# Patient Record
Sex: FEMALE | Race: WHITE | NOT HISPANIC OR LATINO | Employment: OTHER | URBAN - METROPOLITAN AREA
[De-identification: names, ages, dates, MRNs, and addresses within clinical notes are randomized per-mention and may not be internally consistent; named-entity substitution may affect disease eponyms.]

---

## 2019-09-18 ENCOUNTER — APPOINTMENT (OUTPATIENT)
Dept: ONCOLOGY | Facility: CLINIC | Age: 69
End: 2019-09-18

## 2019-10-25 ENCOUNTER — TRANSCRIBE ORDERS (OUTPATIENT)
Dept: ADMINISTRATIVE | Facility: HOSPITAL | Age: 69
End: 2019-10-25

## 2019-10-25 DIAGNOSIS — Z85.3 PERSONAL HISTORY OF MALIGNANT NEOPLASM OF BREAST: Primary | ICD-10-CM

## 2019-10-30 ENCOUNTER — HOSPITAL ENCOUNTER (OUTPATIENT)
Dept: MRI IMAGING | Facility: HOSPITAL | Age: 69
Discharge: HOME | End: 2019-10-30
Admitting: NURSE PRACTITIONER

## 2019-10-30 DIAGNOSIS — Z85.3 PERSONAL HISTORY OF MALIGNANT NEOPLASM OF BREAST: ICD-10-CM

## 2019-10-30 LAB — CREAT BLDA-MCNC: 1.2 MG/DL (ref 0.6–1.3)

## 2019-10-30 PROCEDURE — A9576 INJ PROHANCE MULTIPACK: HCPCS | Performed by: NURSE PRACTITIONER

## 2019-10-30 PROCEDURE — 82565 ASSAY OF CREATININE: CPT

## 2019-10-30 PROCEDURE — C8937 CAD BREAST MRI: HCPCS

## 2019-10-30 PROCEDURE — C8908 MRI W/O FOL W/CONT, BREAST,: HCPCS

## 2019-10-30 PROCEDURE — 25010000002 GADOTERIDOL PER 1 ML: Performed by: NURSE PRACTITIONER

## 2019-10-30 RX ADMIN — GADOTERIDOL 20 ML: 279.3 INJECTION, SOLUTION INTRAVENOUS at 15:45

## 2019-11-18 ENCOUNTER — TRANSCRIBE ORDERS (OUTPATIENT)
Dept: ADMINISTRATIVE | Facility: HOSPITAL | Age: 69
End: 2019-11-18

## 2019-11-18 DIAGNOSIS — K22.89 ESOPHAGEAL THICKENING: Primary | ICD-10-CM

## 2019-11-21 ENCOUNTER — APPOINTMENT (OUTPATIENT)
Dept: CT IMAGING | Facility: HOSPITAL | Age: 69
End: 2019-11-21

## 2019-11-26 ENCOUNTER — APPOINTMENT (OUTPATIENT)
Dept: MRI IMAGING | Facility: HOSPITAL | Age: 69
End: 2019-11-26

## 2019-11-26 ENCOUNTER — APPOINTMENT (OUTPATIENT)
Dept: CT IMAGING | Facility: HOSPITAL | Age: 69
End: 2019-11-26

## 2019-11-26 ENCOUNTER — TRANSCRIBE ORDERS (OUTPATIENT)
Dept: ADMINISTRATIVE | Facility: HOSPITAL | Age: 69
End: 2019-11-26

## 2019-11-26 ENCOUNTER — HOSPITAL ENCOUNTER (OUTPATIENT)
Dept: MRI IMAGING | Facility: HOSPITAL | Age: 69
End: 2019-11-26

## 2019-11-26 ENCOUNTER — HOSPITAL ENCOUNTER (OUTPATIENT)
Dept: CT IMAGING | Facility: HOSPITAL | Age: 69
Discharge: HOME | End: 2019-11-26
Admitting: NURSE PRACTITIONER

## 2019-11-26 DIAGNOSIS — K22.89 ESOPHAGEAL THICKENING: ICD-10-CM

## 2019-11-26 DIAGNOSIS — K22.89 ESOPHAGEAL THICKENING: Primary | ICD-10-CM

## 2019-11-26 LAB — CREAT BLDA-MCNC: 1 MG/DL (ref 0.6–1.3)

## 2019-11-26 PROCEDURE — 82565 ASSAY OF CREATININE: CPT

## 2019-11-26 PROCEDURE — 74160 CT ABDOMEN W/CONTRAST: CPT

## 2019-11-26 PROCEDURE — 71260 CT THORAX DX C+: CPT

## 2019-11-26 PROCEDURE — 0 IOPAMIDOL PER 1 ML: Performed by: NURSE PRACTITIONER

## 2019-11-26 RX ADMIN — IOPAMIDOL 100 ML: 755 INJECTION, SOLUTION INTRAVENOUS at 16:30

## 2023-11-15 ENCOUNTER — HOSPITAL ENCOUNTER (OUTPATIENT)
Dept: CARDIOLOGY | Facility: HOSPITAL | Age: 73
Discharge: HOME | End: 2023-11-15
Payer: MEDICARE

## 2023-11-15 ENCOUNTER — HOSPITAL ENCOUNTER (OUTPATIENT)
Dept: GENERAL RADIOLOGY | Facility: HOSPITAL | Age: 73
Discharge: HOME | End: 2023-11-15
Payer: MEDICARE

## 2023-11-15 ENCOUNTER — LAB (OUTPATIENT)
Dept: LAB | Facility: HOSPITAL | Age: 73
End: 2023-11-15
Payer: MEDICARE

## 2023-11-15 ENCOUNTER — PRE-ADMISSION TESTING (OUTPATIENT)
Dept: PREADMISSION TESTING | Facility: HOSPITAL | Age: 73
End: 2023-11-15
Payer: MEDICARE

## 2023-11-15 VITALS
DIASTOLIC BLOOD PRESSURE: 61 MMHG | SYSTOLIC BLOOD PRESSURE: 195 MMHG | HEIGHT: 60 IN | OXYGEN SATURATION: 99 % | RESPIRATION RATE: 18 BRPM | BODY MASS INDEX: 29.96 KG/M2 | HEART RATE: 73 BPM | WEIGHT: 152.6 LBS | TEMPERATURE: 98 F

## 2023-11-15 DIAGNOSIS — Z01.818 PRE-OP TESTING: Primary | ICD-10-CM

## 2023-11-15 LAB
ABO GROUP BLD: NORMAL
ALBUMIN SERPL-MCNC: 3.9 G/DL (ref 3.5–5.2)
ALBUMIN/GLOB SERPL: 1.3 G/DL
ALP SERPL-CCNC: 90 U/L (ref 39–117)
ALT SERPL W P-5'-P-CCNC: 8 U/L (ref 1–33)
ANION GAP SERPL CALCULATED.3IONS-SCNC: 10.3 MMOL/L (ref 5–15)
AST SERPL-CCNC: 15 U/L (ref 1–32)
BASOPHILS # BLD AUTO: 0.03 10*3/MM3 (ref 0–0.2)
BASOPHILS NFR BLD AUTO: 0.5 % (ref 0–1.5)
BILIRUB SERPL-MCNC: <0.2 MG/DL (ref 0–1.2)
BLD GP AB SCN SERPL QL: NEGATIVE
BUN SERPL-MCNC: 12 MG/DL (ref 8–23)
BUN/CREAT SERPL: 8.5 (ref 7–25)
CALCIUM SPEC-SCNC: 9.4 MG/DL (ref 8.6–10.5)
CHLORIDE SERPL-SCNC: 101 MMOL/L (ref 98–107)
CO2 SERPL-SCNC: 24.7 MMOL/L (ref 22–29)
CREAT SERPL-MCNC: 1.42 MG/DL (ref 0.57–1)
DEPRECATED RDW RBC AUTO: 42.8 FL (ref 37–54)
EGFRCR SERPLBLD CKD-EPI 2021: 39.6 ML/MIN/1.73
EOSINOPHIL # BLD AUTO: 0.26 10*3/MM3 (ref 0–0.4)
EOSINOPHIL NFR BLD AUTO: 4.5 % (ref 0.3–6.2)
ERYTHROCYTE [DISTWIDTH] IN BLOOD BY AUTOMATED COUNT: 13.9 % (ref 12.3–15.4)
GLOBULIN UR ELPH-MCNC: 3 GM/DL
GLUCOSE SERPL-MCNC: 68 MG/DL (ref 65–99)
HBA1C MFR BLD: 5.6 % (ref 4.8–5.6)
HCT VFR BLD AUTO: 31.8 % (ref 34–46.6)
HGB BLD-MCNC: 10.3 G/DL (ref 12–15.9)
IMM GRANULOCYTES # BLD AUTO: 0.01 10*3/MM3 (ref 0–0.05)
IMM GRANULOCYTES NFR BLD AUTO: 0.2 % (ref 0–0.5)
INR PPP: 1.02 (ref 0.93–1.1)
LYMPHOCYTES # BLD AUTO: 1.43 10*3/MM3 (ref 0.7–3.1)
LYMPHOCYTES NFR BLD AUTO: 24.5 % (ref 19.6–45.3)
MCH RBC QN AUTO: 27.5 PG (ref 26.6–33)
MCHC RBC AUTO-ENTMCNC: 32.4 G/DL (ref 31.5–35.7)
MCV RBC AUTO: 84.8 FL (ref 79–97)
MONOCYTES # BLD AUTO: 0.53 10*3/MM3 (ref 0.1–0.9)
MONOCYTES NFR BLD AUTO: 9.1 % (ref 5–12)
MRSA DNA SPEC QL NAA+PROBE: NORMAL
NEUTROPHILS NFR BLD AUTO: 3.57 10*3/MM3 (ref 1.7–7)
NEUTROPHILS NFR BLD AUTO: 61.2 % (ref 42.7–76)
NRBC BLD AUTO-RTO: 0 /100 WBC (ref 0–0.2)
PLATELET # BLD AUTO: 233 10*3/MM3 (ref 140–450)
PMV BLD AUTO: 10.4 FL (ref 6–12)
POTASSIUM SERPL-SCNC: 3.8 MMOL/L (ref 3.5–5.2)
PROT SERPL-MCNC: 6.9 G/DL (ref 6–8.5)
PROTHROMBIN TIME: 11.1 SECONDS (ref 9.6–11.7)
RBC # BLD AUTO: 3.75 10*6/MM3 (ref 3.77–5.28)
RH BLD: POSITIVE
SODIUM SERPL-SCNC: 136 MMOL/L (ref 136–145)
T&S EXPIRATION DATE: NORMAL
WBC NRBC COR # BLD: 5.83 10*3/MM3 (ref 3.4–10.8)

## 2023-11-15 PROCEDURE — 86900 BLOOD TYPING SEROLOGIC ABO: CPT

## 2023-11-15 PROCEDURE — 85610 PROTHROMBIN TIME: CPT

## 2023-11-15 PROCEDURE — 86901 BLOOD TYPING SEROLOGIC RH(D): CPT

## 2023-11-15 PROCEDURE — 80053 COMPREHEN METABOLIC PANEL: CPT

## 2023-11-15 PROCEDURE — 85025 COMPLETE CBC W/AUTO DIFF WBC: CPT | Performed by: ORTHOPAEDIC SURGERY

## 2023-11-15 PROCEDURE — 86850 RBC ANTIBODY SCREEN: CPT

## 2023-11-15 PROCEDURE — 71046 X-RAY EXAM CHEST 2 VIEWS: CPT

## 2023-11-15 PROCEDURE — 87641 MR-STAPH DNA AMP PROBE: CPT

## 2023-11-15 PROCEDURE — 93005 ELECTROCARDIOGRAM TRACING: CPT | Performed by: ORTHOPAEDIC SURGERY

## 2023-11-15 PROCEDURE — 97161 PT EVAL LOW COMPLEX 20 MIN: CPT

## 2023-11-15 PROCEDURE — 36415 COLL VENOUS BLD VENIPUNCTURE: CPT | Performed by: ORTHOPAEDIC SURGERY

## 2023-11-15 PROCEDURE — 83036 HEMOGLOBIN GLYCOSYLATED A1C: CPT

## 2023-11-15 RX ORDER — OMEPRAZOLE 40 MG/1
1 CAPSULE, DELAYED RELEASE ORAL DAILY
Status: ON HOLD | COMMUNITY
Start: 2023-08-17

## 2023-11-15 RX ORDER — ZOLPIDEM TARTRATE 10 MG/1
1 TABLET ORAL NIGHTLY PRN
Status: ON HOLD | COMMUNITY

## 2023-11-15 RX ORDER — ATORVASTATIN CALCIUM 10 MG/1
1 TABLET, FILM COATED ORAL DAILY
Status: ON HOLD | COMMUNITY
Start: 2023-08-09

## 2023-11-15 RX ORDER — CETIRIZINE HYDROCHLORIDE 10 MG/1
1 TABLET ORAL DAILY
Status: ON HOLD | COMMUNITY
Start: 2023-09-06

## 2023-11-15 RX ORDER — CALCIUM CARBONATE/VITAMIN D3 500MG-5MCG
1 TABLET ORAL 2 TIMES DAILY
Status: ON HOLD | COMMUNITY

## 2023-11-15 RX ORDER — OXYBUTYNIN CHLORIDE 15 MG/1
1 TABLET, EXTENDED RELEASE ORAL DAILY
Status: ON HOLD | COMMUNITY
Start: 2023-07-18

## 2023-11-15 RX ORDER — FLUTICASONE PROPIONATE 50 MCG
2 SPRAY, SUSPENSION (ML) NASAL DAILY
Status: ON HOLD | COMMUNITY
Start: 2023-08-04

## 2023-11-15 RX ORDER — ALENDRONATE SODIUM 70 MG/1
1 TABLET ORAL WEEKLY
Status: ON HOLD | COMMUNITY
Start: 2023-07-24

## 2023-11-15 NOTE — THERAPY EVALUATION
Patient Name: Sandra Treadwell  : 1950    MRN: [id number]                              Today's Date: 11/15/2023       Admit Date: (Not on file)    Visit Dx: No diagnosis found.  There is no problem list on file for this patient.    Past Medical History:   Diagnosis Date    Bladder leak     Breast cancer     chemo and radiation    Depression     GERD (gastroesophageal reflux disease)     Hyperlipidemia     Osteoporosis      Past Surgical History:   Procedure Laterality Date    BREAST SURGERY      mastectomy    CHOLECYSTECTOMY      HYSTERECTOMY        General Information       Row Name 11/15/23 1434          Physical Therapy Time and Intention    Document Type evaluation  in PAT  -BR     Mode of Treatment physical therapy  -BR       Row Name 11/15/23 1434          General Information    Patient Profile Reviewed yes  -BR     Prior Level of Function independent:;all household mobility;community mobility;gait;transfer  -BR     Existing Precautions/Restrictions no known precautions/restrictions  -BR     Barriers to Rehab none identified  -BR       Row Name 11/15/23 1434          Living Environment    People in Home spouse  -BR       Row Name 11/15/23 143          Home Main Entrance    Number of Stairs, Main Entrance one  -BR       Row Name 11/15/23 1434          Stairs Within Home, Primary    Stairs, Within Home, Primary Pt  usually enters home from walk out basement and has several steps but pt can enter home from back of house.  -BR       Row Name 11/15/23 1434          Cognition    Orientation Status (Cognition) oriented x 4  -BR       Row Name 11/15/23 143          Safety Issues, Functional Mobility    Impairments Affecting Function (Mobility) balance;endurance/activity tolerance;pain;range of motion (ROM);strength  -BR               User Key  (r) = Recorded By, (t) = Taken By, (c) = Cosigned By      Initials Name Provider Type    Sharri Gonzalez PT Physical Therapist                   Mobility       Row  Name 11/15/23 1443          Sit-Stand Transfer    Sit-Stand [place] (Transfers) modified independence  -BR       Row Name 11/15/23 1443          Gait/Stairs (Locomotion)    [place] Level (Gait) standby assist  -BR     Distance in Feet (Gait) >150  -BR     Deviations/Abnormal Patterns (Gait) raymundo decreased;base of support, wide;antalgic;stride length decreased  -BR     Bilateral Gait Deviations heel strike decreased  -BR               User Key  (r) = Recorded By, (t) = Taken By, (c) = Cosigned By      Initials Name Provider Type    BR Sharri Yanes PT Physical Therapist                   Obj/Interventions       Row Name 11/15/23 1444          Range of Motion Comprehensive    Comment, General Range of Motion AROM right knee 0 to 120 degrees  -BR       Row Name 11/15/23 1444          Strength Comprehensive (MMT)    Comment, General Manual Muscle Testing (MMT) Assessment BLE strength grossly 3+/5  -BR       Row Name 11/15/23 1444          Motor Skills    Therapeutic Exercise knee  PT reviewed TKA protocol exercises with pt and spouse  -BR       Row Name 11/15/23 1444          Balance    Balance Assessment sitting dynamic balance;standing static balance  -BR     Dynamic Sitting Balance modified independence  -BR     Position, Sitting Balance sitting in chair  -BR     Static Standing Balance standby assist  -BR     Position/Device Used, Standing Balance unsupported  -BR       Row Name 11/15/23 1444          Sensory Assessment (Somatosensory)    Sensory Assessment (Somatosensory) sensation intact  -BR               User Key  (r) = Recorded By, (t) = Taken By, (c) = Cosigned By      Initials Name Provider Type    Sharri Gonzalez PT Physical Therapist                   Goals/Plan       Row Name 11/15/23 1450          Bed Mobility Goal 1 (PT)    Activity/Assistive Device (Bed Mobility Goal 1, PT) bed mobility activities, all  -BR     [place] Level/Cues Needed (Bed Mobility Goal 1, PT) modified  independence  -BR     Time Frame (Bed Mobility Goal 1, PT) long term goal (LTG);2 weeks  -BR       Row Name 11/15/23 1450          Transfer Goal 1 (PT)    Activity/Assistive Device (Transfer Goal 1, PT) transfers, all  -BR     [place] Level/Cues Needed (Transfer Goal 1, PT) standby assist  -BR     Time Frame (Transfer Goal 1, PT) long term goal (LTG);2 weeks  -BR       Row Name 11/15/23 1450          Gait Training Goal 1 (PT)    Activity/Assistive Device (Gait Training Goal 1, PT) gait (walking locomotion);assistive device use  -BR     [place] Level (Gait Training Goal 1, PT) supervision required  -BR     Distance (Gait Training Goal 1, PT) 100  -BR     Time Frame (Gait Training Goal 1, PT) long term goal (LTG);2 weeks  -BR       Row Name 11/15/23 1450          ROM Goal 1 (PT)    ROM Goal 1 (PT) AROM right knee 0 to 90 degrees  -BR       Row Name 11/15/23 1450          Stairs Goal 1 (PT)    Activity/Assistive Device (Stairs Goal 1, PT) stairs, all skills  -BR     [place] Level/Cues Needed (Stairs Goal 1, PT) contact guard required  -BR     Number of Stairs (Stairs Goal 1, PT) 1  -BR     Time Frame (Stairs Goal 1, PT) long term goal (LTG);2 weeks  -BR       Row Name 11/15/23 0800          Patient Education Goal (PT)    Activity (Patient Education Goal, PT) Pt independent with HEP.  -BR     Time Frame (Patient Education Goal, PT) long term goal (LTG);2 weeks  -BR       Row Name 11/15/23 7580          Therapy Assessment/Plan (PT)    Planned Therapy Interventions (PT) balance training;bed mobility training;gait training;home exercise program;patient/family education;transfer training;ROM (range of motion);stair training;strengthening;stretching  -BR               User Key  (r) = Recorded By, (t) = Taken By, (c) = Cosigned By      Initials Name Provider Type    Sharri Gonzalez, PT Physical Therapist                   Clinical Impression       Row Name 11/15/23 6233          Pain    Pretreatment Pain  Rating 0/10 - no pain  -BR     Posttreatment Pain Rating 0/10 - no pain  -BR       Row Name 11/15/23 1445          Plan of Care Review    Plan of Care Reviewed With patient;spouse  -BR     Outcome Evaluation Pt presents to PAT as  a 72 y/o F for planned R TKA per Dr. Chen on 11/24/23. PT A and O x 4. AROM right knee o to 120 degrees. AT baseline, pt lives with spouse in home with walk out basement with 1 NATALIA and uses no AD for community mobility. Pt up ad ramona in hospital for PAT. PT reviewed TKA protocol with pt including exercises, use of AD, cryotherapy, edema management and schedule. PT answered all pt questions. Pt has supportive spouse who will provide 24/7 care. PT will re-evaluate pt post-operatively. PT recommendation is Home with Assist and Home Health Physical Therapy. Pt will need a rolling walker for home.  -BR       Row Name 11/15/23 1445          Therapy Assessment/Plan (PT)    Rehab Potential (PT) good, to achieve stated therapy goals  -BR     Criteria for Skilled Interventions Met (PT) yes;meets criteria;skilled treatment is necessary  -BR     Therapy Frequency (PT) 2 times/day  -BR     Predicted Duration of Therapy Intervention (PT) until D/C  -BR       Row Name 11/15/23 1442          Vital Signs    O2 Delivery Pre Treatment room air  -BR               User Key  (r) = Recorded By, (t) = Taken By, (c) = Cosigned By      Initials Name Provider Type    Sharri Gonzalez, PT Physical Therapist                   Outcome Measures       Row Name 11/15/23 0833          How much help from another person do you currently need...    Turning from your back to your side while in flat bed without using bedrails? 4  -BR     Moving from lying on back to sitting on the side of a flat bed without bedrails? 4  -BR     Moving to and from a bed to a chair (including a wheelchair)? 4  -BR     Standing up from a chair using your arms (e.g., wheelchair, bedside chair)? 4  -BR     Climbing 3-5 steps with a railing? 4   -BR     To walk in hospital room? 4  -BR     AM-PAC 6 Clicks Score (PT) 24  -BR     Highest Level of Mobility Goal 8 --> Walked 250 feet or more  -BR       Row Name 11/15/23 1451          Functional Assessment    Outcome Measure Options AM-PAC 6 Clicks Basic Mobility (PT)  -BR               User Key  (r) = Recorded By, (t) = Taken By, (c) = Cosigned By      Initials Name Provider Type    BR Sharri Yanes PT Physical Therapist                                 Physical Therapy Education       Title: PT OT SLP Therapies (Done)       Topic: Physical Therapy (Done)       Point: Mobility training (Done)       Learning Progress Summary             Patient Acceptance, E,D,H, VU,DU by BR at 11/15/2023 1451   Family Acceptance, E,D,H, VU,DU by BR at 11/15/2023 1451                         Point: Home exercise program (Done)       Learning Progress Summary             Patient Acceptance, E,D,H, VU,DU by BR at 11/15/2023 1451   Family Acceptance, E,D,H, VU,DU by BR at 11/15/2023 1451                         Point: Body mechanics (Done)       Learning Progress Summary             Patient Acceptance, E,D,H, VU,DU by BR at 11/15/2023 1451   Family Acceptance, E,D,H, VU,DU by BR at 11/15/2023 1451                         Point: Precautions (Done)       Learning Progress Summary             Patient Acceptance, E,D,H, VU,DU by BR at 11/15/2023 1451   Family Acceptance, E,D,H, VU,DU by BR at 11/15/2023 1451                                         User Key       Initials Effective Dates Name Provider Type Discipline     02/01/22 -  Sharri Yanes, LEATHA Physical Therapist PT                  PT Recommendation and Plan  Planned Therapy Interventions (PT): balance training, bed mobility training, gait training, home exercise program, patient/family education, transfer training, ROM (range of motion), stair training, strengthening, stretching  Plan of Care Reviewed With: patient, spouse  Outcome Evaluation: Pt presents to PAT as  a  74 y/o F for planned R TKA per Dr. Chen on 11/24/23. PT A and O x 4. AROM right knee o to 120 degrees. AT baseline, pt lives with spouse in home with walk out basement with 1 NATALIA and uses no AD for community mobility. Pt up ad ramona in hospital for PAT. PT reviewed TKA protocol with pt including exercises, use of AD, cryotherapy, edema management and schedule. PT answered all pt questions. Pt has supportive spouse who will provide 24/7 care. PT will re-evaluate pt post-operatively. PT recommendation is Home with Assist and Home Health Physical Therapy. Pt will need a rolling walker for home.     Time Calculation:         PT Charges       Row Name 11/15/23 1452             Time Calculation    Start Time 1130  -BR      Stop Time 1152  -BR      Time Calculation (min) 22 min  -BR      PT Received On 11/15/23  -BR      PT - Next Appointment 11/24/23  -BR      PT Goal Re-Cert Due Date 11/29/23  -BR         Time Calculation- PT    Total Timed Code Minutes- PT 0 minute(s)  -BR                User Key  (r) = Recorded By, (t) = Taken By, (c) = Cosigned By      Initials Name Provider Type    BR Sharri Yanes, PT Physical Therapist                  Therapy Charges for Today       Code Description Service Date Service Provider Modifiers Qty    85335538325 HC PT EVAL LOW COMPLEXITY 4 11/15/2023 Sharri Yanes, PT GP 1            PT G-Codes  Outcome Measure Options: AM-PAC 6 Clicks Basic Mobility (PT)  AM-PAC 6 Clicks Score (PT): 24  PT Discharge Summary  Anticipated Discharge Disposition (PT): home with assist, home with home health    Sharri Yanes, LEATHA  11/15/2023

## 2023-11-15 NOTE — PLAN OF CARE
Goal Outcome Evaluation:  Plan of Care Reviewed With: patient, spouse   Pt presents to PAT as  a 74 y/o F for planned R TKA per Dr. Chen on 11/24/23. PT A and O x 4. AROM right knee o to 120 degrees. AT baseline, pt lives with spouse in home with walk out basement with 1 NATALIA and uses no AD for community mobility. Pt up ad ramona in hospital for PAT. PT reviewed TKA protocol with pt including exercises, use of AD, cryotherapy, edema management and schedule. PT answered all pt questions. Pt has supportive spouse who will provide 24/7 care. PT will re-evaluate pt post-operatively. PT recommendation is Home with Assist and Home Health Physical Therapy. Pt will need a rolling walker for home.                Anticipated Discharge Disposition (PT): home with assist, home with home health

## 2023-11-15 NOTE — PAT
Pt has abn EKG and [facility] states it is always abn.  Call to [facility] to obtain.  They are faxing EKG.

## 2023-11-16 LAB
QT INTERVAL: 457 MS
QTC INTERVAL: 499 MS

## 2023-11-17 ENCOUNTER — LAB (OUTPATIENT)
Dept: LAB | Facility: HOSPITAL | Age: 73
End: 2023-11-17
Payer: MEDICARE

## 2023-11-17 LAB
BACTERIA UR QL AUTO: ABNORMAL /HPF
BILIRUB UR QL STRIP: NEGATIVE
CLARITY UR: CLEAR
COLOR UR: YELLOW
GLUCOSE UR STRIP-MCNC: NEGATIVE MG/DL
HGB UR QL STRIP.AUTO: NEGATIVE
HYALINE CASTS UR QL AUTO: ABNORMAL /LPF
KETONES UR QL STRIP: NEGATIVE
LEUKOCYTE ESTERASE UR QL STRIP.AUTO: ABNORMAL
NITRITE UR QL STRIP: NEGATIVE
PH UR STRIP.AUTO: 5.5 [PH] (ref 5–8)
PROT UR QL STRIP: NEGATIVE
RBC # UR STRIP: ABNORMAL /HPF
REF LAB TEST METHOD: ABNORMAL
SP GR UR STRIP: 1.01 (ref 1–1.03)
SQUAMOUS #/AREA URNS HPF: ABNORMAL /HPF
UROBILINOGEN UR QL STRIP: ABNORMAL
WBC # UR STRIP: ABNORMAL /HPF

## 2023-11-17 PROCEDURE — 87086 URINE CULTURE/COLONY COUNT: CPT

## 2023-11-17 PROCEDURE — 81001 URINALYSIS AUTO W/SCOPE: CPT

## 2023-11-18 LAB — BACTERIA SPEC AEROBE CULT: NORMAL

## 2023-11-24 ENCOUNTER — ANESTHESIA EVENT (OUTPATIENT)
Dept: PERIOP | Facility: HOSPITAL | Age: 73
End: 2023-11-24
Payer: MEDICARE

## 2023-11-24 ENCOUNTER — APPOINTMENT (OUTPATIENT)
Dept: GENERAL RADIOLOGY | Facility: HOSPITAL | Age: 73
DRG: 469 | End: 2023-11-24
Payer: MEDICARE

## 2023-11-24 ENCOUNTER — ANESTHESIA (OUTPATIENT)
Dept: PERIOP | Facility: HOSPITAL | Age: 73
End: 2023-11-24
Payer: MEDICARE

## 2023-11-24 ENCOUNTER — HOSPITAL ENCOUNTER (INPATIENT)
Facility: HOSPITAL | Age: 73
LOS: 8 days | Discharge: NURSING, REHAB OR LONG-TERM CARE | DRG: 469 | End: 2023-12-04
Attending: ORTHOPAEDIC SURGERY | Admitting: ORTHOPAEDIC SURGERY
Payer: MEDICARE

## 2023-11-24 DIAGNOSIS — M17.11 UNILATERAL PRIMARY OSTEOARTHRITIS, RIGHT KNEE: Primary | ICD-10-CM

## 2023-11-24 PROBLEM — Z96.659 STATUS POST KNEE REPLACEMENT: Status: ACTIVE | Noted: 2023-11-24

## 2023-11-24 PROCEDURE — 97164 PT RE-EVAL EST PLAN CARE: CPT

## 2023-11-24 PROCEDURE — 25010000002 MIDAZOLAM PER 1 MG: Performed by: ANESTHESIOLOGY

## 2023-11-24 PROCEDURE — 0SRC069 REPLACEMENT OF RIGHT KNEE JOINT WITH OXIDIZED ZIRCONIUM ON POLYETHYLENE SYNTHETIC SUBSTITUTE, CEMENTED, OPEN APPROACH: ICD-10-PCS | Performed by: ORTHOPAEDIC SURGERY

## 2023-11-24 PROCEDURE — 25810000003 LACTATED RINGERS PER 1000 ML: Performed by: ORTHOPAEDIC SURGERY

## 2023-11-24 PROCEDURE — 25010000002 CEFAZOLIN PER 500 MG: Performed by: ORTHOPAEDIC SURGERY

## 2023-11-24 PROCEDURE — 25010000002 FENTANYL CITRATE (PF) 100 MCG/2ML SOLUTION: Performed by: ANESTHESIOLOGIST ASSISTANT

## 2023-11-24 PROCEDURE — C1713 ANCHOR/SCREW BN/BN,TIS/BN: HCPCS | Performed by: ORTHOPAEDIC SURGERY

## 2023-11-24 PROCEDURE — 25010000002 PROPOFOL 200 MG/20ML EMULSION: Performed by: ANESTHESIOLOGIST ASSISTANT

## 2023-11-24 PROCEDURE — A9270 NON-COVERED ITEM OR SERVICE: HCPCS | Performed by: ORTHOPAEDIC SURGERY

## 2023-11-24 PROCEDURE — 8E0Y0CZ ROBOTIC ASSISTED PROCEDURE OF LOWER EXTREMITY, OPEN APPROACH: ICD-10-PCS | Performed by: ORTHOPAEDIC SURGERY

## 2023-11-24 PROCEDURE — 25810000003 SODIUM CHLORIDE 0.9 % SOLUTION: Performed by: ORTHOPAEDIC SURGERY

## 2023-11-24 PROCEDURE — 63710000001 SERTRALINE 50 MG TABLET: Performed by: ORTHOPAEDIC SURGERY

## 2023-11-24 PROCEDURE — C1776 JOINT DEVICE (IMPLANTABLE): HCPCS | Performed by: ORTHOPAEDIC SURGERY

## 2023-11-24 PROCEDURE — 25010000002 DEXAMETHASONE PER 1 MG: Performed by: ANESTHESIOLOGIST ASSISTANT

## 2023-11-24 PROCEDURE — 25010000002 VANCOMYCIN 1 G RECONSTITUTED SOLUTION: Performed by: ORTHOPAEDIC SURGERY

## 2023-11-24 PROCEDURE — 25010000002 HYDROMORPHONE 1 MG/ML SOLUTION: Performed by: ANESTHESIOLOGIST ASSISTANT

## 2023-11-24 PROCEDURE — 63710000001 MELOXICAM 15 MG TABLET: Performed by: ORTHOPAEDIC SURGERY

## 2023-11-24 PROCEDURE — 73560 X-RAY EXAM OF KNEE 1 OR 2: CPT

## 2023-11-24 PROCEDURE — 25010000002 KETOROLAC TROMETHAMINE PER 15 MG: Performed by: ORTHOPAEDIC SURGERY

## 2023-11-24 PROCEDURE — 25010000002 EPINEPHRINE 1 MG/ML SOLUTION: Performed by: ORTHOPAEDIC SURGERY

## 2023-11-24 PROCEDURE — 25010000002 CLONIDINE PER 1 MG: Performed by: ORTHOPAEDIC SURGERY

## 2023-11-24 PROCEDURE — 25810000003 LACTATED RINGERS SOLUTION: Performed by: ANESTHESIOLOGY

## 2023-11-24 PROCEDURE — 25010000002 ONDANSETRON PER 1 MG: Performed by: ANESTHESIOLOGIST ASSISTANT

## 2023-11-24 PROCEDURE — 25010000002 ROPIVACAINE PER 1 MG: Performed by: ANESTHESIOLOGY

## 2023-11-24 PROCEDURE — 25010000002 ROPIVACAINE PER 1 MG: Performed by: ORTHOPAEDIC SURGERY

## 2023-11-24 PROCEDURE — 63710000001 OXYCODONE 5 MG TABLET: Performed by: ORTHOPAEDIC SURGERY

## 2023-11-24 DEVICE — DEV WND/CLS CONTRL TISS STRATAFIX SYMM PDS PLS CTX 60CM VIL: Type: IMPLANTABLE DEVICE | Site: KNEE | Status: FUNCTIONAL

## 2023-11-24 DEVICE — IMPLANTABLE DEVICE: Type: IMPLANTABLE DEVICE | Site: KNEE | Status: FUNCTIONAL

## 2023-11-24 DEVICE — JOURNEY II BCS FEMORAL OXINIUM                                    RIGHT SIZE 4
Type: IMPLANTABLE DEVICE | Site: KNEE | Status: FUNCTIONAL
Brand: JOURNEY

## 2023-11-24 DEVICE — DEV CONTRL TISS STRATAFIX SPIRAL MNCRYL UD 3/0 PLS 30CM: Type: IMPLANTABLE DEVICE | Site: KNEE | Status: FUNCTIONAL

## 2023-11-24 DEVICE — JOURNEY II BCS XLPE ARTICULAR                                    INSERT SIZE 1-2 RIGHT 11MM
Type: IMPLANTABLE DEVICE | Site: KNEE | Status: FUNCTIONAL
Brand: JOURNEY

## 2023-11-24 DEVICE — JOURNEY 7.5 ROUND RESURF PAT 32MM STANDARD
Type: IMPLANTABLE DEVICE | Site: KNEE | Status: FUNCTIONAL
Brand: JOURNEY

## 2023-11-24 DEVICE — CMT BONE PALACOS R HI/VISC 1X40: Type: IMPLANTABLE DEVICE | Site: KNEE | Status: FUNCTIONAL

## 2023-11-24 DEVICE — JOURNEY TIBIAL BASEPLATE NONPOROUS                                    RT SZ 2
Type: IMPLANTABLE DEVICE | Site: KNEE | Status: FUNCTIONAL
Brand: JOURNEY

## 2023-11-24 RX ORDER — DIPHENHYDRAMINE HCL 25 MG
25 CAPSULE ORAL
Status: DISCONTINUED | OUTPATIENT
Start: 2023-11-24 | End: 2023-11-24 | Stop reason: HOSPADM

## 2023-11-24 RX ORDER — ACETAMINOPHEN 650 MG/1
650 SUPPOSITORY RECTAL EVERY 4 HOURS PRN
Status: DISCONTINUED | OUTPATIENT
Start: 2023-11-24 | End: 2023-11-24 | Stop reason: HOSPADM

## 2023-11-24 RX ORDER — FENTANYL CITRATE 50 UG/ML
100 INJECTION, SOLUTION INTRAMUSCULAR; INTRAVENOUS
Status: DISCONTINUED | OUTPATIENT
Start: 2023-11-24 | End: 2023-11-24 | Stop reason: HOSPADM

## 2023-11-24 RX ORDER — ONDANSETRON 2 MG/ML
4 INJECTION INTRAMUSCULAR; INTRAVENOUS EVERY 6 HOURS PRN
Status: DISCONTINUED | OUTPATIENT
Start: 2023-11-24 | End: 2023-12-04 | Stop reason: HOSPADM

## 2023-11-24 RX ORDER — MELOXICAM 15 MG/1
15 TABLET ORAL DAILY
Status: DISCONTINUED | OUTPATIENT
Start: 2023-11-24 | End: 2023-11-25

## 2023-11-24 RX ORDER — DEXAMETHASONE SODIUM PHOSPHATE 4 MG/ML
INJECTION, SOLUTION INTRA-ARTICULAR; INTRALESIONAL; INTRAMUSCULAR; INTRAVENOUS; SOFT TISSUE AS NEEDED
Status: DISCONTINUED | OUTPATIENT
Start: 2023-11-24 | End: 2023-11-24 | Stop reason: SURG

## 2023-11-24 RX ORDER — VANCOMYCIN HYDROCHLORIDE 1 G/20ML
INJECTION, POWDER, LYOPHILIZED, FOR SOLUTION INTRAVENOUS AS NEEDED
Status: DISCONTINUED | OUTPATIENT
Start: 2023-11-24 | End: 2023-11-24 | Stop reason: HOSPADM

## 2023-11-24 RX ORDER — ONDANSETRON 2 MG/ML
4 INJECTION INTRAMUSCULAR; INTRAVENOUS ONCE AS NEEDED
Status: DISCONTINUED | OUTPATIENT
Start: 2023-11-24 | End: 2023-11-24 | Stop reason: HOSPADM

## 2023-11-24 RX ORDER — MIDAZOLAM HYDROCHLORIDE 1 MG/ML
2 INJECTION INTRAMUSCULAR; INTRAVENOUS
Status: DISCONTINUED | OUTPATIENT
Start: 2023-11-24 | End: 2023-11-24 | Stop reason: HOSPADM

## 2023-11-24 RX ORDER — ONDANSETRON 4 MG/1
4 TABLET, FILM COATED ORAL EVERY 6 HOURS PRN
Status: DISCONTINUED | OUTPATIENT
Start: 2023-11-24 | End: 2023-12-04 | Stop reason: HOSPADM

## 2023-11-24 RX ORDER — ACETAMINOPHEN 325 MG/1
650 TABLET ORAL ONCE AS NEEDED
Status: DISCONTINUED | OUTPATIENT
Start: 2023-11-24 | End: 2023-11-24 | Stop reason: HOSPADM

## 2023-11-24 RX ORDER — SODIUM CHLORIDE 9 MG/ML
100 INJECTION, SOLUTION INTRAVENOUS CONTINUOUS
Status: DISCONTINUED | OUTPATIENT
Start: 2023-11-24 | End: 2023-11-26

## 2023-11-24 RX ORDER — FENTANYL CITRATE 50 UG/ML
50 INJECTION, SOLUTION INTRAMUSCULAR; INTRAVENOUS
Status: DISCONTINUED | OUTPATIENT
Start: 2023-11-24 | End: 2023-11-24 | Stop reason: HOSPADM

## 2023-11-24 RX ORDER — OXYCODONE HYDROCHLORIDE 5 MG/1
10 TABLET ORAL EVERY 4 HOURS PRN
Status: DISCONTINUED | OUTPATIENT
Start: 2023-11-24 | End: 2023-11-24

## 2023-11-24 RX ORDER — ZOLPIDEM TARTRATE 5 MG/1
10 TABLET ORAL NIGHTLY PRN
Status: DISCONTINUED | OUTPATIENT
Start: 2023-11-24 | End: 2023-12-04 | Stop reason: HOSPADM

## 2023-11-24 RX ORDER — LIDOCAINE HYDROCHLORIDE 10 MG/ML
0.5 INJECTION, SOLUTION INFILTRATION; PERINEURAL ONCE AS NEEDED
Status: DISCONTINUED | OUTPATIENT
Start: 2023-11-24 | End: 2023-11-24 | Stop reason: HOSPADM

## 2023-11-24 RX ORDER — MIDAZOLAM HYDROCHLORIDE 1 MG/ML
INJECTION INTRAMUSCULAR; INTRAVENOUS
Status: COMPLETED | OUTPATIENT
Start: 2023-11-24 | End: 2023-11-24

## 2023-11-24 RX ORDER — DOCUSATE SODIUM 100 MG/1
100 CAPSULE, LIQUID FILLED ORAL 2 TIMES DAILY PRN
Status: DISCONTINUED | OUTPATIENT
Start: 2023-11-24 | End: 2023-12-04 | Stop reason: HOSPADM

## 2023-11-24 RX ORDER — PROPOFOL 10 MG/ML
INJECTION, EMULSION INTRAVENOUS AS NEEDED
Status: DISCONTINUED | OUTPATIENT
Start: 2023-11-24 | End: 2023-11-24 | Stop reason: SURG

## 2023-11-24 RX ORDER — ROPIVACAINE HYDROCHLORIDE 5 MG/ML
INJECTION, SOLUTION EPIDURAL; INFILTRATION; PERINEURAL
Status: COMPLETED | OUTPATIENT
Start: 2023-11-24 | End: 2023-11-24

## 2023-11-24 RX ORDER — TRANEXAMIC ACID 10 MG/ML
1000 INJECTION, SOLUTION INTRAVENOUS ONCE
Status: DISCONTINUED | OUTPATIENT
Start: 2023-11-24 | End: 2023-11-24 | Stop reason: HOSPADM

## 2023-11-24 RX ORDER — FENTANYL CITRATE 50 UG/ML
INJECTION, SOLUTION INTRAMUSCULAR; INTRAVENOUS AS NEEDED
Status: DISCONTINUED | OUTPATIENT
Start: 2023-11-24 | End: 2023-11-24 | Stop reason: SURG

## 2023-11-24 RX ORDER — EPHEDRINE SULFATE 5 MG/ML
5 INJECTION INTRAVENOUS ONCE AS NEEDED
Status: DISCONTINUED | OUTPATIENT
Start: 2023-11-24 | End: 2023-11-24 | Stop reason: HOSPADM

## 2023-11-24 RX ORDER — LIDOCAINE HYDROCHLORIDE 10 MG/ML
INJECTION, SOLUTION EPIDURAL; INFILTRATION; INTRACAUDAL; PERINEURAL AS NEEDED
Status: DISCONTINUED | OUTPATIENT
Start: 2023-11-24 | End: 2023-11-24 | Stop reason: SURG

## 2023-11-24 RX ORDER — LORAZEPAM 2 MG/ML
1 INJECTION INTRAMUSCULAR
Status: DISCONTINUED | OUTPATIENT
Start: 2023-11-24 | End: 2023-11-24 | Stop reason: HOSPADM

## 2023-11-24 RX ORDER — SODIUM CHLORIDE, SODIUM LACTATE, POTASSIUM CHLORIDE, CALCIUM CHLORIDE 600; 310; 30; 20 MG/100ML; MG/100ML; MG/100ML; MG/100ML
1000 INJECTION, SOLUTION INTRAVENOUS CONTINUOUS
Status: DISCONTINUED | OUTPATIENT
Start: 2023-11-24 | End: 2023-11-26

## 2023-11-24 RX ORDER — PHENYLEPHRINE HCL IN 0.9% NACL 1 MG/10 ML
SYRINGE (ML) INTRAVENOUS AS NEEDED
Status: DISCONTINUED | OUTPATIENT
Start: 2023-11-24 | End: 2023-11-24 | Stop reason: SURG

## 2023-11-24 RX ORDER — DIPHENHYDRAMINE HYDROCHLORIDE 50 MG/ML
12.5 INJECTION INTRAMUSCULAR; INTRAVENOUS
Status: DISCONTINUED | OUTPATIENT
Start: 2023-11-24 | End: 2023-11-24 | Stop reason: HOSPADM

## 2023-11-24 RX ORDER — IPRATROPIUM BROMIDE AND ALBUTEROL SULFATE 2.5; .5 MG/3ML; MG/3ML
3 SOLUTION RESPIRATORY (INHALATION) ONCE AS NEEDED
Status: DISCONTINUED | OUTPATIENT
Start: 2023-11-24 | End: 2023-11-24 | Stop reason: HOSPADM

## 2023-11-24 RX ORDER — HYDRALAZINE HYDROCHLORIDE 20 MG/ML
5 INJECTION INTRAMUSCULAR; INTRAVENOUS
Status: DISCONTINUED | OUTPATIENT
Start: 2023-11-24 | End: 2023-11-24 | Stop reason: HOSPADM

## 2023-11-24 RX ORDER — FLUMAZENIL 0.1 MG/ML
0.5 INJECTION INTRAVENOUS AS NEEDED
Status: DISCONTINUED | OUTPATIENT
Start: 2023-11-24 | End: 2023-11-24 | Stop reason: HOSPADM

## 2023-11-24 RX ORDER — OXYCODONE HYDROCHLORIDE 5 MG/1
10 TABLET ORAL EVERY 4 HOURS PRN
Status: DISCONTINUED | OUTPATIENT
Start: 2023-11-24 | End: 2023-11-24 | Stop reason: HOSPADM

## 2023-11-24 RX ORDER — SODIUM CHLORIDE, SODIUM LACTATE, POTASSIUM CHLORIDE, CALCIUM CHLORIDE 600; 310; 30; 20 MG/100ML; MG/100ML; MG/100ML; MG/100ML
1000 INJECTION, SOLUTION INTRAVENOUS CONTINUOUS
Status: DISPENSED | OUTPATIENT
Start: 2023-11-24 | End: 2023-11-26

## 2023-11-24 RX ORDER — SODIUM CHLORIDE 0.9 % (FLUSH) 0.9 %
10 SYRINGE (ML) INJECTION AS NEEDED
Status: DISCONTINUED | OUTPATIENT
Start: 2023-11-24 | End: 2023-11-24 | Stop reason: HOSPADM

## 2023-11-24 RX ORDER — OXYBUTYNIN CHLORIDE 5 MG/1
15 TABLET, EXTENDED RELEASE ORAL DAILY
Status: DISCONTINUED | OUTPATIENT
Start: 2023-11-24 | End: 2023-12-04 | Stop reason: HOSPADM

## 2023-11-24 RX ORDER — LABETALOL HYDROCHLORIDE 5 MG/ML
5 INJECTION, SOLUTION INTRAVENOUS
Status: DISCONTINUED | OUTPATIENT
Start: 2023-11-24 | End: 2023-11-24 | Stop reason: HOSPADM

## 2023-11-24 RX ORDER — NALOXONE HCL 0.4 MG/ML
0.4 VIAL (ML) INJECTION AS NEEDED
Status: DISCONTINUED | OUTPATIENT
Start: 2023-11-24 | End: 2023-11-24 | Stop reason: HOSPADM

## 2023-11-24 RX ORDER — PROMETHAZINE HYDROCHLORIDE 25 MG/1
25 SUPPOSITORY RECTAL ONCE AS NEEDED
Status: DISCONTINUED | OUTPATIENT
Start: 2023-11-24 | End: 2023-11-24 | Stop reason: HOSPADM

## 2023-11-24 RX ORDER — OXYCODONE HYDROCHLORIDE 5 MG/1
5 TABLET ORAL EVERY 4 HOURS PRN
Status: DISCONTINUED | OUTPATIENT
Start: 2023-11-24 | End: 2023-11-24

## 2023-11-24 RX ORDER — ASPIRIN 81 MG/1
81 TABLET ORAL EVERY 12 HOURS SCHEDULED
Status: DISCONTINUED | OUTPATIENT
Start: 2023-11-25 | End: 2023-12-04 | Stop reason: HOSPADM

## 2023-11-24 RX ORDER — HYDROCODONE BITARTRATE AND ACETAMINOPHEN 7.5; 325 MG/1; MG/1
2 TABLET ORAL EVERY 4 HOURS PRN
Status: DISCONTINUED | OUTPATIENT
Start: 2023-11-24 | End: 2023-11-26

## 2023-11-24 RX ORDER — TRANEXAMIC ACID 10 MG/ML
1000 INJECTION, SOLUTION INTRAVENOUS ONCE
Status: COMPLETED | OUTPATIENT
Start: 2023-11-24 | End: 2023-11-24

## 2023-11-24 RX ORDER — HYDROCODONE BITARTRATE AND ACETAMINOPHEN 7.5; 325 MG/1; MG/1
1 TABLET ORAL EVERY 4 HOURS PRN
Status: DISCONTINUED | OUTPATIENT
Start: 2023-11-24 | End: 2023-11-26

## 2023-11-24 RX ORDER — OXYCODONE HYDROCHLORIDE 5 MG/1
7.5 TABLET ORAL ONCE AS NEEDED
Status: DISCONTINUED | OUTPATIENT
Start: 2023-11-24 | End: 2023-11-24 | Stop reason: HOSPADM

## 2023-11-24 RX ORDER — PROMETHAZINE HYDROCHLORIDE 25 MG/1
25 TABLET ORAL ONCE AS NEEDED
Status: DISCONTINUED | OUTPATIENT
Start: 2023-11-24 | End: 2023-11-24 | Stop reason: HOSPADM

## 2023-11-24 RX ORDER — DIPHENHYDRAMINE HYDROCHLORIDE 50 MG/ML
12.5 INJECTION INTRAMUSCULAR; INTRAVENOUS ONCE AS NEEDED
Status: DISCONTINUED | OUTPATIENT
Start: 2023-11-24 | End: 2023-11-24 | Stop reason: HOSPADM

## 2023-11-24 RX ORDER — ONDANSETRON 2 MG/ML
INJECTION INTRAMUSCULAR; INTRAVENOUS AS NEEDED
Status: DISCONTINUED | OUTPATIENT
Start: 2023-11-24 | End: 2023-11-24 | Stop reason: SURG

## 2023-11-24 RX ORDER — NALOXONE HCL 0.4 MG/ML
0.1 VIAL (ML) INJECTION
Status: DISCONTINUED | OUTPATIENT
Start: 2023-11-24 | End: 2023-12-04 | Stop reason: HOSPADM

## 2023-11-24 RX ADMIN — SODIUM CHLORIDE, SODIUM LACTATE, POTASSIUM CHLORIDE, AND CALCIUM CHLORIDE 500 ML: .6; .31; .03; .02 INJECTION, SOLUTION INTRAVENOUS at 13:56

## 2023-11-24 RX ADMIN — PROPOFOL 50 MG: 10 INJECTION, EMULSION INTRAVENOUS at 09:56

## 2023-11-24 RX ADMIN — Medication 150 MCG: at 10:31

## 2023-11-24 RX ADMIN — MIDAZOLAM 2 MG: 1 INJECTION INTRAMUSCULAR; INTRAVENOUS at 08:41

## 2023-11-24 RX ADMIN — Medication 150 MCG: at 11:05

## 2023-11-24 RX ADMIN — SERTRALINE HYDROCHLORIDE 50 MG: 50 TABLET ORAL at 20:14

## 2023-11-24 RX ADMIN — OXYCODONE 10 MG: 5 TABLET ORAL at 12:34

## 2023-11-24 RX ADMIN — LIDOCAINE HYDROCHLORIDE 30 MG: 10 INJECTION, SOLUTION EPIDURAL; INFILTRATION; INTRACAUDAL; PERINEURAL at 09:47

## 2023-11-24 RX ADMIN — OXYCODONE 10 MG: 5 TABLET ORAL at 15:58

## 2023-11-24 RX ADMIN — FENTANYL CITRATE 50 MCG: 50 INJECTION, SOLUTION INTRAMUSCULAR; INTRAVENOUS at 10:40

## 2023-11-24 RX ADMIN — TRANEXAMIC ACID 1000 MG: 10 INJECTION, SOLUTION INTRAVENOUS at 10:27

## 2023-11-24 RX ADMIN — MELOXICAM 15 MG: 15 TABLET ORAL at 08:23

## 2023-11-24 RX ADMIN — ROPIVACAINE HYDROCHLORIDE 30 ML: 5 INJECTION EPIDURAL; INFILTRATION; PERINEURAL at 08:41

## 2023-11-24 RX ADMIN — CEFAZOLIN 2000 MG: 2 INJECTION, POWDER, FOR SOLUTION INTRAMUSCULAR; INTRAVENOUS at 09:43

## 2023-11-24 RX ADMIN — SODIUM CHLORIDE 100 ML/HR: 9 INJECTION, SOLUTION INTRAVENOUS at 15:00

## 2023-11-24 RX ADMIN — SODIUM CHLORIDE, POTASSIUM CHLORIDE, SODIUM LACTATE AND CALCIUM CHLORIDE 1000 ML: 600; 310; 30; 20 INJECTION, SOLUTION INTRAVENOUS at 08:02

## 2023-11-24 RX ADMIN — SODIUM CHLORIDE, POTASSIUM CHLORIDE, SODIUM LACTATE AND CALCIUM CHLORIDE: 600; 310; 30; 20 INJECTION, SOLUTION INTRAVENOUS at 10:43

## 2023-11-24 RX ADMIN — TRANEXAMIC ACID 1000 MG: 10 INJECTION, SOLUTION INTRAVENOUS at 09:43

## 2023-11-24 RX ADMIN — Medication 200 MCG: at 10:48

## 2023-11-24 RX ADMIN — PROPOFOL 150 MG: 10 INJECTION, EMULSION INTRAVENOUS at 09:47

## 2023-11-24 RX ADMIN — PROPOFOL 120 MCG/KG/MIN: 10 INJECTION, EMULSION INTRAVENOUS at 09:51

## 2023-11-24 RX ADMIN — ONDANSETRON 4 MG: 2 INJECTION INTRAMUSCULAR; INTRAVENOUS at 10:41

## 2023-11-24 RX ADMIN — DEXAMETHASONE SODIUM PHOSPHATE 8 MG: 4 INJECTION, SOLUTION INTRAMUSCULAR; INTRAVENOUS at 09:56

## 2023-11-24 RX ADMIN — CEFAZOLIN 2000 MG: 2 INJECTION, POWDER, FOR SOLUTION INTRAMUSCULAR; INTRAVENOUS at 16:16

## 2023-11-24 RX ADMIN — PROPOFOL 50 MG: 10 INJECTION, EMULSION INTRAVENOUS at 09:58

## 2023-11-24 RX ADMIN — PROPOFOL 30 MG: 10 INJECTION, EMULSION INTRAVENOUS at 10:58

## 2023-11-24 RX ADMIN — FENTANYL CITRATE 50 MCG: 50 INJECTION, SOLUTION INTRAMUSCULAR; INTRAVENOUS at 10:55

## 2023-11-24 RX ADMIN — HYDROMORPHONE HYDROCHLORIDE 0.5 MG: 1 INJECTION, SOLUTION INTRAMUSCULAR; INTRAVENOUS; SUBCUTANEOUS at 12:34

## 2023-11-24 NOTE — ANESTHESIA PROCEDURE NOTES
Peripheral Block    Pre-sedation assessment completed: 11/24/2023 8:40 AM    Patient reassessed immediately prior to procedure    Patient location during procedure: pre-op  Start time: 11/24/2023 8:41 AM  Stop time: 11/24/2023 8:52 AM  Reason for block: at surgeon's request and post-op pain management  Performed by  Anesthesiologist: Ankit Storm MD  Preanesthetic Checklist  Completed: patient identified, IV checked, site marked, risks and benefits discussed, surgical consent, monitors and equipment checked, pre-op evaluation and timeout performed  Prep:  Pt Position: supine  Sterile barriers:cap, gloves and sterile barriers  Prep: ChloraPrep  Patient monitoring: blood pressure monitoring, continuous pulse oximetry and EKG  Procedure    Sedation: yes  Performed under: local infiltration  Guidance:ultrasound guided    ULTRASOUND INTERPRETATION.  Using ultrasound guidance a gauge needle was placed in close proximity to the femoral and sciatic nerve, at which point, under ultrasound guidance anesthetic was injected in the area of the nerve and spread of the anesthesia was seen on ultrasound in close proximity thereto.  There were no abnormalities seen on ultrasound; a digital image was taken; and the patient tolerated the procedure with no complications. Images:still images obtained, printed/placed on chart    Laterality:right  Block Type:iPack and adductor canal block  Injection Technique:single-shot  Needle Type:echogenic  Needle Gauge:20 G  Resistance on Injection: none  Sedation medications used: midazolam (VERSED) injection - Intravenous   2 mg - 11/24/2023 8:41:00 AM  Medications Used: ropivacaine (NAROPIN) 0.5 % injection - Perineural   30 mL - 11/24/2023 8:41:00 AM      Post Assessment  Injection Assessment: negative aspiration for heme, no paresthesia on injection and incremental injection  Patient Tolerance:comfortable throughout block  Complications:no             · Continue CPAP HS

## 2023-11-24 NOTE — OP NOTE
ROBOTIC Total Knee Replacement Operative Note  Dr. USHA Chen II  [phone]    PATIENT NAME: Sandra Treadwell  MRN: [id number]  : 1950 AGE: 73 y.o. GENDER: female  DATE OF OPERATION: 2023  PREOPERATIVE DIAGNOSIS: End Stage Arthritis  POSTOPERATIVE DIAGNOSIS: Same  OPERATION PERFORMED: Right Robotic Assisted Total Knee Arthroplasty  SURGEON: Ulises Chen MD  Circulator: Melyssa Abdul RN; Balaji Haro RN; Winston Patino RN  Scrub Person: Sheridan Ludwig; Sandrine Montilla  Vendor Representative: Zuleyma White  Assistant: Montana Lincoln CSA  ANESTHESIA: General with Block  ASSISTANT:  Montana Lincoln. This case would not have been possible without another set of skilled surgical hands for retraction, bone reduction, use of instrumentation, assistance with implants, and closure.  This assistance was vital to the success of the case.   ESTIMATED BLOOD LOSS: 100cc  SPONGE AND NEEDLE COUNT: Correct  INDICATIONS:   A discussion of operative versus nonoperative treatment was had with the patient and they failed conservative management. They elected to undergo total knee arthroplasty. The risks of surgery were discussed and included the risk of anesthesia, infection, damage to neurovascular structures, implant loosening/failure, fracture, hardware prominence, continued pain, early failure, the need for further procedures, medical complications, and others. No guarantees were made. The patient wished to proceed with surgery and a surgical consent was signed.    COMPONENTS:   Journey II BCS Oxinium Femoral Component: Size 4  Journey II Tibial Baseplate: 2   Posterior Stabilized Insert: 11  Patella: 32mm    PERTINENT FINDINGS: Degenerative Arthritis    DETAILS OF PROCEDURE:  The patient was met in the preoperative area. The site was marked. The consent and H&P were reviewed. The patient was then wheeled back to the operative suite and transferred to the operative table. The patient underwent  anesthesia. A tourniquet was placed on the upper thigh. Surgical alcohol was used to thoroughly clean the entire operative extremity.     The leg was then prepped in the normal sterile fashion and surgical space suits were used for the entire operative team. New outer gloves were used by all sterile surgical team members after final draping. After a surgical timeout, the tourniquet was inflated.     I began by inserting 2 pins into the tibial and femoral shafts and attaching the tibial and femoral robotic .    In flexion, a midline knee incision was utilized centered on the patella and ending medial to the tibial tubercle. Dissection was carried down to the knee capsule. A medial parapatellar ararthrotomy was completed. The patellar fat pad was excised. The MCL was minimally elevated to gain adequate exposure to the knee.  The suprapatellar fat pad was also excised.  The patella was subluxed laterally. The patella was held vertical using 2 clamps, and was then cut using a saw. The patella was then sized, and the lug holes were drilled. Excess patellar bone was removed using a saw. The patella was then protected during this case using the metal patella shield.    The ACL remnant, anterior horn of the lateral meniscus, and PCL were then resected.  Next I proceeded with a standard mapping of the knee including all relevant anatomic positions, range of motion, and stressing of ligaments.  I then planned out the knee including implant sizes, rotation, and bony resection to appropriately balance the knee as needed.      After the mapping and planning was complete, I turned my attention to the distal femur.  Using the bur, I was able to remove the distal femoral bone and create the initial lug holes.  I then used the punch to create the pinholes for the 5-in-1 cutting block.  The 5-in-1 cutting block was then snapped into place and pinned.  I used a saw to resect the anterior posterior and chamfer cuts.  These were  all removed using a rongeur.    I then turned my attention to the tibia.  Retractors were placed appropriately.  Using the robot for guidance, a cutting jig was attached to the tibia using 2 pins.  This was done just above the level of measured resection.  I then used a saw to cut the tibia and remove this bone.  At that point, I was able to use the bur to resect down to the actual intended target tibial resection line.  This was verified to be accurate afterwards on the robot screen.    At that point, the remaining meniscus and osteophytes were removed.  I then attached the femoral component which was well-seated. The femoral BCS box was then prepared for.  I then trialed the knee.  Any additional bony resection and/or soft tissue releases were then noted and performed at that time to fully balance the knee.    We next turned our attention back to the tibia to finish the tibial preparation. The tibia was measured and sized. The tibial baseplate was aligned with the rotation from the trialing process and verified to be positioned near the medial third of the tibial tubercle. The tibial surface was then prepared for the keel.     The knee was thoroughly irrigated with sterile saline using a pulse-lavage system while the final tibial baseplate, femoral component and patellar component were opened. Cement was prepared and mixed using standard techniques. Outer gloves were changed before implant handling to ensure no soft tissue or oily material was exposed to the surfaces of the final implants. The bony knee surfaces were dried and the implants were cemented in place, starting with the tibia, then the femur and finally the patella. Excess cement was removed at each step. A trial poly was utilized during cementation for compression. The tourniquet was taken down and adequate hemostasis was achieved. The knee was thoroughly irrigated once again.     The soft tissues about the knee were then injected with an anesthetic  "cocktail. Care was taken to avoid the peroneal nerve and the neurovascular bundle posteriorly. The cement was allowed to harden.  While the cement was hardening, I did remove all 4 pins those sites were closed.    After the cement was fully set, the knee was ranged with various thickness of polyethylene trials to ensure that the balance was appropriate after robotic resection. The knee was inspected for excess cement, which was removed. The real poly, of corresponding thickness was then opened and inserted into the knee. One final range of motion and stability test showed the knee to be in good condition with a well tracking patellar component.    The knee capsule was then closed after applying 1 g vancomycin.  The knee was then closed in layers.  A sterile dressing was applied.    The patient was awoken from anesthesia, moved to the [place] and taken to the recovery room in stable condition. Sponge and needle count were correct. There were no complications. Patient tolerated the procedure well.    R \"Jose\" April ORTEGA MD  Orthopaedic Surgery  [facility]  [phone]                "

## 2023-11-24 NOTE — PLAN OF CARE
Goal Outcome Evaluation:  Plan of Care Reviewed With: patient        Progress: no change   Pt is a 72 y/o F admitted to [facility] on 11/24/23 for elective R TKA performed by Dr. Chen. Pt had previously failed conservative treatment of R Knee OA and has now elected for surgical intervention. She is currently living with spouse in [facility] with one step step entry. At baseline, she is IND with households mobility, community ambulation, and ADLs with no AD. This date, she is AAOx4 and lying supine in bed. She completes bed mobility min A this date, but is orthostatic in sitting and returned back to supine min A. BP supine originally was 102/47, dropped to 72/33 sitting EOB, returned back to 95/48 in trendelenburg position in bed. Pt is not safe for transfers/ambulation this date to due orthostatic hypotension and complaints of dizziness with positional changes. She was educated on HEP in bed, ambulation distance/intensity, icing protocol, safe household/stair navigation, and HHPT at d/c. PT will re-evaluate tomorrow, 11/25/23 assuming BP is more stable than present in PACU. Pt will require RW at d/c and has one step to enter. PT will follow.    Anticipated Discharge Disposition (PT): home with home health, home with assist

## 2023-11-24 NOTE — THERAPY RE-EVALUATION
Patient Name: Sandra Treadwell  : 1950    MRN: [id number]                              Today's Date: 2023       Admit Date: 2023    Visit Dx: No diagnosis found.  Patient Active Problem List   Diagnosis    Status post knee replacement    Unilateral primary osteoarthritis, right knee     Past Medical History:   Diagnosis Date    Bladder leak     Breast cancer     chemo and radiation    Depression     GERD (gastroesophageal reflux disease)     Hyperlipidemia     Osteoporosis      Past Surgical History:   Procedure Laterality Date    BREAST SURGERY      lumpectomy    CHOLECYSTECTOMY      HYSTERECTOMY        General Information       Row Name 23 1614          Physical Therapy Time and Intention    Document Type re-evaluation  -MB     Mode of Treatment physical therapy  -MB       Row Name 23 1614          General Information    Patient Profile Reviewed yes  -MB     Prior Level of Function --  See initial eval for PLOF and home environment  -MB     Existing Precautions/Restrictions orthostatic hypotension  -MB       Row Name 23 1614          Cognition    Orientation Status (Cognition) oriented x 4  -MB       Row Name 23 1614          Safety Issues, Functional Mobility    Impairments Affecting Function (Mobility) balance;endurance/activity tolerance;strength;pain  -MB               User Key  (r) = Recorded By, (t) = Taken By, (c) = Cosigned By      Initials Name Provider Type    MB Ken Cai, PT Physical Therapist                   Mobility       Row Name 23 1616          Bed Mobility    Bed Mobility bed mobility (all) activities  -MB     All Activities, [place] (Bed Mobility) minimum assist (75% patient effort)  -MB       Row Name 23 1616          Transfers    Comment, (Transfers) transfers and gait not attempted this date due to orthostatic hypotension  -MB               User Key  (r) = Recorded By, (t) = Taken By, (c) = Cosigned By      Initials Name Provider  Type    Ken Cedeño, PT Physical Therapist                   Obj/Interventions       Row Name 11/24/23 1623          Range of Motion Comprehensive    Comment, General Range of Motion AROM R Knee: 0-100  -MB       Row Name 11/24/23 1623          Strength Comprehensive (MMT)    Comment, General Manual Muscle Testing (MMT) Assessment BLE Strength 3+/5 grossly  -MB       Row Name 11/24/23 1623          Balance    Balance Assessment sitting static balance;sitting dynamic balance  -MB     Static Sitting Balance independent  -MB     Dynamic Sitting Balance independent  -MB     Position, Sitting Balance unsupported;sitting edge of bed  -MB       Row Name 11/24/23 1623          Sensory Assessment (Somatosensory)    Sensory Assessment (Somatosensory) sensation intact  -MB               User Key  (r) = Recorded By, (t) = Taken By, (c) = Cosigned By      Initials Name Provider Type    Ken Cedeño, PT Physical Therapist                   Goals/Plan       Row Name 11/24/23 1627          Bed Mobility Goal 1 (PT)    Activity/Assistive Device (Bed Mobility Goal 1, PT) bed mobility activities, all  -MB     [place] Level/Cues Needed (Bed Mobility Goal 1, PT) independent  -MB     Time Frame (Bed Mobility Goal 1, PT) long term goal (LTG);2 weeks  -MB       Row Name 11/24/23 1627          Transfer Goal 1 (PT)    Activity/Assistive Device (Transfer Goal 1, PT) transfers, all;walker, rolling  -MB     [place] Level/Cues Needed (Transfer Goal 1, PT) standby assist  -MB     Time Frame (Transfer Goal 1, PT) long term goal (LTG);2 weeks  -MB       Row Name 11/24/23 1627          Gait Training Goal 1 (PT)    Activity/Assistive Device (Gait Training Goal 1, PT) gait (walking locomotion);walker, rolling  -MB     [place] Level (Gait Training Goal 1, PT) standby assist  -MB     Distance (Gait Training Goal 1, PT) 100  -MB     Time Frame (Gait Training Goal 1, PT) long term goal (LTG);2 weeks  -MB       Row Name 11/24/23 1627           Stairs Goal 1 (PT)    Activity/Assistive Device (Stairs Goal 1, PT) stairs, all skills  -MB     [place] Level/Cues Needed (Stairs Goal 1, PT) standby assist  -MB     Number of Stairs (Stairs Goal 1, PT) 1  -MB     Time Frame (Stairs Goal 1, PT) long term goal (LTG);2 weeks  -MB       Row Name 11/24/23 1627          Therapy Assessment/Plan (PT)    Planned Therapy Interventions (PT) balance training;bed mobility training;home exercise program;gait training;neuromuscular re-education;patient/family education;stair training;strengthening;transfer training  -MB               User Key  (r) = Recorded By, (t) = Taken By, (c) = Cosigned By      Initials Name Provider Type    Ken Cedeño, PT Physical Therapist                   Clinical Impression       Row Name 11/24/23 1626          Pain    Additional Documentation Pain Scale: FACES Pre/Post-Treatment (Group)  -MB       Row Name 11/24/23 1626          Pain Scale: FACES Pre/Post-Treatment    Pain: FACES Scale, Pretreatment 4-->hurts little more  -MB     Posttreatment Pain Rating 4-->hurts little more  -MB     Pain Location - Side/Orientation Right  -MB     Pain Location - knee  -MB       Row Name 11/24/23 1634 11/24/23 1626       Plan of Care Review    Plan of Care Reviewed With -- patient  -MB    Progress -- no change  -MB    Outcome Evaluation Pt is a 74 y/o F admitted to [facility] on 11/24/23 for elective R TKA performed by Dr. Chen. Pt had previously failed conservative treatment of R Knee OA and has now elected for surgical intervention. She is currently living with spouse in [facility] with one step step entry. At baseline, she is IND with households mobility, community ambulation, and ADLs with no AD. This date, she is AAOx4 and lying supine in bed. She completes bed mobility min A this date, but is orthostatic in sitting and returned back to supine min A. BP supine originally was 102/47, dropped to 72/33 sitting EOB, returned back to 95/48 in trendelenburg  position in bed. Pt is not safe for transfers/ambulation this date to due orthostatic hypotension and complaints of dizziness with positional changes. She was educated on HEP in bed, ambulation distance/intensity, icing protocol, safe household/stair navigation, and HHPT at d/c. PT will re-evaluate tomorrow, 11/25/23 assuming BP is more stable than present in PACU. Pt will require RW at d/c and has one step to enter. PT will follow.  -MB --      Row Name 11/24/23 1626          Therapy Assessment/Plan (PT)    Rehab Potential (PT) good, to achieve stated therapy goals  -MB     Criteria for Skilled Interventions Met (PT) yes;meets criteria  -MB     Therapy Frequency (PT) 2 times/day  -MB     Predicted Duration of Therapy Intervention (PT) until d/c  -MB       Row Name 11/24/23 1626          Vital Signs    Pre Systolic BP Rehab 102  -MB     Pre Treatment Diastolic BP 47  -MB     Intra Systolic BP Rehab 72  -MB     Intra Treatment Diastolic BP 33  -MB     Post Systolic BP Rehab 95  -MB     Post Treatment Diastolic BP 48  -MB     Pretreatment Heart Rate (beats/min) 78  -MB     Posttreatment Heart Rate (beats/min) 76  -MB     Pre SpO2 (%) 95  -MB     O2 Delivery Pre Treatment room air  -MB     O2 Delivery Intra Treatment room air  -MB     Post SpO2 (%) 97  -MB     O2 Delivery Post Treatment room air  -MB     Pre Patient Position Supine  -MB     Intra Patient Position Sitting  -MB     Post Patient Position Supine  -MB       Row Name 11/24/23 1626          Positioning and Restraints    Pre-Treatment Position in bed  -MB     Post Treatment Position bed  -MB     In Bed notified nsg;supine;encouraged to call for assist;patient within staff view  -MB               User Key  (r) = Recorded By, (t) = Taken By, (c) = Cosigned By      Initials Name Provider Type    Ken Cedeño, PT Physical Therapist                   Outcome Measures       Row Name 11/24/23 1629 11/24/23 7240       How much help from another person do you  currently need...    Turning from your back to your side while in flat bed without using bedrails? 3  -MB 4  -CS    Moving from lying on back to sitting on the side of a flat bed without bedrails? 3  -MB 3  -CS    Moving to and from a bed to a chair (including a wheelchair)? 3  -MB 3  -CS    Standing up from a chair using your arms (e.g., wheelchair, bedside chair)? 3  -MB 3  -CS    Climbing 3-5 steps with a railing? 3  -MB 3  -CS    To walk in hospital room? 3  -MB 3  -CS    AM-PAC 6 Clicks Score (PT) 18  -MB 19  -CS    Highest Level of Mobility Goal 6 --> Walk 10 steps or more  -MB 6 --> Walk 10 steps or more  -CS              User Key  (r) = Recorded By, (t) = Taken By, (c) = Cosigned By      Initials Name Provider Type    CS Meera Ziegler, RN Registered Nurse    Ken Cedeño, PT Physical Therapist                                 Physical Therapy Education       Title: PT OT SLP Therapies (Done)       Topic: Physical Therapy (Done)       Point: Mobility training (Done)       Learning Progress Summary             Patient Acceptance, E,D,H, VU,DU by BR at 11/15/2023 1451   Family Acceptance, E,D,H, VU,DU by BR at 11/15/2023 1451                         Point: Home exercise program (Done)       Learning Progress Summary             Patient Acceptance, E,D,H, VU,DU by BR at 11/15/2023 1451   Family Acceptance, E,D,H, VU,DU by BR at 11/15/2023 1451                         Point: Body mechanics (Done)       Learning Progress Summary             Patient Acceptance, E,D,H, VU,DU by BR at 11/15/2023 1451   Family Acceptance, E,D,H, VU,DU by BR at 11/15/2023 1451                         Point: Precautions (Done)       Learning Progress Summary             Patient Acceptance, E,D,H, VU,DU by BR at 11/15/2023 1451   Family Acceptance, E,D,H, VU,DU by BR at 11/15/2023 1451                                         User Key       Initials Effective Dates Name Provider Type Discipline    BR 02/01/22 -  Sharri Yanes, PT  Physical Therapist PT                  PT Recommendation and Plan  Planned Therapy Interventions (PT): balance training, bed mobility training, home exercise program, gait training, neuromuscular re-education, patient/family education, stair training, strengthening, transfer training  Plan of Care Reviewed With: patient  Progress: no change  Outcome Evaluation: Pt is a 72 y/o F admitted to [facility] on 11/24/23 for elective R TKA performed by Dr. Chen. Pt had previously failed conservative treatment of R Knee OA and has now elected for surgical intervention. She is currently living with spouse in [facility] with one step step entry. At baseline, she is IND with households mobility, community ambulation, and ADLs with no AD. This date, she is AAOx4 and lying supine in bed. She completes bed mobility min A this date, but is orthostatic in sitting and returned back to supine min A. BP supine originally was 102/47, dropped to 72/33 sitting EOB, returned back to 95/48 in trendelenburg position in bed. Pt is not safe for transfers/ambulation this date to due orthostatic hypotension and complaints of dizziness with positional changes. She was educated on HEP in bed, ambulation distance/intensity, icing protocol, safe household/stair navigation, and HHPT at d/c. PT will re-evaluate tomorrow, 11/25/23 assuming BP is more stable than present in PACU. Pt will require RW at d/c and has one step to enter. PT will follow.     Time Calculation:   PT Evaluation Complexity  History, PT Evaluation Complexity: 1-2 personal factors and/or comorbidities  Examination of Body Systems (PT Eval Complexity): total of 3 or more elements  Clinical Presentation (PT Evaluation Complexity): evolving  Clinical Decision Making (PT Evaluation Complexity): moderate complexity  Overall Complexity (PT Evaluation Complexity): moderate complexity     PT Charges       Row Name 11/24/23 9454             Time Calculation    Start Time 1332  -MB      Stop Time 1351  -MB       Time Calculation (min) 19 min  -MB      PT Received On 11/24/23  -MB      PT - Next Appointment 11/25/23  -MB      PT Goal Re-Cert Due Date 12/08/23  -MB         Time Calculation- PT    Total Timed Code Minutes- PT 0 minute(s)  -MB                User Key  (r) = Recorded By, (t) = Taken By, (c) = Cosigned By      Initials Name Provider Type    Ken Cedeño, PT Physical Therapist                  Therapy Charges for Today       Code Description Service Date Service Provider Modifiers Qty    71727323025 HC PT RE-EVAL ESTABLISHED PLAN 2 11/24/2023 Ken Cai, PT GP 1            PT G-Codes  Outcome Measure Options: AM-PAC 6 Clicks Basic Mobility (PT)  AM-PAC 6 Clicks Score (PT): 18  PT Discharge Summary  Anticipated Discharge Disposition (PT): home with home health, home with assist    Ken Cai, LEATHA  11/24/2023

## 2023-11-24 NOTE — H&P
Orthopaedic Surgery  History & Physical For Elective Total Knee  Dr. USHA Chen II  [phone]    HPI:  Patient is a 73 y.o. Not  or  female who presents with End-stage arthritis of the right knee. They failed conservative treatment of their knee pain and a thorough discussion of the risks and benefits of surgery was had. The patient wishes to continue with elective total knee replacement, they were scheduled and are here for surgery. They did get medical clearance as well as a thorough preoperative workup.    MEDICAL HISTORY  Past Medical History:   Diagnosis Date    Bladder leak     Breast cancer     chemo and radiation    Depression     GERD (gastroesophageal reflux disease)     Hyperlipidemia     Osteoporosis      Past Surgical History:   Procedure Laterality Date    BREAST SURGERY      mastectomy    CHOLECYSTECTOMY      HYSTERECTOMY       Prior to Admission medications    Medication Sig Start Date End Date Taking? Authorizing Provider   alendronate (FOSAMAX) 70 MG tablet Take 1 tablet by mouth 1 (One) Time Per Week. Sun 7/24/23   Ramy Swann MD   atorvastatin (LIPITOR) 10 MG tablet Take 1 tablet by mouth Daily. 8/9/23   Ramy Swann MD   cetirizine (zyrTEC) 10 MG tablet Take 1 tablet by mouth Daily. 9/6/23   Ramy Swann MD   fluticasone (FLONASE) 50 MCG/ACT nasal spray 2 sprays by Each Nare route Daily. 8/4/23   Ramy Swann MD   omeprazole (priLOSEC) 40 MG capsule Take 1 capsule by mouth Daily. 8/17/23   Ramy Swann MD   oxybutynin XL (DITROPAN XL) 15 MG 24 hr tablet Take 1 tablet by mouth Daily. 7/18/23   Ramy Swann MD   OYSCO 500 + D 500-5 MG-MCG tablet per tablet Take 1 tablet by mouth 2 (Two) Times a Day.    Ramy Swann MD   sertraline (ZOLOFT) 50 MG tablet Take 1 tablet by mouth Every Night.    Ramy Swann MD   zolpidem (AMBIEN) 10 MG tablet Take 1 tablet by mouth At Night As Needed.    Hue  Historical, MD     Allergies   Allergen Reactions    Codeine Nausea And Vomiting and Dizziness     Most Recent Immunizations   Administered Date(s) Administered    COVID-19 (MODERNA) 1st,2nd,3rd Dose Monovalent 03/22/2021    COVID-19 (MODERNA) BIVALENT 12+YRS 10/12/2022    COVID-19 (MODERNA) Monovalent Original Booster 11/22/2021     Social History     Tobacco Use    Smoking status: Never    Smokeless tobacco: Never   Substance Use Topics    Alcohol use: Not on file      Social History     Substance and Sexual Activity   Drug Use Never       REVIEW OF SYSTEMS:  Head: negative for headache  Respiratory: negative for shortness of breath.   Cardiovascular: negative for chest pain.   Gastrointestinal: negative abdominal pain.   Neurological: negative for LOC  Psychiatric/Behavioral: negative for memory loss.   All other systems reviewed and are negative    VITALS: There were no vitals taken for this visit. There is no height or weight on file to calculate BMI.    PHYSICAL EXAM:   CONSTITUTIONAL: A&Ox3, No acute distress  LUNGS: Equal chest rise, no shortness of air  CARDIOVASCULAR: palpable peripheral pulses  SKIN: no skin lesions in the area examined  LYMPH: no lymphadenopathy in the area examined  EXTREMITY: Knee  Pulses:  Brisk Capillary Refill  Sensation: Intact to Saphenous, Sural, Deep Peroneal, Superficial Peroneal, and Tibial Nerves and grossly throughout extremity  Motor: 5/5 EHL/FHL/TA/GS motor complexes    RADIOLOGY REVIEW:   No radiology results for the last 7 days    LABS:   Results for the past 24 hours: No results found for this or any previous visit (from the past 24 hour(s)).    IMPRESSION:  Patient is a 73 y.o. Not  or  female with end-stage arthritis of the right knee    PLAN:   Surgery: Elective total knee arthroplasty  Consent: The risks and benefits of operative versus nonoperative treatment were discussed. The patient elected to undergo operative treatment of their knee arthritis.  The risks discussed included but were not limited to blood clots, MI, stroke, other medical complications, infection, damage to neurovascular structures, continued pain, hardware prominence, loss of range of motion, need for further procedures, and and risk of anesthesia..  No guarantees were made   Disposition: Elective right Total Knee Arthroplasty today.    Ulises Chen II, MD  Orthopaedic Surgery  [facility]

## 2023-11-24 NOTE — ANESTHESIA POSTPROCEDURE EVALUATION
Patient: Sandra Treadwell    Procedure Summary       Date: 11/24/23 Room / Location: [facility] OR 06 / [facility] MAIN OR    Anesthesia Start: 0941 Anesthesia Stop: 1109    Procedure: TOTAL KNEE ARTHROPLASTY WITH CORI ROBOT (Right: Knee) Diagnosis:       Unilateral primary osteoarthritis, right knee      (Unilateral primary osteoarthritis, right knee [M17.11])    Surgeons: Ulises Chen II, MD Provider: Ankit Storm MD    Anesthesia Type: general with block ASA Status: 2            Anesthesia Type: general with block    Vitals  Vitals Value Taken Time   BP 95/48 11/24/23 1349   Temp 97.4 °F (36.3 °C) 11/24/23 1110   Pulse 77 11/24/23 1349   Resp 14 11/24/23 1250   SpO2 97 % 11/24/23 1349   Vitals shown include unfiled device data.        Post Anesthesia Care and Evaluation    Patient location during evaluation: PACU  Patient participation: complete - patient participated  Level of consciousness: awake  Pain score: 2 (See nurse's notes for pain score)  Pain management: adequate    Airway patency: patent  Anesthetic complications: No anesthetic complications  PONV Status: none  Cardiovascular status: acceptable  Respiratory status: acceptable and spontaneous ventilation  Hydration status: acceptable    Comments: Patient seen and examined postoperatively; vital signs stable; SpO2 greater than or equal to 90%; cardiopulmonary status stable; nausea/vomiting adequately controlled; pain adequately controlled; no apparent anesthesia complications; patient discharged from anesthesia care when discharge criteria were met

## 2023-11-24 NOTE — ANESTHESIA PROCEDURE NOTES
Airway  Urgency: elective    Date/Time: 11/24/2023 9:49 AM  End Time:11/24/2023 9:49 AM  Airway not difficult    General Information and Staff    Patient location during procedure: OR  Anesthesiologist: Ankit Storm MD  CRNA/CAA: Mesha Noyola CAA    Indications and Patient Condition  Indications for airway management: airway protection    Preoxygenated: yes  Mask difficulty assessment: 0 - not attempted    Final Airway Details  Final airway type: supraglottic airway      Successful airway: LMA and I-gel  Size 4     Number of attempts at approach: 1  Assessment: lips, teeth, and gum same as pre-op and atraumatic intubation

## 2023-11-25 ENCOUNTER — APPOINTMENT (OUTPATIENT)
Dept: CT IMAGING | Facility: HOSPITAL | Age: 73
DRG: 469 | End: 2023-11-25
Payer: MEDICARE

## 2023-11-25 LAB
ACANTHOCYTES BLD QL SMEAR: NORMAL
ALBUMIN SERPL-MCNC: 3.3 G/DL (ref 3.5–5.2)
ALBUMIN/GLOB SERPL: 1.4 G/DL
ALP SERPL-CCNC: 67 U/L (ref 39–117)
ALT SERPL W P-5'-P-CCNC: <5 U/L (ref 1–33)
ANION GAP SERPL CALCULATED.3IONS-SCNC: 7 MMOL/L (ref 5–15)
AST SERPL-CCNC: 25 U/L (ref 1–32)
BILIRUB SERPL-MCNC: 0.3 MG/DL (ref 0–1.2)
BILIRUB UR QL STRIP: NEGATIVE
BUN SERPL-MCNC: 13 MG/DL (ref 8–23)
BUN/CREAT SERPL: 10.3 (ref 7–25)
CA-I SERPL ISE-MCNC: 1.23 MMOL/L (ref 1.2–1.3)
CALCIUM SPEC-SCNC: 8.7 MG/DL (ref 8.6–10.5)
CHLORIDE SERPL-SCNC: 100 MMOL/L (ref 98–107)
CLARITY UR: CLEAR
CO2 SERPL-SCNC: 24 MMOL/L (ref 22–29)
COLOR UR: YELLOW
CREAT SERPL-MCNC: 1.26 MG/DL (ref 0.57–1)
D DIMER PPP FEU-MCNC: 0.48 MG/L (FEU) (ref 0–0.71)
D-LACTATE SERPL-SCNC: 0.8 MMOL/L (ref 0.5–2)
DEPRECATED RDW RBC AUTO: 48.1 FL (ref 37–54)
EGFRCR SERPLBLD CKD-EPI 2021: 45.7 ML/MIN/1.73
ELLIPTOCYTES BLD QL SMEAR: NORMAL
ERYTHROCYTE [DISTWIDTH] IN BLOOD BY AUTOMATED COUNT: 15.6 % (ref 12.3–15.4)
GEN 5 2HR TROPONIN T REFLEX: 31 NG/L
GLOBULIN UR ELPH-MCNC: 2.4 GM/DL
GLUCOSE SERPL-MCNC: 85 MG/DL (ref 65–99)
GLUCOSE UR STRIP-MCNC: NEGATIVE MG/DL
HCT VFR BLD AUTO: 24.6 % (ref 34–46.6)
HGB BLD-MCNC: 8 G/DL (ref 12–15.9)
HGB UR QL STRIP.AUTO: NEGATIVE
IRON 24H UR-MRATE: 24 MCG/DL (ref 37–145)
IRON SATN MFR SERPL: 6 % (ref 20–50)
KETONES UR QL STRIP: NEGATIVE
LEUKOCYTE ESTERASE UR QL STRIP.AUTO: NEGATIVE
LYMPHOCYTES # BLD MANUAL: 1.28 10*3/MM3 (ref 0.7–3.1)
LYMPHOCYTES NFR BLD MANUAL: 8 % (ref 5–12)
MAGNESIUM SERPL-MCNC: 1.5 MG/DL (ref 1.6–2.4)
MCH RBC QN AUTO: 27.1 PG (ref 26.6–33)
MCHC RBC AUTO-ENTMCNC: 32.6 G/DL (ref 31.5–35.7)
MCV RBC AUTO: 83.2 FL (ref 79–97)
MONOCYTES # BLD: 0.51 10*3/MM3 (ref 0.1–0.9)
NEUTROPHILS # BLD AUTO: 4.61 10*3/MM3 (ref 1.7–7)
NEUTROPHILS NFR BLD MANUAL: 72 % (ref 42.7–76)
NEUTS VAC BLD QL SMEAR: NORMAL
NITRITE UR QL STRIP: NEGATIVE
NT-PROBNP SERPL-MCNC: 1332 PG/ML (ref 0–900)
PH UR STRIP.AUTO: 5.5 [PH] (ref 5–8)
PLAT MORPH BLD: NORMAL
PLATELET # BLD AUTO: 192 10*3/MM3 (ref 140–450)
PMV BLD AUTO: 8.1 FL (ref 6–12)
POIKILOCYTOSIS BLD QL SMEAR: NORMAL
POTASSIUM SERPL-SCNC: 4.2 MMOL/L (ref 3.5–5.2)
PROT SERPL-MCNC: 5.7 G/DL (ref 6–8.5)
PROT UR QL STRIP: NEGATIVE
RBC # BLD AUTO: 2.96 10*6/MM3 (ref 3.77–5.28)
SCAN SLIDE: NORMAL
SODIUM SERPL-SCNC: 131 MMOL/L (ref 136–145)
SP GR UR STRIP: 1.01 (ref 1–1.03)
TIBC SERPL-MCNC: 374 MCG/DL (ref 298–536)
TRANSFERRIN SERPL-MCNC: 251 MG/DL (ref 200–360)
TROPONIN T DELTA: 8 NG/L
TROPONIN T SERPL HS-MCNC: 23 NG/L
TROPONIN T SERPL HS-MCNC: 49 NG/L
TSH SERPL DL<=0.05 MIU/L-ACNC: 3.46 UIU/ML (ref 0.27–4.2)
UROBILINOGEN UR QL STRIP: NORMAL
VARIANT LYMPHS NFR BLD MANUAL: 20 % (ref 19.6–45.3)
WBC NRBC COR # BLD AUTO: 6.4 10*3/MM3 (ref 3.4–10.8)
WHOLE BLOOD HOLD COAG: NORMAL

## 2023-11-25 PROCEDURE — 93005 ELECTROCARDIOGRAM TRACING: CPT | Performed by: INTERNAL MEDICINE

## 2023-11-25 PROCEDURE — 82746 ASSAY OF FOLIC ACID SERUM: CPT | Performed by: INTERNAL MEDICINE

## 2023-11-25 PROCEDURE — A9270 NON-COVERED ITEM OR SERVICE: HCPCS | Performed by: ORTHOPAEDIC SURGERY

## 2023-11-25 PROCEDURE — 85025 COMPLETE CBC W/AUTO DIFF WBC: CPT | Performed by: INTERNAL MEDICINE

## 2023-11-25 PROCEDURE — 84443 ASSAY THYROID STIM HORMONE: CPT | Performed by: INTERNAL MEDICINE

## 2023-11-25 PROCEDURE — 63710000001 ASPIRIN 81 MG TABLET DELAYED-RELEASE: Performed by: ORTHOPAEDIC SURGERY

## 2023-11-25 PROCEDURE — 80053 COMPREHEN METABOLIC PANEL: CPT | Performed by: INTERNAL MEDICINE

## 2023-11-25 PROCEDURE — 84466 ASSAY OF TRANSFERRIN: CPT | Performed by: INTERNAL MEDICINE

## 2023-11-25 PROCEDURE — 25010000002 MAGNESIUM SULFATE 2 GM/50ML SOLUTION: Performed by: INTERNAL MEDICINE

## 2023-11-25 PROCEDURE — 83605 ASSAY OF LACTIC ACID: CPT | Performed by: INTERNAL MEDICINE

## 2023-11-25 PROCEDURE — 82330 ASSAY OF CALCIUM: CPT | Performed by: INTERNAL MEDICINE

## 2023-11-25 PROCEDURE — 85379 FIBRIN DEGRADATION QUANT: CPT | Performed by: INTERNAL MEDICINE

## 2023-11-25 PROCEDURE — 25010000002 HYDROMORPHONE 1 MG/ML SOLUTION: Performed by: ORTHOPAEDIC SURGERY

## 2023-11-25 PROCEDURE — 85007 BL SMEAR W/DIFF WBC COUNT: CPT | Performed by: INTERNAL MEDICINE

## 2023-11-25 PROCEDURE — 81003 URINALYSIS AUTO W/O SCOPE: CPT | Performed by: INTERNAL MEDICINE

## 2023-11-25 PROCEDURE — P9612 CATHETERIZE FOR URINE SPEC: HCPCS

## 2023-11-25 PROCEDURE — 83540 ASSAY OF IRON: CPT | Performed by: INTERNAL MEDICINE

## 2023-11-25 PROCEDURE — 25010000002 LORAZEPAM PER 2 MG: Performed by: ORTHOPAEDIC SURGERY

## 2023-11-25 PROCEDURE — 63710000001 HYDROCODONE-ACETAMINOPHEN 7.5-325 MG TABLET: Performed by: ORTHOPAEDIC SURGERY

## 2023-11-25 PROCEDURE — 83880 ASSAY OF NATRIURETIC PEPTIDE: CPT | Performed by: INTERNAL MEDICINE

## 2023-11-25 PROCEDURE — 97530 THERAPEUTIC ACTIVITIES: CPT

## 2023-11-25 PROCEDURE — 83735 ASSAY OF MAGNESIUM: CPT | Performed by: INTERNAL MEDICINE

## 2023-11-25 PROCEDURE — 97110 THERAPEUTIC EXERCISES: CPT

## 2023-11-25 PROCEDURE — 82607 VITAMIN B-12: CPT | Performed by: INTERNAL MEDICINE

## 2023-11-25 PROCEDURE — 93010 ELECTROCARDIOGRAM REPORT: CPT | Performed by: INTERNAL MEDICINE

## 2023-11-25 PROCEDURE — 84484 ASSAY OF TROPONIN QUANT: CPT | Performed by: INTERNAL MEDICINE

## 2023-11-25 PROCEDURE — 63710000001 SERTRALINE 50 MG TABLET: Performed by: ORTHOPAEDIC SURGERY

## 2023-11-25 PROCEDURE — 63710000001 MELOXICAM 15 MG TABLET: Performed by: ORTHOPAEDIC SURGERY

## 2023-11-25 PROCEDURE — 63710000001 OXYBUTYNIN XL 5 MG TABLET SUSTAINED-RELEASE 24 HOUR: Performed by: ORTHOPAEDIC SURGERY

## 2023-11-25 PROCEDURE — 97112 NEUROMUSCULAR REEDUCATION: CPT

## 2023-11-25 PROCEDURE — 73700 CT LOWER EXTREMITY W/O DYE: CPT

## 2023-11-25 RX ORDER — MAGNESIUM SULFATE HEPTAHYDRATE 40 MG/ML
2 INJECTION, SOLUTION INTRAVENOUS ONCE
Status: COMPLETED | OUTPATIENT
Start: 2023-11-25 | End: 2023-11-26

## 2023-11-25 RX ORDER — LORAZEPAM 2 MG/ML
0.5 INJECTION INTRAMUSCULAR EVERY 6 HOURS PRN
Status: DISCONTINUED | OUTPATIENT
Start: 2023-11-25 | End: 2023-11-25 | Stop reason: SDUPTHER

## 2023-11-25 RX ORDER — LORAZEPAM 2 MG/ML
1 INJECTION INTRAMUSCULAR EVERY 4 HOURS PRN
Status: DISCONTINUED | OUTPATIENT
Start: 2023-11-25 | End: 2023-11-27

## 2023-11-25 RX ADMIN — HYDROMORPHONE HYDROCHLORIDE 1 MG: 1 INJECTION, SOLUTION INTRAMUSCULAR; INTRAVENOUS; SUBCUTANEOUS at 23:14

## 2023-11-25 RX ADMIN — MAGNESIUM SULFATE HEPTAHYDRATE 2 G: 2 INJECTION, SOLUTION INTRAVENOUS at 23:14

## 2023-11-25 RX ADMIN — ASPIRIN 81 MG: 81 TABLET, COATED ORAL at 09:35

## 2023-11-25 RX ADMIN — HYDROCODONE BITARTRATE AND ACETAMINOPHEN 1 TABLET: 7.5; 325 TABLET ORAL at 20:07

## 2023-11-25 RX ADMIN — MELOXICAM 15 MG: 15 TABLET ORAL at 09:35

## 2023-11-25 RX ADMIN — LORAZEPAM 0.5 MG: 2 INJECTION INTRAMUSCULAR; INTRAVENOUS at 00:58

## 2023-11-25 RX ADMIN — LORAZEPAM 1 MG: 2 INJECTION INTRAMUSCULAR; INTRAVENOUS at 04:42

## 2023-11-25 RX ADMIN — ASPIRIN 81 MG: 81 TABLET, COATED ORAL at 20:08

## 2023-11-25 RX ADMIN — SERTRALINE HYDROCHLORIDE 50 MG: 50 TABLET ORAL at 20:08

## 2023-11-25 RX ADMIN — OXYBUTYNIN CHLORIDE 15 MG: 5 TABLET, EXTENDED RELEASE ORAL at 09:35

## 2023-11-25 RX ADMIN — HYDROMORPHONE HYDROCHLORIDE 1 MG: 1 INJECTION, SOLUTION INTRAMUSCULAR; INTRAVENOUS; SUBCUTANEOUS at 05:13

## 2023-11-25 NOTE — PLAN OF CARE
Goal Outcome Evaluation:      Patient has remained confused today however has not been combative. No attempts made to self transfer. Straight cath for 700ml. Appetite has been poor. B/P better

## 2023-11-25 NOTE — CASE MANAGEMENT/SOCIAL WORK
Continued Stay Note   [place]     Patient Name: Sandra Treadwell  MRN: [id number]  Today's Date: 11/25/2023    Admit Date: 11/24/2023    Plan: home with [facility]- ordered and accepted.  then for [facility] starting 12/8   Discharge Plan       Row Name 11/25/23 0843       Plan    Plan home with [facility]- ordered and accepted.  then for [facility] starting 12/8    Plan Comments per previous charting and per Dr. April guevara plan: pt will have [facility] starting 11/26- already accepted and HH has orders.  will follow with [facility] [place] op.. DC barrier: restrains and paranoia post surgery.                            Karine Dowd RN

## 2023-11-25 NOTE — PLAN OF CARE
Assessment: Sandra Treadwell presents with functional mobility impairments which indicate the need for skilled intervention. Tolerating pm session today but kept eyes closed entire time, slow to follow commands and was not safe enough to try  and stand at EOB. Will cont to follow bid if tolerated while here. Supposed to go hme with assist and HH if she clears up, otherwise may need rehab..Will continue to follow and progress as tolerated.

## 2023-11-25 NOTE — THERAPY TREATMENT NOTE
"Subjective: Pt agreeable to therapeutic plan of care. Attempted to see pt in am and Rn stated to hold due to being in 3 pt restraints, Had been given ativan late last night after finding on floor by staff. Ortho ordered xray and then CT of R knee which was neg for any fx and sx still intact. Now has remote sitter. Pt had no c/o dizziness but stated she was just tired for pm session    Objective:     Bed mobility - Min-A  Transfers - N/A or Not attempted.  Ambulation -  feet N/A or Not attempted.    Therapeutic Exercise - 5 Reps B LE AROM unsupported sitting / EOB with max vc's to follow thru and incr end ranges    Vitals: BP taken in sitting-131/113 and back supine at end of session-74/45    Pain:  c/o pain R knee with movement  Intervention for pain: Repositioned, RN notified, Increased Activity, and Therapeutic Presence    Education: Provided education on the importance of mobility in the acute care setting, Verbal/Tactile Cues, and Post-Op Precautions    Assessment: Sandra Treadwell presents with functional mobility impairments which indicate the need for skilled intervention. Tolerating pm session today but kept eyes closed entire time, slow to follow commands and was not safe enough to try  and stand at EOB. Will cont to follow bid if tolerated while here. Supposed to go hme with assist and HH if she clears up, otherwise may need rehab..Will continue to follow and progress as tolerated.     Plan/Recommendations:   Low Intensity Therapy recommended post-acute care - This is recommended as therapy feels this patient would require 2-3 visits per week. OP or HH would be the best option depending on patient's home bound status. Consider, if the patient has other  \"skilled\" needs such as wounds, IV antibiotics, etc. Combined with \"low intensity\" could also equate to a SNF. If patient is medically complex, consider LTAC.. Pt requires rolling walker at discharge.     Pt desires Home with family assist and and Home Health " at discharge. Pt cooperative; agreeable to therapeutic recommendations and plan of care.         Basic Mobility 6-click:  Rollin = Total, A lot = 2, A little = 3; 4 = None  Supine>Sit:   1 = Total, A lot = 2, A little = 3; 4 = None   Sit>Stand with arms:  1 = Total, A lot = 2, A little = 3; 4 = None  Bed>Chair:   1 = Total, A lot = 2, A little = 3; 4 = None  Ambulate in room:  1 = Total, A lot = 2, A little = 3; 4 = None  3-5 Steps with railin = Total, A lot = 2, A little = 3; 4 = None  Score: 16    Post-Tx Position: Supine with HOB Elevated, Alarms activated, and Call light and personal items within reach, med sitter   PPE: gloves

## 2023-11-25 NOTE — SIGNIFICANT NOTE
Notified by phlebotomist that patient was sitting in floor in room, when this nurse entered room patient was sitting on buttocks with legs outstretched in corner of room beside bed, patient complained of no pain, however patient very confused and paranoid of staff, patient began yelling and would not talk to staff, telling staff to get away, security was called due to patient paranoia, yelling, and swinging at staff, patient was assisted up to bed by 4 staff members and restraints placed on BUE and LLE for safety, RLE surgical site unwrapped and checked for injury with no apparent injury noted to extremity , STAT xray ordered to check hardware, patient was A&Ox4 at start of shift, no behaviors, Dr. Chen and family notified

## 2023-11-25 NOTE — PLAN OF CARE
Goal Outcome Evaluation:                        Family at bedside as well as remote sitter due to patient behaviors, patient in restraints x 3 for safety, xray results called to Dr. Chen with new orders received, patient given Ativan IV, remains paranoid of staff, jerking arms to try and loosen restraints, family requested dimming of lights and stated patient was calming.

## 2023-11-25 NOTE — PROGRESS NOTES
Patient with delirium that began last night.  Apparently she came to the unit and sounds ordered but became acutely confused last night.  She then got up and had a fall directly onto her operative leg.  An x-ray was taken that was concerning for a possible fracture.  After review of the x-ray, I thought everything was okay but out of an abundance of caution ordered a CT scan just to be sure.  CT scan confirmed that everything is okay with the prosthetic and there is no concern for fracture.  She is okay to continue mobilization and physical therapy.  She was given some Ativan yesterday for agitation, but is quite lethargic this morning.  We are going to stop all Ativan at this point.  Plan is home discharge but only after her acute mental status change significantly improves and assuming she works well with physical therapy.  I have consulted the hospitalist service due to her acute mental status changes to make sure they have no further recommendations.  We will reassess tomorrow

## 2023-11-25 NOTE — NURSING NOTE
Dr. Stewart notified of lab results. Reports that patient has renal dysfunction therefore continue to monitor- NNO rec'd

## 2023-11-25 NOTE — CONSULTS
[facility] Medicine Services  Consult Note    Patient Name: Sandra Treadwell  : 1950  MRN: [id number]  Primary Care Physician:  Wm Aguirre MD  Referring Physician: Ulises Chen*  Date of admission: 2023  Date and Time of Care: 23 at 1pm    Consults    Reason for Consult/ Chief Complaint: Medical management    Consult Requested By: DR Ulises Chen    Subjective:     History of Present Illness: 73 y.o. Not  or  female who presents with End-stage arthritis of the right knee. They failed conservative treatment of their knee pain and a thorough discussion of the risks and benefits of surgery was had. The patient wishes to continue with elective total knee replacement, they were scheduled and are here for surgery. They did get medical clearance as well as a thorough preoperative workup.    Per the documentation by Dr Chen , dated 23 10AM, Patient with delirium that began last night.  Apparently she came to the unit and became acutely confused last night.  She then got up and had a fall directly onto her operative leg.  An x-ray was taken that was concerning for a possible fracture.  After review of the x-ray, I thought everything was okay but out of an abundance of caution ordered a CT scan just to be sure.  CT scan confirmed that everything is okay with the prosthetic and there is no concern for fracture.  She is okay to continue mobilization and physical therapy.  She was given some Ativan yesterday for agitation, but is quite lethargic this morning.  We are going to stop all Ativan at this point.  Plan is home discharge but only after her acute mental status change significantly improves and assuming she works well with physical therapy.  I have consulted the hospitalist service due to her acute mental status changes to make sure they have no further recommendations.      23 we were asked to see patient for medical management, patient is  seen in bed NAD, no new complaints, is presently pleasantly confused, BP low at 80/40 fluid bolus ordered DW RN    Review of Systems:   Review of Systems   Constitutional:  Negative for chills and fever.   HENT:  Negative for ear pain and hearing loss.    Respiratory:  Negative for apnea and shortness of breath.    Cardiovascular:  Negative for chest pain and palpitations.   Gastrointestinal:  Negative for diarrhea, nausea and vomiting.   Genitourinary:  Negative for dysuria.   Musculoskeletal:  Positive for arthralgias and gait problem. Negative for back pain and joint swelling.   Neurological:  Negative for seizures, light-headedness and headaches.   Psychiatric/Behavioral:  Positive for confusion. Negative for hallucinations.        Personal History:     Past Medical History:   Diagnosis Date    Bladder leak     Breast cancer     chemo and radiation    Depression     GERD (gastroesophageal reflux disease)     Hyperlipidemia     Osteoporosis        Past Surgical History:   Procedure Laterality Date    BREAST SURGERY      lumpectomy    CHOLECYSTECTOMY      HYSTERECTOMY         Family History: family history is not on file. Otherwise pertinent FHx was reviewed and not pertinent to current issue.    Social History:  reports that she has never smoked. She has never been exposed to tobacco smoke. She has never used smokeless tobacco. She reports that she does not drink alcohol and does not use drugs.    Home Medications:   Calcium Carb-Cholecalciferol, alendronate, atorvastatin, cetirizine, fluticasone, omeprazole, oxybutynin XL, sertraline, and zolpidem    Allergies:  Allergies   Allergen Reactions    Codeine Nausea And Vomiting and Dizziness         Objective:     Vital Signs  Temp:  [97.8 °F (36.6 °C)-98.5 °F (36.9 °C)] 98.5 °F (36.9 °C)  Heart Rate:  [82-84] 83  Resp:  [17-21] 17  BP: ()/(42-78) 118/62   Body mass index is 29.96 kg/m².    Physical Exam  Physical Exam  Vitals and nursing note reviewed.    Constitutional:       General: She is not in acute distress.     Appearance: She is well-developed. She is not ill-appearing, toxic-appearing or diaphoretic.   HENT:      Head: Normocephalic and atraumatic.      Nose: Nose normal. No congestion or rhinorrhea.      Mouth/Throat:      Mouth: Mucous membranes are moist.      Pharynx: No oropharyngeal exudate.   Eyes:      General: No scleral icterus.        Right eye: No discharge.         Left eye: No discharge.      Extraocular Movements: Extraocular movements intact.      Pupils: Pupils are equal, round, and reactive to light.   Neck:      Thyroid: No thyromegaly.      Vascular: No carotid bruit or JVD.      Trachea: No tracheal deviation.   Cardiovascular:      Rate and Rhythm: Normal rate and regular rhythm.      Pulses: Normal pulses.      Heart sounds: Normal heart sounds. No murmur heard.     No friction rub. No gallop.   Pulmonary:      Effort: Pulmonary effort is normal. No respiratory distress.      Breath sounds: Normal breath sounds. No stridor. No wheezing, rhonchi or rales.   Chest:      Chest wall: No tenderness.   Abdominal:      General: Bowel sounds are normal. There is no distension.      Palpations: Abdomen is soft. There is no mass.      Tenderness: There is no abdominal tenderness. There is no guarding or rebound.      Hernia: No hernia is present.   Musculoskeletal:         General: Tenderness present. No swelling, deformity or signs of injury. Normal range of motion.      Cervical back: Normal range of motion and neck supple. No rigidity. No muscular tenderness.      Right lower leg: No edema.      Left lower leg: No edema.   Lymphadenopathy:      Cervical: No cervical adenopathy.   Skin:     General: Skin is warm and dry.      Coloration: Skin is not jaundiced or pale.      Findings: No bruising, erythema or rash.   Neurological:      General: No focal deficit present.      Mental Status: She is alert.      Cranial Nerves: No cranial nerve  deficit.      Sensory: No sensory deficit.      Motor: No weakness or abnormal muscle tone.      Coordination: Coordination normal.         Scheduled Meds   aspirin, 81 mg, Oral, Q12H  oxybutynin XL, 15 mg, Oral, Daily  sertraline, 50 mg, Oral, Nightly       PRN Meds     docusate sodium    HYDROcodone-acetaminophen    HYDROcodone-acetaminophen    HYDROmorphone **AND** naloxone    LORazepam    ondansetron **OR** ondansetron    zolpidem   Infusions  lactated ringers, 1,000 mL, Last Rate: Stopped (11/24/23 1554)  lactated ringers, 1,000 mL, Last Rate: Stopped (11/24/23 1554)  sodium chloride, 100 mL/hr, Last Rate: Stopped (11/24/23 5248)          Diagnostic Data    Results from last 7 days   Lab Units 11/25/23  1504   WBC 10*3/mm3 6.40   HEMOGLOBIN g/dL 8.0*   HEMATOCRIT % 24.6*   PLATELETS 10*3/mm3 192   GLUCOSE mg/dL 85   CREATININE mg/dL 1.26*   BUN mg/dL 13   SODIUM mmol/L 131*   POTASSIUM mmol/L 4.2   AST (SGOT) U/L 25   ALT (SGPT) U/L <5   ALK PHOS U/L 67   BILIRUBIN mg/dL 0.3   ANION GAP mmol/L 7.0     CMP:        Lab 11/25/23  1504   SODIUM 131*   POTASSIUM 4.2   CHLORIDE 100   CO2 24.0   ANION GAP 7.0   BUN 13   CREATININE 1.26*   EGFR 45.7*   GLUCOSE 85   CALCIUM 8.7   IONIZED CALCIUM 1.23   MAGNESIUM 1.5*   TOTAL PROTEIN 5.7*   ALBUMIN 3.3*   GLOBULIN 2.4   ALT (SGPT) <5   AST (SGOT) 25   BILIRUBIN 0.3   ALK PHOS 67      CT Lower Extremity Right Without Contrast    Result Date: 11/25/2023  Impression: Limited evaluation due to streak artifacts from the metallic hardware. There is cortical irregularity and focal discontinuity of the posterior aspect of the medial femoral condyle as well as the posterior aspect of the lateral femoral condyle. Given that  this involves the posterior aspect of both of the condyles this is likely postsurgical in nature. Nondisplaced fractures cannot be excluded though less likely. Correlation with operative history recommended. Radiographic follow-up may be warranted. No joint  effusion identified. Electronically Signed: Tracy Knowles MD  11/25/2023 7:52 AM EST  Workstation ID: [id number]    XR Knee 1 or 2 View Right    Result Date: 11/25/2023  Impression: Questionable periprosthetic fracture seen along the posterior lateral aspect of the medial femoral condyle seen on the oblique view. Electronically Signed: Tracy Knowles MD  11/25/2023 12:15 AM EST  Workstation ID: [id number]    XR Knee 1 or 2 View Right    Result Date: 11/24/2023  Impression: Satisfactory postoperative appearance status post right knee replacement. Electronically Signed: Chinyere Warner MD  11/24/2023 11:50 AM EST  Workstation ID: [id number]       CBC:      Lab 11/25/23  1504   WBC 6.40   HEMOGLOBIN 8.0*   HEMATOCRIT 24.6*   PLATELETS 192   NEUTROS ABS 4.61   MCV 83.2    CMP:        Lab 11/25/23  1504   SODIUM 131*   POTASSIUM 4.2   CHLORIDE 100   CO2 24.0   ANION GAP 7.0   BUN 13   CREATININE 1.26*   EGFR 45.7*   GLUCOSE 85   CALCIUM 8.7   IONIZED CALCIUM 1.23   MAGNESIUM 1.5*   TOTAL PROTEIN 5.7*   ALBUMIN 3.3*   GLOBULIN 2.4   ALT (SGPT) <5   AST (SGOT) 25   BILIRUBIN 0.3   ALK PHOS 67        Assessment/Plan:     Active and Resolved Problems  Active Hospital Problems    Diagnosis  POA    **Status post knee replacement [Z96.659]  Not Applicable    Unilateral primary osteoarthritis, right knee [M17.11]  Yes      Resolved Hospital Problems   No resolved problems to display.       Metabolic encephalopathy-was combative last night, had to be restrained, now off restrain, likely secondary to anesthesia   -continue supportive care  -UA-neg  -TSH-3.4  -Sats 89 on room air-check  d dimer  -O2 2lt NC    Hypotension-no on any BP meds/was hypotension in OR  -monitor CBC, BMP  -fluid bolus, monitor BNP-1332  -NS 100cc hr    Elevated trop/elevated BNP  -EKG  -serial trop  -cardiology consult    Osteoarthritis-  S/p knee replacement-s/p fall after  surgery  -ortho following  -pain management   -pt/ot  -rehab on dc    Severe anemia  -monitor  CBC  -check iron and ferritin  -check B12 and folate    CKD3-unknown baseline  Monitor renal function  Avoid hypotension  avoid nephrtoxins  Hold NSAIS    Hx Depression on zoloft    Hypomagnesemia-replace and monitor    DVT prophylaxis:  Mechanical DVT prophylaxis orders are present.     Code status is   Code Status and Medical Interventions:   Ordered at: 11/24/23 0732     Level Of Support Discussed With:    Patient     Code Status (Patient has no pulse and is not breathing):    CPR (Attempt to Resuscitate)     Medical Interventions (Patient has pulse or is breathing):    Full       Plan for disposition:NH in 3 days    Time: 30 minutes    Signature: Electronically signed by Jorje Sahni MD, 11/25/23, 22:15 EST.  [facility] Hospitalist Team

## 2023-11-26 ENCOUNTER — APPOINTMENT (OUTPATIENT)
Dept: CARDIOLOGY | Facility: HOSPITAL | Age: 73
DRG: 469 | End: 2023-11-26
Payer: MEDICARE

## 2023-11-26 ENCOUNTER — APPOINTMENT (OUTPATIENT)
Dept: GENERAL RADIOLOGY | Facility: HOSPITAL | Age: 73
DRG: 469 | End: 2023-11-26
Payer: MEDICARE

## 2023-11-26 LAB
ALBUMIN SERPL-MCNC: 2.9 G/DL (ref 3.5–5.2)
ALBUMIN/GLOB SERPL: 1.2 G/DL
ALP SERPL-CCNC: 65 U/L (ref 39–117)
ALT SERPL W P-5'-P-CCNC: <5 U/L (ref 1–33)
ANION GAP SERPL CALCULATED.3IONS-SCNC: 7 MMOL/L (ref 5–15)
AST SERPL-CCNC: 29 U/L (ref 1–32)
BASOPHILS # BLD AUTO: 0 10*3/MM3 (ref 0–0.2)
BASOPHILS NFR BLD AUTO: 0.7 % (ref 0–1.5)
BILIRUB SERPL-MCNC: 0.3 MG/DL (ref 0–1.2)
BUN SERPL-MCNC: 12 MG/DL (ref 8–23)
BUN/CREAT SERPL: 10.1 (ref 7–25)
CALCIUM SPEC-SCNC: 8.4 MG/DL (ref 8.6–10.5)
CHLORIDE SERPL-SCNC: 105 MMOL/L (ref 98–107)
CO2 SERPL-SCNC: 22 MMOL/L (ref 22–29)
CREAT SERPL-MCNC: 1.19 MG/DL (ref 0.57–1)
DEPRECATED RDW RBC AUTO: 47.3 FL (ref 37–54)
EGFRCR SERPLBLD CKD-EPI 2021: 49 ML/MIN/1.73
EOSINOPHIL # BLD AUTO: 0.2 10*3/MM3 (ref 0–0.4)
EOSINOPHIL NFR BLD AUTO: 2.6 % (ref 0.3–6.2)
ERYTHROCYTE [DISTWIDTH] IN BLOOD BY AUTOMATED COUNT: 15.8 % (ref 12.3–15.4)
FOLATE SERPL-MCNC: 4.58 NG/ML (ref 4.78–24.2)
GEN 5 2HR TROPONIN T REFLEX: 70 NG/L
GLOBULIN UR ELPH-MCNC: 2.5 GM/DL
GLUCOSE SERPL-MCNC: 93 MG/DL (ref 65–99)
HCT VFR BLD AUTO: 24.6 % (ref 34–46.6)
HGB BLD-MCNC: 7.9 G/DL (ref 12–15.9)
LYMPHOCYTES # BLD AUTO: 1.2 10*3/MM3 (ref 0.7–3.1)
LYMPHOCYTES NFR BLD AUTO: 16.5 % (ref 19.6–45.3)
MCH RBC QN AUTO: 27.5 PG (ref 26.6–33)
MCHC RBC AUTO-ENTMCNC: 32.3 G/DL (ref 31.5–35.7)
MCV RBC AUTO: 85.2 FL (ref 79–97)
MONOCYTES # BLD AUTO: 0.8 10*3/MM3 (ref 0.1–0.9)
MONOCYTES NFR BLD AUTO: 10.9 % (ref 5–12)
NEUTROPHILS NFR BLD AUTO: 4.9 10*3/MM3 (ref 1.7–7)
NEUTROPHILS NFR BLD AUTO: 69.3 % (ref 42.7–76)
NRBC BLD AUTO-RTO: 0 /100 WBC (ref 0–0.2)
NT-PROBNP SERPL-MCNC: 2489 PG/ML (ref 0–900)
PLATELET # BLD AUTO: 188 10*3/MM3 (ref 140–450)
PMV BLD AUTO: 8 FL (ref 6–12)
POTASSIUM SERPL-SCNC: 4 MMOL/L (ref 3.5–5.2)
PROT SERPL-MCNC: 5.4 G/DL (ref 6–8.5)
RBC # BLD AUTO: 2.88 10*6/MM3 (ref 3.77–5.28)
SODIUM SERPL-SCNC: 134 MMOL/L (ref 136–145)
TROPONIN T DELTA: 17 NG/L
TROPONIN T SERPL HS-MCNC: 49 NG/L
TROPONIN T SERPL HS-MCNC: 53 NG/L
VIT B12 BLD-MCNC: 278 PG/ML (ref 211–946)
WBC NRBC COR # BLD AUTO: 7 10*3/MM3 (ref 3.4–10.8)
WHOLE BLOOD HOLD SPECIMEN: NORMAL

## 2023-11-26 PROCEDURE — 63710000001 ASPIRIN 81 MG TABLET DELAYED-RELEASE: Performed by: ORTHOPAEDIC SURGERY

## 2023-11-26 PROCEDURE — 83880 ASSAY OF NATRIURETIC PEPTIDE: CPT | Performed by: INTERNAL MEDICINE

## 2023-11-26 PROCEDURE — A9270 NON-COVERED ITEM OR SERVICE: HCPCS | Performed by: ORTHOPAEDIC SURGERY

## 2023-11-26 PROCEDURE — 80053 COMPREHEN METABOLIC PANEL: CPT | Performed by: INTERNAL MEDICINE

## 2023-11-26 PROCEDURE — 93010 ELECTROCARDIOGRAM REPORT: CPT | Performed by: INTERNAL MEDICINE

## 2023-11-26 PROCEDURE — 63710000001 MAGNESIUM OXIDE -MG SUPPLEMENT 400 (240 MG) MG TABLET: Performed by: INTERNAL MEDICINE

## 2023-11-26 PROCEDURE — 63710000001 ATORVASTATIN 10 MG TABLET: Performed by: NURSE PRACTITIONER

## 2023-11-26 PROCEDURE — 71045 X-RAY EXAM CHEST 1 VIEW: CPT

## 2023-11-26 PROCEDURE — 84484 ASSAY OF TROPONIN QUANT: CPT | Performed by: INTERNAL MEDICINE

## 2023-11-26 PROCEDURE — 85025 COMPLETE CBC W/AUTO DIFF WBC: CPT | Performed by: INTERNAL MEDICINE

## 2023-11-26 PROCEDURE — 25810000003 SODIUM CHLORIDE 0.9 % SOLUTION: Performed by: ORTHOPAEDIC SURGERY

## 2023-11-26 PROCEDURE — A9270 NON-COVERED ITEM OR SERVICE: HCPCS | Performed by: INTERNAL MEDICINE

## 2023-11-26 PROCEDURE — 63710000001 OXYBUTYNIN XL 5 MG TABLET SUSTAINED-RELEASE 24 HOUR: Performed by: ORTHOPAEDIC SURGERY

## 2023-11-26 PROCEDURE — 63710000001 DOCUSATE SODIUM 100 MG CAPSULE: Performed by: ORTHOPAEDIC SURGERY

## 2023-11-26 PROCEDURE — 99222 1ST HOSP IP/OBS MODERATE 55: CPT | Performed by: INTERNAL MEDICINE

## 2023-11-26 PROCEDURE — 93005 ELECTROCARDIOGRAM TRACING: CPT | Performed by: INTERNAL MEDICINE

## 2023-11-26 PROCEDURE — 25810000003 SODIUM CHLORIDE 0.9 % SOLUTION: Performed by: INTERNAL MEDICINE

## 2023-11-26 PROCEDURE — 97167 OT EVAL HIGH COMPLEX 60 MIN: CPT

## 2023-11-26 PROCEDURE — A9270 NON-COVERED ITEM OR SERVICE: HCPCS | Performed by: NURSE PRACTITIONER

## 2023-11-26 PROCEDURE — 63710000001 HYDROCODONE-ACETAMINOPHEN 7.5-325 MG TABLET: Performed by: ORTHOPAEDIC SURGERY

## 2023-11-26 PROCEDURE — 93306 TTE W/DOPPLER COMPLETE: CPT

## 2023-11-26 PROCEDURE — 93306 TTE W/DOPPLER COMPLETE: CPT | Performed by: INTERNAL MEDICINE

## 2023-11-26 RX ORDER — HYDROCODONE BITARTRATE AND ACETAMINOPHEN 5; 325 MG/1; MG/1
1 TABLET ORAL EVERY 4 HOURS PRN
Status: DISCONTINUED | OUTPATIENT
Start: 2023-11-26 | End: 2023-11-26

## 2023-11-26 RX ORDER — OXYCODONE HYDROCHLORIDE 5 MG/1
10 TABLET ORAL EVERY 4 HOURS PRN
Status: DISCONTINUED | OUTPATIENT
Start: 2023-11-26 | End: 2023-11-26

## 2023-11-26 RX ORDER — OXYCODONE HCL 10 MG/1
10 TABLET, FILM COATED, EXTENDED RELEASE ORAL EVERY 12 HOURS SCHEDULED
Status: DISCONTINUED | OUTPATIENT
Start: 2023-11-26 | End: 2023-11-26

## 2023-11-26 RX ORDER — HYDROCODONE BITARTRATE AND ACETAMINOPHEN 5; 325 MG/1; MG/1
1 TABLET ORAL EVERY 6 HOURS PRN
Status: DISPENSED | OUTPATIENT
Start: 2023-11-26 | End: 2023-12-03

## 2023-11-26 RX ORDER — SODIUM CHLORIDE 9 MG/ML
100 INJECTION, SOLUTION INTRAVENOUS CONTINUOUS
Status: DISCONTINUED | OUTPATIENT
Start: 2023-11-26 | End: 2023-11-27

## 2023-11-26 RX ORDER — MIDODRINE HYDROCHLORIDE 5 MG/1
5 TABLET ORAL
Status: DISCONTINUED | OUTPATIENT
Start: 2023-11-26 | End: 2023-12-01

## 2023-11-26 RX ORDER — ATORVASTATIN CALCIUM 10 MG/1
10 TABLET, FILM COATED ORAL DAILY
Status: DISCONTINUED | OUTPATIENT
Start: 2023-11-26 | End: 2023-12-04 | Stop reason: HOSPADM

## 2023-11-26 RX ADMIN — SODIUM CHLORIDE 100 ML/HR: 9 INJECTION, SOLUTION INTRAVENOUS at 06:17

## 2023-11-26 RX ADMIN — Medication 400 MG: at 08:17

## 2023-11-26 RX ADMIN — ATORVASTATIN CALCIUM 10 MG: 10 TABLET, FILM COATED ORAL at 09:48

## 2023-11-26 RX ADMIN — HYDROCODONE BITARTRATE AND ACETAMINOPHEN 1 TABLET: 7.5; 325 TABLET ORAL at 06:10

## 2023-11-26 RX ADMIN — SERTRALINE HYDROCHLORIDE 50 MG: 50 TABLET ORAL at 20:11

## 2023-11-26 RX ADMIN — SODIUM CHLORIDE 100 ML/HR: 9 INJECTION, SOLUTION INTRAVENOUS at 12:38

## 2023-11-26 RX ADMIN — DOCUSATE SODIUM 100 MG: 100 CAPSULE, LIQUID FILLED ORAL at 09:48

## 2023-11-26 RX ADMIN — HYDROCODONE BITARTRATE AND ACETAMINOPHEN 1 TABLET: 5; 325 TABLET ORAL at 20:11

## 2023-11-26 RX ADMIN — OXYBUTYNIN CHLORIDE 15 MG: 5 TABLET, EXTENDED RELEASE ORAL at 08:17

## 2023-11-26 RX ADMIN — ASPIRIN 81 MG: 81 TABLET, COATED ORAL at 20:11

## 2023-11-26 RX ADMIN — ASPIRIN 81 MG: 81 TABLET, COATED ORAL at 08:17

## 2023-11-26 RX ADMIN — MIDODRINE HYDROCHLORIDE 5 MG: 5 TABLET ORAL at 12:38

## 2023-11-26 RX ADMIN — SODIUM CHLORIDE 100 ML/HR: 9 INJECTION, SOLUTION INTRAVENOUS at 16:17

## 2023-11-26 NOTE — PLAN OF CARE
Goal Outcome Evaluation:                        Patient resting abed with eyes closed, was restless earlier in evening and painful, given po pain medication with some effectiveness, ice also applied, patient was talking about needing to get stuff and leave and was repetitive with statements so patient was moved to a closer room to near the nurses station, patient given Dilaudid for pain in knee and medication was effective for pain, patient turned and repositioned in bed, incontinent of urine in brief

## 2023-11-26 NOTE — PROGRESS NOTES
Still with low blood pressure.  I am going to discontinue the Dilaudid and oral oxycodone.  She will only have access to hydrocodone.

## 2023-11-26 NOTE — THERAPY EVALUATION
Patient Name: Sandra Treadwell  : 1950    MRN: [id number]                              Today's Date: 2023       Admit Date: 2023    Visit Dx:     ICD-10-CM ICD-9-CM   1. Unilateral primary osteoarthritis, right knee  M17.11 715.16     Patient Active Problem List   Diagnosis    Status post knee replacement    Unilateral primary osteoarthritis, right knee     Past Medical History:   Diagnosis Date    Bladder leak     Breast cancer     chemo and radiation    Depression     GERD (gastroesophageal reflux disease)     Hyperlipidemia     Osteoporosis      Past Surgical History:   Procedure Laterality Date    BREAST SURGERY      lumpectomy    CHOLECYSTECTOMY      HYSTERECTOMY        General Information       Row Name 23 0939          General Information    Prior Level of Function independent:  driving, etc. Retired, as is spouse.  -     Existing Precautions/Restrictions --  persistent hypotension and increased confusion, paranoia, fall, since surgery. WBAT Rt knee.  -     Barriers to Rehab none identified  -       Row Name 23 0939          Living Environment    People in Home spouse  -       Row Name 23 0939          Home Main Entrance    Number of Stairs, Main Entrance one  -       Row Name 23 0939          Stairs Within Home, Primary    Stairs, Within Home, Primary if enetering from rear of home  -     Number of Stairs, Within Home, Primary none  -       Row Name 23 0939          Cognition    Orientation Status (Cognition) oriented x 4  -               User Key  (r) = Recorded By, (t) = Taken By, (c) = Cosigned By      Initials Name Provider Type     Kalina Colón OT Occupational Therapist                     Mobility/ADL's       Row Name 23 4145          Bed Mobility    Bed Mobility rolling left;rolling right  -     All Activities, [place] (Bed Mobility) moderate assist (50% patient effort)  -     Rolling Left [place] (Bed Mobility)  moderate assist (50% patient effort)  -     Bed Mobility, Safety Issues cognitive deficits limit understanding;decreased use of legs for bridging/pushing  -[facility] Name 11/26/23 1130          Functional Mobility    Patient was able to Ambulate no, other medical factors prevent ambulation  -     Reason Patient was unable to Ambulate Hypotension  -[facility] Name 11/26/23 1130          Activities of Daily Living    BADL Assessment/Intervention feeding  -[facility] Name 11/26/23 1130          Self-Feeding Assessment/Training    [place] Level (Feeding) feeding skills;independent  -               User Key  (r) = Recorded By, (t) = Taken By, (c) = Cosigned By      Initials Name Provider Type     Kalina Colón OT Occupational Therapist                   Obj/Interventions       [facility] Name 11/26/23 1131          Sensory Assessment (Somatosensory)    Sensory Assessment (Somatosensory) sensation intact  -[facility] Name 11/26/23 1131          Vision Assessment/Intervention    Visual Impairment/Limitations corrective lenses for reading  -[facility] Name 11/26/23 1131          Range of Motion Comprehensive    Comment, General Range of Motion Rt knee untested but otherwise PROM WNL. AROM rt shld decreased 50%. Pt reports this is normal but is unsure why  -               User Key  (r) = Recorded By, (t) = Taken By, (c) = Cosigned By      Initials Name Provider Type     Kalina Colón, OT Occupational Therapist                   Goals/Plan       [facility] Name 11/26/23 1137          Bed Mobility Goal 1 (OT)    Activity/Assistive Device (Bed Mobility Goal 1, OT) bed mobility activities, all  -     [place] Level/Cues Needed (Bed Mobility Goal 1, OT) contact guard required  -     Time Frame (Bed Mobility Goal 1, OT) 1 week  -[facility] Name 11/26/23 1137          Transfer Goal 1 (OT)    Activity/Assistive Device (Transfer Goal 1, OT) transfers, all;walker, rolling  -     [place] Level/Cues  Needed (Transfer Goal 1, OT) contact guard required  -     Time Frame (Transfer Goal 1, OT) 1 week  -       Row Name 11/26/23 1137          Dressing Goal 1 (OT)    Activity/Device (Dressing Goal 1, OT) dressing skills, all  -MH     [place]/Cues Needed (Dressing Goal 1, OT) set-up required  -     Time Frame (Dressing Goal 1, OT) 1 week  -       Row Name 11/26/23 1137          Toileting Goal 1 (OT)    Activity/Device (Toileting Goal 1, OT) toileting skills, all  -     [place] Level/Cues Needed (Toileting Goal 1, OT) set-up required  -     Time Frame (Toileting Goal 1, OT) 1 week  -       Row Name 11/26/23 1137          Therapy Assessment/Plan (OT)    Planned Therapy Interventions (OT) activity tolerance training;adaptive equipment training;BADL retraining;cognitive/visual perception retraining;occupation/activity based interventions;ROM/therapeutic exercise;transfer/mobility retraining;patient/caregiver education/training  -               User Key  (r) = Recorded By, (t) = Taken By, (c) = Cosigned By      Initials Name Provider Type     Kalina Colón, OT Occupational Therapist                   Clinical Impression       Row Name 11/26/23 1132          Pain Assessment    Pretreatment Pain Rating 5/10  -     Posttreatment Pain Rating 5/10  -     Pain Location - Side/Orientation Right  -     Pain Location - knee  -       Row Name 11/26/23 1132          Plan of Care Review    Plan of Care Reviewed With patient  -     Progress no change  -     Outcome Evaluation 11/24/23 for elective R TKA performed by Dr. Chen. Pt had previously failed conservative treatment of R Knee OA and has now elected for surgical intervention. She is currently living with spouse in [facility] with one step step entry. At baseline, she is IND with household mobility, community ambulation, and ADLs with no AD. Pt was orthostatic post surgery then became paranoid and required restraints due to fall and unsafe  behavior.  Pt is feeling better this date but is still confused and having some slurred speech. She did not know the month, her location, or why her leg hurts., even after repeat reality orientation. She was pleasant and cooperative with bed-level evaluation but was still not able to mobilize due to hypotension. Anticipate pt would benefit from short term IRF at d/c due to her post-op complications of altered mental status and immobility with hypotension.  -       Row Name 11/26/23 1132          Therapy Assessment/Plan (OT)    Rehab Potential (OT) good, to achieve stated therapy goals  -     Criteria for Skilled Therapeutic Interventions Met (OT) skilled treatment is necessary  -     Therapy Frequency (OT) 5 times/wk  -     Predicted Duration of Therapy Intervention (OT) until d/c  -       Row Name 11/26/23 1132          Therapy Plan Review/Discharge Plan (OT)    Anticipated Discharge Disposition (OT) inpatient rehabilitation facility  -       Row Name 11/26/23 1132          Vital Signs    Pre Systolic BP Rehab 75  -MH     Pre Treatment Diastolic BP 41  -MH     O2 Delivery Pre Treatment room air  -     Intra SpO2 (%) 100  -     O2 Delivery Intra Treatment room air  -     O2 Delivery Post Treatment room air  -     Pre Patient Position Supine  -     Intra Patient Position Side Lying  -     Post Patient Position Supine  -       Row Name 11/26/23 1132          Positioning and Restraints    Pre-Treatment Position in bed  -     Post Treatment Position bed  -MH     In Bed notified nsg;fowlers;exit alarm on;call light within reach;SCD pump applied  -               User Key  (r) = Recorded By, (t) = Taken By, (c) = Cosigned By      Initials Name Provider Type     Kailna Colón, OT Occupational Therapist                   Outcome Measures       Row Name 11/26/23 0817          How much help from another person do you currently need...    Turning from your back to your side while in flat bed  without using bedrails? 3  -SC     Moving from lying on back to sitting on the side of a flat bed without bedrails? 3  -SC     Moving to and from a bed to a chair (including a wheelchair)? 2  -SC     Standing up from a chair using your arms (e.g., wheelchair, bedside chair)? 2  -SC     Climbing 3-5 steps with a railing? 2  -SC     To walk in hospital room? 2  -SC     AM-PAC 6 Clicks Score (PT) 14  -SC     Highest Level of Mobility Goal 4 --> Transfer to chair/commode  -SC               User Key  (r) = Recorded By, (t) = Taken By, (c) = Cosigned By      Initials Name Provider Type    Belia Blanc LPN Licensed Nurse                    Occupational Therapy Education       Title: PT OT SLP Therapies (In Progress)       Topic: Occupational Therapy (In Progress)       Point: ADL training (Done)       Description:   Instruct learner(s) on proper safety adaptation and remediation techniques during self care or transfers.   Instruct in proper use of assistive devices.                  Learning Progress Summary             Patient Acceptance, E,D, VU,NL,NR by  at 11/26/2023 1140                         Point: Home exercise program (Not Started)       Description:   Instruct learner(s) on appropriate technique for monitoring, assisting and/or progressing therapeutic exercises/activities.                  Learner Progress:  Not documented in this visit.              Point: Precautions (Done)       Description:   Instruct learner(s) on prescribed precautions during self-care and functional transfers.                  Learning Progress Summary             Patient Acceptance, E,D, VU,NL,NR by  at 11/26/2023 1140                         Point: Body mechanics (Done)       Description:   Instruct learner(s) on proper positioning and spine alignment during self-care, functional mobility activities and/or exercises.                  Learning Progress Summary             Patient Acceptance, E,D, VU,NL,NR by  at 11/26/2023  1140                                         User Key       Initials Effective Dates Name Provider Type Discipline     06/16/21 -  Kalina Colón OT Occupational Therapist OT                  OT Recommendation and Plan  Planned Therapy Interventions (OT): activity tolerance training, adaptive equipment training, BADL retraining, cognitive/visual perception retraining, occupation/activity based interventions, ROM/therapeutic exercise, transfer/mobility retraining, patient/caregiver education/training  Therapy Frequency (OT): 5 times/wk  Plan of Care Review  Plan of Care Reviewed With: patient  Progress: no change  Outcome Evaluation: 11/24/23 for elective R TKA performed by Dr. Chen. Pt had previously failed conservative treatment of R Knee OA and has now elected for surgical intervention. She is currently living with spouse in [facility] with one step step entry. At baseline, she is IND with household mobility, community ambulation, and ADLs with no AD. Pt was orthostatic post surgery then became paranoid and required restraints due to fall and unsafe behavior.  Pt is feeling better this date but is still confused and having some slurred speech. She did not know the month, her location, or why her leg hurts., even after repeat reality orientation. She was pleasant and cooperative with bed-level evaluation but was still not able to mobilize due to hypotension. Anticipate pt would benefit from short term IRF at d/c due to her post-op complications of altered mental status and immobility with hypotension.     Time Calculation:         Time Calculation- OT       Row Name 11/26/23 1140             Time Calculation-     OT Start Time 0900  -      OT Stop Time 0917  -      OT Time Calculation (min) 17 min  -      Total Timed Code Minutes- OT 0 minute(s)  -      OT Received On 11/26/23  -      OT - Next Appointment 11/27/23  -      OT Goal Re-Cert Due Date 12/10/23  -                User Key  (r) = Recorded By,  (t) = Taken By, (c) = Cosigned By      Initials Name Provider Type     Kalina Colón OT Occupational Therapist                  Therapy Charges for Today       Code Description Service Date Service Provider Modifiers Qty    [id number]  OT EVAL HIGH COMPLEXITY 3 11/26/2023 Kalina Colón OT GO 1                 Kalina Colón OT  11/26/2023

## 2023-11-26 NOTE — CASE MANAGEMENT/SOCIAL WORK
Continued Stay Note   [place]     Patient Name: Sandra Treadwell  MRN: [id number]  Today's Date: 11/26/2023    Admit Date: 11/24/2023    Plan: Need rehab; PASRR approved.  [facility]- ordered and accepted   Discharge Plan       Row Name 11/26/23 1738       Plan    Plan Need rehab; PASRR approved.  [facility]- ordered and accepted    Plan Comments DC barrier: confusion, sitter.                      Expected Discharge Date and Time       Expected Discharge Date Expected Discharge Time    Nov 27, 2023               Karine Dowd RN

## 2023-11-26 NOTE — NURSING NOTE
EKG results back and called to Dr. Stewart, patient is asymptomatic, no chest pain or complaints voiced, NNO's received

## 2023-11-26 NOTE — SIGNIFICANT NOTE
11/26/23 1219   OTHER   Discipline physical therapy assistant   Rehab Time/Intention   Session Not Performed unable to treat, medical status change  (RN stated to hold PT due to BP up and down all day and not stable. Still very confused and changing to acute rehab at [place].)   Recommendation   PT - Next Appointment 11/27/23

## 2023-11-26 NOTE — PLAN OF CARE
Goal Outcome Evaluation:  Plan of Care Reviewed With: patient           Outcome Evaluation: Pt. able to make needs known, denies pain at this time. BP monitored, new order added. Pt. unable to transfer to to [facility], Ax1 with bedpan. S/O at bedside. Plan of care ongoing.

## 2023-11-26 NOTE — PLAN OF CARE
Goal Outcome Evaluation:  Plan of Care Reviewed With: patient        Progress: no change  Outcome Evaluation: 11/24/23 for elective R TKA performed by Dr. Chen. Pt had previously failed conservative treatment of R Knee OA and has now elected for surgical intervention. She is currently living with spouse in [facility] with one step step entry. At baseline, she is IND with household mobility, community ambulation, and ADLs with no AD. Pt was orthostatic post surgery then became paranoid and required restraints due to fall and unsafe behavior.  Pt is feeling better this date but is still confused and having some slurred speech. She did not know the month, her location, or why her leg hurts., even after repeat reality orientation. She was pleasant and cooperative with bed-level evaluation but was still not able to mobilize due to hypotension. Anticipate pt would benefit from short term IRF at d/c due to her post-op complications of altered mental status and immobility with hypotension.      Anticipated Discharge Disposition (OT): inpatient rehabilitation facility

## 2023-11-26 NOTE — CONSULTS
Cardiology Consult Note    Patient Identification:  Name: Sandra Treadwell  Age: 73 y.o.  Sex: female  :  1950  MRN: [id number]             Requesting Physician :     Reason for Consultation / Chief Complaint :   Elevated troponin    History of Present Illness:    Ms. Sandra Treadwell has PMH of    Right knee trauma  Cholecystectomy, hysterectomy  Allergies/intolerance to codeine  Positive family history of heart disease in brother and father    Here for right total knee replacement surgery.  Patient underwent surgery 2023 had low blood pressure, abnormal EKG with left bundle branch block, therefore troponins were ordered which were abnormal.  Patient denies any chest pain or shortness of breath.    Data:  2023 and 2023: HS troponin is 23--> 31--> 49.  proBNP 1332.  CMP with a creatinine of 1.36 EGFR 45.  CBC with a hemoglobin of 8.  Chest x-ray shows no acute ST-T changes and hiatal hernia.  EKG done 2023 reviewed/interpreted by me reveals sinus rhythm with left bundle branch block at the rate of 93 bpm.  Left bundle is not new compared EKG from 11/15/2023.      Assessment:  :    Status post knee surgery  Low blood pressure  Elevated troponin  Elevated proBNP  Left bundle branch block  Anemia  CKD  Dyslipidemia        Recommendations / Plan:        Reviewed EKG and lab results with patient.  Will check serial troponins.  Patient's troponin elevation is probably due to demand ischemia from anemia.  Will continue to monitor.  Hydration and recheck blood pressure.  Check echocardiogram.  Statins for dyslipidemia  Will follow and consider further evaluation treatment.           Diagnosis Plan   1. Unilateral primary osteoarthritis, right knee  Ambulatory Referral to Home Health    Walker                 Past Medical History:  Past Medical History:   Diagnosis Date    Bladder leak     Breast cancer     chemo and radiation    Depression     GERD (gastroesophageal reflux disease)      Hyperlipidemia     Osteoporosis      Past Surgical History:  Past Surgical History:   Procedure Laterality Date    BREAST SURGERY      lumpectomy    CHOLECYSTECTOMY      HYSTERECTOMY        Allergies:  Allergies   Allergen Reactions    Codeine Nausea And Vomiting and Dizziness     Home Meds:  Medications Prior to Admission   Medication Sig Dispense Refill Last Dose    atorvastatin (LIPITOR) 10 MG tablet Take 1 tablet by mouth Daily.   11/24/2023    cetirizine (zyrTEC) 10 MG tablet Take 1 tablet by mouth Daily.   11/23/2023    fluticasone (FLONASE) 50 MCG/ACT nasal spray 2 sprays by Each Nare route Daily.   11/23/2023    omeprazole (priLOSEC) 40 MG capsule Take 1 capsule by mouth Daily.   11/24/2023    oxybutynin XL (DITROPAN XL) 15 MG 24 hr tablet Take 1 tablet by mouth Daily.   11/24/2023    OYSCO 500 + D 500-5 MG-MCG tablet per tablet Take 1 tablet by mouth 2 (Two) Times a Day.   11/23/2023    sertraline (ZOLOFT) 50 MG tablet Take 1 tablet by mouth Every Night.   11/23/2023    zolpidem (AMBIEN) 10 MG tablet Take 1 tablet by mouth At Night As Needed.   11/23/2023    alendronate (FOSAMAX) 70 MG tablet Take 1 tablet by mouth 1 (One) Time Per Week. Sun   11/19/2023     Current Meds:     Current Facility-Administered Medications:     aspirin EC tablet 81 mg, 81 mg, Oral, Q12H, Ulises Chen II, MD, 81 mg at 11/26/23 0817    atorvastatin (LIPITOR) tablet 10 mg, 10 mg, Oral, Daily, Jessica Sanchez, APRN, 10 mg at 11/26/23 0948    docusate sodium (COLACE) capsule 100 mg, 100 mg, Oral, BID PRN, Ulises Chen II, MD, 100 mg at 11/26/23 0948    HYDROcodone-acetaminophen (NORCO) 5-325 MG per tablet 1 tablet, 1 tablet, Oral, Q6H PRN, Jorje Sahni MD    LORazepam (ATIVAN) injection 1 mg, 1 mg, Intravenous, Q4H PRN, Ulises Chen II, MD, 1 mg at 11/25/23 0442    magnesium oxide (MAG-OX) tablet 400 mg, 400 mg, Oral, Daily, Jorje Sahni MD, 400 mg at 11/26/23 0817    midodrine  "(PROAMATINE) tablet 5 mg, 5 mg, Oral, TID AC, Jorje Sahni MD, 5 mg at 11/26/23 1238    [DISCONTINUED] HYDROmorphone (DILAUDID) injection 1 mg, 1 mg, Intramuscular, Q4H PRN, 1 mg at 11/25/23 2314 **AND** naloxone (NARCAN) injection 0.1 mg, 0.1 mg, Intravenous, Q5 Min PRN, Ulises Chen II, MD    ondansetron (ZOFRAN) tablet 4 mg, 4 mg, Oral, Q6H PRN **OR** ondansetron (ZOFRAN) injection 4 mg, 4 mg, Intravenous, Q6H PRN, Ulises Chen II, MD    oxybutynin XL (DITROPAN-XL) 24 hr tablet 15 mg, 15 mg, Oral, Daily, Ulises Chen II, MD, 15 mg at 11/26/23 0817    sertraline (ZOLOFT) tablet 50 mg, 50 mg, Oral, Nightly, Ulises Chen II, MD, 50 mg at 11/25/23 2008    sodium chloride 0.9 % infusion, 100 mL/hr, Intravenous, Continuous, Jorje Sahni MD, Last Rate: 100 mL/hr at 11/26/23 1238, 100 mL/hr at 11/26/23 1238    zolpidem (AMBIEN) tablet 10 mg, 10 mg, Oral, Nightly PRN, Ulises Chen II, MD  Social History:   Social History     Tobacco Use    Smoking status: Never     Passive exposure: Never    Smokeless tobacco: Never   Substance Use Topics    Alcohol use: Never      Family History:  History reviewed. No pertinent family history.     Review of Systems : Review of Systems   Musculoskeletal:  Positive for joint pain.   All other systems reviewed and are negative.                 Constitutional:  Temp:  [98.3 °F (36.8 °C)-100.1 °F (37.8 °C)] 98.3 °F (36.8 °C)  Heart Rate:  [] 85  Resp:  [12-20] 20  BP: ()/(48-89) 110/48    Physical Exam   /48   Pulse 85   Temp 98.3 °F (36.8 °C) (Oral)   Resp 20   Ht 152.4 cm (60\")   Wt 69.4 kg (153 lb)   SpO2 91%   BMI 29.88 kg/m²   Physical Exam  General:  Appears in no acute distress  Eyes: Sclerae are anicteric,  conjunctivae are clear   HEENT:  No JVD. Thyroid not visibly enlarged. No mucosal pallor or cyanosis  Respiratory: Respirations regular and unlabored at rest.  Bilaterally good " breath sounds with good air entry in all fields. No crackles, rubs or wheezes auscultated  Cardiovascular: S1,S2 Regular rate and rhythm. No murmur, rub or gallop auscultated. No pretibial pitting edema  Gastrointestinal: Abdomen soft, flat, nontender. Bowel sounds present.   Musculoskeletal:  No abnormal movements  Extremities: No digital clubbing or cyanosis  Skin: Color pink. Skin warm and dry to touch. No rashes  No xanthoma  Neuro: Alert and awake, no lateralizing deficits appreciated    Cardiographics  ECG: EKG tracing was  personally reviewed/interpreted by me  ECG 12 Lead Other; elevated troponin   Preliminary Result   HEART RATE= 93  bpm   RR Interval= 640  ms   MN Interval= 177  ms   P Horizontal Axis= 9  deg   P Front Axis= 50  deg   QRSD Interval= 120  ms   QT Interval= 379  ms   QTcB= 474  ms   QRS Axis= -3  deg   T Wave Axis= 138  deg   - ABNORMAL ECG -   Sinus rhythm   Ventricular premature complex   Left bundle branch block   Probable anteroseptal infarct, acute   When compared with ECG of 15-Nov-2023 11:02:55,   Significant rate increase   New or worsened ischemia or infarction   Electronically Signed By:    Date and Time of Study: 2023-11-25 19:45:40      SCANNED EKG   Final Result      SCANNED - TELEMETRY     Final Result          Telemetry: Sinus    Echocardiogram:       Imaging  Chest X-ray:   Imaging Results (Last 24 Hours)       Procedure Component Value Units Date/Time    XR Chest 1 View [[id number]] Resulted: 11/26/23 0834     Updated: 11/26/23 0835            Lab Review: I have reviewed the labs  Results from last 7 days   Lab Units 11/26/23  1245 11/25/23  2306 11/25/23  1751   HSTROP T ng/L 49* 49* 31*     Results from last 7 days   Lab Units 11/25/23  1504   MAGNESIUM mg/dL 1.5*     Results from last 7 days   Lab Units 11/26/23  1245   SODIUM mmol/L 134*   POTASSIUM mmol/L 4.0   BUN mg/dL 12   CREATININE mg/dL 1.19*   CALCIUM mg/dL 8.4*         Results from last 7 days   Lab Units  11/26/23  1245   PROBNP pg/mL 2,489.0*     Results from last 7 days   Lab Units 11/25/23  1504   WBC 10*3/mm3 6.40   HEMOGLOBIN g/dL 8.0*   HEMATOCRIT % 24.6*   PLATELETS 10*3/mm3 192                 Wili Stewart MD  11/26/2023, 15:14 EST      ZIYAD Patel/Transcription:   Dictated utilizing Dragon dictation

## 2023-11-26 NOTE — PROGRESS NOTES
[facility] MEDICINE SERVICE  DAILY PROGRESS NOTE    NAME: Sandra Treadwell  : 1950  MRN: [id number]      LOS: 0 days     PROVIDER OF SERVICE: Jorje Sahni MD    Chief Complaint: Status post knee replacement    Subjective:     Interval History:  History taken from: patient  Patient Complaints:        73 y.o. Not  or  female who presents with End-stage arthritis of the right knee. They failed conservative treatment of their knee pain and a thorough discussion of the risks and benefits of surgery was had. The patient wishes to continue with elective total knee replacement, they were scheduled and are here for surgery. They did get medical clearance as well as a thorough preoperative workup.     Per the documentation by Dr Chen , dated 23 10AM, Patient with delirium that began last night.  Apparently she came to the unit and became acutely confused last night.  She then got up and had a fall directly onto her operative leg.  An x-ray was taken that was concerning for a possible fracture.  After review of the x-ray, I thought everything was okay but out of an abundance of caution ordered a CT scan just to be sure.  CT scan confirmed that everything is okay with the prosthetic and there is no concern for fracture.  She is okay to continue mobilization and physical therapy.  She was given some Ativan yesterday for agitation, but is quite lethargic this morning.  We are going to stop all Ativan at this point.  Plan is home discharge but only after her acute mental status change significantly improves and assuming she works well with physical therapy.  I have consulted the hospitalist service due to her acute mental status changes to make sure they have no further recommendations.       23 we were asked to see patient for medical management, patient is seen in bed NAD, no new complaints, is presently pleasantly confused, BP low at 80/40 fluid bolus ordered DW RN  2023  patient seen and examined in bed no acute distress, feeling better this morning.  Patient is awake alert oriented x 3.  Discussed with RN.  Echocardiogram being done now.Blood pressure 110/48.    Review of SystemsReview of Systems   Constitutional:  Negative for chills and fever.   HENT:  Negative for ear pain and hearing loss.    Respiratory:  Negative for apnea and shortness of breath.    Cardiovascular:  Negative for chest pain and palpitations.   Gastrointestinal:  Negative for diarrhea, nausea and vomiting.   Genitourinary:  Negative for dysuria.   Musculoskeletal:  Positive for arthralgias and gait problem. Negative for back pain and joint swelling.   Neurological:  Negative for seizures, light-headedness and headaches.   Psychiatric/Behavioral:  Positive for confusion. Negative for hallucinati    Objective:     Vital Signs  Temp:  [98.3 °F (36.8 °C)-100.1 °F (37.8 °C)] 98.3 °F (36.8 °C)  Heart Rate:  [] 85  Resp:  [12-20] 20  BP: ()/(46-89) 110/48  Flow (L/min):  [2] 2   Body mass index is 29.88 kg/m².      Physical ExamConstitutional:       General: She is not in acute distress.     Appearance: She is well-developed. She is not ill-appearing, toxic-appearing or diaphoretic.   HENT:      Head: Normocephalic and atraumatic.      Nose: Nose normal. No congestion or rhinorrhea.      Mouth/Throat:      Mouth: Mucous membranes are moist.      Pharynx: No oropharyngeal exudate.   Eyes:      General: No scleral icterus.        Right eye: No discharge.         Left eye: No discharge.      Extraocular Movements: Extraocular movements intact.      Pupils: Pupils are equal, round, and reactive to light.   Neck:      Thyroid: No thyromegaly.      Vascular: No carotid bruit or JVD.      Trachea: No tracheal deviation.   Cardiovascular:      Rate and Rhythm: Normal rate and regular rhythm.      Pulses: Normal pulses.      Heart sounds: Normal heart sounds. No murmur heard.     No friction rub. No gallop.    Pulmonary:      Effort: Pulmonary effort is normal. No respiratory distress.      Breath sounds: Normal breath sounds. No stridor. No wheezing, rhonchi or rales.   Chest:      Chest wall: No tenderness.   Abdominal:      General: Bowel sounds are normal. There is no distension.      Palpations: Abdomen is soft. There is no mass.      Tenderness: There is no abdominal tenderness. There is no guarding or rebound.      Hernia: No hernia is present.   Musculoskeletal:         General: Tenderness present. No swelling, deformity or signs of injury. Normal range of motion.      Cervical back: Normal range of motion and neck supple. No rigidity. No muscular tenderness.      Right lower leg: No edema.      Left lower leg: No edema.   Lymphadenopathy:      Cervical: No cervical adenopathy.   Skin:     General: Skin is warm and dry.      Coloration: Skin is not jaundiced or pale.      Findings: No bruising, erythema or rash.   Neurological:      General: No focal deficit present.      Mental Status: She is alert.      Cranial Nerves: No cranial nerve deficit.      Sensory: No sensory deficit.      Motor: No weakness or abnormal muscle tone.      Coordination: Coordination normal.              Scheduled Meds   aspirin, 81 mg, Oral, Q12H  atorvastatin, 10 mg, Oral, Daily  magnesium oxide, 400 mg, Oral, Daily  midodrine, 5 mg, Oral, TID AC  oxybutynin XL, 15 mg, Oral, Daily  sertraline, 50 mg, Oral, Nightly       PRN Meds     docusate sodium    HYDROcodone-acetaminophen    LORazepam    [DISCONTINUED] HYDROmorphone **AND** naloxone    ondansetron **OR** ondansetron    zolpidem   Infusions  sodium chloride, 100 mL/hr, Last Rate: 100 mL/hr (11/26/23 1238)          Diagnostic Data    Results from last 7 days   Lab Units 11/25/23  1504   WBC 10*3/mm3 6.40   HEMOGLOBIN g/dL 8.0*   HEMATOCRIT % 24.6*   PLATELETS 10*3/mm3 192   GLUCOSE mg/dL 85   CREATININE mg/dL 1.26*   BUN mg/dL 13   SODIUM mmol/L 131*   POTASSIUM mmol/L 4.2   AST  (SGOT) U/L 25   ALT (SGPT) U/L <5   ALK PHOS U/L 67   BILIRUBIN mg/dL 0.3   ANION GAP mmol/L 7.0       CT Lower Extremity Right Without Contrast    Result Date: 11/25/2023  Impression: Limited evaluation due to streak artifacts from the metallic hardware. There is cortical irregularity and focal discontinuity of the posterior aspect of the medial femoral condyle as well as the posterior aspect of the lateral femoral condyle. Given that  this involves the posterior aspect of both of the condyles this is likely postsurgical in nature. Nondisplaced fractures cannot be excluded though less likely. Correlation with operative history recommended. Radiographic follow-up may be warranted. No joint effusion identified. Electronically Signed: Tracy Knowles MD  11/25/2023 7:52 AM EST  Workstation ID: [id number]    XR Knee 1 or 2 View Right    Result Date: 11/25/2023  Impression: Questionable periprosthetic fracture seen along the posterior lateral aspect of the medial femoral condyle seen on the oblique view. Electronically Signed: Tracy Knowles MD  11/25/2023 12:15 AM EST  Workstation ID: [id number]       I reviewed the patient's new clinical results.    Assessment/Plan:     Active and Resolved Problems  Active Hospital Problems    Diagnosis  POA    **Status post knee replacement [Z96.659]  Not Applicable    Unilateral primary osteoarthritis, right knee [M17.11]  Yes      Resolved Hospital Problems   No resolved problems to display.     Metabolic encephalopathy-was combative 11/24/23, had to be restrained, now off restrain, likely secondary to anesthesia. improving  -continue supportive care  -UA-neg  -TSH-3.4  -Sats 89 on room air-check  d dimer  -O2 2lt NC     Hypotension-not on any BP meds/was hypotension in OR  -monitor CBC, BMP  -ortho discontinued Dilaudid and oral oxycodone   -fluid bolus,   -monitor BNP-1332  -NS 100cc hr     Elevated trop/elevated BNP  -EKG  -serial dgzj--31-49-49  -cardiology consulted  -echo p      Osteoarthritis-  S/p knee replacement-s/p fall after  surgery  -ortho following  -pain management   -pt/ot  -rehab on dc     Severe anemia  -monitor CBC  -iron-24 and ferritin  -check B12 and folate     CKD3-unknown baseline  Monitor renal function  Avoid hypotension  avoid nephrtoxins  Hold NSAIDS     Hx Depression on zoloft     Hypomagnesemia-replace and monitor    DVT prophylaxis:  Mechanical DVT prophylaxis orders are present.     Code status is   Code Status and Medical Interventions:   Ordered at: 11/24/23 0732     Level Of Support Discussed With:    Patient     Code Status (Patient has no pulse and is not breathing):    CPR (Attempt to Resuscitate)     Medical Interventions (Patient has pulse or is breathing):    Full       Plan for disposition:nh in 2 days    Time: 30 minutes    Signature: Electronically signed by Jorje Sahni MD, 11/26/23, 14:14 EST.  [facility] Hospitalist Team

## 2023-11-27 LAB
ANION GAP SERPL CALCULATED.3IONS-SCNC: 15 MMOL/L (ref 5–15)
BASOPHILS # BLD AUTO: 0 10*3/MM3 (ref 0–0.2)
BASOPHILS NFR BLD AUTO: 0.3 % (ref 0–1.5)
BH CV ECHO MEAS - ACS: 1.6 CM
BH CV ECHO MEAS - AO MAX PG: 42.8 MMHG
BH CV ECHO MEAS - AO MEAN PG: 25 MMHG
BH CV ECHO MEAS - AO ROOT DIAM: 2.7 CM
BH CV ECHO MEAS - AO V2 MAX: 327 CM/SEC
BH CV ECHO MEAS - AO V2 VTI: 64.2 CM
BH CV ECHO MEAS - AVA(I,D): 1.07 CM2
BH CV ECHO MEAS - EDV(CUBED): 50.7 ML
BH CV ECHO MEAS - EDV(MOD-SP4): 61.3 ML
BH CV ECHO MEAS - EF(MOD-BP): 53 %
BH CV ECHO MEAS - EF(MOD-SP4): 52.5 %
BH CV ECHO MEAS - ESV(MOD-SP4): 29.1 ML
BH CV ECHO MEAS - FS: 24.3 %
BH CV ECHO MEAS - IVS/LVPW: 1.13 CM
BH CV ECHO MEAS - IVSD: 0.9 CM
BH CV ECHO MEAS - LA DIMENSION: 3.4 CM
BH CV ECHO MEAS - LAT PEAK E' VEL: 8.2 CM/SEC
BH CV ECHO MEAS - LV DIASTOLIC VOL/BSA (35-75): 36.8 CM2
BH CV ECHO MEAS - LV MASS(C)D: 89.5 GRAMS
BH CV ECHO MEAS - LV MAX PG: 4.5 MMHG
BH CV ECHO MEAS - LV MEAN PG: 3 MMHG
BH CV ECHO MEAS - LV SYSTOLIC VOL/BSA (12-30): 17.5 CM2
BH CV ECHO MEAS - LV V1 MAX: 106 CM/SEC
BH CV ECHO MEAS - LV V1 VTI: 21.8 CM
BH CV ECHO MEAS - LVIDD: 3.7 CM
BH CV ECHO MEAS - LVIDS: 2.8 CM
BH CV ECHO MEAS - LVOT AREA: 3.1 CM2
BH CV ECHO MEAS - LVOT DIAM: 2 CM
BH CV ECHO MEAS - LVPWD: 0.8 CM
BH CV ECHO MEAS - MED PEAK E' VEL: 8.5 CM/SEC
BH CV ECHO MEAS - MR MAX PG: 102 MMHG
BH CV ECHO MEAS - MR MAX VEL: 505 CM/SEC
BH CV ECHO MEAS - MV A DUR: 0.13 SEC
BH CV ECHO MEAS - MV A MAX VEL: 127 CM/SEC
BH CV ECHO MEAS - MV DEC SLOPE: 855 CM/SEC2
BH CV ECHO MEAS - MV DEC TIME: 0.13 SEC
BH CV ECHO MEAS - MV E MAX VEL: 88.2 CM/SEC
BH CV ECHO MEAS - MV E/A: 0.69
BH CV ECHO MEAS - MV MAX PG: 7.8 MMHG
BH CV ECHO MEAS - MV MEAN PG: 4 MMHG
BH CV ECHO MEAS - MV P1/2T: 41.8 MSEC
BH CV ECHO MEAS - MV V2 VTI: 29.7 CM
BH CV ECHO MEAS - MVA(P1/2T): 5.3 CM2
BH CV ECHO MEAS - MVA(VTI): 2.31 CM2
BH CV ECHO MEAS - PA V2 MAX: 139 CM/SEC
BH CV ECHO MEAS - RAP SYSTOLE: 3 MMHG
BH CV ECHO MEAS - RV MAX PG: 1.32 MMHG
BH CV ECHO MEAS - RV V1 MAX: 57.5 CM/SEC
BH CV ECHO MEAS - RV V1 VTI: 9.2 CM
BH CV ECHO MEAS - RVSP: 53 MMHG
BH CV ECHO MEAS - SI(MOD-SP4): 19.3 ML/M2
BH CV ECHO MEAS - SV(LVOT): 68.5 ML
BH CV ECHO MEAS - SV(MOD-SP4): 32.2 ML
BH CV ECHO MEAS - TR MAX PG: 49.6 MMHG
BH CV ECHO MEAS - TR MAX VEL: 352 CM/SEC
BH CV ECHO MEASUREMENTS AVERAGE E/E' RATIO: 10.56
BH CV XLRA - RV BASE: 3 CM
BH CV XLRA - RV LENGTH: 6.9 CM
BH CV XLRA - RV MID: 2.6 CM
BH CV XLRA - TDI S': 9.3 CM/SEC
BUN SERPL-MCNC: 11 MG/DL (ref 8–23)
BUN/CREAT SERPL: 11.6 (ref 7–25)
CALCIUM SPEC-SCNC: 8.4 MG/DL (ref 8.6–10.5)
CHLORIDE SERPL-SCNC: 105 MMOL/L (ref 98–107)
CO2 SERPL-SCNC: 17 MMOL/L (ref 22–29)
CREAT SERPL-MCNC: 0.95 MG/DL (ref 0.57–1)
DEPRECATED RDW RBC AUTO: 48.1 FL (ref 37–54)
EGFRCR SERPLBLD CKD-EPI 2021: 64.2 ML/MIN/1.73
EOSINOPHIL # BLD AUTO: 0.1 10*3/MM3 (ref 0–0.4)
EOSINOPHIL NFR BLD AUTO: 1.4 % (ref 0.3–6.2)
ERYTHROCYTE [DISTWIDTH] IN BLOOD BY AUTOMATED COUNT: 15.6 % (ref 12.3–15.4)
GLUCOSE SERPL-MCNC: 89 MG/DL (ref 65–99)
HCT VFR BLD AUTO: 23.4 % (ref 34–46.6)
HGB BLD-MCNC: 7.7 G/DL (ref 12–15.9)
LEFT ATRIUM VOLUME INDEX: 36.6 ML/M2
LYMPHOCYTES # BLD AUTO: 1.1 10*3/MM3 (ref 0.7–3.1)
LYMPHOCYTES NFR BLD AUTO: 13.9 % (ref 19.6–45.3)
MCH RBC QN AUTO: 27.6 PG (ref 26.6–33)
MCHC RBC AUTO-ENTMCNC: 32.9 G/DL (ref 31.5–35.7)
MCV RBC AUTO: 83.9 FL (ref 79–97)
MONOCYTES # BLD AUTO: 0.8 10*3/MM3 (ref 0.1–0.9)
MONOCYTES NFR BLD AUTO: 9.9 % (ref 5–12)
NEUTROPHILS NFR BLD AUTO: 6 10*3/MM3 (ref 1.7–7)
NEUTROPHILS NFR BLD AUTO: 74.5 % (ref 42.7–76)
NRBC BLD AUTO-RTO: 0.2 /100 WBC (ref 0–0.2)
NT-PROBNP SERPL-MCNC: 6219 PG/ML (ref 0–900)
PLATELET # BLD AUTO: 185 10*3/MM3 (ref 140–450)
PMV BLD AUTO: 8.4 FL (ref 6–12)
POTASSIUM SERPL-SCNC: 4 MMOL/L (ref 3.5–5.2)
QT INTERVAL: 431 MS
QTC INTERVAL: 544 MS
RBC # BLD AUTO: 2.79 10*6/MM3 (ref 3.77–5.28)
SODIUM SERPL-SCNC: 137 MMOL/L (ref 136–145)
TROPONIN T SERPL HS-MCNC: 118 NG/L
WBC NRBC COR # BLD AUTO: 8.1 10*3/MM3 (ref 3.4–10.8)
WHOLE BLOOD HOLD SPECIMEN: NORMAL

## 2023-11-27 PROCEDURE — 97112 NEUROMUSCULAR REEDUCATION: CPT

## 2023-11-27 PROCEDURE — 25010000002 CYANOCOBALAMIN PER 1000 MCG: Performed by: INTERNAL MEDICINE

## 2023-11-27 PROCEDURE — 93010 ELECTROCARDIOGRAM REPORT: CPT | Performed by: INTERNAL MEDICINE

## 2023-11-27 PROCEDURE — 85025 COMPLETE CBC W/AUTO DIFF WBC: CPT | Performed by: INTERNAL MEDICINE

## 2023-11-27 PROCEDURE — 97110 THERAPEUTIC EXERCISES: CPT

## 2023-11-27 PROCEDURE — 99232 SBSQ HOSP IP/OBS MODERATE 35: CPT | Performed by: INTERNAL MEDICINE

## 2023-11-27 PROCEDURE — 80048 BASIC METABOLIC PNL TOTAL CA: CPT | Performed by: INTERNAL MEDICINE

## 2023-11-27 PROCEDURE — 84484 ASSAY OF TROPONIN QUANT: CPT | Performed by: INTERNAL MEDICINE

## 2023-11-27 PROCEDURE — 83880 ASSAY OF NATRIURETIC PEPTIDE: CPT | Performed by: INTERNAL MEDICINE

## 2023-11-27 PROCEDURE — 25010000002 LORAZEPAM PER 2 MG: Performed by: INTERNAL MEDICINE

## 2023-11-27 PROCEDURE — 97116 GAIT TRAINING THERAPY: CPT

## 2023-11-27 PROCEDURE — 93005 ELECTROCARDIOGRAM TRACING: CPT | Performed by: INTERNAL MEDICINE

## 2023-11-27 RX ORDER — FUROSEMIDE 20 MG/1
20 TABLET ORAL DAILY
Qty: 3 TABLET | Refills: 0 | Status: DISCONTINUED | OUTPATIENT
Start: 2023-11-28 | End: 2023-11-30

## 2023-11-27 RX ORDER — FUROSEMIDE 10 MG/ML
20 INJECTION INTRAMUSCULAR; INTRAVENOUS ONCE
Status: DISCONTINUED | OUTPATIENT
Start: 2023-11-27 | End: 2023-11-30

## 2023-11-27 RX ORDER — FOLIC ACID 1 MG/1
1 TABLET ORAL DAILY
Status: DISCONTINUED | OUTPATIENT
Start: 2023-11-27 | End: 2023-12-04 | Stop reason: HOSPADM

## 2023-11-27 RX ORDER — CYANOCOBALAMIN 1000 UG/ML
1000 INJECTION, SOLUTION INTRAMUSCULAR; SUBCUTANEOUS
Status: DISCONTINUED | OUTPATIENT
Start: 2023-11-27 | End: 2023-12-04 | Stop reason: HOSPADM

## 2023-11-27 RX ORDER — LORAZEPAM 2 MG/ML
1 INJECTION INTRAMUSCULAR EVERY 4 HOURS PRN
Status: DISCONTINUED | OUTPATIENT
Start: 2023-11-27 | End: 2023-11-27

## 2023-11-27 RX ORDER — LORAZEPAM 2 MG/ML
1 INJECTION INTRAMUSCULAR EVERY 4 HOURS PRN
Status: DISCONTINUED | OUTPATIENT
Start: 2023-11-27 | End: 2023-11-30

## 2023-11-27 RX ORDER — LORAZEPAM 1 MG/1
1 TABLET ORAL ONCE
Status: DISCONTINUED | OUTPATIENT
Start: 2023-11-27 | End: 2023-12-03

## 2023-11-27 RX ADMIN — LORAZEPAM 1 MG: 2 INJECTION INTRAMUSCULAR; INTRAVENOUS at 19:14

## 2023-11-27 RX ADMIN — Medication 400 MG: at 09:18

## 2023-11-27 RX ADMIN — SERTRALINE HYDROCHLORIDE 50 MG: 50 TABLET ORAL at 21:02

## 2023-11-27 RX ADMIN — ATORVASTATIN CALCIUM 10 MG: 10 TABLET, FILM COATED ORAL at 09:18

## 2023-11-27 RX ADMIN — OXYBUTYNIN CHLORIDE 15 MG: 5 TABLET, EXTENDED RELEASE ORAL at 09:18

## 2023-11-27 RX ADMIN — ASPIRIN 81 MG: 81 TABLET, COATED ORAL at 21:02

## 2023-11-27 RX ADMIN — MIDODRINE HYDROCHLORIDE 5 MG: 5 TABLET ORAL at 09:18

## 2023-11-27 RX ADMIN — ASPIRIN 81 MG: 81 TABLET, COATED ORAL at 09:18

## 2023-11-27 RX ADMIN — LORAZEPAM 1 MG: 2 INJECTION INTRAMUSCULAR; INTRAVENOUS at 14:22

## 2023-11-27 NOTE — CASE MANAGEMENT/SOCIAL WORK
Discharge Planning Assessment   [facility]     Patient Name: Sandra Treadwell  MRN: [id number]  Today's Date: 11/27/2023    Admit Date: 11/24/2023    Plan: Referral to [facility] pending acceptance .  No precert needed.PASRR  approved.   Discharge Needs Assessment       Row Name 11/27/23 1612       Living Environment    People in Home spouse    Name(s) of People in Home richard Crowder    Current Living Arrangements home    Potentially Unsafe Housing Conditions none    Primary Care Provided by self    Provides Primary Care For no one    Family Caregiver if Needed spouse    Family Caregiver Names richard Crowder    Quality of Family Relationships supportive;involved;helpful    Able to Return to Prior Arrangements yes       Resource/Environmental Concerns    Transportation Concerns none       Transition Planning    Patient/Family Anticipates Transition to home with help/services    Patient/Family Anticipated Services at Transition skilled nursing    Transportation Anticipated family or friend will provide       Discharge Needs Assessment    Readmission Within the Last 30 Days no previous admission in last 30 days    Equipment Currently Used at Home none    Concerns to be Addressed care coordination/care conferences;discharge planning    Anticipated Changes Related to Illness none    Equipment Needed After Discharge walker, rolling    Outpatient/Agency/Support Group Needs homecare agency;skilled nursing facility    Discharge Facility/Level of Care Needs nursing facility, skilled;home with home health    Provided Post Acute Provider List? Yes    Post Acute Provider List Nursing Home    Delivered To Support Person    Method of Delivery In person    Patient's Choice of Community Agency(s) [facility]                   Discharge Plan       Row Name 11/27/23 1614       Plan    Plan Referral to [facility] pending acceptance .  No precert needed.PASRR  approved.    Patient/Family in Agreement with Plan yes    Plan  Comments Met with patient at bedside.  Confirmed pc and pharmacy.  Lives at home with spouse,  Family able to transport at discharge.  [facility]  Now PT recommending snf.  Spoke with patient and  about snf choices and they selected [facility],  Referral sent to [facility] pending acceptance. Dc barriers pod #3 Left TKR.  confusion, sitter at bedside.                  Continued Care and Services - Admitted Since 11/24/2023       Destination       Service Provider Request Status Selected Services Address Phone Fax Patient Preferred    [facility] Pending - Request Sent N/A [phone] [place] [id number] [phone] --              Home Medical Care Coordination complete.      Service Provider Request Status Selected Services Address Phone Fax Patient Preferred    [facility]  Selected Home Rehabilitation [address], [address] [address] [phone] --                  Expected Discharge Date and Time       Expected Discharge Date Expected Discharge Time    Nov 27, 2023            Demographic Summary       Row Name 11/27/23 1612       General Information    Admission Type inpatient    Arrived From home    Referral Source admission list    Reason for Consult discharge planning    Preferred Language English       Contact Information    Permission Granted to Share Info With                    Functional Status       Row Name 11/27/23 1612       Functional Status    Usual Activity Tolerance moderate    Current Activity Tolerance moderate       Functional Status, IADL    Medications independent    Meal Preparation independent    Housekeeping independent    Laundry independent    Shopping independent       Mental Status    General Appearance WDL WDL       Mental Status Summary    Recent Changes in Mental Status/Cognitive Functioning no changes                     Sendy Pires RN

## 2023-11-27 NOTE — PROGRESS NOTES
[facility] MEDICINE SERVICE  DAILY PROGRESS NOTE    NAME: Sandra Treadwell  : 1950  MRN: [id number]      LOS: 1 day     PROVIDER OF SERVICE: Jorje Sahni MD    Chief Complaint: Status post knee replacement    Subjective:     Interval History:  History taken from: patient  Patient Complaints:        73 y.o. Not  or  female who presents with End-stage arthritis of the right knee. They failed conservative treatment of their knee pain and a thorough discussion of the risks and benefits of surgery was had. The patient wishes to continue with elective total knee replacement, they were scheduled and are here for surgery. They did get medical clearance as well as a thorough preoperative workup.     Per the documentation by Dr Chen , dated 23 10AM, Patient with delirium that began last night.  Apparently she came to the unit and became acutely confused last night.  She then got up and had a fall directly onto her operative leg.  An x-ray was taken that was concerning for a possible fracture.  After review of the x-ray, I thought everything was okay but out of an abundance of caution ordered a CT scan just to be sure.  CT scan confirmed that everything is okay with the prosthetic and there is no concern for fracture.  She is okay to continue mobilization and physical therapy.  She was given some Ativan yesterday for agitation, but is quite lethargic this morning.  We are going to stop all Ativan at this point.  Plan is home discharge but only after her acute mental status change significantly improves and assuming she works well with physical therapy.  I have consulted the hospitalist service due to her acute mental status changes to make sure they have no further recommendations.       23 we were asked to see patient for medical management, patient is seen in bed NAD, no new complaints, is presently pleasantly confused, BP low at 80/40 fluid bolus ordered DW RN  2023  patient seen and examined in bed no acute distress, feeling better this morning.  Patient is awake alert oriented x 3.  Discussed with RN.  Echocardiogram being done now.Blood pressure 110/48.  11/27/2023 patient seen and examined in bed no acute distress, patient still confused.  Vital signs stable, troponin 118 from 70 yesterday, discussed with RN.  BNP 6219 will DC fluids.    Review of SystemsReview of Systems   Constitutional:  Negative for chills and fever.   HENT:  Negative for ear pain and hearing loss.    Respiratory:  Negative for apnea and shortness of breath.    Cardiovascular:  Negative for chest pain and palpitations.   Gastrointestinal:  Negative for diarrhea, nausea and vomiting.   Genitourinary:  Negative for dysuria.   Musculoskeletal:  Positive for arthralgias and gait problem. Negative for back pain and joint swelling.   Neurological:  Negative for seizures, light-headedness and headaches.   Psychiatric/Behavioral:  Positive for confusion. Negative for hallucinati    Objective:     Vital Signs  Temp:  [98 °F (36.7 °C)-99.3 °F (37.4 °C)] 98 °F (36.7 °C)  Heart Rate:  [85-93] 93  Resp:  [19-20] 20  BP: (109-146)/(48-77) 146/77   Body mass index is 29.88 kg/m².    Physical ExamConstitutional:       General: She is not in acute distress.     Appearance: She is well-developed. She is not ill-appearing, toxic-appearing or diaphoretic.   HENT:      Head: Normocephalic and atraumatic.      Nose: Nose normal. No congestion or rhinorrhea.      Mouth/Throat:      Mouth: Mucous membranes are moist.      Pharynx: No oropharyngeal exudate.   Eyes:      General: No scleral icterus.        Right eye: No discharge.         Left eye: No discharge.      Extraocular Movements: Extraocular movements intact.      Pupils: Pupils are equal, round, and reactive to light.   Neck:      Thyroid: No thyromegaly.      Vascular: No carotid bruit or JVD.      Trachea: No tracheal deviation.   Cardiovascular:      Rate and  Rhythm: Normal rate and regular rhythm.      Pulses: Normal pulses.      Heart sounds: Normal heart sounds. No murmur heard.     No friction rub. No gallop.   Pulmonary:      Effort: Pulmonary effort is normal. No respiratory distress.      Breath sounds: Normal breath sounds. No stridor. No wheezing, rhonchi or rales.   Chest:      Chest wall: No tenderness.   Abdominal:      General: Bowel sounds are normal. There is no distension.      Palpations: Abdomen is soft. There is no mass.      Tenderness: There is no abdominal tenderness. There is no guarding or rebound.      Hernia: No hernia is present.   Musculoskeletal:         General: Tenderness present. No swelling, deformity or signs of injury. Normal range of motion.      Cervical back: Normal range of motion and neck supple. No rigidity. No muscular tenderness.      Right lower leg: No edema.      Left lower leg: No edema.   Lymphadenopathy:      Cervical: No cervical adenopathy.   Skin:     General: Skin is warm and dry.      Coloration: Skin is not jaundiced or pale.      Findings: No bruising, erythema or rash.   Neurological:      General: No focal deficit present.      Mental Status: She is alert.      Cranial Nerves: No cranial nerve deficit.      Sensory: No sensory deficit.      Motor: No weakness or abnormal muscle tone.      Coordination: Coordination normal.      Scheduled Meds   aspirin, 81 mg, Oral, Q12H  atorvastatin, 10 mg, Oral, Daily  LORazepam, 1 mg, Oral, Once  magnesium oxide, 400 mg, Oral, Daily  midodrine, 5 mg, Oral, TID AC  oxybutynin XL, 15 mg, Oral, Daily  sertraline, 50 mg, Oral, Nightly       PRN Meds     docusate sodium    HYDROcodone-acetaminophen    LORazepam    [DISCONTINUED] HYDROmorphone **AND** naloxone    ondansetron **OR** ondansetron    zolpidem   Infusions  sodium chloride, 100 mL/hr, Last Rate: 100 mL/hr (11/26/23 1617)          Diagnostic Data    Results from last 7 days   Lab Units 11/27/23  0642 11/26/23  0792  11/26/23  1245   WBC 10*3/mm3 8.10   < >  --    HEMOGLOBIN g/dL 7.7*   < >  --    HEMATOCRIT % 23.4*   < >  --    PLATELETS 10*3/mm3 185   < >  --    GLUCOSE mg/dL 89  --  93   CREATININE mg/dL 0.95  --  1.19*   BUN mg/dL 11  --  12   SODIUM mmol/L 137  --  134*   POTASSIUM mmol/L 4.0  --  4.0   AST (SGOT) U/L  --   --  29   ALT (SGPT) U/L  --   --  <5   ALK PHOS U/L  --   --  65   BILIRUBIN mg/dL  --   --  0.3   ANION GAP mmol/L 15.0  --  7.0    < > = values in this interval not displayed.       XR Chest 1 View    Result Date: 11/27/2023  Impression: 1. Large hiatal hernia with increased distention compared with 11/15/2023. If further concern consider CT follow-up 2. Lungs without consolidation. Electronically Signed: Willie Spencer MD  11/27/2023 8:06 AM EST  Workstation ID: [id number]       I reviewed the patient's new clinical results.    Assessment/Plan:     Active and Resolved Problems  Active Hospital Problems    Diagnosis  POA    **Status post knee replacement [Z96.659]  Not Applicable    Unilateral primary osteoarthritis, right knee [M17.11]  Yes      Resolved Hospital Problems   No resolved problems to display.     Metabolic encephalopathy-was combative 11/24/23, had to be restrained, now off restrain, likely secondary to anesthesia. improving  -continue supportive care  -UA-neg  -TSH-3.4  -Sats 89 on room air-check  d dimer     Hypotension-not on any BP meds/was hypotension in OR  -monitor CBC, BMP  -ortho discontinued Dilaudid and oral oxycodone   -fluid bolus,   -monitor BNP-1332>6219  -DC NS      Elevated trop/elevated BNP  -EKG  -serial [id number]-  -cardiology consulted  -echo Normal LV size and lower limits of normal contractility EF of 50%   Aortic valve leaflets are mildly thickened, have adequate cusp separation  Mitral valve, tricuspid valve appears structurally normal, mild mitral and trace tricuspid regurgitation seen.  Calculated RV systolic pressure 50 mmHg consistent with  moderate pulm hypertension sent to.  -start lasix 20mg qd     Osteoarthritis-  S/p knee replacement-s/p fall after  surgery  -ortho following  -pain management   -pt/ot  -rehab on dc     Severe anemia-iron deficiency B12 deficient folate deficient  -monitor CBC-hemoglobin 7.7  -iron-24 > IV iron ordered  -check B12: 272 will give vitamin B12  -folate-4.5 will replace     JULIA-back to baseline  Monitor renal function  Avoid hypotension  avoid nephrtoxins  Hold NSAIDS     Hx Depression on zoloft     Hypomagnesemia-replace and monitor    DVT prophylaxis:  Mechanical DVT prophylaxis orders are present.     Code status is   Code Status and Medical Interventions:   Ordered at: 11/24/23 0732     Level Of Support Discussed With:    Patient     Code Status (Patient has no pulse and is not breathing):    CPR (Attempt to Resuscitate)     Medical Interventions (Patient has pulse or is breathing):    Full       Plan for disposition:nh in 2 days    Time: 30 minutes    Signature: Electronically signed by Jorje Sahni MD, 11/27/23, 12:06 EST.  [facility] Hospitalist Team

## 2023-11-27 NOTE — PLAN OF CARE
"Goal Outcome Evaluation:  Plan of Care Reviewed With: patient, spouse        Assessment: Sandra Treadwell presents with functional mobility impairments which indicate the need for skilled intervention. Pt confused and not wanting to use the walker initially. Pt eventually used the walker once in standing. Pt not grasping the walker at hand  but grabs the cross bar and leans far forward over the walker. Pt unable to perform turning steps safely with the rolling walker and the CNA /sitter brought recliner up behind pt so she could sit. Patient is a high risk of injurious falls and unsafe to return to prior living environment at this time.  Pt's present cognitive state is far from her baseline. Pt flexing right knee to 90 degrees spontaneously with pt hanging her right leg off the side of the recliner foot rest. Pt requiring assist of 2 for all functional mobility. PT changing disposition recommendation to Skilled Nursing Facility and CM is aware. Tolerating session today without incident. Will continue to follow and progress as tolerated.     Plan/Recommendations:   Moderate Intensity Therapy recommended post-acute care. This is recommended as therapy feels the patient would require 3-4 days per week and wouldn't tolerate \"3 hour daily\" rehab intensity. SNF would be the preferred choice. If the patient does not agree to SNF, arrange HH or OP depending on home bound status. If patient is medically complex, consider LTACH.. Pt requires no DME at discharge.     Pt desires Skilled Rehab placement at discharge. Pt cooperative; agreeable to therapeutic recommendations and plan of care.         Anticipated Discharge Disposition (PT): skilled nursing facility  "

## 2023-11-27 NOTE — THERAPY TREATMENT NOTE
Subjective: Pt agreeable to therapeutic plan of care. Pt with nonsensical conversation.    Objective:   Pt disoriented to place, situation and time.     Bed mobility - N/A or Not attempted. Pt was up in recliner   Transfers - Mod-A, Assist x 2, and with rolling walker  Ambulation - 12 feet Mod-A, Assist x 2, and with rolling walker    WB'ing status: R Lower Extremity Weight Bearing As Tolerated    ROM: R knee -5 to 90 degrees    Therapeutic Exercise: 10 Reps R Lower Extremity AAROM Total Knee: Ankle Pumps, Quad Sets, Heel slides, Hip Abduction, LAQ    Precautions: High falls risk     Vitals: Hypotensive  BP pre-tx 146/77  BP post-tx 120/50    Pain: pt grimaced and gasped with standing and rubbed right knee  Intervention for pain: Repositioned, Increased Activity, and Therapeutic Presence    Education: Provided education on the importance of mobility in the acute care setting, Verbal/Tactile Cues, Transfer Training, Gait Training, and Post-Op Precautions    Assessment: Sandra Treadwell presents with functional mobility impairments which indicate the need for skilled intervention. Pt confused and not wanting to use the walker initially. Pt eventually used the walker once in standing. Pt not grasping the walker at hand  but grabs the cross bar and leans far forward over the walker. Pt unable to perform turning steps safely with the rolling walker and the CNA /sitter brought recliner up behind pt so she could sit. Patient is a high risk of injurious falls and unsafe to return to prior living environment at this time.  Pt's present cognitive state is far from her baseline. Pt flexing right knee to 90 degrees spontaneously with pt hanging her right leg off the side of the recliner foot rest. Pt requiring assist of 2 for all functional mobility. PT changing disposition recommendation to Skilled Nursing Facility and CM is aware. Tolerating session today without incident. Will continue to follow and progress as tolerated.  "    Plan/Recommendations:   Moderate Intensity Therapy recommended post-acute care. This is recommended as therapy feels the patient would require 3-4 days per week and wouldn't tolerate \"3 hour daily\" rehab intensity. SNF would be the preferred choice. If the patient does not agree to SNF, arrange HH or OP depending on home bound status. If patient is medically complex, consider LTACH.. Pt requires no DME at discharge.     Pt desires Skilled Rehab placement at discharge. Pt cooperative; agreeable to therapeutic recommendations and plan of care.         Basic Mobility 6-click:  Rollin = Total, A lot = 2, A little = 3; 4 = None  Supine>Sit:   1 = Total, A lot = 2, A little = 3; 4 = None   Sit>Stand with arms:  1 = Total, A lot = 2, A little = 3; 4 = None  Bed>Chair:   1 = Total, A lot = 2, A little = 3; 4 = None  Ambulate in room:  1 = Total, A lot = 2, A little = 3; 4 = None  3-5 Steps with railin = Total, A lot = 2, A little = 3; 4 = None  Score: 11    Modified Jing: N/A = No pre-op stroke/TIA    Post-Tx Position: Up in Chair, Staff Present, Alarms activated, and Call light and personal items within reach  PPE: gloves   "

## 2023-11-27 NOTE — DISCHARGE PLACEMENT REQUEST
"Hamzah Quintana (73 y.o. Female)       Date of Birth   1950    Social Security Number       Address   [address] [place] [address] [place] [facility]    Home Phone   [phone]    MRN   [id number]       Christian   None    Marital Status                               Admission Date   11/24/23    Admission Type   Elective    Admitting Provider   Ulises Chen II, MD    Attending Provider   Ulises Chen II, MD    Department, Room/Bed   [facility] SURGICAL INPATIENT, 4103/1       Discharge Date       Discharge Disposition       Discharge Destination                                 Attending Provider: Ulises Chen II, MD    Allergies: Codeine    Isolation: None   Infection: None   Code Status: CPR    Ht: 152.4 cm (60\")   Wt: 69.4 kg (153 lb)    Admission Cmt: None   Principal Problem: Status post knee replacement [Z96.659]                   Active Insurance as of 11/24/2023       Primary Coverage       Payor Plan Insurance Group Employer/Plan Group    MEDICARE MEDICARE A & B        Payor Plan Address Payor Plan Phone Number Payor Plan Fax Number Effective Dates    [address] [phone]  4/1/2017 - None Entered    [facility] [id number]         Subscriber Name Subscriber Birth Date Member ID       HAMZAH QUINTANA 1950 [id number]               Secondary Coverage       Payor Plan Insurance Group Employer/Plan Group    [employer]ET[employer] COMMERCIAL [employer] HE[employer] AND LIFE  SUP               Payor Plan Address Payor Plan Phone Number Payor Plan Fax Number Effective Dates    [address] [address]   4/1/2017 - None Entered    [facility] [address]         Subscriber Name Subscriber Birth Date Member ID       HAMZAH QUINTANA 1950 [id number]                     Emergency Contacts        (Rel.) Home Phone Work Phone Mobile Phone    SKINNY QUINTANA (Spouse) -- -- [phone]                "

## 2023-11-27 NOTE — PLAN OF CARE
Goal Outcome Evaluation:   Patient remains very confused.  at bedside. Ct ordered however patient not able to sit still for test. Screams at staff.

## 2023-11-27 NOTE — NURSING NOTE
Patient hitting at staff , attempting to hit staff, trying to take dressing off leg, screaming at staff. Dr. Sahni notified . Can give ativan IM

## 2023-11-27 NOTE — PLAN OF CARE
Goal Outcome Evaluation:              Outcome Evaluation: Pt has had minimal c/o pain treated per MAR, pt pulled iv out, refused to have iv put back in, kept trying to get out of bed unassisted, medsitter back in place, vss, call light in reach, plan of care ongoing

## 2023-11-27 NOTE — PLAN OF CARE
Goal Outcome Evaluation:   Patient continues to be very confused and screaming at staff. Does become combative with care.  at bedside

## 2023-11-27 NOTE — NURSING NOTE
Patient remains very confused. Refuses to get in abed to transport for CT. Yelling and combative with staff. Dr. Sahni notified.

## 2023-11-27 NOTE — PROGRESS NOTES
"  Cardiology Progress Note    Patient Identification:  Name: Sandra Treadwell  Age: 73 y.o.  Sex: female  :  1950  MRN: [id number]                 Follow Up / Chief Complaint: Left Bundle branch block,     Interval History: Patient presented for elective right total knee replacement on 2023 patient's EKG showed LBBB with elevated troponin therefore cardiology was consulted.         NP NOTE:  Patient seen and examined this afternoon.   at bedside and sitter at bedside. Patient's  report she had has \"irregular heart beat and murmur for 30 years\" but does not follow a cardiologist.  He states she has been confused since Saturday and she got out of bed and fell.    Patient denies any chest pain.  Patient's  does state that patient gets out of breath quickly at home especially if carrying things up stairs. Per nursing staff, patient is combative and calling out.     Will check UA and CT chest   Discussed echocardiogram results with patient's      Electronically signed by BERNA Kelly, 23, 4:25 PM EST.    Cardiology attending addendum :    I have personally performed a face-to-face diagnostic evaluation, physical exam and reviewed data on this patient.  I have reviewed documentation done by me and nurse practitioner  and corrected as needed.  And agree with the different components of documentation.Greater than 50% of the time spent in the care of this patient was provided by attending consultant/me.       Subjective patient seen and examined.  Labs reviewed.  Discussed with RN taking care of patient.        Objective:     2023 and 2023: HS troponin is 23--> 31--> 49.  proBNP 1332.  CMP with a creatinine of 1.36 EGFR 45.  CBC with a hemoglobin of 8.  Chest x-ray shows no acute changes and hiatal hernia.  2023: HS troponin 70-- 118 pro bnp 6219  hgb 7.7       History of present illness:    Ms. Sandra Treadwell has PMH of     Right knee trauma  Cholecystectomy, " hysterectomy  Allergies/intolerance to codeine  Positive family history of heart disease in brother and father     Here for right total knee replacement surgery.  Patient underwent surgery 11/25/2023 had low blood pressure, abnormal EKG with left bundle branch block, therefore troponins were ordered which were abnormal.  Patient denies any chest pain or shortness of breath.     Data:  11/25/2023 and 11/26/2023: HS troponin is 23--> 31--> 49.  proBNP 1332.  CMP with a creatinine of 1.36 EGFR 45.  CBC with a hemoglobin of 8.  Chest x-ray shows no acute ST-T changes and hiatal hernia.  EKG done 11/25/2023 reviewed/interpreted by me reveals sinus rhythm with left bundle branch block at the rate of 93 bpm.  Left bundle is not new compared EKG from 11/15/2023.        Assessment:  :     Status post knee surgery  Low blood pressure  Elevated troponin  Elevated proBNP  Left bundle branch block  Anemia  CKD  Dyslipidemia           Recommendations / Plan:           Will check serial troponins.  Patient's troponin elevation is probably due to demand ischemia from anemia.  Will continue to monitor.  Statins for dyslipidemia  Telemetry is revealing sinus rhythm  Patient is currently confused, will defer evaluation and treatment of confusion to primary team.  Patient is not a candidate for any invasive cardiac workup at the current time.  Will follow and consider further evaluation and treatment.  Discussed with patient's  by bedside.  Discussed with RN taking care of patient to coordinate care         Copied text in this portion of the note has been reviewed and is accurate as of 11/27/2023    Past Medical History:  Past Medical History:   Diagnosis Date    Bladder leak     Breast cancer     chemo and radiation    Depression     GERD (gastroesophageal reflux disease)     Hyperlipidemia     Osteoporosis      Past Surgical History:  Past Surgical History:   Procedure Laterality Date    BREAST SURGERY      lumpectomy     "CHOLECYSTECTOMY      HYSTERECTOMY          Social History:   Social History     Tobacco Use    Smoking status: Never     Passive exposure: Never    Smokeless tobacco: Never   Substance Use Topics    Alcohol use: Never      Family History:  History reviewed. No pertinent family history.       Allergies:  Allergies   Allergen Reactions    Codeine Nausea And Vomiting and Dizziness     Scheduled Meds:  aspirin, 81 mg, Q12H  atorvastatin, 10 mg, Daily  cyanocobalamin, 1,000 mcg, Q28 Days  ferric gluconate, 250 mg, Once  folic acid, 1 mg, Daily  furosemide, 20 mg, Once  [START ON 11/28/2023] furosemide, 20 mg, Daily  LORazepam, 1 mg, Once  magnesium oxide, 400 mg, Daily  midodrine, 5 mg, TID AC  oxybutynin XL, 15 mg, Daily  sertraline, 50 mg, Nightly          Review of Systems:   Review of Systems   Unable to perform ROS: Mental status change (confused)         Constitutional:  Temp:  [98 °F (36.7 °C)-99.3 °F (37.4 °C)] 98.6 °F (37 °C)  Heart Rate:  [75-93] 75  Resp:  [18-20] 18  BP: (137-148)/(59-77) 148/70    Physical Exam   /70 (BP Location: Right arm, Patient Position: Sitting)   Pulse 75   Temp 98.6 °F (37 °C) (Oral)   Resp 18   Ht 152.4 cm (60\")   Wt 69.4 kg (153 lb)   SpO2 95%   BMI 29.88 kg/m²   General:  Appears in no acute distress; confused   Eyes: Sclera is anicteric,  conjunctiva is clear   HEENT:  No JVD. Thyroid not visibly enlarged. No mucosal pallor or cyanosis  Respiratory: Respirations regular and unlabored at rest.  Bilaterally good breath sounds, with good air entry in all fields. No crackles, rubs or wheezes auscultated  Cardiovascular: S1,S2 Regular rate and rhythm. +2/6 murmur, no rub or gallop auscultated.  . No pretibial pitting edema  Gastrointestinal: Abdomen nondistended, soft  Musculoskeletal:  No abnormal movements  Extremities: No digital clubbing or cyanosis  Skin: Color pink. Skin warm and dry to touch. No rashes  No xanthoma  Neuro: Alert and awake, no lateralizing deficits " appreciated  confused     INTAKE AND OUTPUT:    Intake/Output Summary (Last 24 hours) at 11/27/2023 1825  Last data filed at 11/27/2023 0731  Gross per 24 hour   Intake --   Output 700 ml   Net -700 ml       Cardiographics  Telemetry: non monitored.     ECG:   ECG 12 Lead Other; elevated troponin   Preliminary Result   HEART RATE= 95  bpm   RR Interval= 628  ms   OK Interval= 163  ms   P Horizontal Axis= 9  deg   P Front Axis= 51  deg   QRSD Interval= 131  ms   QT Interval= 431  ms   QTcB= 544  ms   QRS Axis= 37  deg   T Wave Axis= 52  deg   - ABNORMAL ECG -   Sinus rhythm   Left bundle branch block   ST elevation secondary to IVCD   When compared with ECG of 26-Nov-2023 21:32:31,   No significant change   Electronically Signed By:    Date and Time of Study: 2023-11-27 08:38:04      ECG 12 Lead Rhythm Change   Preliminary Result   HEART RATE= 98  bpm   RR Interval= 612  ms   OK Interval= 170  ms   P Horizontal Axis= -4  deg   P Front Axis= 51  deg   QRSD Interval= 120  ms   QT Interval= 377  ms   QTcB= 482  ms   QRS Axis= 3  deg   T Wave Axis= 82  deg   - ABNORMAL ECG -   Sinus rhythm   Left bundle branch block   LVH with secondary repolarization abnormality   Anterior infarct, old   When compared with ECG of 25-Nov-2023 19:45:40,   No significant change   Electronically Signed By:    Date and Time of Study: 2023-11-26 21:32:31      ECG 12 Lead Other; elevated troponin   Preliminary Result   HEART RATE= 93  bpm   RR Interval= 640  ms   OK Interval= 177  ms   P Horizontal Axis= 9  deg   P Front Axis= 50  deg   QRSD Interval= 120  ms   QT Interval= 379  ms   QTcB= 474  ms   QRS Axis= -3  deg   T Wave Axis= 138  deg   - ABNORMAL ECG -   Sinus rhythm   Ventricular premature complex   Left bundle branch block   Probable anteroseptal infarct, acute   When compared with ECG of 15-Nov-2023 11:02:55,   Significant rate increase   New or worsened ischemia or infarction   Electronically Signed By:    Date and Time of Study:  2023-11-25 19:45:40      SCANNED EKG   Final Result      SCANNED - TELEMETRY     Final Result      SCANNED - TELEMETRY     Final Result      SCANNED - TELEMETRY     Final Result      SCANNED - TELEMETRY     Final Result      SCANNED - TELEMETRY     Final Result      SCANNED - TELEMETRY     Final Result        I have personally reviewed EKG    Echocardiogram: Results for orders placed during the hospital encounter of 11/24/23    Adult Transthoracic Echo Complete W/ Cont if Necessary Per Protocol    Interpretation Summary    Estimated right ventricular systolic pressure from tricuspid regurgitation is moderately elevated (45-55 mmHg).    Conclusion      Normal LV size and lower limits of normal contractility EF of 50%  Normal RV size  Normal atrial size  Pulmonic valve is not well visualized.  Aortic valve leaflets are mildly thickened, have adequate cusp separation  Mitral valve, tricuspid valve appears structurally normal, mild mitral and trace tricuspid regurgitation seen.  Calculated RV systolic pressure 50 mmHg consistent with moderate pulm hypertension sent to.  No pericardial effusion seen.  Proximal aorta appears normal in size.      Lab Review   I have reviewed the labs  Results from last 7 days   Lab Units 11/27/23  0642 11/26/23 2032 11/26/23 1847   HSTROP T ng/L 118* 70* 53*     Results from last 7 days   Lab Units 11/25/23  1504   MAGNESIUM mg/dL 1.5*     Results from last 7 days   Lab Units 11/27/23  0642   SODIUM mmol/L 137   POTASSIUM mmol/L 4.0   BUN mg/dL 11   CREATININE mg/dL 0.95   CALCIUM mg/dL 8.4*         Results from last 7 days   Lab Units 11/27/23  0642 11/26/23  1847 11/25/23  1504   WBC 10*3/mm3 8.10 7.00 6.40   HEMOGLOBIN g/dL 7.7* 7.9* 8.0*   HEMATOCRIT % 23.4* 24.6* 24.6*   PLATELETS 10*3/mm3 185 188 192           RADIOLOGY:  Imaging Results (Last 24 Hours)       Procedure Component Value Units Date/Time    XR Chest 1 View [[id number]] Collected: 11/27/23 0803     Updated: 11/27/23 0808  "   Narrative:      XR CHEST 1 VW    Date of Exam: 11/26/2023 8:34 AM EST    Indication: hypoxia    Comparison: 11/15/2023    Findings:  Heart mildly enlarged, exaggerated by technique. There is a large hiatal hernia with increased distention when compared prior study. No pneumothorax. No focal consolidation or significant effusion. Surgical clips overlie the left lateral chest.      Impression:      Impression:  1. Large hiatal hernia with increased distention compared with 11/15/2023. If further concern consider CT follow-up  2. Lungs without consolidation.      Electronically Signed: Willie Spencer MD    11/27/2023 8:06 AM EST    Workstation ID: [id number]                  )11/27/2023  Wili Stewart MD      EMR Dragon/Transcription:   \"Dictated utilizing Dragon dictation\".   "

## 2023-11-28 ENCOUNTER — APPOINTMENT (OUTPATIENT)
Dept: CT IMAGING | Facility: HOSPITAL | Age: 73
DRG: 469 | End: 2023-11-28
Payer: MEDICARE

## 2023-11-28 LAB
ABO GROUP BLD: NORMAL
ANION GAP SERPL CALCULATED.3IONS-SCNC: 14 MMOL/L (ref 5–15)
BACTERIA UR QL AUTO: ABNORMAL /HPF
BASOPHILS # BLD AUTO: 0 10*3/MM3 (ref 0–0.2)
BASOPHILS NFR BLD AUTO: 0.3 % (ref 0–1.5)
BILIRUB UR QL STRIP: NEGATIVE
BLD GP AB SCN SERPL QL: NEGATIVE
BUN SERPL-MCNC: 13 MG/DL (ref 8–23)
BUN/CREAT SERPL: 15.7 (ref 7–25)
CALCIUM SPEC-SCNC: 8.4 MG/DL (ref 8.6–10.5)
CHLORIDE SERPL-SCNC: 107 MMOL/L (ref 98–107)
CLARITY UR: CLEAR
CO2 SERPL-SCNC: 17 MMOL/L (ref 22–29)
COLOR UR: YELLOW
CREAT SERPL-MCNC: 0.83 MG/DL (ref 0.57–1)
DEPRECATED RDW RBC AUTO: 51.6 FL (ref 37–54)
EGFRCR SERPLBLD CKD-EPI 2021: 75.5 ML/MIN/1.73
EOSINOPHIL # BLD AUTO: 0.2 10*3/MM3 (ref 0–0.4)
EOSINOPHIL NFR BLD AUTO: 2.2 % (ref 0.3–6.2)
ERYTHROCYTE [DISTWIDTH] IN BLOOD BY AUTOMATED COUNT: 16.6 % (ref 12.3–15.4)
GLUCOSE SERPL-MCNC: 80 MG/DL (ref 65–99)
GLUCOSE UR STRIP-MCNC: NEGATIVE MG/DL
HCT VFR BLD AUTO: 23.3 % (ref 34–46.6)
HGB BLD-MCNC: 7.3 G/DL (ref 12–15.9)
HGB UR QL STRIP.AUTO: ABNORMAL
HYALINE CASTS UR QL AUTO: ABNORMAL /LPF
KETONES UR QL STRIP: ABNORMAL
LEUKOCYTE ESTERASE UR QL STRIP.AUTO: NEGATIVE
LYMPHOCYTES # BLD AUTO: 1.2 10*3/MM3 (ref 0.7–3.1)
LYMPHOCYTES NFR BLD AUTO: 14.6 % (ref 19.6–45.3)
MCH RBC QN AUTO: 27.9 PG (ref 26.6–33)
MCHC RBC AUTO-ENTMCNC: 31.5 G/DL (ref 31.5–35.7)
MCV RBC AUTO: 88.6 FL (ref 79–97)
MONOCYTES # BLD AUTO: 0.9 10*3/MM3 (ref 0.1–0.9)
MONOCYTES NFR BLD AUTO: 10.3 % (ref 5–12)
NEUTROPHILS NFR BLD AUTO: 6.1 10*3/MM3 (ref 1.7–7)
NEUTROPHILS NFR BLD AUTO: 72.6 % (ref 42.7–76)
NITRITE UR QL STRIP: NEGATIVE
NRBC BLD AUTO-RTO: 0 /100 WBC (ref 0–0.2)
PH UR STRIP.AUTO: 5.5 [PH] (ref 5–8)
PLATELET # BLD AUTO: 220 10*3/MM3 (ref 140–450)
PMV BLD AUTO: 8 FL (ref 6–12)
POTASSIUM SERPL-SCNC: 3.9 MMOL/L (ref 3.5–5.2)
PROT UR QL STRIP: ABNORMAL
RBC # BLD AUTO: 2.63 10*6/MM3 (ref 3.77–5.28)
RBC # UR STRIP: ABNORMAL /HPF
REF LAB TEST METHOD: ABNORMAL
RH BLD: POSITIVE
SODIUM SERPL-SCNC: 138 MMOL/L (ref 136–145)
SP GR UR STRIP: 1.02 (ref 1–1.03)
SQUAMOUS #/AREA URNS HPF: ABNORMAL /HPF
T&S EXPIRATION DATE: NORMAL
UROBILINOGEN UR QL STRIP: ABNORMAL
WBC # UR STRIP: ABNORMAL /HPF
WBC NRBC COR # BLD AUTO: 8.4 10*3/MM3 (ref 3.4–10.8)

## 2023-11-28 PROCEDURE — 80048 BASIC METABOLIC PNL TOTAL CA: CPT | Performed by: INTERNAL MEDICINE

## 2023-11-28 PROCEDURE — 86850 RBC ANTIBODY SCREEN: CPT | Performed by: INTERNAL MEDICINE

## 2023-11-28 PROCEDURE — 81001 URINALYSIS AUTO W/SCOPE: CPT | Performed by: NURSE PRACTITIONER

## 2023-11-28 PROCEDURE — 25010000002 LORAZEPAM PER 2 MG: Performed by: INTERNAL MEDICINE

## 2023-11-28 PROCEDURE — 71250 CT THORAX DX C-: CPT

## 2023-11-28 PROCEDURE — 86923 COMPATIBILITY TEST ELECTRIC: CPT

## 2023-11-28 PROCEDURE — 86900 BLOOD TYPING SEROLOGIC ABO: CPT

## 2023-11-28 PROCEDURE — 25010000002 LORAZEPAM PER 2 MG: Performed by: NURSE PRACTITIONER

## 2023-11-28 PROCEDURE — 86900 BLOOD TYPING SEROLOGIC ABO: CPT | Performed by: INTERNAL MEDICINE

## 2023-11-28 PROCEDURE — 36430 TRANSFUSION BLD/BLD COMPNT: CPT

## 2023-11-28 PROCEDURE — 99222 1ST HOSP IP/OBS MODERATE 55: CPT

## 2023-11-28 PROCEDURE — 99221 1ST HOSP IP/OBS SF/LOW 40: CPT | Performed by: PSYCHIATRY & NEUROLOGY

## 2023-11-28 PROCEDURE — 70450 CT HEAD/BRAIN W/O DYE: CPT

## 2023-11-28 PROCEDURE — 86901 BLOOD TYPING SEROLOGIC RH(D): CPT | Performed by: INTERNAL MEDICINE

## 2023-11-28 PROCEDURE — 99232 SBSQ HOSP IP/OBS MODERATE 35: CPT | Performed by: INTERNAL MEDICINE

## 2023-11-28 PROCEDURE — P9016 RBC LEUKOCYTES REDUCED: HCPCS

## 2023-11-28 PROCEDURE — 85025 COMPLETE CBC W/AUTO DIFF WBC: CPT | Performed by: INTERNAL MEDICINE

## 2023-11-28 RX ORDER — LORAZEPAM 2 MG/ML
0.5 INJECTION INTRAMUSCULAR ONCE
Status: COMPLETED | OUTPATIENT
Start: 2023-11-28 | End: 2023-11-28

## 2023-11-28 RX ADMIN — LORAZEPAM 1 MG: 2 INJECTION INTRAMUSCULAR; INTRAVENOUS at 02:35

## 2023-11-28 RX ADMIN — ASPIRIN 81 MG: 81 TABLET, COATED ORAL at 20:57

## 2023-11-28 RX ADMIN — ZOLPIDEM TARTRATE 10 MG: 5 TABLET ORAL at 20:56

## 2023-11-28 RX ADMIN — SERTRALINE HYDROCHLORIDE 50 MG: 50 TABLET ORAL at 20:57

## 2023-11-28 RX ADMIN — LORAZEPAM 0.5 MG: 2 INJECTION, SOLUTION INTRAMUSCULAR; INTRAVENOUS at 05:34

## 2023-11-28 NOTE — SIGNIFICANT NOTE
Post Fall Note    Patient: Sandra Treadwell   Location: 4103/1    Time of fall: 2345    Date of fall: 11/24/23    Location of the fall: 4115    Describe the Fall:   Notified by phlebotomist that patient was sitting in floor in room, when this nurse entered room patient was sitting on buttocks with legs outstretched in corner of room beside bed, patient complained of no pain, however patient very confused and paranoid of staff, patient began yelling and would not talk to staff, telling staff to get away, security was called due to patient paranoia, yelling, and swinging at staff, patient was assisted up to bed by 4 staff members and restraints placed on BUE and LLE for safety, RLE surgical site unwrapped and checked for injury with no apparent injury noted to extremity , STAT xray ordered to check hardware, patient was A&Ox4 at start of shift, no behaviors          Interventions in place prior to fall: Call Light Within Reach, Encourage Use of Call Light, Bed/Chair in Lowest Position, Bed/Chair Locked, Non-Skid Footwear, Items within Reach, Room Clutter Free, and Adequate Lighting Provided    Was the fall witnessed? No    Where was the patient found?: 4115 in room beside bed    Patient position when found?: sitting up right on buttocks near corner of room beside bed    If witnessed, what part of the body made contact with the floor or other object? unknown    Injury: Needs Evaluatiuon    Is the patient having any pain?: No    Level of Consciousness: awake, alert, confused, and agitated     Post fall assessment:     Physician notified: Yes    Name of physician: Dr. Chen    Family notified: Yes    Name of family member notified: Vel Eben    Post fall precautions in place: Encourage Use of Call Light, Bed Side Rails x1, x2, x3, Bed/Chair in Lowest Position, Bed/Chair Locked, Bed Alarm Set, Increased Alarm Volume, Falls Wrist Band Applied, Falls Gown Applied, Fall Risk Care Plan Active, Non-Skid Footwear, Room Clutter  Free, Items within Reach, Adequate Lighting Provided, Falls Risk Signage Present, and Restraints    Teaching provided: Patient educated on risk for added injury due to status post knee replacement, increased pain, mobility, family also educated about anesthesia complications post surgery due to acute delirium and restraint usage due to safety concerns          Electronically signed by Mary Pena LPN, 11/27/23, 22:08 EST.

## 2023-11-28 NOTE — CONSULTS
"  Referring Provider: Jorje Sahni MD  Reason for Consultation: confusion      Chief complaint confusion    Subjective .     History of present illness:  The patient is a 73 y.o. female who was admitted secondary to arthritis of the right knee and a right knee replacement.     PMH: Breast cancer, depression. GERD, hyperlipidemia.     Psych was consulted due to confusion and agitation.     The patient was seen this AM. She was sleeping and her  in the room asked me not to wake her. She was also accompanied by a patient .     The patient's  state the patient is generally alert and oriented and independent in her ADLs. She does not have any prior history of dementia or memory problems. He states she has been \"belligerent\" and combative with staff. He states this happened last night.     Per chart review of nursing notes, patient has been confused and only oriented to self, combative and telling at staff.       Review of Systems   All systems were reviewed and negative except for:  Neurological: positive for  confusion and agitation    The following portions of the patient's history were reviewed and updated as appropriate: allergies, current medications, past family history, past medical history, past social history, past surgical history and problem list.    History    Past psychiatric history: depression    Past Medical History:   Diagnosis Date    Bladder leak     Breast cancer     chemo and radiation    Depression     GERD (gastroesophageal reflux disease)     Hyperlipidemia     Osteoporosis         History reviewed. No pertinent family history.     Social History     Tobacco Use    Smoking status: Never     Passive exposure: Never    Smokeless tobacco: Never   Vaping Use    Vaping Use: Never used   Substance Use Topics    Alcohol use: Never    Drug use: Never          Medications Prior to Admission   Medication Sig Dispense Refill Last Dose    atorvastatin (LIPITOR) 10 MG tablet " "Take 1 tablet by mouth Daily.   11/24/2023    cetirizine (zyrTEC) 10 MG tablet Take 1 tablet by mouth Daily.   11/23/2023    fluticasone (FLONASE) 50 MCG/ACT nasal spray 2 sprays by Each Nare route Daily.   11/23/2023    omeprazole (priLOSEC) 40 MG capsule Take 1 capsule by mouth Daily.   11/24/2023    oxybutynin XL (DITROPAN XL) 15 MG 24 hr tablet Take 1 tablet by mouth Daily.   11/24/2023    OYSCO 500 + D 500-5 MG-MCG tablet per tablet Take 1 tablet by mouth 2 (Two) Times a Day.   11/23/2023    sertraline (ZOLOFT) 50 MG tablet Take 1 tablet by mouth Every Night.   11/23/2023    zolpidem (AMBIEN) 10 MG tablet Take 1 tablet by mouth At Night As Needed.   11/23/2023    alendronate (FOSAMAX) 70 MG tablet Take 1 tablet by mouth 1 (One) Time Per Week. Sun   11/19/2023        Scheduled Meds:  aspirin, 81 mg, Oral, Q12H  atorvastatin, 10 mg, Oral, Daily  cyanocobalamin, 1,000 mcg, Intramuscular, Q28 Days  ferric gluconate, 250 mg, Intravenous, Once  folic acid, 1 mg, Oral, Daily  furosemide, 20 mg, Intravenous, Once  furosemide, 20 mg, Oral, Daily  LORazepam, 1 mg, Oral, Once  magnesium oxide, 400 mg, Oral, Daily  midodrine, 5 mg, Oral, TID AC  oxybutynin XL, 15 mg, Oral, Daily  sertraline, 50 mg, Oral, Nightly         Continuous Infusions:       PRN Meds:    docusate sodium    HYDROcodone-acetaminophen    LORazepam    [DISCONTINUED] HYDROmorphone **AND** naloxone    ondansetron **OR** ondansetron    zolpidem      Allergies:  Codeine      Objective     Vital Signs   /68 (BP Location: Right arm, Patient Position: Lying)   Pulse 88   Temp 97.7 °F (36.5 °C) (Oral)   Resp 16   Ht 152.4 cm (60\")   Wt 69.4 kg (153 lb)   SpO2 91%   BMI 29.88 kg/m²     Physical Exam:    Musculoskeletal:   Muscle strength and tone: TEMITOPE  Abnormal Movements: None noted   Gait: TEMITOPE     General Appearance:    In bed, in NAD>    Head:    Normocephalic, without obvious abnormality, atraumatic   Eyes:            Lids and lashes normal, " conjunctivae and sclerae normal, no   icterus, no pallor, corneas clear, PERRLA   Skin:   No bleeding, bruising or rash          Neurologic:   Cranial nerves 2 - 12 grossly intact, sensation intact, DTR       present and equal bilaterally     Mental Status Exam:   Hygiene:   good  Cooperation:   TEMITOPE  Eye Contact:  Closed  Psychomotor Behavior:   Calm at time of assessment, but patient sleeping  Affect:   TEMITOPE  Mood:  TEMITOPE  Hopelessness: Denies  Speech:   TEMITOPE  Thought Process:   TEMITOPE  Thought Content:   TEMITOPE  Suicidal:   TEMITOPE  Homicidal:   TEMITOPE  Hallucinations:  Not demonstrated today  Delusion:  None  Memory:  Deficits  Orientation:  Person  Reliability:  poor  Insight:  Poor  Judgement:  Poor  Impulse Control:  Impaired  Physical/Medical Issues:  Yes          Medications and allergies reviewed.    Result Review:  I have personally reviewed the results from the time of this admission to 11/28/2023 07:50 EST and agree with these findings:  [x]  Laboratory  []  Microbiology  []  Radiology  [x]  EKG/Telemetry   []  Cardiology/Vascular   []  Pathology  []  Old records  []  Other:  Most notable findings include:     Assessment & Plan       Status post knee replacement    Unilateral primary osteoarthritis, right knee    Assessment: delirium   Treatment Plan: Patient presents with signs and symptoms of delirium. Her QTC is prolonged at 545, so not appropriate for antipsychotics for management at this time.     Continue lorazepam PRN agitation.     Continue supportive treatment.     Will continue to follow.   Treatment Plan discussed with: Patient and Family    I discussed the patients findings and my recommendations with patient and family    I have reviewed and approved the behavioral health treatment plans and problem list. Yes  Thank you for the consult   Referring MD has access to consult report and progress notes in EMR     BERNA Arredondo  11/28/23  07:50 EST

## 2023-11-28 NOTE — SIGNIFICANT NOTE
11/28/23 1516   Rehab Time/Intention   Session Not Performed   (Attempted x2.  Sleeping each attempt and RN requested to let her rest due to very stressful night.)   Recommendation   OT - Next Appointment 11/29/23

## 2023-11-28 NOTE — CONSULTS
"Primary Care Provider: Ulises Chen*     Consult requested by: Dr. Sahni    Reason for Consultation: Neurological evaluation /delirium    History taken from: chart RN    Chief complaint: Confusion and delirium and agitation       SUBJECTIVE:    History of present illness: Background per H&P: Sandra Treadwell is a 73 y.o. year old female who was actually evaluated briefly in CT suite at [facility]    She was admitted on the 24th of this month and had knee replacement    Since then she has been lethargic and at times agitated    When I saw her she would keep her eyes closed but was arousable and interactive with me but very angry and upset    I am not seeing anything on cranial nerves    She is moving all extremities    No ptosis or nystagmus or passing out or forceful eye deviation or anything focal except for limitation because of knee surgery    Psychiatry is already seen the patient and considering this as delirium    Nothing suggesting large stroke, seizure or CNS infection  Minimally low B12          As per psychiatry  History of present illness:  The patient is a 73 y.o. female who was admitted secondary to arthritis of the right knee and a right knee replacement.      PMH: Breast cancer, depression. GERD, hyperlipidemia.      Psych was consulted due to confusion and agitation.      The patient was seen this AM. She was sleeping and her  in the room asked me not to wake her. She was also accompanied by a patient .     The patient's  state the patient is generally alert and oriented and independent in her ADLs. She does not have any prior history of dementia or memory problems. He states she has been \"belligerent\" and combative with staff. He states this happened last night.      Per chart review of nursing notes, patient has been confused and only oriented to self, combative and telling at staff.         Review of Systems              All systems were reviewed " and negative except for:  Neurological: positive for  confusion and agitation     The following portions of the patient's history were reviewed and updated as appropriate: allergies, current medications, past family history, past medical history, past social history, past surgical history and problem list.        - Portions of the above HPI were copied from previous encounters and edited as appropriate. PMH as detailed below.     Review of Systems   Very not possible because of her present condition    PATIENT HISTORY:  Past Medical History:   Diagnosis Date    Bladder leak     Breast cancer     chemo and radiation    Depression     GERD (gastroesophageal reflux disease)     Hyperlipidemia     Osteoporosis    ,   Past Surgical History:   Procedure Laterality Date    BREAST SURGERY      lumpectomy    CHOLECYSTECTOMY      HYSTERECTOMY      TOTAL KNEE ARTHROPLASTY Right 11/24/2023    Procedure: TOTAL KNEE ARTHROPLASTY WITH CORI ROBOT;  Surgeon: Ulises Chen II, MD;  Location: [facility] OR;  Service: Robotics - Ortho;  Laterality: Right;   , History reviewed. No pertinent family history.,   Social History     Tobacco Use    Smoking status: Never     Passive exposure: Never    Smokeless tobacco: Never   Vaping Use    Vaping Use: Never used   Substance Use Topics    Alcohol use: Never    Drug use: Never   ,   Prior to Admission medications    Medication Sig Start Date End Date Taking? Authorizing Provider   atorvastatin (LIPITOR) 10 MG tablet Take 1 tablet by mouth Daily. 8/9/23  Yes ProviderRamy MD   cetirizine (zyrTEC) 10 MG tablet Take 1 tablet by mouth Daily. 9/6/23  Yes ProviderRamy MD   fluticasone (FLONASE) 50 MCG/ACT nasal spray 2 sprays by Each Nare route Daily. 8/4/23  Yes ProviderRamy MD   omeprazole (priLOSEC) 40 MG capsule Take 1 capsule by mouth Daily. 8/17/23  Yes ProviderRamy MD   oxybutynin XL (DITROPAN XL) 15 MG 24 hr tablet Take 1 tablet by mouth Daily.  7/18/23  Yes Ramy Swann MD   OYSCO 500 + D 500-5 MG-MCG tablet per tablet Take 1 tablet by mouth 2 (Two) Times a Day.   Yes Ramy Swann MD   sertraline (ZOLOFT) 50 MG tablet Take 1 tablet by mouth Every Night.   Yes Ramy Swann MD   zolpidem (AMBIEN) 10 MG tablet Take 1 tablet by mouth At Night As Needed.   Yes Ramy Swann MD   alendronate (FOSAMAX) 70 MG tablet Take 1 tablet by mouth 1 (One) Time Per Week. Sun 7/24/23   Ramy Swann MD    Allergies:  Codeine    Current Facility-Administered Medications   Medication Dose Route Frequency Provider Last Rate Last Admin    aspirin EC tablet 81 mg  81 mg Oral Q12H Ulises Chen II, MD   81 mg at 11/27/23 2102    atorvastatin (LIPITOR) tablet 10 mg  10 mg Oral Daily Jessica Sanchez APRN   10 mg at 11/27/23 0918    cyanocobalamin injection 1,000 mcg  1,000 mcg Intramuscular Q28 Days Jorje Sahni MD        docusate sodium (COLACE) capsule 100 mg  100 mg Oral BID PRN Ulises Chen II, MD   100 mg at 11/26/23 0948    ferric gluconate (FERRLECIT) 250 MG in sodium chloride 0.9% 250 mL IVPB  250 mg Intravenous Once Jorje Sahni MD        folic acid (FOLVITE) tablet 1 mg  1 mg Oral Daily Jorje Sahni MD        furosemide (LASIX) injection 20 mg  20 mg Intravenous Once Jorje Sahni MD        furosemide (LASIX) tablet 20 mg  20 mg Oral Daily Jorje Sahni MD        HYDROcodone-acetaminophen (NORCO) 5-325 MG per tablet 1 tablet  1 tablet Oral Q6H PRN Jorje Sahni MD   1 tablet at 11/26/23 2011    LORazepam (ATIVAN) injection 1 mg  1 mg Intramuscular Q4H PRN Jorje Sahni MD   1 mg at 11/28/23 0235    LORazepam (ATIVAN) tablet 1 mg  1 mg Oral Once Dian Bernal APRN        magnesium oxide (MAG-OX) tablet 400 mg  400 mg Oral Daily Jorje Sahni MD   400 mg at 11/27/23 0918    midodrine (PROAMATINE) tablet 5 mg  5 mg Oral TID Jorje Barnes  "MD JORGE   5 mg at 11/27/23 0918    naloxone (NARCAN) injection 0.1 mg  0.1 mg Intravenous Q5 Min PRN Ulises Chen II, MD        ondansetron (ZOFRAN) tablet 4 mg  4 mg Oral Q6H PRN Ulises Chen II, MD        Or    ondansetron (ZOFRAN) injection 4 mg  4 mg Intravenous Q6H PRN Ulises Chen II, MD        oxybutynin XL (DITROPAN-XL) 24 hr tablet 15 mg  15 mg Oral Daily Ulises Chen II, MD   15 mg at 11/27/23 0918    sertraline (ZOLOFT) tablet 50 mg  50 mg Oral Nightly Ulises Chen II, MD   50 mg at 11/27/23 2102    zolpidem (AMBIEN) tablet 10 mg  10 mg Oral Nightly PRN Ulises Chen II, MD            ________________________________________________________        OBJECTIVE:  Upon today's exam, patient does not look in any distress, like respiratory or circulatory distress.  She intentionally refuses to interact.  When stimulated she states that, \"I will punch you in your head \".     Neurologic Exam  Limited neurological examination  Arousable  Goal oriented talk like when I told her to squeeze my fingers she would not give me minimal effort but when I told her to break them she started really twisting my fingers    When I showed her my hand and ask her what it was, her response was, but big fat hand \".    Neck stiffness    Detailed examination to follow but I am not seeing anything focal  ________________________________________________________   RESULTS REVIEW:    VITAL SIGNS:   Temp:  [97.7 °F (36.5 °C)-99.8 °F (37.7 °C)] 98 °F (36.7 °C)  Heart Rate:  [84-95] 95  Resp:  [16-19] 19  BP: (116-144)/(68-78) 144/73     LABS:      Lab 11/27/23  0642 11/26/23  1847 11/25/23  1504   WBC 8.10 7.00 6.40   HEMOGLOBIN 7.7* 7.9* 8.0*   HEMATOCRIT 23.4* 24.6* 24.6*   PLATELETS 185 188 192   NEUTROS ABS 6.00 4.90 4.61   LYMPHS ABS 1.10 1.20  --    MONOS ABS 0.80 0.80  --    EOS ABS 0.10 0.20  --    MCV 83.9 85.2 83.2   LACTATE  --   --  0.8         Lab " 11/27/23  0642 11/26/23  1245 11/25/23  1504   SODIUM 137 134* 131*   POTASSIUM 4.0 4.0 4.2   CHLORIDE 105 105 100   CO2 17.0* 22.0 24.0   ANION GAP 15.0 7.0 7.0   BUN 11 12 13   CREATININE 0.95 1.19* 1.26*   EGFR 64.2 49.0* 45.7*   GLUCOSE 89 93 85   CALCIUM 8.4* 8.4* 8.7   IONIZED CALCIUM  --   --  1.23   MAGNESIUM  --   --  1.5*   TSH  --   --  3.460         Lab 11/26/23  1245 11/25/23  1504   TOTAL PROTEIN 5.4* 5.7*   ALBUMIN 2.9* 3.3*   GLOBULIN 2.5 2.4   ALT (SGPT) <5 <5   AST (SGOT) 29 25   BILIRUBIN 0.3 0.3   ALK PHOS 65 67         Lab 11/27/23  0642 11/26/23 2032 11/26/23  1847 11/26/23  1245 11/25/23  2306 11/25/23  1751 11/25/23  1504   PROBNP 6,219.0*  --   --  2,489.0*  --   --  1,332.0*   HSTROP T 118* 70* 53* 49* 49*   < > 23*    < > = values in this interval not displayed.             Lab 11/25/23  2306 11/25/23  1751   IRON  --  24*   IRON SATURATION (TSAT)  --  6*   TIBC  --  374   TRANSFERRIN  --  251   FOLATE 4.58*  --    VITAMIN B 12 278  --          UA          11/17/2023    10:28 11/25/2023    16:20   Urinalysis   Squamous Epithelial Cells, UA 0-2     Specific Gravity, UA 1.007  1.010    Ketones, UA Negative  Negative    Blood, UA Negative  Negative    Leukocytes, UA Small (1+)  Negative    Nitrite, UA Negative  Negative    RBC, UA 0-2     WBC, UA 6-10     Bacteria, UA None Seen         Lab Results   Component Value Date    TSH 3.460 11/25/2023    HGBA1C 5.60 11/15/2023    [id number] 278 11/25/2023       IMAGING STUDIES:  No radiology results for the last day    I reviewed the patient's new clinical results.    ________________________________________________________     PROBLEM LIST:    Status post knee replacement    Unilateral primary osteoarthritis, right knee            ASSESSMENT/PLAN:  Likely delirium  Nonspecific  Psych history    Nothing suggesting large stroke, CNS infection or seizure    CT looks okay but I will follow-up      I discussed the patient's findings and my  recommendations with nursing staff    Reginald Herr MD  11/28/23  14:13 EST

## 2023-11-28 NOTE — PLAN OF CARE
Goal Outcome Evaluation:         Patient has been sleeping most of shift. Was able to go to CT today. IV placed. Waiting on type and screen for transfusion. No evidence of pain

## 2023-11-28 NOTE — PROGRESS NOTES
[facility] MEDICINE SERVICE  DAILY PROGRESS NOTE    NAME: Sandra Treadwell  : 1950  MRN: [id number]      LOS: 2 days     PROVIDER OF SERVICE: Jorje Sahni MD    Chief Complaint: Status post knee replacement    Subjective:     Interval History:  History taken from: patient  Patient Complaints:        73 y.o. Not  or  female who presents with End-stage arthritis of the right knee. They failed conservative treatment of their knee pain and a thorough discussion of the risks and benefits of surgery was had. The patient wishes to continue with elective total knee replacement, they were scheduled and are here for surgery. They did get medical clearance as well as a thorough preoperative workup.     Per the documentation by Dr Chen , dated 23 10AM, Patient with delirium that began last night.  Apparently she came to the unit and became acutely confused last night.  She then got up and had a fall directly onto her operative leg.  An x-ray was taken that was concerning for a possible fracture.  After review of the x-ray, I thought everything was okay but out of an abundance of caution ordered a CT scan just to be sure.  CT scan confirmed that everything is okay with the prosthetic and there is no concern for fracture.  She is okay to continue mobilization and physical therapy.  She was given some Ativan yesterday for agitation, but is quite lethargic this morning.  We are going to stop all Ativan at this point.  Plan is home discharge but only after her acute mental status change significantly improves and assuming she works well with physical therapy.  I have consulted the hospitalist service due to her acute mental status changes to make sure they have no further recommendations.       23 we were asked to see patient for medical management, patient is seen in bed NAD, no new complaints, is presently pleasantly confused, BP low at 80/40 fluid bolus ordered DW RN  2023  patient seen and examined in bed no acute distress, feeling better this morning.  Patient is awake alert oriented x 3.  Discussed with RN.  Echocardiogram being done now.Blood pressure 110/48.  11/27/2023 patient seen and examined in bed no acute distress, patient still confused.  Vital signs stable, troponin 118 from 70 yesterday, discussed with RN.  BNP 6219 will DC fluids.  11/28/23 patient seen and examined in bed no acute distress, family at bedside, discussed with RN.  Patient still confused.  Will obtain CT head.  Consulted neurology.    Review of SystemsReview of Systems   Constitutional:  Negative for chills and fever.   HENT:  Negative for ear pain and hearing loss.    Respiratory:  Negative for apnea and shortness of breath.    Cardiovascular:  Negative for chest pain and palpitations.   Gastrointestinal:  Negative for diarrhea, nausea and vomiting.   Genitourinary:  Negative for dysuria.   Musculoskeletal:  Positive for arthralgias and gait problem. Negative for back pain and joint swelling.   Neurological:  Negative for seizures, light-headedness and headaches.   Psychiatric/Behavioral:  Positive for confusion. Negative for hallucinati    Objective:     Vital Signs  Temp:  [97.7 °F (36.5 °C)-99.8 °F (37.7 °C)] 98 °F (36.7 °C)  Heart Rate:  [84-95] 95  Resp:  [16-19] 19  BP: (116-144)/(68-78) 144/73   Body mass index is 29.88 kg/m².    Physical ExamConstitutional:       General: She is not in acute distress.     Appearance: She is well-developed. She is not ill-appearing, toxic-appearing or diaphoretic.   HENT:      Head: Normocephalic and atraumatic.      Nose: Nose normal. No congestion or rhinorrhea.      Mouth/Throat:      Mouth: Mucous membranes are moist.      Pharynx: No oropharyngeal exudate.   Eyes:      General: No scleral icterus.        Right eye: No discharge.         Left eye: No discharge.      Extraocular Movements: Extraocular movements intact.      Pupils: Pupils are equal, round, and  reactive to light.   Neck:      Thyroid: No thyromegaly.      Vascular: No carotid bruit or JVD.      Trachea: No tracheal deviation.   Cardiovascular:      Rate and Rhythm: Normal rate and regular rhythm.      Pulses: Normal pulses.      Heart sounds: Normal heart sounds. No murmur heard.     No friction rub. No gallop.   Pulmonary:      Effort: Pulmonary effort is normal. No respiratory distress.      Breath sounds: Normal breath sounds. No stridor. No wheezing, rhonchi or rales.   Chest:      Chest wall: No tenderness.   Abdominal:      General: Bowel sounds are normal. There is no distension.      Palpations: Abdomen is soft. There is no mass.      Tenderness: There is no abdominal tenderness. There is no guarding or rebound.      Hernia: No hernia is present.   Musculoskeletal:         General: Tenderness present. No swelling, deformity or signs of injury. Normal range of motion.      Cervical back: Normal range of motion and neck supple. No rigidity. No muscular tenderness.      Right lower leg: No edema.      Left lower leg: No edema.   Lymphadenopathy:      Cervical: No cervical adenopathy.   Skin:     General: Skin is warm and dry.      Coloration: Skin is not jaundiced or pale.      Findings: No bruising, erythema or rash.   Neurological:      General: No focal deficit present.      Mental Status: She is alert.      Cranial Nerves: No cranial nerve deficit.      Sensory: No sensory deficit.      Motor: No weakness or abnormal muscle tone.      Coordination: Coordination normal.      Scheduled Meds   aspirin, 81 mg, Oral, Q12H  atorvastatin, 10 mg, Oral, Daily  cyanocobalamin, 1,000 mcg, Intramuscular, Q28 Days  ferric gluconate, 250 mg, Intravenous, Once  folic acid, 1 mg, Oral, Daily  furosemide, 20 mg, Intravenous, Once  furosemide, 20 mg, Oral, Daily  LORazepam, 1 mg, Oral, Once  magnesium oxide, 400 mg, Oral, Daily  midodrine, 5 mg, Oral, TID AC  oxybutynin XL, 15 mg, Oral, Daily  sertraline, 50 mg,  Oral, Nightly       PRN Meds     docusate sodium    HYDROcodone-acetaminophen    LORazepam    [DISCONTINUED] HYDROmorphone **AND** naloxone    ondansetron **OR** ondansetron    zolpidem   Infusions           Diagnostic Data    Results from last 7 days   Lab Units 11/27/23  0642 11/26/23  1847 11/26/23  1245   WBC 10*3/mm3 8.10   < >  --    HEMOGLOBIN g/dL 7.7*   < >  --    HEMATOCRIT % 23.4*   < >  --    PLATELETS 10*3/mm3 185   < >  --    GLUCOSE mg/dL 89  --  93   CREATININE mg/dL 0.95  --  1.19*   BUN mg/dL 11  --  12   SODIUM mmol/L 137  --  134*   POTASSIUM mmol/L 4.0  --  4.0   AST (SGOT) U/L  --   --  29   ALT (SGPT) U/L  --   --  <5   ALK PHOS U/L  --   --  65   BILIRUBIN mg/dL  --   --  0.3   ANION GAP mmol/L 15.0  --  7.0    < > = values in this interval not displayed.       No radiology results for the last day      I reviewed the patient's new clinical results.    Assessment/Plan:     Active and Resolved Problems  Active Hospital Problems    Diagnosis  POA    **Status post knee replacement [Z96.659]  Not Applicable    Unilateral primary osteoarthritis, right knee [M17.11]  Yes      Resolved Hospital Problems   No resolved problems to display.     Metabolic encephalopathy-was combative 11/24/23, had to be restrained, now off restrain, likely secondary to anesthesia.  -continue supportive care  -UA-neg  -TSH-3.4  -Sats 89 on room air-check  d dimer  CT head, neurology consult     Hypotension-not on any BP meds/was hypotension in OR  -monitor CBC, BMP  -ortho discontinued Dilaudid and oral oxycodone   -fluid bolus given   -monitor BNP-1332>6219  -DC NS      Elevated trop/elevated BNP  -EKG  -serial [id number]-  -cardiology consulted  -echo Normal LV size and lower limits of normal contractility EF of 50%   Aortic valve leaflets are mildly thickened, have adequate cusp separation  Mitral valve, tricuspid valve appears structurally normal, mild mitral and trace tricuspid regurgitation  seen.  Calculated RV systolic pressure 50 mmHg consistent with moderate pulm hypertension sent to.  -start lasix 20mg qd     Osteoarthritis-  S/p knee replacement-s/p fall after  surgery  -ortho following  -pain management   -pt/ot  -rehab on dc     Severe anemia-iron deficiency B12 deficient folate deficient  -monitor CBC-hemoglobin 7.7  -iron-24 > IV iron ordered  -check B12: 272 will give vitamin B12  -folate-4.5 will replace     JULIA-back to baseline  Monitor renal function  Avoid hypotension  avoid nephrtoxins  Hold NSAIDS     Hx Depression on zoloft     Hypomagnesemia-replace and monitor    DVT prophylaxis:  Mechanical DVT prophylaxis orders are present.     Code status is   Code Status and Medical Interventions:   Ordered at: 11/24/23 0732     Level Of Support Discussed With:    Patient     Code Status (Patient has no pulse and is not breathing):    CPR (Attempt to Resuscitate)     Medical Interventions (Patient has pulse or is breathing):    Full       Plan for disposition:nh in 2 days    Time: 30 minutes    Signature: Electronically signed by Jorje Sahni MD, 11/28/23, 13:35 EST.  [facility] Hospitalist Team

## 2023-11-28 NOTE — PROGRESS NOTES
Cardiology Progress Note    Patient Identification:  Name: Sandra Treadwell  Age: 73 y.o.  Sex: female  :  1950  MRN: [id number]                 Follow Up / Chief Complaint: Left Bundle branch block,     Interval History: Patient presented for elective right total knee replacement on 2023 patient's EKG showed LBBB with elevated troponin therefore cardiology was consulted.         NP NOTE:  Patient seen, not examined.  She was sleeping comfortably in the chair.  Still confused but now sedated.  She will undergo CT chest and CT head.  She had refused last night.         Electronically signed by BERNA Kelly, 23, 2:06 PM EST.    Cardiology attending addendum :    I have personally performed a face-to-face diagnostic evaluation, physical exam and reviewed data on this patient.  I have reviewed documentation done by me and nurse practitioner  and corrected as needed.  And agree with the different components of documentation.Greater than 50% of the time spent in the care of this patient was provided by attending consultant/me.           Subjective patient seen and examined.  Labs reviewed.  Discussed with RN taking care of patient.  Patient reportedly has not been sleeping and family is by bedside and she finally fell asleep.  Is awaiting CT of the head and chest.  Has been having altered mental status with agitation    Objective:     2023 and 2023: HS troponin is 23--> 31--> 49.  proBNP 1332.  CMP with a creatinine of 1.36 EGFR 45.  CBC with a hemoglobin of 8.  Chest x-ray shows no acute changes and hiatal hernia.  2023: HS troponin 70-- 118 pro bnp 6219  hgb 7.7   2023: refused labs      History of present illness:    Ms. Sandra Treadwell has PMH of     Right knee trauma  Cholecystectomy, hysterectomy  Allergies/intolerance to codeine  Positive family history of heart disease in brother and father     Here for right total knee replacement surgery.  Patient underwent surgery  11/25/2023 had low blood pressure, abnormal EKG with left bundle branch block, therefore troponins were ordered which were abnormal.  Patient denies any chest pain or shortness of breath.     Data:  11/25/2023 and 11/26/2023: HS troponin is 23--> 31--> 49.  proBNP 1332.  CMP with a creatinine of 1.36 EGFR 45.  CBC with a hemoglobin of 8.  Chest x-ray shows no acute ST-T changes and hiatal hernia.  EKG done 11/25/2023 reviewed/interpreted by me reveals sinus rhythm with left bundle branch block at the rate of 93 bpm.  Left bundle is not new compared EKG from 11/15/2023.        Assessment:  :     Status post knee surgery  Low blood pressure  Elevated troponin  Elevated proBNP  Left bundle branch block  Anemia  CKD  Dyslipidemia           Recommendations / Plan:           Will check serial troponins.  Patient's troponin elevation is probably due to demand ischemia from anemia.  Will continue to monitor.  Statins for dyslipidemia  Telemetry is revealing sinus rhythm  Patient is currently confused, will defer evaluation and treatment of confusion to primary team.  Patient is not a candidate for any invasive cardiac workup at the current time.  Patient is awaiting CT of the head and chest.  Will follow and consider further evaluation and treatment.  Discussed with patient's  by bedside.  Discussed with RN taking care of patient to coordinate care         Copied text in this portion of the note has been reviewed and is accurate as of 11/28/2023    Past Medical History:  Past Medical History:   Diagnosis Date    Bladder leak     Breast cancer     chemo and radiation    Depression     GERD (gastroesophageal reflux disease)     Hyperlipidemia     Osteoporosis      Past Surgical History:  Past Surgical History:   Procedure Laterality Date    BREAST SURGERY      lumpectomy    CHOLECYSTECTOMY      HYSTERECTOMY      TOTAL KNEE ARTHROPLASTY Right 11/24/2023    Procedure: TOTAL KNEE ARTHROPLASTY WITH CORI ROBOT;  Surgeon:  "Ulises Chen II, MD;  Location: [facility] MAIN OR;  Service: Robotics - Ortho;  Laterality: Right;        Social History:   Social History     Tobacco Use    Smoking status: Never     Passive exposure: Never    Smokeless tobacco: Never   Substance Use Topics    Alcohol use: Never      Family History:  History reviewed. No pertinent family history.       Allergies:  Allergies   Allergen Reactions    Codeine Nausea And Vomiting and Dizziness     Scheduled Meds:  aspirin, 81 mg, Q12H  atorvastatin, 10 mg, Daily  cyanocobalamin, 1,000 mcg, Q28 Days  ferric gluconate, 250 mg, Once  folic acid, 1 mg, Daily  furosemide, 20 mg, Once  furosemide, 20 mg, Daily  LORazepam, 1 mg, Once  magnesium oxide, 400 mg, Daily  midodrine, 5 mg, TID AC  oxybutynin XL, 15 mg, Daily  sertraline, 50 mg, Nightly          Review of Systems:   Review of Systems   Unable to perform ROS: Mental status change (confused)         Constitutional:  Temp:  [97.7 °F (36.5 °C)-99.8 °F (37.7 °C)] 98 °F (36.7 °C)  Heart Rate:  [84-95] 95  Resp:  [16-19] 19  BP: (116-144)/(68-78) 144/73    Physical Exam   /73 (BP Location: Right arm, Patient Position: Lying)   Pulse 95   Temp 98 °F (36.7 °C) (Axillary)   Resp 19   Ht 152.4 cm (60\")   Wt 69.4 kg (153 lb)   SpO2 92%   BMI 29.88 kg/m²   General:  Appears in no acute distress; confused intermittently  Eyes: Sclera is anicteric,  conjunctiva is clear   HEENT:  No JVD. Thyroid not visibly enlarged. No mucosal pallor or cyanosis  Respiratory: Respirations regular and unlabored at rest.  Clear to auscultation  Cardiovascular: S1,S2 Regular rate and rhythm. +2/6 murmur, no rub or gallop auscultated.  . No pretibial pitting edema  Gastrointestinal: Abdomen nondistended, soft  Musculoskeletal:  No abnormal movements  Extremities: No digital clubbing or cyanosis  Skin: Color pink. Skin warm and dry to touch. No rashes  No xanthoma  Neuro: Sedated, sleeping    INTAKE AND OUTPUT:    Intake/Output " Summary (Last 24 hours) at 11/28/2023 1742  Last data filed at 11/28/2023 1643  Gross per 24 hour   Intake --   Output 1050 ml   Net -1050 ml       Cardiographics  Telemetry: non monitored.     ECG:   ECG 12 Lead Other; elevated troponin   Final Result   HEART RATE= 95  bpm   RR Interval= 628  ms   RI Interval= 163  ms   P Horizontal Axis= 9  deg   P Front Axis= 51  deg   QRSD Interval= 131  ms   QT Interval= 431  ms   QTcB= 544  ms   QRS Axis= 37  deg   T Wave Axis= 52  deg   - ABNORMAL ECG -   Sinus rhythm   Left bundle branch block   ST elevation secondary to IVCD   When compared with ECG of 26-Nov-2023 21:32:31,   No significant change   Electronically Signed By: Merary Jaffe ([facility]) 27-Nov-2023 20:51:26   Date and Time of Study: 2023-11-27 08:38:04      ECG 12 Lead Rhythm Change   Preliminary Result   HEART RATE= 98  bpm   RR Interval= 612  ms   RI Interval= 170  ms   P Horizontal Axis= -4  deg   P Front Axis= 51  deg   QRSD Interval= 120  ms   QT Interval= 377  ms   QTcB= 482  ms   QRS Axis= 3  deg   T Wave Axis= 82  deg   - ABNORMAL ECG -   Sinus rhythm   Left bundle branch block   LVH with secondary repolarization abnormality   Anterior infarct, old   When compared with ECG of 25-Nov-2023 19:45:40,   No significant change   Electronically Signed By:    Date and Time of Study: 2023-11-26 21:32:31      ECG 12 Lead Other; elevated troponin   Preliminary Result   HEART RATE= 93  bpm   RR Interval= 640  ms   RI Interval= 177  ms   P Horizontal Axis= 9  deg   P Front Axis= 50  deg   QRSD Interval= 120  ms   QT Interval= 379  ms   QTcB= 474  ms   QRS Axis= -3  deg   T Wave Axis= 138  deg   - ABNORMAL ECG -   Sinus rhythm   Ventricular premature complex   Left bundle branch block   Probable anteroseptal infarct, acute   When compared with ECG of 15-Nov-2023 11:02:55,   Significant rate increase   New or worsened ischemia or infarction   Electronically Signed By:    Date and Time of Study: 2023-11-25 19:45:40       SCANNED EKG   Final Result      SCANNED - TELEMETRY     Final Result      SCANNED - TELEMETRY     Final Result      SCANNED - TELEMETRY     Final Result      SCANNED - TELEMETRY     Final Result      SCANNED - TELEMETRY     Final Result      SCANNED - TELEMETRY     Final Result        I have personally reviewed EKG    Echocardiogram: Results for orders placed during the hospital encounter of 11/24/23    Adult Transthoracic Echo Complete W/ Cont if Necessary Per Protocol    Interpretation Summary    Estimated right ventricular systolic pressure from tricuspid regurgitation is moderately elevated (45-55 mmHg).    Conclusion      Normal LV size and lower limits of normal contractility EF of 50%  Normal RV size  Normal atrial size  Pulmonic valve is not well visualized.  Aortic valve leaflets are mildly thickened, have adequate cusp separation  Mitral valve, tricuspid valve appears structurally normal, mild mitral and trace tricuspid regurgitation seen.  Calculated RV systolic pressure 50 mmHg consistent with moderate pulm hypertension sent to.  No pericardial effusion seen.  Proximal aorta appears normal in size.      Lab Review   I have reviewed the labs  Results from last 7 days   Lab Units 11/27/23  0642 11/26/23  2032 11/26/23  1847   HSTROP T ng/L 118* 70* 53*     Results from last 7 days   Lab Units 11/25/23  1504   MAGNESIUM mg/dL 1.5*     Results from last 7 days   Lab Units 11/28/23  1459   SODIUM mmol/L 138   POTASSIUM mmol/L 3.9   BUN mg/dL 13   CREATININE mg/dL 0.83   CALCIUM mg/dL 8.4*         Results from last 7 days   Lab Units 11/28/23  1459 11/27/23  0642 11/26/23  1847   WBC 10*3/mm3 8.40 8.10 7.00   HEMOGLOBIN g/dL 7.3* 7.7* 7.9*   HEMATOCRIT % 23.3* 23.4* 24.6*   PLATELETS 10*3/mm3 220 185 188           RADIOLOGY:  Imaging Results (Last 24 Hours)       Procedure Component Value Units Date/Time    CT Chest Without Contrast Diagnostic [[id number]] Collected: 11/28/23 1513     Updated: 11/28/23 1527     Narrative:      CT CHEST WO CONTRAST DIAGNOSTIC    Date of Exam: 11/28/2023 2:14 PM EST    Indication: Dyspnea, chronic, unclear etiology  large hiatal hernia,abnormal cxr.    Comparison: Chest radiograph dated 11/26/2023, chest CT with contrast dated 11/26/2019    Technique: Axial CT images were obtained of the chest without contrast administration.  Sagittal and coronal reconstructions were performed.  Automated exposure control and iterative reconstruction methods were used.      Findings:  The entire stomach is in the thoracic cavity due to large hiatal hernia which also contains fat and decompressed loops of colon within the hernia. The stomach is decompressed and there is suggestion of gastric volvulus given its contour, but there are no   CT signs of obstruction. The visualized loop of colon distal to the hernia defect is air-filled and within upper range of normal for caliber. No abnormal bowel wall thickening. There are small bilateral pleural effusions and adjacent partial right lower   lobe atelectasis these findings are new since the 2019 CT. The tracheobronchial tree is widely patent. No bronchiectasis or abnormal bronchial wall thickening. No pneumothorax or pericardial effusions. Coronary artery calcifications are present. The   thoracic aorta is not aneurysmal. The unenhanced pulmonary arteries are normal in caliber. The thyroid gland is normal in size. There are bilateral saline breast implants in place. No acute bony abnormality or aggressive appearing focal osseous lesions.      Impression:      Impression:    1. Large hiatal hernia containing the entire stomach as well as abdominal fat and a loop of colon. No distended bowel loops are seen within the hernia to indicate obstruction. There is suggestion of a gastric volvulus. Adjacent to the hernia there is   extensive partial right lower lobe atelectasis. There are also small layering bilateral pleural effusions.    2. Other chronic incidental  "ancillary findings are detailed above    Electronically Signed: Sonu DO Arnaud    11/28/2023 3:25 PM EST    Workstation ID: [id number]    CT Head Without Contrast [[id number]] Collected: 11/28/23 1425     Updated: 11/28/23 1434    Narrative:      CT HEAD WO CONTRAST    Date of Exam: 11/28/2023 2:14 PM EST    Indication: Altered mental status, nontraumatic (Ped 0-17y).    Comparison: None available.    Technique: Axial CT images were obtained of the head without contrast administration.  Coronal reconstructions were performed.  Automated exposure control and iterative reconstruction methods were used.    FINDINGS: Gray-white differentiation is maintained and there is no evidence of intracranial hemorrhage, mass or mass effect. Mild age-related changes of the brain are present including volume loss and typical periventricular sequela of chronic small   vessel ischemia. There is otherwise no evidence of intracranial hemorrhage, mass or mass effect. The ventricles are normal in size and configuration accounting for surrounding volume loss. The orbits are normal and the paranasal sinuses are grossly   clear.      Impression:      Mild age-related changes of the brain as above, otherwise without evidence of acute intracranial abnormality.      Electronically Signed: Sarkis Chirinos MD    11/28/2023 2:32 PM EST    Workstation ID: [id number]                  )11/28/2023  Wili Stewart MD      [facility] "[employer]/Transcription:   \"Dictated utilizing Dragon dictation\".   "

## 2023-11-28 NOTE — CASE MANAGEMENT/SOCIAL WORK
Continued Stay Note   [facility]     Patient Name: Sandra Treadwell  MRN: [id number]  Today's Date: 11/28/2023    Admit Date: 11/24/2023    Plan: Referral to [facility] pending acceptance . No precert needed.PASRR approved.   Discharge Plan       Row Name 11/28/23 1504       Plan    Plan Referral to [facility] pending acceptance . No precert needed.PASRR approved.    Plan Comments DC barriers: pod #4 left TKR, patient is confused and anxious, pending CT of head  ,confusion                    Expected Discharge Date and Time       Expected Discharge Date Expected Discharge Time    Nov 29, 2023               Sendy Pires RN

## 2023-11-28 NOTE — PLAN OF CARE
Goal Outcome Evaluation:  Plan of Care Reviewed With: patient        Progress: no change  Outcome Evaluation: Patient is confused, only oriented to self. Combative and yelling at staff. Safety attendent at bedside. Plan of care ongoing.

## 2023-11-28 NOTE — SIGNIFICANT NOTE
11/28/23 1443   OTHER   Discipline physical therapist   Rehab Time/Intention   Session Not Performed unable to treat, medical status change;other (see comments)  (Pt had just gotten up to BSC with nursing and was resting soundly. Pt also received head CT today. Nursing wanting pt to rest this afternoon and thus no PT session today.)   Therapy Assessment/Plan (PT)   Criteria for Skilled Interventions Met (PT) yes;meets criteria   Recommendation   PT - Next Appointment 11/29/23

## 2023-11-29 LAB
ACANTHOCYTES BLD QL SMEAR: ABNORMAL
ALBUMIN SERPL-MCNC: 2.6 G/DL (ref 3.5–5.2)
ALBUMIN/GLOB SERPL: 0.9 G/DL
ALP SERPL-CCNC: 56 U/L (ref 39–117)
ALT SERPL W P-5'-P-CCNC: 6 U/L (ref 1–33)
ANION GAP SERPL CALCULATED.3IONS-SCNC: 11 MMOL/L (ref 5–15)
AST SERPL-CCNC: 27 U/L (ref 1–32)
BILIRUB SERPL-MCNC: 0.7 MG/DL (ref 0–1.2)
BUN SERPL-MCNC: 13 MG/DL (ref 8–23)
BUN/CREAT SERPL: 14.9 (ref 7–25)
CALCIUM SPEC-SCNC: 8.5 MG/DL (ref 8.6–10.5)
CHLORIDE SERPL-SCNC: 107 MMOL/L (ref 98–107)
CO2 SERPL-SCNC: 21 MMOL/L (ref 22–29)
CREAT SERPL-MCNC: 0.87 MG/DL (ref 0.57–1)
DEPRECATED RDW RBC AUTO: 45.5 FL (ref 37–54)
EGFRCR SERPLBLD CKD-EPI 2021: 71.3 ML/MIN/1.73
ELLIPTOCYTES BLD QL SMEAR: ABNORMAL
EOSINOPHIL # BLD MANUAL: 0.07 10*3/MM3 (ref 0–0.4)
EOSINOPHIL NFR BLD MANUAL: 1 % (ref 0.3–6.2)
ERYTHROCYTE [DISTWIDTH] IN BLOOD BY AUTOMATED COUNT: 15.2 % (ref 12.3–15.4)
GIANT PLATELETS: ABNORMAL
GLOBULIN UR ELPH-MCNC: 2.9 GM/DL
GLUCOSE SERPL-MCNC: 91 MG/DL (ref 65–99)
HCT VFR BLD AUTO: 27.3 % (ref 34–46.6)
HGB BLD-MCNC: 8.9 G/DL (ref 12–15.9)
LYMPHOCYTES # BLD MANUAL: 1.06 10*3/MM3 (ref 0.7–3.1)
LYMPHOCYTES NFR BLD MANUAL: 7 % (ref 5–12)
MCH RBC QN AUTO: 28 PG (ref 26.6–33)
MCHC RBC AUTO-ENTMCNC: 32.7 G/DL (ref 31.5–35.7)
MCV RBC AUTO: 85.9 FL (ref 79–97)
MONOCYTES # BLD: 0.46 10*3/MM3 (ref 0.1–0.9)
NEUTROPHILS # BLD AUTO: 5.02 10*3/MM3 (ref 1.7–7)
NEUTROPHILS NFR BLD MANUAL: 74 % (ref 42.7–76)
NEUTS BAND NFR BLD MANUAL: 2 % (ref 0–5)
NEUTS VAC BLD QL SMEAR: ABNORMAL
PLATELET # BLD AUTO: 228 10*3/MM3 (ref 140–450)
PMV BLD AUTO: 8 FL (ref 6–12)
POIKILOCYTOSIS BLD QL SMEAR: ABNORMAL
POLYCHROMASIA BLD QL SMEAR: ABNORMAL
POTASSIUM SERPL-SCNC: 3.9 MMOL/L (ref 3.5–5.2)
PROT SERPL-MCNC: 5.5 G/DL (ref 6–8.5)
RBC # BLD AUTO: 3.18 10*6/MM3 (ref 3.77–5.28)
SCAN SLIDE: NORMAL
SODIUM SERPL-SCNC: 139 MMOL/L (ref 136–145)
VARIANT LYMPHS NFR BLD MANUAL: 16 % (ref 19.6–45.3)
WBC NRBC COR # BLD AUTO: 6.6 10*3/MM3 (ref 3.4–10.8)

## 2023-11-29 PROCEDURE — 97110 THERAPEUTIC EXERCISES: CPT

## 2023-11-29 PROCEDURE — 85025 COMPLETE CBC W/AUTO DIFF WBC: CPT | Performed by: INTERNAL MEDICINE

## 2023-11-29 PROCEDURE — 99232 SBSQ HOSP IP/OBS MODERATE 35: CPT | Performed by: INTERNAL MEDICINE

## 2023-11-29 PROCEDURE — 97530 THERAPEUTIC ACTIVITIES: CPT

## 2023-11-29 PROCEDURE — 80053 COMPREHEN METABOLIC PANEL: CPT | Performed by: INTERNAL MEDICINE

## 2023-11-29 PROCEDURE — 85007 BL SMEAR W/DIFF WBC COUNT: CPT | Performed by: INTERNAL MEDICINE

## 2023-11-29 PROCEDURE — 25010000002 LORAZEPAM PER 2 MG: Performed by: INTERNAL MEDICINE

## 2023-11-29 RX ADMIN — LORAZEPAM 1 MG: 2 INJECTION INTRAMUSCULAR; INTRAVENOUS at 12:05

## 2023-11-29 RX ADMIN — LORAZEPAM 1 MG: 2 INJECTION INTRAMUSCULAR; INTRAVENOUS at 01:41

## 2023-11-29 RX ADMIN — LORAZEPAM 1 MG: 2 INJECTION INTRAMUSCULAR; INTRAVENOUS at 07:04

## 2023-11-29 NOTE — PROGRESS NOTES
Events noted    She was again agitated last night and had I believe several doses of Ativan    This morning she is arousable  Follows simple commands    Moving all extremities    Does not want to interact rather than so encephalopathic that she could not communicate    CT head okay    Likely delirium secondary to being in the hospital and medication and medical condition    Again nothing suggesting large stroke, CNS infection or seizure

## 2023-11-29 NOTE — PROGRESS NOTES
Unfortunately patient remains with significant sundowning and confusion.  I appreciate neurology and psychiatry assistance with the case.  I will defer management of her altered mental status to their care.  In terms of the knee, she is likely going to get behind in physical therapy due to this mental status change but there is not much we can do about that at current time.  She will eventually be discharged to a rehab facility likely once she pulls through this a little bit better but I doubt she will be a candidate for discharge until she is a better sound mind.  We will continue to follow

## 2023-11-29 NOTE — PLAN OF CARE
Problem: Adult Inpatient Plan of Care  Goal: Plan of Care Review  Outcome: Ongoing, Progressing  Flowsheets (Taken 11/29/2023 1448)  Progress: no change  Plan of Care Reviewed With:   patient   spouse  Outcome Evaluation: Patient still confused. only oriented to self. Patient still combative and yelling at staff. Sitter at bedside this shift. Patient recieving ativan IM to help control agitation.  visited this shift and sat with patient. Safety measures in place. Call light within reach. Patient unable to communication calmly with staff. Care of plan ongoing.  Goal: Patient-Specific Goal (Individualized)  Outcome: Ongoing, Progressing  Goal: Absence of Hospital-Acquired Illness or Injury  Outcome: Ongoing, Progressing  Intervention: Identify and Manage Fall Risk  Recent Flowsheet Documentation  Taken 11/29/2023 1415 by Yen Tinoco LPN  Safety Promotion/Fall Prevention:   activity supervised   assistive device/personal items within reach   clutter free environment maintained   nonskid shoes/slippers when out of bed   room organization consistent   safety round/check completed   fall prevention program maintained   gait belt  Taken 11/29/2023 1125 by Yen iTnoco LPN  Safety Promotion/Fall Prevention:   activity supervised   assistive device/personal items within reach   clutter free environment maintained   fall prevention program maintained   gait belt   nonskid shoes/slippers when out of bed   room organization consistent   safety round/check completed  Taken 11/29/2023 1040 by Yen Tinoco LPN  Safety Promotion/Fall Prevention:   activity supervised   assistive device/personal items within reach   clutter free environment maintained   nonskid shoes/slippers when out of bed   room organization consistent   safety round/check completed   fall prevention program maintained  Taken 11/29/2023 0854 by Yen Tinoco LPN  Safety Promotion/Fall Prevention:   activity supervised   assistive  device/personal items within reach   clutter free environment maintained   nonskid shoes/slippers when out of bed   room organization consistent   safety round/check completed   fall prevention program maintained   gait belt  Intervention: Prevent Skin Injury  Recent Flowsheet Documentation  Taken 11/29/2023 1125 by Yen Tinoco LPN  Body Position:   position changed independently   side-lying   right  Taken 11/29/2023 0854 by Yen Tinoco LPN  Body Position: position changed independently  Intervention: Prevent and Manage VTE (Venous Thromboembolism) Risk  Recent Flowsheet Documentation  Taken 11/29/2023 1125 by Yen Tinoco LPN  Activity Management: patient refuses activity  Taken 11/29/2023 0854 by Yen Tinoco LPN  Activity Management: (unable to follow direction at this time) unable to complete activity (see comments)  VTE Prevention/Management:   bilateral   sequential compression devices off   patient refused intervention  Intervention: Prevent Infection  Recent Flowsheet Documentation  Taken 11/29/2023 1125 by Yen Tinoco LPN  Infection Prevention:   hand hygiene promoted   personal protective equipment utilized   single patient room provided  Taken 11/29/2023 0854 by Yen Tinoco LPN  Infection Prevention:   hand hygiene promoted   personal protective equipment utilized   single patient room provided  Goal: Optimal Comfort and Wellbeing  Outcome: Ongoing, Progressing  Intervention: Provide Person-Centered Care  Recent Flowsheet Documentation  Taken 11/29/2023 1125 by Yen Tinoco LPN  Trust Relationship/Rapport:   care explained   questions encouraged   reassurance provided   thoughts/feelings acknowledged  Taken 11/29/2023 0854 by Yen Tinoco LPN  Trust Relationship/Rapport:   care explained   thoughts/feelings acknowledged  Goal: Readiness for Transition of Care  Outcome: Ongoing, Progressing     Problem: Adjustment to Surgery (Knee Arthroplasty)  Goal: Optimal Coping  Outcome:  Ongoing, Progressing     Problem: Bleeding (Knee Arthroplasty)  Goal: Absence of Bleeding  Outcome: Ongoing, Progressing  Intervention: Monitor and Manage Bleeding  Recent Flowsheet Documentation  Taken 11/29/2023 0854 by Yen Tinoco LPN  Bleeding Management: dressing monitored     Problem: Bowel Motility Impaired (Knee Arthroplasty)  Goal: Effective Bowel Elimination  Outcome: Ongoing, Progressing     Problem: Fluid and Electrolyte Imbalance (Knee Arthroplasty)  Goal: Fluid and Electrolyte Balance  Outcome: Ongoing, Progressing     Problem: Functional Ability Impaired (Knee Arthroplasty)  Goal: Optimal Functional Ability  Outcome: Ongoing, Progressing  Intervention: Promote Optimal Functional Status  Recent Flowsheet Documentation  Taken 11/29/2023 1125 by Yen Tinoco LPN  Activity Management: patient refuses activity  Taken 11/29/2023 0854 by Yen Tinoco LPN  Activity Management: (unable to follow direction at this time) unable to complete activity (see comments)     Problem: Infection (Knee Arthroplasty)  Goal: Absence of Infection Signs and Symptoms  Outcome: Ongoing, Progressing  Intervention: Prevent or Manage Infection  Recent Flowsheet Documentation  Taken 11/29/2023 1125 by Yen Tinoco LPN  Infection Prevention:   hand hygiene promoted   personal protective equipment utilized   single patient room provided  Taken 11/29/2023 0854 by Yen Tinoco LPN  Infection Prevention:   hand hygiene promoted   personal protective equipment utilized   single patient room provided     Problem: Neurovascular Compromise (Knee Arthroplasty)  Goal: Intact Neurovascular Status  Outcome: Ongoing, Progressing     Problem: Ongoing Anesthesia Effects (Knee Arthroplasty)  Goal: Anesthesia/Sedation Recovery  Outcome: Ongoing, Progressing  Intervention: Optimize Anesthesia Recovery  Recent Flowsheet Documentation  Taken 11/29/2023 1415 by Yen Tinoco LPN  Safety Promotion/Fall Prevention:   activity supervised    assistive device/personal items within reach   clutter free environment maintained   nonskid shoes/slippers when out of bed   room organization consistent   safety round/check completed   fall prevention program maintained   gait belt  Taken 11/29/2023 1400 by Yen Tinoco LPN  Administration (IS): unable to perform  Taken 11/29/2023 1200 by Yen Tinoco LPN  Administration (IS): unable to perform  Taken 11/29/2023 1125 by Yen Tinoco LPN  Safety Promotion/Fall Prevention:   activity supervised   assistive device/personal items within reach   clutter free environment maintained   fall prevention program maintained   gait belt   nonskid shoes/slippers when out of bed   room organization consistent   safety round/check completed  Taken 11/29/2023 1040 by Yen Tinoco LPN  Safety Promotion/Fall Prevention:   activity supervised   assistive device/personal items within reach   clutter free environment maintained   nonskid shoes/slippers when out of bed   room organization consistent   safety round/check completed   fall prevention program maintained  Taken 11/29/2023 1000 by Yen Tinoco LPN  Administration (IS):   refused   unable to perform  Taken 11/29/2023 0854 by Yen Tinoco LPN  Safety Promotion/Fall Prevention:   activity supervised   assistive device/personal items within reach   clutter free environment maintained   nonskid shoes/slippers when out of bed   room organization consistent   safety round/check completed   fall prevention program maintained   gait belt  Administration (IS):   refused   unable to perform  Taken 11/29/2023 0800 by Yen Tinoco LPN  Administration (IS):   refused   unable to perform     Problem: Pain (Knee Arthroplasty)  Goal: Acceptable Pain Control  Outcome: Ongoing, Progressing  Intervention: Prevent or Manage Pain  Recent Flowsheet Documentation  Taken 11/29/2023 1125 by Yen Tinoco LPN  Diversional Activities: television     Problem: Postoperative Nausea  and Vomiting (Knee Arthroplasty)  Goal: Nausea and Vomiting Relief  Outcome: Ongoing, Progressing     Problem: Postoperative Urinary Retention (Knee Arthroplasty)  Goal: Effective Urinary Elimination  Outcome: Ongoing, Progressing     Problem: Respiratory Compromise (Knee Arthroplasty)  Goal: Effective Oxygenation and Ventilation  Outcome: Ongoing, Progressing  Intervention: Optimize Oxygenation and Ventilation  Recent Flowsheet Documentation  Taken 11/29/2023 1400 by Yen Tinoco LPN  Administration (IS): unable to perform  Taken 11/29/2023 1200 by Yen Tinoco LPN  Administration (IS): unable to perform  Taken 11/29/2023 1125 by Yen Tinoco LPN  Head of Bed (HOB) Positioning: HOB lowered  Taken 11/29/2023 1000 by Yen Tinoco LPN  Administration (IS):   refused   unable to perform  Taken 11/29/2023 0854 by Yen Tinoco LPN  Administration (IS):   refused   unable to perform  Taken 11/29/2023 0800 by Yen Tinoco LPN  Administration (IS):   refused   unable to perform     Problem: Fall Injury Risk  Goal: Absence of Fall and Fall-Related Injury  Outcome: Ongoing, Progressing  Intervention: Identify and Manage Contributors  Recent Flowsheet Documentation  Taken 11/29/2023 1415 by Yen Tinoco LPN  Medication Review/Management: medications reviewed  Taken 11/29/2023 1125 by Yen Tinoco LPN  Medication Review/Management: medications reviewed  Taken 11/29/2023 1040 by Yen Tinoco LPN  Medication Review/Management: medications reviewed  Taken 11/29/2023 0854 by Yen Tinoco LPN  Medication Review/Management: medications reviewed  Intervention: Promote Injury-Free Environment  Recent Flowsheet Documentation  Taken 11/29/2023 1415 by Yen Tinoco LPN  Safety Promotion/Fall Prevention:   activity supervised   assistive device/personal items within reach   clutter free environment maintained   nonskid shoes/slippers when out of bed   room organization consistent   safety round/check  completed   fall prevention program maintained   gait belt  Taken 11/29/2023 1125 by Yen Tinoco LPN  Safety Promotion/Fall Prevention:   activity supervised   assistive device/personal items within reach   clutter free environment maintained   fall prevention program maintained   gait belt   nonskid shoes/slippers when out of bed   room organization consistent   safety round/check completed  Taken 11/29/2023 1040 by Yen Tinoco LPN  Safety Promotion/Fall Prevention:   activity supervised   assistive device/personal items within reach   clutter free environment maintained   nonskid shoes/slippers when out of bed   room organization consistent   safety round/check completed   fall prevention program maintained  Taken 11/29/2023 0854 by Yen Tinoco LPN  Safety Promotion/Fall Prevention:   activity supervised   assistive device/personal items within reach   clutter free environment maintained   nonskid shoes/slippers when out of bed   room organization consistent   safety round/check completed   fall prevention program maintained   gait belt     Problem: Restraint, Nonviolent  Goal: Absence of Harm or Injury  Outcome: Ongoing, Progressing  Intervention: Implement Least Restrictive Safety Strategies  Recent Flowsheet Documentation  Taken 11/29/2023 1125 by Yen Tinoco LPN  Diversional Activities: television  Intervention: Protect Dignity, Rights, and Personal Wellbeing  Recent Flowsheet Documentation  Taken 11/29/2023 1125 by Yen Tinoco LPN  Trust Relationship/Rapport:   care explained   questions encouraged   reassurance provided   thoughts/feelings acknowledged  Taken 11/29/2023 0854 by Yen Tinoco LPN  Trust Relationship/Rapport:   care explained   thoughts/feelings acknowledged  Intervention: Protect Skin and Joint Integrity  Recent Flowsheet Documentation  Taken 11/29/2023 1125 by Yen Tinoco LPN  Body Position:   position changed independently   side-lying   right  Taken 11/29/2023  0854 by Yen Tinoco LPN  Body Position: position changed independently     Problem: Skin Injury Risk Increased  Goal: Skin Health and Integrity  Outcome: Ongoing, Progressing  Intervention: Optimize Skin Protection  Recent Flowsheet Documentation  Taken 11/29/2023 1125 by Yen Tinoco LPN  Head of Bed (HOB) Positioning: HOB lowered  Taken 11/29/2023 0854 by Yen Tinoco LPN  Pressure Reduction Devices: pressure-redistributing mattress utilized   Goal Outcome Evaluation:  Plan of Care Reviewed With: patient, spouse        Progress: no change  Outcome Evaluation: Patient still confused. only oriented to self. Patient still combative and yelling at staff. Sitter at bedside this shift. Patient recieving ativan IM to help control agitation.  visited this shift and sat with patient. Safety measures in place. Call light within reach. Patient unable to communication calmly with staff. Care of plan ongoing.

## 2023-11-29 NOTE — PLAN OF CARE
"Goal Outcome Evaluation:  Plan of Care Reviewed With: patient, spouse      Assessment: Sandra Treadwell presents with functional mobility impairments which indicate the need for skilled intervention. Pt was less agitated today and performed exercises well with verbal cues and assist. Pt unable to comprehend the isometric contraction for a QS. Pt not able to be convinced to let rolling walker support her in standing which hinders her success and independence with all weight bearing activities. Tolerating session today without incident. Will continue to follow and progress as tolerated.     Plan/Recommendations:   Moderate Intensity Therapy recommended post-acute care. This is recommended as therapy feels the patient would require 3-4 days per week and wouldn't tolerate \"3 hour daily\" rehab intensity. SNF would be the preferred choice. If the patient does not agree to SNF, arrange HH or OP depending on home bound status. If patient is medically complex, consider LTACH.. Pt requires no DME at discharge.     Pt desires Skilled Rehab placement at discharge. Pt cooperative; agreeable to therapeutic recommendations and plan of care.       Anticipated Discharge Disposition (PT): skilled nursing facility  "

## 2023-11-29 NOTE — PROGRESS NOTES
[facility] MEDICINE SERVICE  DAILY PROGRESS NOTE    NAME: Sandra Treadwell  : 1950  MRN: [id number]      LOS: 3 days     PROVIDER OF SERVICE: Jorje Sahni MD    Chief Complaint: Status post knee replacement    Subjective:     Interval History:  History taken from: patient  Patient Complaints:        73 y.o. Not  or  female who presents with End-stage arthritis of the right knee. They failed conservative treatment of their knee pain and a thorough discussion of the risks and benefits of surgery was had. The patient wishes to continue with elective total knee replacement, they were scheduled and are here for surgery. They did get medical clearance as well as a thorough preoperative workup.     Per the documentation by Dr Chen , dated 23 10AM, Patient with delirium that began last night.  Apparently she came to the unit and became acutely confused last night.  She then got up and had a fall directly onto her operative leg.  An x-ray was taken that was concerning for a possible fracture.  After review of the x-ray, I thought everything was okay but out of an abundance of caution ordered a CT scan just to be sure.  CT scan confirmed that everything is okay with the prosthetic and there is no concern for fracture.  She is okay to continue mobilization and physical therapy.  She was given some Ativan yesterday for agitation, but is quite lethargic this morning.  We are going to stop all Ativan at this point.  Plan is home discharge but only after her acute mental status change significantly improves and assuming she works well with physical therapy.  I have consulted the hospitalist service due to her acute mental status changes to make sure they have no further recommendations.       23 we were asked to see patient for medical management, patient is seen in bed NAD, no new complaints, is presently pleasantly confused, BP low at 80/40 fluid bolus ordered DW RN  2023  patient seen and examined in bed no acute distress, feeling better this morning.  Patient is awake alert oriented x 3.  Discussed with RN.  Echocardiogram being done now.Blood pressure 110/48.  11/27/2023 patient seen and examined in bed no acute distress, patient still confused.  Vital signs stable, troponin 118 from 70 yesterday, discussed with RN.  BNP 6219 will DC fluids.  11/28/23 patient seen and examined in bed no acute distress, family at bedside, discussed with RN.  Patient still confused.  Will obtain CT head.  Consulted neurology.  11/29/23 patient seen and examined in bed no acute distress, vital signs stable, patient still confused, sitter at bedside,    Review of SystemsReview of Systems   Constitutional:  Negative for chills and fever.   HENT:  Negative for ear pain and hearing loss.    Respiratory:  Negative for apnea and shortness of breath.    Cardiovascular:  Negative for chest pain and palpitations.   Gastrointestinal:  Negative for diarrhea, nausea and vomiting.   Genitourinary:  Negative for dysuria.   Musculoskeletal:  Positive for arthralgias and gait problem. Negative for back pain and joint swelling.   Neurological:  Negative for seizures, light-headedness and headaches.   Psychiatric/Behavioral:  Positive for confusion. Negative for hallucinati    Objective:     Vital Signs  Temp:  [98.5 °F (36.9 °C)-99.4 °F (37.4 °C)] 99.4 °F (37.4 °C)  Heart Rate:  [] 100  Resp:  [20] 20  BP: (130-174)/(49-82) 130/49   Body mass index is 29.88 kg/m².    Physical ExamConstitutional:       General: She is not in acute distress.     Appearance: She is well-developed. She is not ill-appearing, toxic-appearing or diaphoretic.   HENT:      Head: Normocephalic and atraumatic.      Nose: Nose normal. No congestion or rhinorrhea.      Mouth/Throat:      Mouth: Mucous membranes are moist.      Pharynx: No oropharyngeal exudate.   Eyes:      General: No scleral icterus.        Right eye: No discharge.          Left eye: No discharge.      Extraocular Movements: Extraocular movements intact.      Pupils: Pupils are equal, round, and reactive to light.   Neck:      Thyroid: No thyromegaly.      Vascular: No carotid bruit or JVD.      Trachea: No tracheal deviation.   Cardiovascular:      Rate and Rhythm: Normal rate and regular rhythm.      Pulses: Normal pulses.      Heart sounds: Normal heart sounds. No murmur heard.     No friction rub. No gallop.   Pulmonary:      Effort: Pulmonary effort is normal. No respiratory distress.      Breath sounds: Normal breath sounds. No stridor. No wheezing, rhonchi or rales.   Chest:      Chest wall: No tenderness.   Abdominal:      General: Bowel sounds are normal. There is no distension.      Palpations: Abdomen is soft. There is no mass.      Tenderness: There is no abdominal tenderness. There is no guarding or rebound.      Hernia: No hernia is present.   Musculoskeletal:         General: Tenderness present. No swelling, deformity or signs of injury. Normal range of motion.      Cervical back: Normal range of motion and neck supple. No rigidity. No muscular tenderness.      Right lower leg: No edema.      Left lower leg: No edema.   Lymphadenopathy:      Cervical: No cervical adenopathy.   Skin:     General: Skin is warm and dry.      Coloration: Skin is not jaundiced or pale.      Findings: No bruising, erythema or rash.   Neurological:      General: No focal deficit present.      Mental Status: She is alert.      Cranial Nerves: No cranial nerve deficit.      Sensory: No sensory deficit.      Motor: No weakness or abnormal muscle tone.      Coordination: Coordination normal.      Scheduled Meds   aspirin, 81 mg, Oral, Q12H  atorvastatin, 10 mg, Oral, Daily  cyanocobalamin, 1,000 mcg, Intramuscular, Q28 Days  ferric gluconate, 250 mg, Intravenous, Once  folic acid, 1 mg, Oral, Daily  furosemide, 20 mg, Intravenous, Once  furosemide, 20 mg, Oral, Daily  LORazepam, 1 mg, Oral,  Once  magnesium oxide, 400 mg, Oral, Daily  midodrine, 5 mg, Oral, TID AC  oxybutynin XL, 15 mg, Oral, Daily  sertraline, 50 mg, Oral, Nightly       PRN Meds     docusate sodium    HYDROcodone-acetaminophen    LORazepam    [DISCONTINUED] HYDROmorphone **AND** naloxone    ondansetron **OR** ondansetron    zolpidem   Infusions           Diagnostic Data    Results from last 7 days   Lab Units 11/28/23  1459 11/26/23  1847 11/26/23  1245   WBC 10*3/mm3 8.40   < >  --    HEMOGLOBIN g/dL 7.3*   < >  --    HEMATOCRIT % 23.3*   < >  --    PLATELETS 10*3/mm3 220   < >  --    GLUCOSE mg/dL 80   < > 93   CREATININE mg/dL 0.83   < > 1.19*   BUN mg/dL 13   < > 12   SODIUM mmol/L 138   < > 134*   POTASSIUM mmol/L 3.9   < > 4.0   AST (SGOT) U/L  --   --  29   ALT (SGPT) U/L  --   --  <5   ALK PHOS U/L  --   --  65   BILIRUBIN mg/dL  --   --  0.3   ANION GAP mmol/L 14.0   < > 7.0    < > = values in this interval not displayed.       CT Chest Without Contrast Diagnostic    Result Date: 11/28/2023  Impression: 1. Large hiatal hernia containing the entire stomach as well as abdominal fat and a loop of colon. No distended bowel loops are seen within the hernia to indicate obstruction. There is suggestion of a gastric volvulus. Adjacent to the hernia there is extensive partial right lower lobe atelectasis. There are also small layering bilateral pleural effusions. 2. Other chronic incidental ancillary findings are detailed above Electronically Signed: Sonu Lares DO  11/28/2023 3:25 PM EST  Workstation ID: [id number]    CT Head Without Contrast    Result Date: 11/28/2023  Mild age-related changes of the brain as above, otherwise without evidence of acute intracranial abnormality. Electronically Signed: Sarkis Chirinos MD  11/28/2023 2:32 PM EST  Workstation ID: [id number]       I reviewed the patient's new clinical results.    Assessment/Plan:     Active and Resolved Problems  Active Hospital Problems    Diagnosis  POA    **Status post  knee replacement [Z96.659]  Not Applicable    Unilateral primary osteoarthritis, right knee [M17.11]  Yes      Resolved Hospital Problems   No resolved problems to display.     Metabolic encephalopathy-was combative 11/24/23, had to be restrained, now off restrain, likely secondary to anesthesia.  -continue supportive care  -UA-neg  -TSH-3.4  -Sats 89 on room air-  - d dimer-0.4  CT head, Mild age-related changes of the brain as above, otherwise without evidence of acute intracranial abnormalit   -psych/neurology consult:Patient presents with signs and symptoms of delirium. Her QTC is prolonged at 545, so not appropriate for antipsychotics for management at this time      Hypotension-not on any BP meds/was hypotension in OR  -monitor CBC, BMP  -ortho discontinued Dilaudid and oral oxycodone   -fluid bolus given   -monitor BNP-1332>6219  -DC NS      Elevated trop/elevated BNP  -EKG  -serial [id number]  -cardiology consulted  -echo Normal LV size and lower limits of normal contractility EF of 50%   Aortic valve leaflets are mildly thickened, have adequate cusp separation  Mitral valve, tricuspid valve appears structurally normal, mild mitral and trace tricuspid regurgitation seen.  Calculated RV systolic pressure 50 mmHg consistent with moderate pulm hypertension sent to.  -start lasix 20mg qd     Osteoarthritis-  S/p knee replacement-s/p fall after  surgery  -ortho following  -pain management   -pt/ot  -rehab on dc     Severe anemia-iron deficiency B12 deficient folate deficient  -monitor CBC-hemoglobin 7.7  -iron-24 > IV iron ordered  - B12: 272 will give vitamin B12  -folate-4.5 will replace     JULIA-back to baseline  Monitor renal function  Avoid hypotension  avoid nephrtoxins  Hold NSAIDS     Hx Depression on zoloft     Hypomagnesemia-replace and monitor    Large hiatal hernia containing the entire stomach as well as abdominal fat and a loop of colon. No distended bowel loops are seen within the  hernia to indicate obstruction. There is suggestion of a gastric volvulus. Adjacent to the hernia there is   extensive partial right lower lobe atelectasis. There are also small layering bilateral pleural effusions.       DVT prophylaxis:  Mechanical DVT prophylaxis orders are present.     Code status is   Code Status and Medical Interventions:   Ordered at: 11/24/23 0732     Level Of Support Discussed With:    Patient     Code Status (Patient has no pulse and is not breathing):    CPR (Attempt to Resuscitate)     Medical Interventions (Patient has pulse or is breathing):    Full       Plan for disposition:nh in 2 days    Time: 30 minutes    Signature: Electronically signed by Jorje Sahni MD, 11/29/23, 13:17 EST.  [facility] Hospitalist Team

## 2023-11-29 NOTE — THERAPY TREATMENT NOTE
"Subjective: Pt agreeable to therapeutic plan of care. Pt oriented to person and year.    Objective:     Bed mobility - Min-A for supine<>sit and mod assist of 2 to scoot to head of bed  Transfers - Mod-A, Max-A, and Assist x 2 for stand at bedside with pt not using rolling walker despite verbal cueing; Pt was returned to supine and then pt started to come to edge of bed stating she had to use bathroom; PT assisted pt with transfer to and from [facility] with max assist of 1 and min assist from second person  Ambulation - unable to perform today    WB'ing status: R Lower Extremity Weight Bearing As Tolerated    ROM: -5 to ~ 80 degrees AAROM right knee    Therapeutic Exercise: 10 Reps R Lower Extremity AAROM Total Knee: Ankle Pumps, Quad Sets, Heel slides, Hip Abduction, LAQ    Precautions: High falls risk     Vitals: WNL    Pain: Pt  grimaced and rubbed right knee with standing  Intervention for pain: Repositioned and Increased Activity    Education: Provided education on the importance of mobility in the acute care setting, Verbal/Tactile Cues, and Transfer Training    Assessment: Sandra Treadwell presents with functional mobility impairments which indicate the need for skilled intervention. Pt was less agitated today and performed exercises well with verbal cues and assist. Pt unable to comprehend the isometric contraction for a QS. Pt not able to be convinced to let rolling walker support her in standing which hinders her success and independence with all weight bearing activities. Tolerating session today without incident. Will continue to follow and progress as tolerated.     Plan/Recommendations:   Moderate Intensity Therapy recommended post-acute care. This is recommended as therapy feels the patient would require 3-4 days per week and wouldn't tolerate \"3 hour daily\" rehab intensity. SNF would be the preferred choice. If the patient does not agree to SNF, arrange HH or OP depending on home bound status. If patient is " medically complex, consider LTACH.. Pt requires no DME at discharge.     Pt desires Skilled Rehab placement at discharge. Pt cooperative; agreeable to therapeutic recommendations and plan of care.         Basic Mobility 6-click:  Rollin = Total, A lot = 2, A little = 3; 4 = None  Supine>Sit:   1 = Total, A lot = 2, A little = 3; 4 = None   Sit>Stand with arms:  1 = Total, A lot = 2, A little = 3; 4 = None  Bed>Chair:   1 = Total, A lot = 2, A little = 3; 4 = None  Ambulate in room:  1 = Total, A lot = 2, A little = 3; 4 = None  3-5 Steps with railin = Total, A lot = 2, A little = 3; 4 = None  Score: 12    Modified [place]: N/A = No pre-op stroke/TIA    Post-Tx Position: Supine with HOB Elevated, Staff Present, Alarms activated, and Call light and personal items within reach  PPE: gloves

## 2023-11-29 NOTE — PROGRESS NOTES
Cardiology Progress Note    Patient Identification:  Name: Sandra Treadwell  Age: 73 y.o.  Sex: female  :  1950  MRN: [id number]                 Follow Up / Chief Complaint: Left Bundle branch block,     Interval History: Patient presented for elective right total knee replacement on 2023 patient's EKG showed LBBB with elevated troponin therefore cardiology was consulted.         NP NOTE:  Patient seen, not examined. Per nursing staff patient is still confused and combative.  Sitter at bedside.  Patient denies any chest pain.   No cardiac work up at this time.     Electronically signed by BERNA Kelly, 23, 4:05 PM EST.    Cardiology attending addendum :    I have personally performed a face-to-face diagnostic evaluation, physical exam and reviewed data on this patient.  I have reviewed documentation done by me and nurse practitioner  and corrected as needed.  And agree with the different components of documentation.Greater than 50% of the time spent in the care of this patient was provided by attending consultant/me.               Subjective patient seen and examined.  Labs reviewed.  Discussed with RN taking care of patient.  Patient continues to be agitated and has a sitter.  Discussed with sitter and RN.  Patient reportedly is not complaining of any chest pain.    Objective:     2023 and 2023: HS troponin is 23--> 31--> 49.  proBNP 1332.  CMP with a creatinine of 1.36 EGFR 45.  CBC with a hemoglobin of 8.  Chest x-ray shows no acute changes and hiatal hernia.  2023: HS troponin 70-- 118 pro bnp 6219  hgb 7.7   2023: hgb 7.3, urine negative   2023: potassium 3.9 hgb 8.9      History of present illness:    Ms. Sandra Treadwell has PMH of     Right knee trauma  Cholecystectomy, hysterectomy  Allergies/intolerance to codeine  Positive family history of heart disease in brother and father     Here for right total knee replacement surgery.  Patient underwent surgery  11/25/2023 had low blood pressure, abnormal EKG with left bundle branch block, therefore troponins were ordered which were abnormal.  Patient denies any chest pain or shortness of breath.     Data:  11/25/2023 and 11/26/2023: HS troponin is 23--> 31--> 49.  proBNP 1332.  CMP with a creatinine of 1.36 EGFR 45.  CBC with a hemoglobin of 8.  Chest x-ray shows no acute ST-T changes and hiatal hernia.  EKG done 11/25/2023 reviewed/interpreted by me reveals sinus rhythm with left bundle branch block at the rate of 93 bpm.  Left bundle is not new compared EKG from 11/15/2023.        Assessment:  :     Status post knee surgery  Low blood pressure  Elevated troponin  Elevated proBNP  Left bundle branch block  Anemia  CKD  Dyslipidemia           Recommendations / Plan:           Will check serial troponins.  Patient's troponin elevation is probably due to demand ischemia from anemia.  Will continue to monitor.  Statins for dyslipidemia  Telemetry is revealing sinus rhythm  Patient is currently confused, will defer evaluation and treatment of confusion to primary team.  Patient is not a candidate for any invasive cardiac workup at the current time.  CT head 11/28/2023 was unrevealing.  Will follow and consider further evaluation and treatment.  Discussed with patient's  by bedside.  Discussed with RN taking care of patient to coordinate care         Copied text in this portion of the note has been reviewed and is accurate as of 11/29/2023    Past Medical History:  Past Medical History:   Diagnosis Date    Bladder leak     Breast cancer     chemo and radiation    Depression     GERD (gastroesophageal reflux disease)     Hyperlipidemia     Osteoporosis      Past Surgical History:  Past Surgical History:   Procedure Laterality Date    BREAST SURGERY      lumpectomy    CHOLECYSTECTOMY      HYSTERECTOMY      TOTAL KNEE ARTHROPLASTY Right 11/24/2023    Procedure: TOTAL KNEE ARTHROPLASTY WITH CORI ROBOT;  Surgeon: April  "Ulises Power II, MD;  Location: [facility] MAIN OR;  Service: Robotics - Ortho;  Laterality: Right;        Social History:   Social History     Tobacco Use    Smoking status: Never     Passive exposure: Never    Smokeless tobacco: Never   Substance Use Topics    Alcohol use: Never      Family History:  History reviewed. No pertinent family history.       Allergies:  Allergies   Allergen Reactions    Codeine Nausea And Vomiting and Dizziness     Scheduled Meds:  aspirin, 81 mg, Q12H  atorvastatin, 10 mg, Daily  cyanocobalamin, 1,000 mcg, Q28 Days  ferric gluconate, 250 mg, Once  folic acid, 1 mg, Daily  furosemide, 20 mg, Once  furosemide, 20 mg, Daily  LORazepam, 1 mg, Once  magnesium oxide, 400 mg, Daily  midodrine, 5 mg, TID AC  oxybutynin XL, 15 mg, Daily  sertraline, 50 mg, Nightly          Review of Systems:   Review of Systems   Unable to perform ROS: Mental status change (confused)         Constitutional:  Temp:  [98.5 °F (36.9 °C)-99.4 °F (37.4 °C)] 99.4 °F (37.4 °C)  Heart Rate:  [] 100  Resp:  [20] 20  BP: (130-174)/(49-82) 130/49    Physical Exam   /49 (BP Location: Left leg, Patient Position: Lying)   Pulse 100   Temp 99.4 °F (37.4 °C) (Axillary) Comment (Src): pt refused to take orally  Resp 20   Ht 152.4 cm (60\")   Wt 69.4 kg (153 lb)   SpO2 93%   BMI 29.88 kg/m²   General:  Appears in no acute distress; confused intermittently  Eyes: Sclera is anicteric,  conjunctiva is clear   HEENT:  No JVD. Thyroid not visibly enlarged. No mucosal pallor or cyanosis  Respiratory: Respirations regular and unlabored at rest.  Clear to auscultation  Cardiovascular: S1,S2 Regular rate and rhythm. +2/6 murmur, no rub or gallop auscultated.  . No pretibial pitting edema  Gastrointestinal: Abdomen nondistended, soft  Musculoskeletal:  No abnormal movements  Extremities: No digital clubbing or cyanosis  Skin: Color pink. Skin warm and dry to touch. No rashes  No xanthoma  Neuro: Sedated, " sleeping    INTAKE AND OUTPUT:    Intake/Output Summary (Last 24 hours) at 11/29/2023 1716  Last data filed at 11/29/2023 1700  Gross per 24 hour   Intake 1020 ml   Output 200 ml   Net 820 ml       Cardiographics  Telemetry: non monitored.     ECG:   ECG 12 Lead Other; elevated troponin   Final Result   HEART RATE= 95  bpm   RR Interval= 628  ms   ME Interval= 163  ms   P Horizontal Axis= 9  deg   P Front Axis= 51  deg   QRSD Interval= 131  ms   QT Interval= 431  ms   QTcB= 544  ms   QRS Axis= 37  deg   T Wave Axis= 52  deg   - ABNORMAL ECG -   Sinus rhythm   Left bundle branch block   ST elevation secondary to IVCD   When compared with ECG of 26-Nov-2023 21:32:31,   No significant change   Electronically Signed By: Merary Jaffe ([facility]) 27-Nov-2023 20:51:26   Date and Time of Study: 2023-11-27 08:38:04      ECG 12 Lead Rhythm Change   Preliminary Result   HEART RATE= 98  bpm   RR Interval= 612  ms   ME Interval= 170  ms   P Horizontal Axis= -4  deg   P Front Axis= 51  deg   QRSD Interval= 120  ms   QT Interval= 377  ms   QTcB= 482  ms   QRS Axis= 3  deg   T Wave Axis= 82  deg   - ABNORMAL ECG -   Sinus rhythm   Left bundle branch block   LVH with secondary repolarization abnormality   Anterior infarct, old   When compared with ECG of 25-Nov-2023 19:45:40,   No significant change   Electronically Signed By:    Date and Time of Study: 2023-11-26 21:32:31      ECG 12 Lead Other; elevated troponin   Preliminary Result   HEART RATE= 93  bpm   RR Interval= 640  ms   ME Interval= 177  ms   P Horizontal Axis= 9  deg   P Front Axis= 50  deg   QRSD Interval= 120  ms   QT Interval= 379  ms   QTcB= 474  ms   QRS Axis= -3  deg   T Wave Axis= 138  deg   - ABNORMAL ECG -   Sinus rhythm   Ventricular premature complex   Left bundle branch block   Probable anteroseptal infarct, acute   When compared with ECG of 15-Nov-2023 11:02:55,   Significant rate increase   New or worsened ischemia or infarction   Electronically Signed By:     Date and Time of Study: 2023-11-25 19:45:40      SCANNED EKG   Final Result      SCANNED - TELEMETRY     Final Result      SCANNED - TELEMETRY     Final Result      SCANNED - TELEMETRY     Final Result      SCANNED - TELEMETRY     Final Result      SCANNED - TELEMETRY     Final Result      SCANNED - TELEMETRY     Final Result      SCANNED - TELEMETRY     Final Result        I have personally reviewed EKG    Echocardiogram: Results for orders placed during the hospital encounter of 11/24/23    Adult Transthoracic Echo Complete W/ Cont if Necessary Per Protocol    Interpretation Summary    Estimated right ventricular systolic pressure from tricuspid regurgitation is moderately elevated (45-55 mmHg).    Conclusion      Normal LV size and lower limits of normal contractility EF of 50%  Normal RV size  Normal atrial size  Pulmonic valve is not well visualized.  Aortic valve leaflets are mildly thickened, have adequate cusp separation  Mitral valve, tricuspid valve appears structurally normal, mild mitral and trace tricuspid regurgitation seen.  Calculated RV systolic pressure 50 mmHg consistent with moderate pulm hypertension sent to.  No pericardial effusion seen.  Proximal aorta appears normal in size.      Lab Review   I have reviewed the labs  Results from last 7 days   Lab Units 11/27/23  0642 11/26/23  2032 11/26/23  1847   HSTROP T ng/L 118* 70* 53*     Results from last 7 days   Lab Units 11/25/23  1504   MAGNESIUM mg/dL 1.5*     Results from last 7 days   Lab Units 11/29/23  1338   SODIUM mmol/L 139   POTASSIUM mmol/L 3.9   BUN mg/dL 13   CREATININE mg/dL 0.87   CALCIUM mg/dL 8.5*         Results from last 7 days   Lab Units 11/29/23  1338 11/28/23  1459 11/27/23  0642   WBC 10*3/mm3 6.60 8.40 8.10   HEMOGLOBIN g/dL 8.9* 7.3* 7.7*   HEMATOCRIT % 27.3* 23.3* 23.4*   PLATELETS 10*3/mm3 228 220 185           RADIOLOGY:  Imaging Results (Last 24 Hours)       ** No results found for the last 24 hours. **       "            )11/29/2023  MD ZIYAD Mehta/Transcription:   \"Dictated utilizing Dragon dictation\".   "

## 2023-11-29 NOTE — CASE MANAGEMENT/SOCIAL WORK
Continued Stay Note   [place]     Patient Name: Sandra Treadwell  MRN: [id number]  Today's Date: 11/29/2023    Admit Date: 11/24/2023    Plan: Referral to [facility] pending acceptance . No precert needed.PASRR approved.   Discharge Plan       Row Name 11/29/23 1530       Plan    Plan Referral to [facility] pending acceptance . No precert needed.PASRR approved.    Plan Comments DC barriers: pod #5 left TKR, patient is confused and anxious,  seems to be clearing up some and being a little less confused                  Expected Discharge Date and Time       Expected Discharge Date Expected Discharge Time    Nov 30, 2023               Sendy Pires RN

## 2023-11-30 LAB
ALBUMIN SERPL-MCNC: 2.7 G/DL (ref 3.5–5.2)
ALBUMIN/GLOB SERPL: 0.9 G/DL
ALP SERPL-CCNC: 59 U/L (ref 39–117)
ALT SERPL W P-5'-P-CCNC: 7 U/L (ref 1–33)
ANION GAP SERPL CALCULATED.3IONS-SCNC: 14 MMOL/L (ref 5–15)
AST SERPL-CCNC: 21 U/L (ref 1–32)
BACTERIA UR QL AUTO: ABNORMAL /HPF
BASOPHILS # BLD AUTO: 0 10*3/MM3 (ref 0–0.2)
BASOPHILS NFR BLD AUTO: 0.3 % (ref 0–1.5)
BH BB BLOOD EXPIRATION DATE: NORMAL
BH BB BLOOD TYPE BARCODE: 5100
BH BB DISPENSE STATUS: NORMAL
BH BB PRODUCT CODE: NORMAL
BH BB UNIT NUMBER: NORMAL
BILIRUB SERPL-MCNC: 0.6 MG/DL (ref 0–1.2)
BILIRUB UR QL STRIP: NEGATIVE
BUN SERPL-MCNC: 21 MG/DL (ref 8–23)
BUN/CREAT SERPL: 21.2 (ref 7–25)
CALCIUM SPEC-SCNC: 8.6 MG/DL (ref 8.6–10.5)
CHLORIDE SERPL-SCNC: 108 MMOL/L (ref 98–107)
CLARITY UR: ABNORMAL
CO2 SERPL-SCNC: 20 MMOL/L (ref 22–29)
COLOR UR: YELLOW
CREAT SERPL-MCNC: 0.99 MG/DL (ref 0.57–1)
CROSSMATCH INTERPRETATION: NORMAL
DEPRECATED RDW RBC AUTO: 49 FL (ref 37–54)
EGFRCR SERPLBLD CKD-EPI 2021: 61.1 ML/MIN/1.73
EOSINOPHIL # BLD AUTO: 0.1 10*3/MM3 (ref 0–0.4)
EOSINOPHIL NFR BLD AUTO: 1 % (ref 0.3–6.2)
ERYTHROCYTE [DISTWIDTH] IN BLOOD BY AUTOMATED COUNT: 15.6 % (ref 12.3–15.4)
GLOBULIN UR ELPH-MCNC: 3.1 GM/DL
GLUCOSE SERPL-MCNC: 95 MG/DL (ref 65–99)
GLUCOSE UR STRIP-MCNC: NEGATIVE MG/DL
GRAN CASTS URNS QL MICRO: ABNORMAL /LPF
HCT VFR BLD AUTO: 27.2 % (ref 34–46.6)
HGB BLD-MCNC: 8.9 G/DL (ref 12–15.9)
HGB UR QL STRIP.AUTO: ABNORMAL
HYALINE CASTS UR QL AUTO: ABNORMAL /LPF
KETONES UR QL STRIP: ABNORMAL
LEUKOCYTE ESTERASE UR QL STRIP.AUTO: ABNORMAL
LYMPHOCYTES # BLD AUTO: 1 10*3/MM3 (ref 0.7–3.1)
LYMPHOCYTES NFR BLD AUTO: 12.9 % (ref 19.6–45.3)
MCH RBC QN AUTO: 27.9 PG (ref 26.6–33)
MCHC RBC AUTO-ENTMCNC: 32.8 G/DL (ref 31.5–35.7)
MCV RBC AUTO: 85 FL (ref 79–97)
MONOCYTES # BLD AUTO: 1 10*3/MM3 (ref 0.1–0.9)
MONOCYTES NFR BLD AUTO: 13.7 % (ref 5–12)
NEUTROPHILS NFR BLD AUTO: 5.5 10*3/MM3 (ref 1.7–7)
NEUTROPHILS NFR BLD AUTO: 72.1 % (ref 42.7–76)
NITRITE UR QL STRIP: NEGATIVE
NT-PROBNP SERPL-MCNC: ABNORMAL PG/ML (ref 0–900)
PH UR STRIP.AUTO: 8.5 [PH] (ref 5–8)
PLATELET # BLD AUTO: 240 10*3/MM3 (ref 140–450)
PMV BLD AUTO: 8.1 FL (ref 6–12)
POTASSIUM SERPL-SCNC: 3.9 MMOL/L (ref 3.5–5.2)
PROT SERPL-MCNC: 5.8 G/DL (ref 6–8.5)
PROT UR QL STRIP: ABNORMAL
RBC # BLD AUTO: 3.2 10*6/MM3 (ref 3.77–5.28)
RBC # UR STRIP: ABNORMAL /HPF
REF LAB TEST METHOD: ABNORMAL
SODIUM SERPL-SCNC: 142 MMOL/L (ref 136–145)
SP GR UR STRIP: 1.01 (ref 1–1.03)
SQUAMOUS #/AREA URNS HPF: ABNORMAL /HPF
TRI-PHOS CRY URNS QL MICRO: ABNORMAL /HPF
UNIT  ABO: NORMAL
UNIT  RH: NORMAL
UROBILINOGEN UR QL STRIP: ABNORMAL
WBC # UR STRIP: ABNORMAL /HPF
WBC NRBC COR # BLD AUTO: 7.6 10*3/MM3 (ref 3.4–10.8)

## 2023-11-30 PROCEDURE — 85025 COMPLETE CBC W/AUTO DIFF WBC: CPT | Performed by: INTERNAL MEDICINE

## 2023-11-30 PROCEDURE — 25010000002 FUROSEMIDE PER 20 MG: Performed by: INTERNAL MEDICINE

## 2023-11-30 PROCEDURE — 97116 GAIT TRAINING THERAPY: CPT

## 2023-11-30 PROCEDURE — 83880 ASSAY OF NATRIURETIC PEPTIDE: CPT | Performed by: INTERNAL MEDICINE

## 2023-11-30 PROCEDURE — 97110 THERAPEUTIC EXERCISES: CPT

## 2023-11-30 PROCEDURE — 80053 COMPREHEN METABOLIC PANEL: CPT | Performed by: INTERNAL MEDICINE

## 2023-11-30 PROCEDURE — 25010000002 LORAZEPAM PER 2 MG

## 2023-11-30 PROCEDURE — 93005 ELECTROCARDIOGRAM TRACING: CPT

## 2023-11-30 PROCEDURE — 25810000003 DEXTROSE-NACL PER 500 ML: Performed by: INTERNAL MEDICINE

## 2023-11-30 PROCEDURE — 87077 CULTURE AEROBIC IDENTIFY: CPT | Performed by: NURSE PRACTITIONER

## 2023-11-30 PROCEDURE — 87186 SC STD MICRODIL/AGAR DIL: CPT | Performed by: NURSE PRACTITIONER

## 2023-11-30 PROCEDURE — 25010000002 LORAZEPAM PER 2 MG: Performed by: INTERNAL MEDICINE

## 2023-11-30 PROCEDURE — 87086 URINE CULTURE/COLONY COUNT: CPT | Performed by: NURSE PRACTITIONER

## 2023-11-30 PROCEDURE — 81001 URINALYSIS AUTO W/SCOPE: CPT | Performed by: NURSE PRACTITIONER

## 2023-11-30 PROCEDURE — 99232 SBSQ HOSP IP/OBS MODERATE 35: CPT

## 2023-11-30 PROCEDURE — 97112 NEUROMUSCULAR REEDUCATION: CPT

## 2023-11-30 PROCEDURE — 87040 BLOOD CULTURE FOR BACTERIA: CPT | Performed by: NURSE PRACTITIONER

## 2023-11-30 PROCEDURE — 97535 SELF CARE MNGMENT TRAINING: CPT

## 2023-11-30 PROCEDURE — 93010 ELECTROCARDIOGRAM REPORT: CPT | Performed by: INTERNAL MEDICINE

## 2023-11-30 PROCEDURE — 99232 SBSQ HOSP IP/OBS MODERATE 35: CPT | Performed by: INTERNAL MEDICINE

## 2023-11-30 PROCEDURE — 25010000002 CEFTRIAXONE PER 250 MG: Performed by: NURSE PRACTITIONER

## 2023-11-30 RX ORDER — DEXTROSE AND SODIUM CHLORIDE 5; .9 G/100ML; G/100ML
50 INJECTION, SOLUTION INTRAVENOUS CONTINUOUS
Status: DISCONTINUED | OUTPATIENT
Start: 2023-11-30 | End: 2023-12-01

## 2023-11-30 RX ORDER — FUROSEMIDE 10 MG/ML
20 INJECTION INTRAMUSCULAR; INTRAVENOUS DAILY
Status: DISCONTINUED | OUTPATIENT
Start: 2023-11-30 | End: 2023-12-01

## 2023-11-30 RX ORDER — LORAZEPAM 2 MG/ML
0.5 INJECTION INTRAMUSCULAR EVERY 4 HOURS PRN
Status: DISCONTINUED | OUTPATIENT
Start: 2023-11-30 | End: 2023-12-03

## 2023-11-30 RX ORDER — PANTOPRAZOLE SODIUM 40 MG/10ML
40 INJECTION, POWDER, LYOPHILIZED, FOR SOLUTION INTRAVENOUS
Status: DISCONTINUED | OUTPATIENT
Start: 2023-11-30 | End: 2023-12-01

## 2023-11-30 RX ORDER — LORAZEPAM 2 MG/ML
1 INJECTION INTRAMUSCULAR EVERY 4 HOURS PRN
Status: DISCONTINUED | OUTPATIENT
Start: 2023-11-30 | End: 2023-11-30

## 2023-11-30 RX ADMIN — PANTOPRAZOLE SODIUM 40 MG: 40 INJECTION, POWDER, LYOPHILIZED, FOR SOLUTION INTRAVENOUS at 12:52

## 2023-11-30 RX ADMIN — LORAZEPAM 0.5 MG: 2 INJECTION INTRAMUSCULAR; INTRAVENOUS at 23:02

## 2023-11-30 RX ADMIN — DEXTROSE AND SODIUM CHLORIDE 75 ML/HR: 5; .9 INJECTION, SOLUTION INTRAVENOUS at 10:34

## 2023-11-30 RX ADMIN — LORAZEPAM 1 MG: 2 INJECTION INTRAMUSCULAR; INTRAVENOUS at 04:17

## 2023-11-30 RX ADMIN — CEFTRIAXONE 2000 MG: 2 INJECTION, POWDER, FOR SOLUTION INTRAMUSCULAR; INTRAVENOUS at 23:28

## 2023-11-30 RX ADMIN — FUROSEMIDE 20 MG: 10 INJECTION, SOLUTION INTRAMUSCULAR; INTRAVENOUS at 12:52

## 2023-11-30 NOTE — PROGRESS NOTES
[facility] MEDICINE SERVICE  DAILY PROGRESS NOTE    NAME: Sandra Treadwell  : 1950  MRN: [id number]      LOS: 4 days     PROVIDER OF SERVICE: Jorje Sahni MD    Chief Complaint: Status post knee replacement    Subjective:     Interval History:  History taken from: patient  Patient Complaints:        73 y.o. Not  or  female who presents with End-stage arthritis of the right knee. They failed conservative treatment of their knee pain and a thorough discussion of the risks and benefits of surgery was had. The patient wishes to continue with elective total knee replacement, they were scheduled and are here for surgery. They did get medical clearance as well as a thorough preoperative workup.     Per the documentation by Dr Chen , dated 23 10AM, Patient with delirium that began last night.  Apparently she came to the unit and became acutely confused last night.  She then got up and had a fall directly onto her operative leg.  An x-ray was taken that was concerning for a possible fracture.  After review of the x-ray, I thought everything was okay but out of an abundance of caution ordered a CT scan just to be sure.  CT scan confirmed that everything is okay with the prosthetic and there is no concern for fracture.  She is okay to continue mobilization and physical therapy.  She was given some Ativan yesterday for agitation, but is quite lethargic this morning.  We are going to stop all Ativan at this point.  Plan is home discharge but only after her acute mental status change significantly improves and assuming she works well with physical therapy.  I have consulted the hospitalist service due to her acute mental status changes to make sure they have no further recommendations.       23 we were asked to see patient for medical management, patient is seen in bed NAD, no new complaints, is presently pleasantly confused, BP low at 80/40 fluid bolus ordered DW RN  2023  patient seen and examined in bed no acute distress, feeling better this morning.  Patient is awake alert oriented x 3.  Discussed with RN.  Echocardiogram being done now.Blood pressure 110/48.  11/27/2023 patient seen and examined in bed no acute distress, patient still confused.  Vital signs stable, troponin 118 from 70 yesterday, discussed with RN.  BNP 6219 will DC fluids.  11/28/23 patient seen and examined in bed no acute distress, family at bedside, discussed with RN.  Patient still confused.  Will obtain CT head.  Consulted neurology.  11/29/23 patient seen and examined in bed no acute distress, vital signs stable, patient still confused, sitter at bedside,  11/30/23 patient seen and examined in bed no acute distress, vital signs stable, patient still confused, discussed with RN.  Sitter at bedside.    Review of SystemsReview of Systems   Constitutional:  Negative for chills and fever.   HENT:  Negative for ear pain and hearing loss.    Respiratory:  Negative for apnea and shortness of breath.    Cardiovascular:  Negative for chest pain and palpitations.   Gastrointestinal:  Negative for diarrhea, nausea and vomiting.   Genitourinary:  Negative for dysuria.   Musculoskeletal:  Positive for arthralgias and gait problem. Negative for back pain and joint swelling.   Neurological:  Negative for seizures, light-headedness and headaches.   Psychiatric/Behavioral:  Positive for confusion. Negative for hallucinati    Objective:     Vital Signs  BP: (130-154)/(49-74) 154/74   Body mass index is 29.88 kg/m².    Physical ExamConstitutional:       General: She is not in acute distress.     Appearance: She is well-developed. She is not ill-appearing, toxic-appearing or diaphoretic.   HENT:      Head: Normocephalic and atraumatic.      Nose: Nose normal. No congestion or rhinorrhea.      Mouth/Throat:      Mouth: Mucous membranes are moist.      Pharynx: No oropharyngeal exudate.   Eyes:      General: No scleral icterus.         Right eye: No discharge.         Left eye: No discharge.      Extraocular Movements: Extraocular movements intact.      Pupils: Pupils are equal, round, and reactive to light.   Neck:      Thyroid: No thyromegaly.      Vascular: No carotid bruit or JVD.      Trachea: No tracheal deviation.   Cardiovascular:      Rate and Rhythm: Normal rate and regular rhythm.      Pulses: Normal pulses.      Heart sounds: Normal heart sounds. No murmur heard.     No friction rub. No gallop.   Pulmonary:      Effort: Pulmonary effort is normal. No respiratory distress.      Breath sounds: Normal breath sounds. No stridor. No wheezing, rhonchi or rales.   Chest:      Chest wall: No tenderness.   Abdominal:      General: Bowel sounds are normal. There is no distension.      Palpations: Abdomen is soft. There is no mass.      Tenderness: There is no abdominal tenderness. There is no guarding or rebound.      Hernia: No hernia is present.   Musculoskeletal:         General: Tenderness present. No swelling, deformity or signs of injury. Normal range of motion.      Cervical back: Normal range of motion and neck supple. No rigidity. No muscular tenderness.      Right lower leg: No edema.      Left lower leg: No edema.   Lymphadenopathy:      Cervical: No cervical adenopathy.   Skin:     General: Skin is warm and dry.      Coloration: Skin is not jaundiced or pale.      Findings: No bruising, erythema or rash.   Neurological:      General: No focal deficit present.      Mental Status: She is alert.      Cranial Nerves: No cranial nerve deficit.      Sensory: No sensory deficit.      Motor: No weakness or abnormal muscle tone.      Coordination: Coordination normal.      Scheduled Meds   aspirin, 81 mg, Oral, Q12H  atorvastatin, 10 mg, Oral, Daily  cyanocobalamin, 1,000 mcg, Intramuscular, Q28 Days  ferric gluconate, 250 mg, Intravenous, Once  folic acid, 1 mg, Oral, Daily  furosemide, 20 mg, Intravenous, Once  furosemide, 20 mg, Oral,  Daily  LORazepam, 1 mg, Oral, Once  magnesium oxide, 400 mg, Oral, Daily  midodrine, 5 mg, Oral, TID AC  oxybutynin XL, 15 mg, Oral, Daily  sertraline, 50 mg, Oral, Nightly       PRN Meds     docusate sodium    HYDROcodone-acetaminophen    LORazepam    [DISCONTINUED] HYDROmorphone **AND** naloxone    ondansetron **OR** ondansetron    zolpidem   Infusions           Diagnostic Data    Results from last 7 days   Lab Units 11/29/23  1338   WBC 10*3/mm3 6.60   HEMOGLOBIN g/dL 8.9*   HEMATOCRIT % 27.3*   PLATELETS 10*3/mm3 228   GLUCOSE mg/dL 91   CREATININE mg/dL 0.87   BUN mg/dL 13   SODIUM mmol/L 139   POTASSIUM mmol/L 3.9   AST (SGOT) U/L 27   ALT (SGPT) U/L 6   ALK PHOS U/L 56   BILIRUBIN mg/dL 0.7   ANION GAP mmol/L 11.0       CT Chest Without Contrast Diagnostic    Result Date: 11/28/2023  Impression: 1. Large hiatal hernia containing the entire stomach as well as abdominal fat and a loop of colon. No distended bowel loops are seen within the hernia to indicate obstruction. There is suggestion of a gastric volvulus. Adjacent to the hernia there is extensive partial right lower lobe atelectasis. There are also small layering bilateral pleural effusions. 2. Other chronic incidental ancillary findings are detailed above Electronically Signed: Sonu Lares DO  11/28/2023 3:25 PM EST  Workstation ID: [id number]    CT Head Without Contrast    Result Date: 11/28/2023  Mild age-related changes of the brain as above, otherwise without evidence of acute intracranial abnormality. Electronically Signed: Sarkis Chirinos MD  11/28/2023 2:32 PM EST  Workstation ID: [id number]       I reviewed the patient's new clinical results.    Assessment/Plan:     Active and Resolved Problems  Active Hospital Problems    Diagnosis  POA    **Status post knee replacement [Z96.659]  Not Applicable    Unilateral primary osteoarthritis, right knee [M17.11]  Yes      Resolved Hospital Problems   No resolved problems to display.     Metabolic  encephalopathy-was combative 11/24/23, had to be restrained, now off restrain, likely secondary to anesthesia.  -continue supportive care  -UA-neg  -TSH-3.4  -Sats 89 on room air-  - d dimer-0.4  CT head, Mild age-related changes of the brain as above, otherwise without evidence of acute intracranial abnormalit   -psych/neurology consult:Patient presents with signs and symptoms of delirium. Her QTC is prolonged at 545, so not appropriate for antipsychotics for management at this time      Hypotension-not on any BP meds/was hypotension in OR  -monitor CBC, BMP  -ortho discontinued Dilaudid and oral oxycodone   -fluid bolus given   -monitor BNP-1332>6219  -DC NS      Elevated trop/elevated BNP  -EKG  -serial [id number]-  -cardiology consulted  -echo Normal LV size and lower limits of normal contractility EF of 50%   Aortic valve leaflets are mildly thickened, have adequate cusp separation  Mitral valve, tricuspid valve appears structurally normal, mild mitral and trace tricuspid regurgitation seen.  Calculated RV systolic pressure 50 mmHg consistent with moderate pulm hypertension sent to.  -start lasix 20mg qd     Osteoarthritis-  S/p knee replacement-s/p fall after  surgery  -ortho following  -pain management   -pt/ot  -rehab on dc     Severe anemia-iron deficiency B12 deficient folate deficient  -monitor CBC-hemoglobin 7.7  -iron-24 > IV iron ordered  - B12: 272 will give vitamin B12  -folate-4.5 will replace     JULIA-back to baseline  Monitor renal function  Avoid hypotension  avoid nephrtoxins  Hold NSAIDS     Hx Depression on zoloft     Hypomagnesemia-replace and monitor    Large hiatal hernia containing the entire stomach as well as abdominal fat and a loop of colon. No distended bowel loops are seen within the hernia to indicate obstruction. There is suggestion of a gastric volvulus. Adjacent to the hernia there is   extensive partial right lower lobe atelectasis. There are also small layering  bilateral pleural effusions.       DVT prophylaxis:  Mechanical DVT prophylaxis orders are present.     Code status is   Code Status and Medical Interventions:   Ordered at: 11/24/23 0732     Level Of Support Discussed With:    Patient     Code Status (Patient has no pulse and is not breathing):    CPR (Attempt to Resuscitate)     Medical Interventions (Patient has pulse or is breathing):    Full       Plan for disposition:nh in 2 days    Time: 30 minutes    Signature: Electronically signed by Jorje Sahni MD, 11/30/23, 09:19 EST.  [facility] Hospitalist Team

## 2023-11-30 NOTE — CASE MANAGEMENT/SOCIAL WORK
Continued Stay Note   [place]     Patient Name: Sandra Treadwell  MRN: [id number]  Today's Date: 11/30/2023    Admit Date: 11/24/2023    Plan: Referral to [facility] pending acceptance . No precert needed.PASRR approved. Sitter at bedside.  Must be sitter free to go t snf   Discharge Plan       Row Name 11/30/23 1329       Plan    Plan Referral to [facility] pending acceptance . No precert needed.PASRR approved. Sitter at bedside.  Must be sitter free to go t snf    Plan Comments DC barriers: pod #6 left TKR, patient is confused and anxious, seems to be clearing up some and being a little less confused               Sendy Pires RN

## 2023-11-30 NOTE — PLAN OF CARE
"Goal Outcome Evaluation:                   Assessment: Sandra Treadwell presents with ADL impairments affecting function including balance, cognition, endurance / activity tolerance, pain, range of motion (ROM), and strength. Pt continues to have severe confusion, limiting ability to participate, though tolerated transfer to chair well this date.  Demonstrated functioning below baseline abilities indicate the need for continued skilled intervention while inpatient. Tolerating session today without incident. Will continue to follow and progress as tolerated.     Plan/Recommendations:   Moderate Intensity Therapy recommended post-acute care. This is recommended as therapy feels the patient would require 3-4 days per week and wouldn't tolerate \"3 hour daily\" rehab intensity. SNF would be the preferred choice. If the patient does not agree to SNF, arrange HH or OP depending on home bound status. If patient is medically complex, consider LTACH.. Pt requires no DME at discharge.      "

## 2023-11-30 NOTE — THERAPY TREATMENT NOTE
"Subjective: Pt agreeable to therapeutic plan of care.  Cognition: oriented to Person    Objective:     Bed Mobility: Max-A and Assist x 2   Functional Transfers: Max-A and Assist x 2     Balance: supported and standing Max-A  Functional Ambulation: N/A or Not attempted.    Toileting: Dependent  ADL Position: supported sitting and supported standing  ADL Comments: Assist for maura care and brief management     Feeding: CGA  ADL Position: supported sitting  ADL Comments: Bringing drink to mouth     Vitals: WNL    Pain: 5 FACES  Location: Generalized  Interventions for pain: Increased Activity  Education: Provided education on the importance of mobility in the acute care setting      Assessment: Sandra Treadwell presents with ADL impairments affecting function including balance, cognition, endurance / activity tolerance, pain, range of motion (ROM), and strength. Pt continues to have severe confusion, limiting ability to participate, though tolerated transfer to chair well this date.  Demonstrated functioning below baseline abilities indicate the need for continued skilled intervention while inpatient. Tolerating session today without incident. Will continue to follow and progress as tolerated.     Plan/Recommendations:   Moderate Intensity Therapy recommended post-acute care. This is recommended as therapy feels the patient would require 3-4 days per week and wouldn't tolerate \"3 hour daily\" rehab intensity. SNF would be the preferred choice. If the patient does not agree to SNF, arrange HH or OP depending on home bound status. If patient is medically complex, consider LTACH.. Pt requires no DME at discharge.     Pt desires Skilled Rehab placement at discharge. Pt cooperative; agreeable to therapeutic recommendations and plan of care.     Modified [place]: 5 = Severe disability (Requires constant nursing care and attention, bedridden, incontinent)    Post-Tx Position: Up in Chair, Alarms activated, and Call light and " personal items within reach  PPE: gloves

## 2023-11-30 NOTE — PROGRESS NOTES
"  Chief complaint confusion     Subjective .     History of present illness:  The patient is a 73 y.o. female who was admitted secondary to arthritis of the right knee and a right knee replacement.      PMH: Breast cancer, depression. GERD, hyperlipidemia.      Psych was consulted due to confusion and agitation.      The patient was seen this AM. She was sleeping and her  in the room asked me not to wake her. She was also accompanied by a patient .     The patient's  state the patient is generally alert and oriented and independent in her ADLs. She does not have any prior history of dementia or memory problems. He states she has been \"belligerent\" and combative with staff. He states this happened last night.      Per chart review of nursing notes, patient has been confused and only oriented to self, combative and telling at staff.     11/30/23: Patient remains confused. She received PRN lorazepam at 417 this morning. Nursing reports she has been primarily sleeping since then. I attempted to see the patient, and she would respond to stimulation, but was irritable, and answered \"No\" to everything I asked her.       Review of Systems   Review of systems could not be obtained due to   patient confusion.    The following portions of the patient's history were reviewed and updated as appropriate: allergies, current medications, past family history, past medical history, past social history, past surgical history and problem list.    History       Medications Prior to Admission   Medication Sig Dispense Refill Last Dose    atorvastatin (LIPITOR) 10 MG tablet Take 1 tablet by mouth Daily.   11/24/2023    cetirizine (zyrTEC) 10 MG tablet Take 1 tablet by mouth Daily.   11/23/2023    fluticasone (FLONASE) 50 MCG/ACT nasal spray 2 sprays by Each Nare route Daily.   11/23/2023    omeprazole (priLOSEC) 40 MG capsule Take 1 capsule by mouth Daily.   11/24/2023    oxybutynin XL (DITROPAN XL) 15 MG 24 " "hr tablet Take 1 tablet by mouth Daily.   11/24/2023    OYSCO 500 + D 500-5 MG-MCG tablet per tablet Take 1 tablet by mouth 2 (Two) Times a Day.   11/23/2023    sertraline (ZOLOFT) 50 MG tablet Take 1 tablet by mouth Every Night.   11/23/2023    zolpidem (AMBIEN) 10 MG tablet Take 1 tablet by mouth At Night As Needed.   11/23/2023    alendronate (FOSAMAX) 70 MG tablet Take 1 tablet by mouth 1 (One) Time Per Week. Sun   11/19/2023        Scheduled Meds:  aspirin, 81 mg, Oral, Q12H  [Held by provider] atorvastatin, 10 mg, Oral, Daily  cyanocobalamin, 1,000 mcg, Intramuscular, Q28 Days  ferric gluconate, 250 mg, Intravenous, Once  folic acid, 1 mg, Oral, Daily  furosemide, 20 mg, Intravenous, Daily  LORazepam, 1 mg, Oral, Once  midodrine, 5 mg, Oral, TID AC  oxybutynin XL, 15 mg, Oral, Daily  pantoprazole, 40 mg, Intravenous, Q AM  sertraline, 50 mg, Oral, Nightly         Continuous Infusions:  dextrose 5 % and sodium chloride 0.9 %, 50 mL/hr, Last Rate: 50 mL/hr (11/30/23 1130)        PRN Meds:    docusate sodium    HYDROcodone-acetaminophen    LORazepam    [DISCONTINUED] HYDROmorphone **AND** naloxone    ondansetron **OR** ondansetron    zolpidem      Allergies:  Codeine      Objective     Vital Signs   /74 (BP Location: Left leg, Patient Position: Lying)   Pulse 100   Temp 99.4 °F (37.4 °C) (Axillary) Comment (Src): pt refused to take orally  Resp 20   Ht 152.4 cm (60\")   Wt 69.4 kg (153 lb)   SpO2 93%   BMI 29.88 kg/m²     Physical Exam:    Musculoskeletal:   Muscle strength and tone: TEMITOPE  Abnormal Movements: None noted   Gait: TEMITOPE                General Appearance:    In bed, in NAD.     Mental Status Exam:   Hygiene:   good  Cooperation:   TEMITOPE  Eye Contact:  Closed  Psychomotor Behavior:   Calm at time of assessment, but patient sleeping  Affect:   TEMITOPE  Mood:  TEMITOPE  Hopelessness: Denies  Speech:   TEMITOPE  Thought Process:   TEMITOPE  Thought Content:   TEMITOPE  Suicidal:   TEMITOPE  Homicidal:   TEMITOPE  Hallucinations:  " Not demonstrated today  Delusion:  None  Memory:  Deficits  Orientation:  Person  Reliability:  poor  Insight:  Poor  Judgement:  Poor  Impulse Control:  Impaired  Physical/Medical Issues:  Yes            Medications and allergies reviewed.       Result Review:  I have personally reviewed the results from the time of this admission to 11/30/2023 11:57 EST and agree with these findings:  [x]  Laboratory  []  Microbiology  []  Radiology  [x]  EKG/Telemetry   []  Cardiology/Vascular   []  Pathology  []  Old records  []  Other:  Most notable findings include:  on 11/27/23    Assessment & Plan       Status post knee replacement    Unilateral primary osteoarthritis, right knee    Assessment: delirium   Treatment Plan: Patient presents with signs and symptoms of delirium. Her QTC was prolonged at 545 on 11/27/23 so she was not a candidate for antipsychotic management, and has been receiving PRN lorazepam which is primarily keeping her sedated.     Will order repeat EKG to assess QT measurement. Decrease lorazepam dose from 1mg to 0.5mg to see if that helps agitation but doesn't keep the patient as sedated.     Continue supportive treatment.     Will continue to follow.     Treatment Plan discussed with: Patient and nursing     I discussed the patients findings and my recommendations with patient and nursing staff    I have reviewed and approved the behavioral health treatment plans and problem list. Yes     Referring MD has access to consult report and progress notes in EMR     BERNA Arredondo  11/30/23  11:57 EST

## 2023-11-30 NOTE — PLAN OF CARE
Goal Outcome Evaluation:      Patient resting most of the day, confused but less agitated. Patient was able to sit in chair this afternoon. Sitting remains at bedside. Care plan ongoing

## 2023-11-30 NOTE — PROGRESS NOTES
Cardiology Progress Note    Patient Identification:  Name: Sandra Treadwell  Age: 73 y.o.  Sex: female  :  1950  MRN: [id number]                 Follow Up / Chief Complaint: Left Bundle branch block,     Interval History: Patient presented for elective right total knee replacement on 2023 patient's EKG showed LBBB with elevated troponin therefore cardiology was consulted.         NP NOTE:  Patient seen, not examined. Per nursing staff patient is still confused and combative.  Sitter at bedside.  Patient denies any chest pain.   No cardiac work up at this time.     Electronically signed by BERNA Kelly, 23, 4:05 PM EST.    Cardiology attending addendum :    I have personally performed a face-to-face diagnostic evaluation, physical exam and reviewed data on this patient.  I have reviewed documentation done by me and nurse practitioner  and corrected as needed.  And agree with the different components of documentation.Greater than 50% of the time spent in the care of this patient was provided by attending consultant/me.      Subjective patient seen and examined.  Labs reviewed.  Discussed with RN taking care of patient.  Patient continues to be confused and with a sitter.  Has developed fever blisters on her lip.    Objective:     2023 and 2023: HS troponin is 23--> 31--> 49.  proBNP 1332.  CMP with a creatinine of 1.36 EGFR 45.  CBC with a hemoglobin of 8.  Chest x-ray shows no acute changes and hiatal hernia.  2023: HS troponin 70-- 118 pro bnp 6219  hgb 7.7   2023: hgb 7.3, urine negative   2023: potassium 3.9 hgb 8.9      History of present illness:    Ms. Sandra Treadwell has PMH of     Right knee trauma  Cholecystectomy, hysterectomy  Allergies/intolerance to codeine  Positive family history of heart disease in brother and father     Here for right total knee replacement surgery.  Patient underwent surgery 2023 had low blood pressure, abnormal EKG with left  bundle branch block, therefore troponins were ordered which were abnormal.  Patient denies any chest pain or shortness of breath.     Data:  11/25/2023 and 11/26/2023: HS troponin is 23--> 31--> 49.  proBNP 1332.  CMP with a creatinine of 1.36 EGFR 45.  CBC with a hemoglobin of 8.  Chest x-ray shows no acute ST-T changes and hiatal hernia.  EKG done 11/25/2023 reviewed/interpreted by me reveals sinus rhythm with left bundle branch block at the rate of 93 bpm.  Left bundle is not new compared EKG from 11/15/2023.        Assessment:  :     Status post knee surgery  Low blood pressure  Elevated troponin  Elevated proBNP  Left bundle branch block  Anemia  CKD  Dyslipidemia           Recommendations / Plan:          Patient's troponin elevation is probably due to demand ischemia from anemia.  Will continue to monitor.  Patient has no symptoms of chest pain.  Statins for dyslipidemia  Telemetry is revealing sinus rhythm  Patient is currently confused, will defer evaluation and treatment of confusion to primary team.  Patient is not a candidate for any invasive cardiac workup at the current time.  CT head 11/28/2023 was unrevealing.  Will follow and consider further evaluation and treatment.  Discussed with RN taking care of patient to coordinate care         Copied text in this portion of the note has been reviewed and is accurate as of 12/1/2023    Past Medical History:  Past Medical History:   Diagnosis Date    Bladder leak     Breast cancer     chemo and radiation    Depression     GERD (gastroesophageal reflux disease)     Hyperlipidemia     Osteoporosis      Past Surgical History:  Past Surgical History:   Procedure Laterality Date    BREAST SURGERY      lumpectomy    CHOLECYSTECTOMY      HYSTERECTOMY      TOTAL KNEE ARTHROPLASTY Right 11/24/2023    Procedure: TOTAL KNEE ARTHROPLASTY WITH CORI ROBOT;  Surgeon: Ulises Chen II, MD;  Location: [facility] OR;  Service: Robotics - Ortho;  Laterality: Right;  "       Social History:   Social History     Tobacco Use    Smoking status: Never     Passive exposure: Never    Smokeless tobacco: Never   Substance Use Topics    Alcohol use: Never      Family History:  History reviewed. No pertinent family history.       Allergies:  Allergies   Allergen Reactions    Codeine Nausea And Vomiting and Dizziness     Scheduled Meds:  aspirin, 81 mg, Q12H  [Held by provider] atorvastatin, 10 mg, Daily  cefTRIAXone, 2,000 mg, Q24H  cyanocobalamin, 1,000 mcg, Q28 Days  ferric gluconate, 250 mg, Once  folic acid, 1 mg, Daily  furosemide, 40 mg, Q12H  LORazepam, 1 mg, Once  oxybutynin XL, 15 mg, Daily  pantoprazole, 40 mg, Q AM  sertraline, 50 mg, Nightly          Review of Systems:   Review of Systems   Unable to perform ROS: Mental status change (confused)         Constitutional:  Temp:  [99.1 °F (37.3 °C)] 99.1 °F (37.3 °C)  Heart Rate:  [83] 83  Resp:  [17] 17  BP: (147-158)/(70-80) 147/80    Physical Exam   /80 (BP Location: Left leg, Patient Position: Lying)   Pulse 83   Temp 99.1 °F (37.3 °C) (Oral)   Resp 17   Ht 152.4 cm (60\")   Wt 69.4 kg (153 lb)   SpO2 91%   BMI 29.88 kg/m²   General:  Appears in no acute distress; confused intermittently  Eyes: Sclera is anicteric,  conjunctiva is clear   HEENT:  No JVD. Thyroid not visibly enlarged. No mucosal pallor or cyanosis  Respiratory: Respirations regular and unlabored at rest.  Clear to auscultation  Cardiovascular: S1,S2 Regular rate and rhythm. +2/6 murmur, no rub or gallop auscultated.  . No pretibial pitting edema  Gastrointestinal: Abdomen nondistended, soft  Musculoskeletal:  No abnormal movements  Extremities: No digital clubbing or cyanosis  Skin: Color pink. Skin warm and dry to touch. No rashes  No xanthoma  Neuro: Sedated, sleeping    INTAKE AND OUTPUT:    Intake/Output Summary (Last 24 hours) at 12/1/2023 1043  Last data filed at 12/1/2023 0300  Gross per 24 hour   Intake 0 ml   Output 350 ml   Net -350 ml "       Cardiographics  Telemetry: non monitored.     ECG:   ECG 12 Lead QT Measurement   Preliminary Result   HEART RATE= 94  bpm   RR Interval= 640  ms   NC Interval= 133  ms   P Horizontal Axis= 25  deg   P Front Axis= 32  deg   QRSD Interval= 109  ms   QT Interval= 407  ms   QTcB= 509  ms   QRS Axis= 42  deg   T Wave Axis= 91  deg   - ABNORMAL ECG -   Sinus rhythm   Anteroseptal infarct, age indeterminate   Prolonged QT interval   Electronically Signed By:    Date and Time of Study: 2023-11-30 13:02:15      ECG 12 Lead Other; elevated troponin   Final Result   HEART RATE= 95  bpm   RR Interval= 628  ms   NC Interval= 163  ms   P Horizontal Axis= 9  deg   P Front Axis= 51  deg   QRSD Interval= 131  ms   QT Interval= 431  ms   QTcB= 544  ms   QRS Axis= 37  deg   T Wave Axis= 52  deg   - ABNORMAL ECG -   Sinus rhythm   Left bundle branch block   ST elevation secondary to IVCD   When compared with ECG of 26-Nov-2023 21:32:31,   No significant change   Electronically Signed By: Merary Jaffe ([facility]) 27-Nov-2023 20:51:26   Date and Time of Study: 2023-11-27 08:38:04      ECG 12 Lead Rhythm Change   Preliminary Result   HEART RATE= 98  bpm   RR Interval= 612  ms   NC Interval= 170  ms   P Horizontal Axis= -4  deg   P Front Axis= 51  deg   QRSD Interval= 120  ms   QT Interval= 377  ms   QTcB= 482  ms   QRS Axis= 3  deg   T Wave Axis= 82  deg   - ABNORMAL ECG -   Sinus rhythm   Left bundle branch block   LVH with secondary repolarization abnormality   Anterior infarct, old   When compared with ECG of 25-Nov-2023 19:45:40,   No significant change   Electronically Signed By:    Date and Time of Study: 2023-11-26 21:32:31      ECG 12 Lead Other; elevated troponin   Preliminary Result   HEART RATE= 93  bpm   RR Interval= 640  ms   NC Interval= 177  ms   P Horizontal Axis= 9  deg   P Front Axis= 50  deg   QRSD Interval= 120  ms   QT Interval= 379  ms   QTcB= 474  ms   QRS Axis= -3  deg   T Wave Axis= 138  deg   - ABNORMAL ECG -    Sinus rhythm   Ventricular premature complex   Left bundle branch block   Probable anteroseptal infarct, acute   When compared with ECG of 15-Nov-2023 11:02:55,   Significant rate increase   New or worsened ischemia or infarction   Electronically Signed By:    Date and Time of Study: 2023-11-25 19:45:40      SCANNED EKG   Final Result      SCANNED - TELEMETRY     Final Result      SCANNED - TELEMETRY     Final Result      SCANNED - TELEMETRY     Final Result      SCANNED - TELEMETRY     Final Result      SCANNED - TELEMETRY     Final Result      SCANNED - TELEMETRY     Final Result      SCANNED - TELEMETRY     Final Result        I have personally reviewed EKG    Echocardiogram: Results for orders placed during the hospital encounter of 11/24/23    Adult Transthoracic Echo Complete W/ Cont if Necessary Per Protocol    Interpretation Summary    Estimated right ventricular systolic pressure from tricuspid regurgitation is moderately elevated (45-55 mmHg).    Conclusion      Normal LV size and lower limits of normal contractility EF of 50%  Normal RV size  Normal atrial size  Pulmonic valve is not well visualized.  Aortic valve leaflets are mildly thickened, have adequate cusp separation  Mitral valve, tricuspid valve appears structurally normal, mild mitral and trace tricuspid regurgitation seen.  Calculated RV systolic pressure 50 mmHg consistent with moderate pulm hypertension sent to.  No pericardial effusion seen.  Proximal aorta appears normal in size.      Lab Review   I have reviewed the labs  Results from last 7 days   Lab Units 11/27/23  0642 11/26/23 2032 11/26/23  1847   HSTROP T ng/L 118* 70* 53*     Results from last 7 days   Lab Units 11/25/23  1504   MAGNESIUM mg/dL 1.5*     Results from last 7 days   Lab Units 11/30/23  1239   SODIUM mmol/L 142   POTASSIUM mmol/L 3.9   BUN mg/dL 21   CREATININE mg/dL 0.99   CALCIUM mg/dL 8.6         Results from last 7 days   Lab Units 11/30/23  1239 11/29/23  1338  "11/28/23  1459   WBC 10*3/mm3 7.60 6.60 8.40   HEMOGLOBIN g/dL 8.9* 8.9* 7.3*   HEMATOCRIT % 27.2* 27.3* 23.3*   PLATELETS 10*3/mm3 240 228 220           RADIOLOGY:  Imaging Results (Last 24 Hours)       ** No results found for the last 24 hours. **                  )12/1/2023  MD ZIYAD Mehta/Transcription:   \"Dictated utilizing Dragon dictation\".   "

## 2023-11-30 NOTE — PROGRESS NOTES
Confusion somewhat improved.  Awaiting placement at rehab.  Needs to be more clear of mind for discharge.  I appreciate medical assistance with her.  We will continue to try to rehab the knee as best as possible although it is quite difficult when she is not really able to comply with the therapy she is supposed to be doing.

## 2023-11-30 NOTE — PLAN OF CARE
Goal Outcome Evaluation:      Patient only oriented to self. Has slept most of the shift. Producing urin output. VSS, call light within reach, plan of care on going.

## 2023-11-30 NOTE — PLAN OF CARE
"Goal Outcome Evaluation:   Assessment: Sandra Treadwell presents with functional mobility impairments which indicate the need for skilled intervention. Tolerating session today without incident. Will continue to follow and progress as tolerated. Patient was able to be EOB and be independent with static sitting. Patient stated needing to use BSC and was MaxAx2 with transfer and the same with transferring to the chair as well. Patient seemed to be more cognitively alert and aware while sitting up. Patient continues to need skilled intervention and requires skilled nursing facility upon discharge.    Plan/Recommendations:   Moderate Intensity Therapy recommended post-acute care. This is recommended as therapy feels the patient would require 3-4 days per week and wouldn't tolerate \"3 hour daily\" rehab intensity. SNF would be the preferred choice. If the patient does not agree to SNF, arrange HH or OP depending on home bound status. If patient is medically complex, consider LTACH.. Pt requires rolling walker at discharge.     Pt desires Skilled Rehab placement at discharge. Pt cooperative; agreeable to therapeutic recommendations and plan of care.                      "

## 2023-11-30 NOTE — THERAPY TREATMENT NOTE
"Subjective: Pt agreeable to therapeutic plan of care. Patient A&Ox2 and was willing to move with therapy today.    Objective:     Bed mobility - Max-A and Assist x 2  Transfers - Max-A and Assist x 2  Ambulation - 3 feet Max-A and Assist x 2    WB'ing status: R Lower Extremity Weight Bearing As Tolerated    ROM: -5 to ~80 degrees AROM    Therapeutic Exercise: 5 Reps R Lower Extremity AROM Total Knee: Ankle Pumps, Quad Sets, Heel slides, Hip Abduction, LAQ    Precautions: High falls risk and Weight-bearing restriction     Vitals: WNL    Pain: Patient unable to rate pain, but showed signs of pain  Location: R Knee  Intervention for pain: Repositioned, Increased Activity, and Therapeutic Presence    Education: Provided education on the importance of mobility in the acute care setting, Verbal/Tactile Cues, Transfer Training, Gait Training, Energy conservation strategies, and Post-Op Precautions    Assessment: Sandra Treadwell presents with functional mobility impairments which indicate the need for skilled intervention. Tolerating session today without incident. Will continue to follow and progress as tolerated. Patient was able to be EOB and be independent with static sitting. Patient stated needing to use BSC and was MaxAx2 with transfer and the same with transferring to the chair as well. Patient seemed to be more cognitively alert and aware while sitting up. Patient continues to need skilled intervention and requires skilled nursing facility upon discharge.    Plan/Recommendations:   Moderate Intensity Therapy recommended post-acute care. This is recommended as therapy feels the patient would require 3-4 days per week and wouldn't tolerate \"3 hour daily\" rehab intensity. SNF would be the preferred choice. If the patient does not agree to SNF, arrange HH or OP depending on home bound status. If patient is medically complex, consider LTACH.. Pt requires rolling walker at discharge.     Pt desires Skilled Rehab placement " at discharge. Pt cooperative; agreeable to therapeutic recommendations and plan of care.         Basic Mobility 6-click:  Rollin = Total, A lot = 2, A little = 3; 4 = None  Supine>Sit:   1 = Total, A lot = 2, A little = 3; 4 = None   Sit>Stand with arms:  1 = Total, A lot = 2, A little = 3; 4 = None  Bed>Chair:   1 = Total, A lot = 2, A little = 3; 4 = None  Ambulate in room:  1 = Total, A lot = 2, A little = 3; 4 = None  3-5 Steps with railin = Total, A lot = 2, A little = 3; 4 = None  Score: 14    Modified Jing: 0 = No Symptoms    Post-Tx Position: Up in Chair, Alarms activated, and Call light and personal items within reach  PPE: gloves

## 2023-12-01 LAB
HOLD SPECIMEN: NORMAL
HOLD SPECIMEN: NORMAL
WHOLE BLOOD HOLD SPECIMEN: NORMAL

## 2023-12-01 PROCEDURE — 99232 SBSQ HOSP IP/OBS MODERATE 35: CPT | Performed by: INTERNAL MEDICINE

## 2023-12-01 PROCEDURE — 99231 SBSQ HOSP IP/OBS SF/LOW 25: CPT

## 2023-12-01 PROCEDURE — 25010000002 LORAZEPAM PER 2 MG

## 2023-12-01 PROCEDURE — 25010000002 FUROSEMIDE PER 20 MG: Performed by: INTERNAL MEDICINE

## 2023-12-01 PROCEDURE — 97110 THERAPEUTIC EXERCISES: CPT

## 2023-12-01 RX ORDER — PANTOPRAZOLE SODIUM 40 MG/1
40 TABLET, DELAYED RELEASE ORAL
Status: DISCONTINUED | OUTPATIENT
Start: 2023-12-02 | End: 2023-12-04 | Stop reason: HOSPADM

## 2023-12-01 RX ORDER — FUROSEMIDE 10 MG/ML
40 INJECTION INTRAMUSCULAR; INTRAVENOUS EVERY 12 HOURS
Status: DISCONTINUED | OUTPATIENT
Start: 2023-12-01 | End: 2023-12-03

## 2023-12-01 RX ADMIN — LORAZEPAM 0.5 MG: 2 INJECTION INTRAMUSCULAR; INTRAVENOUS at 11:28

## 2023-12-01 RX ADMIN — FUROSEMIDE 40 MG: 10 INJECTION, SOLUTION INTRAMUSCULAR; INTRAVENOUS at 21:12

## 2023-12-01 RX ADMIN — FUROSEMIDE 40 MG: 10 INJECTION, SOLUTION INTRAMUSCULAR; INTRAVENOUS at 10:01

## 2023-12-01 RX ADMIN — PANTOPRAZOLE SODIUM 40 MG: 40 INJECTION, POWDER, LYOPHILIZED, FOR SOLUTION INTRAVENOUS at 05:27

## 2023-12-01 RX ADMIN — LORAZEPAM 0.5 MG: 2 INJECTION INTRAMUSCULAR; INTRAVENOUS at 21:16

## 2023-12-01 NOTE — CASE MANAGEMENT/SOCIAL WORK
Continued Stay Note   [place]     Patient Name: Sandra rTeadwell  MRN: [id number]  Today's Date: 12/1/2023    Admit Date: 11/24/2023    Plan: Referral to [facility] pending acceptance . No precert needed.PASRR approved. Sitter at bedside. Must be sitter free to go to snf   Discharge Plan       Row Name 12/01/23 1556       Plan    Plan Referral to [facility] pending acceptance . No precert needed.PASRR approved. Sitter at bedside. Must be sitter free to go to snf    Plan Comments DC barriers: pod #7 left TKR, patient is confused and anxious, seems to be clearing up some and being a little less confused                   Sendy Pires RN

## 2023-12-01 NOTE — PROGRESS NOTES
Patient was better yesterday, today still confused and agitated    She did talk but did not want to follow commands or do anything.  She is upset that they are not letting her go to the bathroom    Again nonfocal and delirium and psych issues, nothing suggesting large stroke, CNS infection or seizures    Call if needed

## 2023-12-01 NOTE — PLAN OF CARE
Goal Outcome Evaluation:         Patient resting most of day, agitated this AM treated per MAR. Patient spouse at bedside most of day, sitter at bedside as well. Care plan ongoing

## 2023-12-01 NOTE — THERAPY TREATMENT NOTE
"Subjective: Pt agreeable to therapeutic plan of care.     Objective:   Pt was very lethargic.    Bed mobility - N/A or Not attempted.  Transfers - N/A or Not attempted.      WB'ing status: R Lower Extremity Weight Bearing As Tolerated    ROM: AAROM right knee 0 to 90 degrees    Therapeutic Exercise: 10 Reps R Lower Extremity AAROM Total Knee: Ankle Pumps, Quad Sets, Heel slides, Hip Abduction, LAQ    Precautions: None and High falls risk   Vitals: WNL    Pain: Pt had no grimacing and didn't vocalize any complaints  Intervention for pain: N/A    Education: Provided education on the importance of mobility in the acute care setting and Verbal/Tactile Cues    Assessment: Sandra Treadwell presents with functional mobility impairments which indicate the need for skilled intervention.  Pt had been given meds to calm her agitated behavior. Pt kept her eyes closed during therapeutic  exercises but did follow commands and assisted some with the exercises even quad sets. Pt was too lethargic to participate safely with therapeutic activity this afternoon.  Pt Tolerating session today without incident. Will continue to follow and progress as tolerated.     Plan/Recommendations:   Moderate Intensity Therapy recommended post-acute care. This is recommended as therapy feels the patient would require 3-4 days per week and wouldn't tolerate \"3 hour daily\" rehab intensity. SNF would be the preferred choice. If the patient does not agree to SNF, arrange HH or OP depending on home bound status. If patient is medically complex, consider LTACH.. Pt requires no DME at discharge.     Pt desires Skilled Rehab placement at discharge. Pt cooperative; agreeable to therapeutic recommendations and plan of care.         Basic Mobility 6-click:  Rollin = Total, A lot = 2, A little = 3; 4 = None  Supine>Sit:   1 = Total, A lot = 2, A little = 3; 4 = None   Sit>Stand with arms:  1 = Total, A lot = 2, A little = 3; 4 = None  Bed>Chair:   1 = " Total, A lot = 2, A little = 3; 4 = None  Ambulate in room:  1 = Total, A lot = 2, A little = 3; 4 = None  3-5 Steps with railin = Total, A lot = 2, A little = 3; 4 = None  Score: 10    Modified Jing: N/A = No pre-op stroke/TIA    Post-Tx Position: Supine with HOB Elevated, Alarms activated, and Call light and personal items within reach  PPE: gloves

## 2023-12-01 NOTE — PLAN OF CARE
Goal Outcome Evaluation:      Pt combative and cursing at staff. Had to straight cath. VSS, plan of care on going.

## 2023-12-01 NOTE — PROGRESS NOTES
"    Chief complaint Confusion     Subjective .     History of present illness: The patient is a 73 y.o. female who was admitted secondary to arthritis of the right knee and a right knee replacement.      PMH: Breast cancer, depression. GERD, hyperlipidemia.    Psych was consulted due to confusion and agitation.    The patient's  state the patient is generally alert and oriented and independent in her ADLs. She does not have any prior history of dementia or memory problems. He states she has been \"belligerent\" and combative with staff. He states this happened last night.      Per chart review of nursing notes, patient has been confused and only oriented to self, combative and telling at staff.      Today pt is awake alert oriented.   Per RN staff pt had issues overnight, agitated not sleeping, somewhat aggressive.   Yesterday pt was sedated, confused, not responding appropriately. Lorazepam was decreased, pt much more alert today.   Per RN pt was pleasant cooperative.       Review of Systems   All systems were reviewed and negative except for:  Behavioral/Psych: positive for  sleep disturbance    The following portions of the patient's history were reviewed and updated as appropriate: allergies, current medications, past family history, past medical history, past social history, past surgical history and problem list.    History    Past psychiatric history     Past Medical History:   Diagnosis Date    Bladder leak     Breast cancer     chemo and radiation    Depression     GERD (gastroesophageal reflux disease)     Hyperlipidemia     Osteoporosis         History reviewed. No pertinent family history.     Social History     Tobacco Use    Smoking status: Never     Passive exposure: Never    Smokeless tobacco: Never   Vaping Use    Vaping Use: Never used   Substance Use Topics    Alcohol use: Never    Drug use: Never          Medications Prior to Admission   Medication Sig Dispense Refill Last Dose    " "atorvastatin (LIPITOR) 10 MG tablet Take 1 tablet by mouth Daily.   11/24/2023    cetirizine (zyrTEC) 10 MG tablet Take 1 tablet by mouth Daily.   11/23/2023    fluticasone (FLONASE) 50 MCG/ACT nasal spray 2 sprays by Each Nare route Daily.   11/23/2023    omeprazole (priLOSEC) 40 MG capsule Take 1 capsule by mouth Daily.   11/24/2023    oxybutynin XL (DITROPAN XL) 15 MG 24 hr tablet Take 1 tablet by mouth Daily.   11/24/2023    OYSCO 500 + D 500-5 MG-MCG tablet per tablet Take 1 tablet by mouth 2 (Two) Times a Day.   11/23/2023    sertraline (ZOLOFT) 50 MG tablet Take 1 tablet by mouth Every Night.   11/23/2023    zolpidem (AMBIEN) 10 MG tablet Take 1 tablet by mouth At Night As Needed.   11/23/2023    alendronate (FOSAMAX) 70 MG tablet Take 1 tablet by mouth 1 (One) Time Per Week. Sun   11/19/2023        Scheduled Meds:  aspirin, 81 mg, Oral, Q12H  [Held by provider] atorvastatin, 10 mg, Oral, Daily  cefTRIAXone, 2,000 mg, Intravenous, Q24H  cyanocobalamin, 1,000 mcg, Intramuscular, Q28 Days  ferric gluconate, 250 mg, Intravenous, Once  folic acid, 1 mg, Oral, Daily  furosemide, 40 mg, Intravenous, Q12H  LORazepam, 1 mg, Oral, Once  oxybutynin XL, 15 mg, Oral, Daily  pantoprazole, 40 mg, Intravenous, Q AM  sertraline, 50 mg, Oral, Nightly         Continuous Infusions:       PRN Meds:    docusate sodium    HYDROcodone-acetaminophen    LORazepam    [DISCONTINUED] HYDROmorphone **AND** naloxone    ondansetron **OR** ondansetron    zolpidem      Allergies:  Codeine      Objective     Vital Signs   /80 (BP Location: Left leg, Patient Position: Lying)   Pulse 83   Temp 99 °F (37.2 °C) (Oral)   Resp 16   Ht 152.4 cm (60\")   Wt 69.4 kg (153 lb)   SpO2 91%   BMI 29.88 kg/m²     Physical Exam:    Muscle strength and tone: moderate, ue's, equal bilaterally  Abnormal Movements: none observed   Gait: juliana     General Appearance:    Awake, alert        Mental Status Exam:   Hygiene:   good  Cooperation:  " Cooperative, interview limited   Eye Contact:  Fair  Psychomotor Behavior:  Slow  Affect:  Restricted  Mood: normal  Speech:  Minimal  Thought Process:  Goal directed and Linear  Thought Content:  Mood congruent  Suicidal:  None  Homicidal:  None  Hallucinations:  None  Delusion:  None  Memory:  intact   Orientation:  Person and Place  Reliability:  poor  Insight:  Poor  Judgement:  Fair  Impulse Control:  Fair  Physical/Medical Issues:  Yes          Medications and allergies reviewed     Result Review:  I have personally reviewed the results from the time of this admission to 12/1/2023 14:40 EST and agree with these findings:  [x]  Laboratory  []  Microbiology  []  Radiology  [x]  EKG/Telemetry   []  Cardiology/Vascular   []  Pathology  []  Old records  []  Other:    Assessment & Plan       Status post knee replacement    Unilateral primary osteoarthritis, right knee      Assessment: Delirium   Treatment Plan: Patient presents with signs and symptoms of delirium. Antipsychotics not indicated due to prolonged Qtc  545 on 11/27/23   PRN lorazepam being given, too sedating dose was decreased, pt appears to be more alert.     Sleep disturbance and agitation overnight encouraged RN staff to utilize prn ambien for sleep when appropriate. Not to be given with Lorazepam     Will follow      Treatment Plan discussed with: Patient and RN         I discussed the patients findings and my recommendations with patient and nursing staff    I have reviewed and approved the behavioral health treatment plans and problem list. Yes  Thank you for the consult   Referring MD has access to consult report and progress notes in EMR     Devika Molina DNP, APRN  12/01/23  14:40 EST

## 2023-12-01 NOTE — PROGRESS NOTES
Patient is 1 week status post right total knee arthroplasty. Patient has had some confusion postoperatively from anesthesia. Dressing is clean dry and intact. Patient is currently awaiting rehab placement and may be discharge when her confusion has improved.

## 2023-12-01 NOTE — PLAN OF CARE
"Goal Outcome Evaluation:  Plan of Care Reviewed With: patient, spouse   Assessment: Sandra Treadwell presents with functional mobility impairments which indicate the need for skilled intervention.  Pt had been given meds to calm her agitated behavior. Pt kept her eyes closed during therapeutic  exercises but did follow commands and assisted some with the exercises even quad sets. Pt was too lethargic to participate safely with therapeutic activity this afternoon.  Pt Tolerating session today without incident. Will continue to follow and progress as tolerated.     Plan/Recommendations:   Moderate Intensity Therapy recommended post-acute care. This is recommended as therapy feels the patient would require 3-4 days per week and wouldn't tolerate \"3 hour daily\" rehab intensity. SNF would be the preferred choice. If the patient does not agree to SNF, arrange HH or OP depending on home bound status. If patient is medically complex, consider LTACH.. Pt requires no DME at discharge.     Pt desires Skilled Rehab placement at discharge. Pt cooperative; agreeable to therapeutic recommendations and plan of care.      Anticipated Discharge Disposition (PT): skilled nursing facility  "

## 2023-12-01 NOTE — PROGRESS NOTES
Cardiology Progress Note    Patient Identification:  Name: Sandra Treadwell  Age: 73 y.o.  Sex: female  :  1950  MRN: [id number]                 Follow Up / Chief Complaint: Left Bundle branch block,     Interval History: Patient presented for elective right total knee replacement on 2023 patient's EKG showed LBBB with elevated troponin therefore cardiology was consulted.   Patient has been confused and having a sitter by the side.    Subjective patient seen and examined.  Labs reviewed.  Discussed with RN taking care of patient.  Patient continues to be confused and with a sitter.  Fever blisters have resolved.  Denies any chest pain.    Objective:     2023 and 2023: HS troponin is 23--> 31--> 49.  proBNP 1332.  CMP with a creatinine of 1.36 EGFR 45.  CBC with a hemoglobin of 8.  Chest x-ray shows no acute changes and hiatal hernia.  2023: HS troponin 70-- 118 pro bnp 6219  hgb 7.7   2023: hgb 7.3, urine negative   2023: potassium 3.9 hgb 8.9  2023: Hemoglobin is 8.9.      History of present illness:    Ms. Sandra Treadwell has PMH of     Right knee trauma  Cholecystectomy, hysterectomy  Allergies/intolerance to codeine  Positive family history of heart disease in brother and father     Here for right total knee replacement surgery.  Patient underwent surgery 2023 had low blood pressure, abnormal EKG with left bundle branch block, therefore troponins were ordered which were abnormal.  Patient denies any chest pain or shortness of breath.     Data:  2023 and 2023: HS troponin is 23--> 31--> 49.  proBNP 1332.  CMP with a creatinine of 1.36 EGFR 45.  CBC with a hemoglobin of 8.  Chest x-ray shows no acute ST-T changes and hiatal hernia.  EKG done 2023 reviewed/interpreted by me reveals sinus rhythm with left bundle branch block at the rate of 93 bpm.  Left bundle is not new compared EKG from 11/15/2023.        Assessment:  :     Status post knee  surgery  Low blood pressure  Elevated troponin  Elevated proBNP  Left bundle branch block  Anemia  CKD  Dyslipidemia           Recommendations / Plan:          Patient's troponin elevation is probably due to demand ischemia from anemia.  Will continue to monitor.  Patient has no symptoms of chest pain.  Statins for dyslipidemia  Telemetry is revealing sinus rhythm  Patient is currently confused, will defer evaluation and treatment of confusion to primary team.  Patient is not a candidate for any invasive cardiac workup at the current time.  CT head 11/28/2023 was unrevealing.  Will follow and consider further evaluation and treatment.  Discussed with RN taking care of patient to coordinate care  Will follow-up as needed.  Please call me back if I can be of any further assistance.         Copied text in this portion of the note has been reviewed and is accurate as of 12/1/2023    Past Medical History:  Past Medical History:   Diagnosis Date    Bladder leak     Breast cancer     chemo and radiation    Depression     GERD (gastroesophageal reflux disease)     Hyperlipidemia     Osteoporosis      Past Surgical History:  Past Surgical History:   Procedure Laterality Date    BREAST SURGERY      lumpectomy    CHOLECYSTECTOMY      HYSTERECTOMY      TOTAL KNEE ARTHROPLASTY Right 11/24/2023    Procedure: TOTAL KNEE ARTHROPLASTY WITH CORI ROBOT;  Surgeon: Ulises Chen II, MD;  Location: [facility];  Service: Robotics - Ortho;  Laterality: Right;        Social History:   Social History     Tobacco Use    Smoking status: Never     Passive exposure: Never    Smokeless tobacco: Never   Substance Use Topics    Alcohol use: Never      Family History:  History reviewed. No pertinent family history.       Allergies:  Allergies   Allergen Reactions    Codeine Nausea And Vomiting and Dizziness     Scheduled Meds:  aspirin, 81 mg, Q12H  [Held by provider] atorvastatin, 10 mg, Daily  cefTRIAXone, 2,000 mg,  "Q24H  cyanocobalamin, 1,000 mcg, Q28 Days  ferric gluconate, 250 mg, Once  folic acid, 1 mg, Daily  furosemide, 40 mg, Q12H  LORazepam, 1 mg, Once  oxybutynin XL, 15 mg, Daily  pantoprazole, 40 mg, Q AM  sertraline, 50 mg, Nightly          Review of Systems:   Review of Systems   Unable to perform ROS: Mental status change (confused)         Constitutional:  Temp:  [99 °F (37.2 °C)-99.1 °F (37.3 °C)] 99 °F (37.2 °C)  Heart Rate:  [83] 83  Resp:  [16-17] 16  BP: (147)/(80) 147/80    Physical Exam   /80 (BP Location: Left leg, Patient Position: Lying)   Pulse 83   Temp 99 °F (37.2 °C) (Oral)   Resp 16   Ht 152.4 cm (60\")   Wt 69.4 kg (153 lb)   SpO2 91%   BMI 29.88 kg/m²   General:  Appears in no acute distress; confused intermittently  Eyes: Sclera is anicteric,  conjunctiva is clear   HEENT:  No JVD. Thyroid not visibly enlarged. No mucosal pallor or cyanosis  Respiratory: Respirations regular and unlabored at rest.  Clear to auscultation  Cardiovascular: S1,S2 Regular rate and rhythm. +2/6 murmur, no rub or gallop auscultated.  . No pretibial pitting edema  Gastrointestinal: Abdomen nondistended, soft  Musculoskeletal:  No abnormal movements  Extremities: No digital clubbing or cyanosis  Skin: Color pink. Skin warm and dry to touch. No rashes  No xanthoma  Neuro: Sedated, sleeping    INTAKE AND OUTPUT:    Intake/Output Summary (Last 24 hours) at 12/1/2023 1232  Last data filed at 12/1/2023 0300  Gross per 24 hour   Intake 0 ml   Output 350 ml   Net -350 ml       Cardiographics  Telemetry: non monitored.     ECG:   ECG 12 Lead QT Measurement   Preliminary Result   HEART RATE= 94  bpm   RR Interval= 640  ms   AR Interval= 133  ms   P Horizontal Axis= 25  deg   P Front Axis= 32  deg   QRSD Interval= 109  ms   QT Interval= 407  ms   QTcB= 509  ms   QRS Axis= 42  deg   T Wave Axis= 91  deg   - ABNORMAL ECG -   Sinus rhythm   Anteroseptal infarct, age indeterminate   Prolonged QT interval   Electronically " Signed By:    Date and Time of Study: 2023-11-30 13:02:15      ECG 12 Lead Other; elevated troponin   Final Result   HEART RATE= 95  bpm   RR Interval= 628  ms   IN Interval= 163  ms   P Horizontal Axis= 9  deg   P Front Axis= 51  deg   QRSD Interval= 131  ms   QT Interval= 431  ms   QTcB= 544  ms   QRS Axis= 37  deg   T Wave Axis= 52  deg   - ABNORMAL ECG -   Sinus rhythm   Left bundle branch block   ST elevation secondary to IVCD   When compared with ECG of 26-Nov-2023 21:32:31,   No significant change   Electronically Signed By: Merary Jaffe ([facility]) 27-Nov-2023 20:51:26   Date and Time of Study: 2023-11-27 08:38:04      ECG 12 Lead Rhythm Change   Preliminary Result   HEART RATE= 98  bpm   RR Interval= 612  ms   IN Interval= 170  ms   P Horizontal Axis= -4  deg   P Front Axis= 51  deg   QRSD Interval= 120  ms   QT Interval= 377  ms   QTcB= 482  ms   QRS Axis= 3  deg   T Wave Axis= 82  deg   - ABNORMAL ECG -   Sinus rhythm   Left bundle branch block   LVH with secondary repolarization abnormality   Anterior infarct, old   When compared with ECG of 25-Nov-2023 19:45:40,   No significant change   Electronically Signed By:    Date and Time of Study: 2023-11-26 21:32:31      ECG 12 Lead Other; elevated troponin   Preliminary Result   HEART RATE= 93  bpm   RR Interval= 640  ms   IN Interval= 177  ms   P Horizontal Axis= 9  deg   P Front Axis= 50  deg   QRSD Interval= 120  ms   QT Interval= 379  ms   QTcB= 474  ms   QRS Axis= -3  deg   T Wave Axis= 138  deg   - ABNORMAL ECG -   Sinus rhythm   Ventricular premature complex   Left bundle branch block   Probable anteroseptal infarct, acute   When compared with ECG of 15-Nov-2023 11:02:55,   Significant rate increase   New or worsened ischemia or infarction   Electronically Signed By:    Date and Time of Study: 2023-11-25 19:45:40      SCANNED EKG   Final Result      SCANNED - TELEMETRY     Final Result      SCANNED - TELEMETRY     Final Result      SCANNED - TELEMETRY    "  Final Result      SCANNED - TELEMETRY     Final Result      SCANNED - TELEMETRY     Final Result      SCANNED - TELEMETRY     Final Result      SCANNED - TELEMETRY     Final Result        I have personally reviewed EKG    Echocardiogram: Results for orders placed during the hospital encounter of 11/24/23    Adult Transthoracic Echo Complete W/ Cont if Necessary Per Protocol    Interpretation Summary    Estimated right ventricular systolic pressure from tricuspid regurgitation is moderately elevated (45-55 mmHg).    Conclusion      Normal LV size and lower limits of normal contractility EF of 50%  Normal RV size  Normal atrial size  Pulmonic valve is not well visualized.  Aortic valve leaflets are mildly thickened, have adequate cusp separation  Mitral valve, tricuspid valve appears structurally normal, mild mitral and trace tricuspid regurgitation seen.  Calculated RV systolic pressure 50 mmHg consistent with moderate pulm hypertension sent to.  No pericardial effusion seen.  Proximal aorta appears normal in size.      Lab Review   I have reviewed the labs  Results from last 7 days   Lab Units 11/27/23  0642 11/26/23 2032 11/26/23  1847   HSTROP T ng/L 118* 70* 53*     Results from last 7 days   Lab Units 11/25/23  1504   MAGNESIUM mg/dL 1.5*     Results from last 7 days   Lab Units 11/30/23  1239   SODIUM mmol/L 142   POTASSIUM mmol/L 3.9   BUN mg/dL 21   CREATININE mg/dL 0.99   CALCIUM mg/dL 8.6         Results from last 7 days   Lab Units 11/30/23  1239 11/29/23  1338 11/28/23  1459   WBC 10*3/mm3 7.60 6.60 8.40   HEMOGLOBIN g/dL 8.9* 8.9* 7.3*   HEMATOCRIT % 27.2* 27.3* 23.3*   PLATELETS 10*3/mm3 240 228 220           RADIOLOGY:  Imaging Results (Last 24 Hours)       ** No results found for the last 24 hours. **                  )12/1/2023  Wili Stewart MD      EMR Dragon/Transcription:   \"Dictated utilizing Dragon dictation\".   "

## 2023-12-01 NOTE — PROGRESS NOTES
[facility] MEDICINE SERVICE  DAILY PROGRESS NOTE    NAME: Sandra Treadwell  : 1950  MRN: [id number]      LOS: 5 days     PROVIDER OF SERVICE: Jorje Sahni MD    Chief Complaint: Status post knee replacement    Subjective:     Interval History:  History taken from: patient  Patient Complaints:        73 y.o. Not  or  female who presents with End-stage arthritis of the right knee. They failed conservative treatment of their knee pain and a thorough discussion of the risks and benefits of surgery was had. The patient wishes to continue with elective total knee replacement, they were scheduled and are here for surgery. They did get medical clearance as well as a thorough preoperative workup.     Per the documentation by Dr Chen , dated 23 10AM, Patient with delirium that began last night.  Apparently she came to the unit and became acutely confused last night.  She then got up and had a fall directly onto her operative leg.  An x-ray was taken that was concerning for a possible fracture.  After review of the x-ray, I thought everything was okay but out of an abundance of caution ordered a CT scan just to be sure.  CT scan confirmed that everything is okay with the prosthetic and there is no concern for fracture.  She is okay to continue mobilization and physical therapy.  She was given some Ativan yesterday for agitation, but is quite lethargic this morning.  We are going to stop all Ativan at this point.  Plan is home discharge but only after her acute mental status change significantly improves and assuming she works well with physical therapy.  I have consulted the hospitalist service due to her acute mental status changes to make sure they have no further recommendations.       23 we were asked to see patient for medical management, patient is seen in bed NAD, no new complaints, is presently pleasantly confused, BP low at 80/40 fluid bolus ordered DW RN  2023  patient seen and examined in bed no acute distress, feeling better this morning.  Patient is awake alert oriented x 3.  Discussed with RN.  Echocardiogram being done now.Blood pressure 110/48.  11/27/2023 patient seen and examined in bed no acute distress, patient still confused.  Vital signs stable, troponin 118 from 70 yesterday, discussed with RN.  BNP 6219 will DC fluids.  11/28/23 patient seen and examined in bed no acute distress, family at bedside, discussed with RN.  Patient still confused.  Will obtain CT head.  Consulted neurology.  11/29/23 patient seen and examined in bed no acute distress, vital signs stable, patient still confused, sitter at bedside,  11/30/23 patient seen and examined in bed no acute distress, vital signs stable, patient still confused, discussed with RN.  Sitter at bedside.  12/1/23 patient seen and examined in bed no acute distress, sitter at bedside, patient still confused.  Vital signs are stable.    Review of SystemsReview of Systems   Constitutional:  Negative for chills and fever.   HENT:  Negative for ear pain and hearing loss.    Respiratory:  Negative for apnea and shortness of breath.    Cardiovascular:  Negative for chest pain and palpitations.   Gastrointestinal:  Negative for diarrhea, nausea and vomiting.   Genitourinary:  Negative for dysuria.   Musculoskeletal:  Positive for arthralgias and gait problem. Negative for back pain and joint swelling.   Neurological:  Negative for seizures, light-headedness and headaches.   Psychiatric/Behavioral:  Positive for confusion. Negative for hallucinati    Objective:     Vital Signs  Temp:  [99 °F (37.2 °C)-99.1 °F (37.3 °C)] 99 °F (37.2 °C)  Heart Rate:  [83] 83  Resp:  [16-17] 16  BP: (147)/(80) 147/80  Flow (L/min):  [2] 2   Body mass index is 29.88 kg/m².    Physical ExamConstitutional:       General: She is not in acute distress.     Appearance: She is well-developed. She is not ill-appearing, toxic-appearing or diaphoretic.    HENT:      Head: Normocephalic and atraumatic.      Nose: Nose normal. No congestion or rhinorrhea.      Mouth/Throat:      Mouth: Mucous membranes are moist.      Pharynx: No oropharyngeal exudate.   Eyes:      General: No scleral icterus.        Right eye: No discharge.         Left eye: No discharge.      Extraocular Movements: Extraocular movements intact.      Pupils: Pupils are equal, round, and reactive to light.   Neck:      Thyroid: No thyromegaly.      Vascular: No carotid bruit or JVD.      Trachea: No tracheal deviation.   Cardiovascular:      Rate and Rhythm: Normal rate and regular rhythm.      Pulses: Normal pulses.      Heart sounds: Normal heart sounds. No murmur heard.     No friction rub. No gallop.   Pulmonary:      Effort: Pulmonary effort is normal. No respiratory distress.      Breath sounds: Normal breath sounds. No stridor. No wheezing, rhonchi or rales.   Chest:      Chest wall: No tenderness.   Abdominal:      General: Bowel sounds are normal. There is no distension.      Palpations: Abdomen is soft. There is no mass.      Tenderness: There is no abdominal tenderness. There is no guarding or rebound.      Hernia: No hernia is present.   Musculoskeletal:         General: Tenderness present. No swelling, deformity or signs of injury. Normal range of motion.      Cervical back: Normal range of motion and neck supple. No rigidity. No muscular tenderness.      Right lower leg: No edema.      Left lower leg: No edema.   Lymphadenopathy:      Cervical: No cervical adenopathy.   Skin:     General: Skin is warm and dry.      Coloration: Skin is not jaundiced or pale.      Findings: No bruising, erythema or rash.   Neurological:      General: No focal deficit present.      Mental Status: She is alert.      Cranial Nerves: No cranial nerve deficit.      Sensory: No sensory deficit.      Motor: No weakness or abnormal muscle tone.      Coordination: Coordination normal.      Scheduled Meds    aspirin, 81 mg, Oral, Q12H  [Held by provider] atorvastatin, 10 mg, Oral, Daily  cefTRIAXone, 2,000 mg, Intravenous, Q24H  cyanocobalamin, 1,000 mcg, Intramuscular, Q28 Days  ferric gluconate, 250 mg, Intravenous, Once  folic acid, 1 mg, Oral, Daily  furosemide, 40 mg, Intravenous, Q12H  LORazepam, 1 mg, Oral, Once  oxybutynin XL, 15 mg, Oral, Daily  [START ON 12/2/2023] pantoprazole, 40 mg, Oral, Q AM  sertraline, 50 mg, Oral, Nightly       PRN Meds     docusate sodium    HYDROcodone-acetaminophen    LORazepam    [DISCONTINUED] HYDROmorphone **AND** naloxone    ondansetron **OR** ondansetron    zolpidem   Infusions           Diagnostic Data    Results from last 7 days   Lab Units 11/30/23  1239   WBC 10*3/mm3 7.60   HEMOGLOBIN g/dL 8.9*   HEMATOCRIT % 27.2*   PLATELETS 10*3/mm3 240   GLUCOSE mg/dL 95   CREATININE mg/dL 0.99   BUN mg/dL 21   SODIUM mmol/L 142   POTASSIUM mmol/L 3.9   AST (SGOT) U/L 21   ALT (SGPT) U/L 7   ALK PHOS U/L 59   BILIRUBIN mg/dL 0.6   ANION GAP mmol/L 14.0       No radiology results for the last day      I reviewed the patient's new clinical results.    Assessment/Plan:     Active and Resolved Problems  Active Hospital Problems    Diagnosis  POA    **Status post knee replacement [Z96.659]  Not Applicable    Unilateral primary osteoarthritis, right knee [M17.11]  Yes      Resolved Hospital Problems   No resolved problems to display.     Metabolic encephalopathy-was combative 11/24/23, had to be restrained, now off restrain, likely secondary to anesthesia.  -continue supportive care  -UA-neg  -TSH-3.4  -Sats 89 on room air-  - d dimer-0.4  CT head, Mild age-related changes of the brain as above, otherwise without evidence of acute intracranial abnormalit   -psych/neurology consult:Patient presents with signs and symptoms of delirium. Her QTC is prolonged at 545, so not appropriate for antipsychotics for management at this time      Hypotension-not on any BP meds/was hypotension in  OR  -monitor CBC, BMP  -ortho discontinued Dilaudid and oral oxycodone   -fluid bolus given   -monitor BNP-1332>6219>[id number]  -DC NS   -Lasix IV     Elevated trop/elevated BNP  -EKG  -serial [id number]-  -cardiology consulted  -echo Normal LV size and lower limits of normal contractility EF of 50%   Aortic valve leaflets are mildly thickened, have adequate cusp separation  Mitral valve, tricuspid valve appears structurally normal, mild mitral and trace tricuspid regurgitation seen.  Calculated RV systolic pressure 50 mmHg consistent with moderate pulm hypertension sent to.  -Continue lasix 40mg q12     Osteoarthritis-  S/p knee replacement-s/p fall after  surgery  -ortho following  -pain management   -pt/ot  -rehab on dc     Severe anemia-iron deficiency B12 deficient folate deficient  -monitor CBC-hemoglobin 7.7  -iron-24 > IV iron ordered  - B12: 272 will give vitamin B12  -folate-4.5 will replace     JULIA-back to baseline  Monitor renal function  Avoid hypotension  avoid nephrtoxins  Hold NSAIDS     Hx Depression on zoloft     Hypomagnesemia-replace and monitor    Large hiatal hernia containing the entire stomach as well as abdominal fat and a loop of colon. No distended bowel loops are seen within the hernia to indicate obstruction. There is suggestion of a gastric volvulus. Adjacent to the hernia there is   extensive partial right lower lobe atelectasis. There are also small layering bilateral pleural effusions.       DVT prophylaxis:  Mechanical DVT prophylaxis orders are present.     Code status is   Code Status and Medical Interventions:   Ordered at: 11/24/23 0732     Level Of Support Discussed With:    Patient     Code Status (Patient has no pulse and is not breathing):    CPR (Attempt to Resuscitate)     Medical Interventions (Patient has pulse or is breathing):    Full       Plan for disposition:nh in 2 days    Time: 30 minutes    Signature: Electronically signed by Jorje Sahni MD,  12/01/23, 16:22 EST.  [facility]ist Team

## 2023-12-02 LAB
ALBUMIN SERPL-MCNC: 3 G/DL (ref 3.5–5.2)
ALBUMIN/GLOB SERPL: 0.9 G/DL
ALP SERPL-CCNC: 64 U/L (ref 39–117)
ALT SERPL W P-5'-P-CCNC: 9 U/L (ref 1–33)
ANION GAP SERPL CALCULATED.3IONS-SCNC: 13 MMOL/L (ref 5–15)
AST SERPL-CCNC: 20 U/L (ref 1–32)
BACTERIA SPEC AEROBE CULT: ABNORMAL
BACTERIA SPEC AEROBE CULT: ABNORMAL
BASOPHILS # BLD AUTO: 0.1 10*3/MM3 (ref 0–0.2)
BASOPHILS NFR BLD AUTO: 0.7 % (ref 0–1.5)
BILIRUB SERPL-MCNC: 0.4 MG/DL (ref 0–1.2)
BUN SERPL-MCNC: 19 MG/DL (ref 8–23)
BUN/CREAT SERPL: 22.9 (ref 7–25)
CA-I SERPL ISE-MCNC: 1.13 MMOL/L (ref 1.2–1.3)
CALCIUM SPEC-SCNC: 8.4 MG/DL (ref 8.6–10.5)
CHLORIDE SERPL-SCNC: 101 MMOL/L (ref 98–107)
CO2 SERPL-SCNC: 27 MMOL/L (ref 22–29)
CREAT SERPL-MCNC: 0.83 MG/DL (ref 0.57–1)
DEPRECATED RDW RBC AUTO: 47.3 FL (ref 37–54)
EGFRCR SERPLBLD CKD-EPI 2021: 75.5 ML/MIN/1.73
EOSINOPHIL # BLD AUTO: 0.2 10*3/MM3 (ref 0–0.4)
EOSINOPHIL NFR BLD AUTO: 2.6 % (ref 0.3–6.2)
ERYTHROCYTE [DISTWIDTH] IN BLOOD BY AUTOMATED COUNT: 15.7 % (ref 12.3–15.4)
GLOBULIN UR ELPH-MCNC: 3.2 GM/DL
GLUCOSE SERPL-MCNC: 107 MG/DL (ref 65–99)
HCT VFR BLD AUTO: 29.5 % (ref 34–46.6)
HGB BLD-MCNC: 9.5 G/DL (ref 12–15.9)
LYMPHOCYTES # BLD AUTO: 0.9 10*3/MM3 (ref 0.7–3.1)
LYMPHOCYTES NFR BLD AUTO: 10.9 % (ref 19.6–45.3)
MAGNESIUM SERPL-MCNC: 1.7 MG/DL (ref 1.6–2.4)
MCH RBC QN AUTO: 27.7 PG (ref 26.6–33)
MCHC RBC AUTO-ENTMCNC: 32.2 G/DL (ref 31.5–35.7)
MCV RBC AUTO: 86 FL (ref 79–97)
MONOCYTES # BLD AUTO: 0.8 10*3/MM3 (ref 0.1–0.9)
MONOCYTES NFR BLD AUTO: 9.7 % (ref 5–12)
NEUTROPHILS NFR BLD AUTO: 6.6 10*3/MM3 (ref 1.7–7)
NEUTROPHILS NFR BLD AUTO: 76.1 % (ref 42.7–76)
NRBC BLD AUTO-RTO: 0.2 /100 WBC (ref 0–0.2)
PLATELET # BLD AUTO: 265 10*3/MM3 (ref 140–450)
PMV BLD AUTO: 8.3 FL (ref 6–12)
POTASSIUM SERPL-SCNC: 3.1 MMOL/L (ref 3.5–5.2)
PROT SERPL-MCNC: 6.2 G/DL (ref 6–8.5)
QT INTERVAL: 377 MS
QT INTERVAL: 379 MS
QTC INTERVAL: 474 MS
QTC INTERVAL: 482 MS
RBC # BLD AUTO: 3.42 10*6/MM3 (ref 3.77–5.28)
SODIUM SERPL-SCNC: 141 MMOL/L (ref 136–145)
WBC NRBC COR # BLD AUTO: 8.7 10*3/MM3 (ref 3.4–10.8)

## 2023-12-02 PROCEDURE — 25010000002 FUROSEMIDE PER 20 MG: Performed by: INTERNAL MEDICINE

## 2023-12-02 PROCEDURE — 97110 THERAPEUTIC EXERCISES: CPT

## 2023-12-02 PROCEDURE — 85025 COMPLETE CBC W/AUTO DIFF WBC: CPT | Performed by: INTERNAL MEDICINE

## 2023-12-02 PROCEDURE — 82330 ASSAY OF CALCIUM: CPT | Performed by: INTERNAL MEDICINE

## 2023-12-02 PROCEDURE — 97112 NEUROMUSCULAR REEDUCATION: CPT

## 2023-12-02 PROCEDURE — 97530 THERAPEUTIC ACTIVITIES: CPT

## 2023-12-02 PROCEDURE — 25010000002 CEFTRIAXONE PER 250 MG: Performed by: NURSE PRACTITIONER

## 2023-12-02 PROCEDURE — 83880 ASSAY OF NATRIURETIC PEPTIDE: CPT | Performed by: INTERNAL MEDICINE

## 2023-12-02 PROCEDURE — 80053 COMPREHEN METABOLIC PANEL: CPT | Performed by: INTERNAL MEDICINE

## 2023-12-02 PROCEDURE — 83735 ASSAY OF MAGNESIUM: CPT | Performed by: INTERNAL MEDICINE

## 2023-12-02 RX ORDER — POTASSIUM CHLORIDE 20 MEQ/1
40 TABLET, EXTENDED RELEASE ORAL EVERY 4 HOURS
Status: COMPLETED | OUTPATIENT
Start: 2023-12-02 | End: 2023-12-02

## 2023-12-02 RX ADMIN — PANTOPRAZOLE SODIUM 40 MG: 40 TABLET, DELAYED RELEASE ORAL at 04:56

## 2023-12-02 RX ADMIN — SERTRALINE HYDROCHLORIDE 50 MG: 50 TABLET ORAL at 22:49

## 2023-12-02 RX ADMIN — ASPIRIN 81 MG: 81 TABLET, COATED ORAL at 22:49

## 2023-12-02 RX ADMIN — FUROSEMIDE 40 MG: 10 INJECTION, SOLUTION INTRAMUSCULAR; INTRAVENOUS at 22:49

## 2023-12-02 RX ADMIN — CEFTRIAXONE 2000 MG: 2 INJECTION, POWDER, FOR SOLUTION INTRAMUSCULAR; INTRAVENOUS at 22:49

## 2023-12-02 RX ADMIN — POTASSIUM CHLORIDE 40 MEQ: 1500 TABLET, EXTENDED RELEASE ORAL at 14:23

## 2023-12-02 RX ADMIN — ASPIRIN 81 MG: 81 TABLET, COATED ORAL at 09:07

## 2023-12-02 RX ADMIN — CEFTRIAXONE 2000 MG: 2 INJECTION, POWDER, FOR SOLUTION INTRAMUSCULAR; INTRAVENOUS at 00:48

## 2023-12-02 RX ADMIN — OXYBUTYNIN CHLORIDE 15 MG: 5 TABLET, EXTENDED RELEASE ORAL at 09:07

## 2023-12-02 RX ADMIN — POTASSIUM CHLORIDE 40 MEQ: 1500 TABLET, EXTENDED RELEASE ORAL at 17:47

## 2023-12-02 RX ADMIN — FUROSEMIDE 40 MG: 10 INJECTION, SOLUTION INTRAMUSCULAR; INTRAVENOUS at 09:07

## 2023-12-02 RX ADMIN — POTASSIUM CHLORIDE 40 MEQ: 1500 TABLET, EXTENDED RELEASE ORAL at 10:09

## 2023-12-02 RX ADMIN — FOLIC ACID 1 MG: 1 TABLET ORAL at 09:07

## 2023-12-02 NOTE — PLAN OF CARE
Goal Outcome Evaluation:         Pt is resting in bed, asleep at this time. Sitter at bedside. Remains confused and combative at times. Voiding on own and had BM. PAOLA removed from right leg and dry dressing applied. Referral made to [facility]-pending acceptance.

## 2023-12-02 NOTE — PROGRESS NOTES
Patient still requiring a sitter.  Planning discharge to rehab when appropriate.  Alla to be removed today and changed to dry dressing

## 2023-12-02 NOTE — PLAN OF CARE
Assessment: Sandra Treadwell presents with functional mobility impairments which indicate the need for skilled intervention. Pt was able to sit EOB and be fed some ice cream and drink fluids. Still has sitter and very restless and at times agitated. Was able to get 0-96 degrees at  EOB with 12 deg ext lag. Tolerating session today without incident. Plans on rehab at [place].Will continue to follow and progress as tolerated.

## 2023-12-02 NOTE — Clinical Note
..    Total Knee  Discharge Instructions  Dr. USHA Howard” April ORTEGA  [phone]    INCISION CARE  Wash your hands prior to dressing changes  PAOLA Wound VAC: Postoperatively you had a PAOLA Wound Vac placed on the incision. This was placed under sterile conditions in the operating room. It remains in place for 7 days postoperatively. After 7 days, the entire dressing must be removed, including all of the sticky adhesive. The dressing and battery pack provide gentle suction to the incision and provide several benefits over a traditional dressing:  It maintains the sterile environment of the OR and reduces the risk of infection  The suction removes unwanted buildup of blood/hematoma under the skin to reduce swelling  The suction also promotes fresh blood supply to the skin and soft tissue to speed up healing  The postoperative scar is reduced in size  Showering is permitted immediately after surgery, but the battery pack must be protected or removed during the shower.   After 7 days the PAOLA Wound Vac is removed. If there is no drainage, no dressing is required. If there is some scant drainage a dry bandage can be applied and changed daily until seen in the office or until the drainage stops.   No creams or ointments to the incisions until 4 weeks post op.  Do not touch or pick at the incision  Check incision every day and notify surgeon immediately if any of the following signs or symptoms are seen:  Increase in redness  Increase in swelling around the incision and of the entire extremity  Increase in pain  NEW drainage or oozing from the incision  Pulling apart of the edges of the incision  Increase in overall body temperature (greater than 100.4 degrees)  Zip-Line: your incision was closed with a state of the art device.   Is a non-invasive and easy to use wound closure device that replaces sutures, staples and glue for surgical incisions  It minimizes scarring and eliminating “railroad” marks that come with staples  or sutures  It makes removal as atraumatic as peeling off a bandage  Can be removed at home or by a physical therapist or nurse at 14 days postoperatively    ACTIVITIES  Exercises:  Physical therapy will begin immediately while in the hospital. Patients going to a nursing home will get therapy as part of their care at the SNU/SNF facility. Patients going home may also have a therapist come to the house to help them mobilize until they can safely get to an outpatient therapy facility.  Elevate the affected leg most of the day during the first week post operatively. Caution must be taken to avoid pillow placement directly under the heel of the leg, as this can cause pressure ulcers even with a soft pillow. All pillows and blankets should be placed underneath of the thigh and calf so that the heel is free-floating.  Use cold packs for 20-30 minutes approximately 5 times per day.  You should perform the daily stretching and strengthening exercises as taught by the therapist as often as possible. This can be done many times a day.  Full weight bearing is allowed after surgery. It will be sore/painful to put weight on the leg, but this will help the bone to heal and prevent complications such as pneumonia, bed sores and blood clots. Mobilization is vital to the recovery process.  Activities of Daily Living:  No tub baths, hot tubs, or swimming pools for 4 weeks.  May shower and let water run over the incision immediately after surgery. The battery pack of the PAOLA Wound Vac must be protected or removed while in the shower. After the PAOLA is removed 7 days after surgery showering is permitted as long as there is no drainage from the wound.     Restrictions  Weight: It is ok to allow full weight bearing after surgery. Weight on the leg actually quickens the recovery process. While it will be sore/painful to put weight on the leg, it is safe to do so. Hip replacement after hip fracture has a much slower recover process. It  can take months to heal fully from a hip fracture and patients even make some slow benefits up to a year afterwards.   Driving: Many patients have questions about when it is safe to return to driving. The answer is that this is extremely variable. It depends on the extent of the surgery, as well as how quickly you heal. Certainly left leg surgeries make returning to driving easier while right leg surgeries require more extensive rehabilitation before driving can be safe. Until you can press down on the brake hard, and are off of all narcotics, driving is not permitted. Your surgeon cannot “clear” you to return to driving, only you can make the decision when you feel it is safe.    Medications  Anticoagulants: After upper extremity surgery most patients do not require an anticoagulant unless you have another injury that will be keeping you from mobilizing. Lower extremity surgery typically does require use of an anticoagulation medicine.   IF YOU HAD LOWER EXTREMITY SURGERY AND ARE NOT DISCHARGED HOME WITH ANY ANTICOAGULANT MEDICINE YOU SHOULD TAKE ASPIRIN 325mg DAILY FOR 30 DAYS POSTOPERATIVELY.  If you are discharged home with an anticoagulant such as Aspirin, Xarelto, Eliquis, Coumadin, or Lovenox, follow these simple instructions:   Notify surgeon immediately if any mckenzie bleeding is noted in the urine, stool, emesis, or from the nose or the incision. Blood in the stool will often appear as black rather than red. Blood in urine may appear as pink. Blood in emesis may appear as brown/black like coffee grounds.  You will need to apply pressure for longer periods of time to any cuts or abrasions to stop bleeding  Avoid alcohol while taking anticoagulants  Most anticoagulants are to be taken for 30 days postoperatively. After this time, you may stop using them unless instructed otherwise.   If you were already taking an anticoagulant (commonly Aspirin, Coumadin, or Plavix) you will likely be resuming your normal dose  postoperatively and will be continuing that medication at the discretion of the prescribing physician.  Stool Softeners: You will be at greater risk of constipation after surgery due to being less mobile and the pain medications.  Take stool softeners as needed. Over the counter Colace 100 mg 1-2 capsules twice daily can be taken.  If stools become too loose or too frequent, please decreases the dosage or stop the stool softener.  If constipation occurs despite use of stool softeners, you are to continue the stool softeners and add a laxative (Milk of Magnesia 1 ounce daily as needed)  Drink plenty of fluids, and eat fruits and vegetables during your recovery time. Getting up and mobilizing will help the bowels to recover their regular function, as will weaning off of all narcotics when the pain becomes tolerable.  Pain Medications: Utilized after surgery are narcotics. This is some general information about these medications.  CLASSIFICATION: Pain medications are called Opioids and are narcotics  LEGALITIES: It is illegal to share narcotics with others  DRIVING: it is illegal to drive while under the influence of narcotics. Doing so is a DUI.  POTENTIAL SIDE EFFECTS: nausea, vomiting, itching, dizziness, drowsiness, dry mouth, constipation, and difficulty urinating.  POTENTIAL ADVERSE EFFECTS:  Opioid tolerance can develop with use of pain medications and this simply means that it requires more and more of the medication to control pain. However, this is seen more in patients that use opioids for longer periods of time.  Opioid dependence can develop with use of Opioids. People with opioid dependence will experience withdrawal symptoms upon cessation of the medication.  Opioid addiction can develop with use of Opioids. The incidence of this is very unlikely in patients who take the medications as ordered and stop the medications as instructed.  Opioid overdose can be dangerous, but is unlikely when the medication  is taken as ordered and stopped when ordered. It is important not to mix opioids with alcohol as this can lead to over sedation and respiratory difficulty.  DOSAGE:  After the initial surgical pain begins to resolve, you may begin to decrease the pain medication. By the end of a few weeks, you should be off of pain medications.  Refills will not be given by the office during evening hours, on weekends, or after 6 weeks post-op. You are responsible for weaning off of pain medication. You can increase the time between narcotic pills, taking one every 4 then 6 then 8 hours and so on.  To seek refills on pain medications during the initial 6-week post-operative period, you must call the office to request the refill. The office will then notify you when to  the prescription. DO NOT wait until you are out of the medication to request a refill. Prescriptions will not be filled over the weekend and depending on the schedule, it may take a couple days for the prescription to be available. Someone will have to pick the prescription in person at the office.    FOLLOW-UP VISITS  You will need to follow up in the office with your surgeon in 3 weeks, or as instructed elsewhere in your discharge paperwork. Please call this number [phone] to schedule this appointment. If you are going to an SNF/SNU facility, they will arrange for you to follow up in the office.  If you have any concerns or suspected complications prior to your follow up visit, please call the office. Do not wait until your appointment time if you suspect complications. These will need to be addressed in the office promptly.      Ulises Chen II, MD  Orthopaedic Surgery  [facility]

## 2023-12-02 NOTE — THERAPY TREATMENT NOTE
"Subjective: Pt agreeable to therapeutic plan of care. Pt still confused but was able to cooperate some today and agreed to get to chair. Still hallucinating some and thought there was a cat in the corner of her room.    Objective:     Bed mobility - Mod-A and Assist x 2  Transfers - Mod-A and Max-A from the front to complete pivot. Attempted using 2 assist from sides but she would not even try to stand up.  Ambulation -  feet N/A or Not attempted.    Therapeutic Exercise - 5 Reps B LE AROM unsupported sitting / EOB    Vitals: WNL    Pain:  R arm and R knee with any movement  Intervention for pain: Repositioned, RN notified, Increased Activity, and Therapeutic Presence    Education: Provided education on the importance of mobility in the acute care setting, Verbal/Tactile Cues, Transfer Training, WB'ing status, and Post-Op Precautions    Assessment: Sandra Treadwell presents with functional mobility impairments which indicate the need for skilled intervention. Pt was able to sit EOB and be fed some ice cream and drink fluids. Still has sitter and very restless and at times agitated. Was able to get 0-96 degrees at  EOB with 12 deg ext lag. Tolerating session today without incident. Plans on rehab at [facility].Will continue to follow and progress as tolerated.     Plan/Recommendations:   Moderate Intensity Therapy recommended post-acute care. This is recommended as therapy feels the patient would require 3-4 days per week and wouldn't tolerate \"3 hour daily\" rehab intensity. SNF would be the preferred choice. If the patient does not agree to SNF, arrange HH or OP depending on home bound status. If patient is medically complex, consider LTACH.. Pt requires no DME at discharge.     Pt desires Skilled Rehab placement at discharge. Pt cooperative; agreeable to therapeutic recommendations and plan of care.         Basic Mobility 6-click:  Rollin = Total, A lot = 2, A little = 3; 4 = None  Supine>Sit:   1 = Total, A lot = " 2, A little = 3; 4 = None   Sit>Stand with arms:  1 = Total, A lot = 2, A little = 3; 4 = None  Bed>Chair:   1 = Total, A lot = 2, A little = 3; 4 = None  Ambulate in room:  1 = Total, A lot = 2, A little = 3; 4 = None  3-5 Steps with railin = Total, A lot = 2, A little = 3; 4 = None  Score: 11    Post-Tx Position: Up in Chair, Staff Present, Alarms activated, and Call light and personal items within reach  PPE: gloves

## 2023-12-02 NOTE — CASE MANAGEMENT/SOCIAL WORK
Continued Stay Note   [place]     Patient Name: Sandra Treadwell  MRN: [id number]  Today's Date: 12/2/2023    Admit Date: 11/24/2023    Plan: Referral to [facility] pending acceptance . No precert needed.PAS[facility] approved. Sitter at bedside. Must be sitter free to go to snf   Discharge Plan       Row Name 12/02/23 1644       Plan    Plan Comments Dc barrier: remains with a sitter.                      Expected Discharge Date and Time       Expected Discharge Date Expected Discharge Time    Dec 3, 2023               Karine Dowd RN

## 2023-12-02 NOTE — PROGRESS NOTES
[facility] [address] MEDICINE SERVICE  DAILY PROGRESS NOTE    NAME: Sandra Treadwell  : 1950  MRN: [id number]      LOS: 6 days     PROVIDER OF SERVICE: Jorje Sahni MD    Chief Complaint: Status post knee replacement    Subjective:     Interval History:  History taken from: patient  Patient Complaints:        73 y.o. Not  or  female who presents with End-stage arthritis of the right knee. They failed conservative treatment of their knee pain and a thorough discussion of the risks and benefits of surgery was had. The patient wishes to continue with elective total knee replacement, they were scheduled and are here for surgery. They did get medical clearance as well as a thorough preoperative workup.     Per the documentation by Dr Chen , dated 23 10AM, Patient with delirium that began last night.  Apparently she came to the unit and became acutely confused last night.  She then got up and had a fall directly onto her operative leg.  An x-ray was taken that was concerning for a possible fracture.  After review of the x-ray, I thought everything was okay but out of an abundance of caution ordered a CT scan just to be sure.  CT scan confirmed that everything is okay with the prosthetic and there is no concern for fracture.  She is okay to continue mobilization and physical therapy.  She was given some Ativan yesterday for agitation, but is quite lethargic this morning.  We are going to stop all Ativan at this point.  Plan is home discharge but only after her acute mental status change significantly improves and assuming she works well with physical therapy.  I have consulted the hospitalist service due to her acute mental status changes to make sure they have no further recommendations.       23 we were asked to see patient for medical management, patient is seen in bed NAD, no new complaints, is presently pleasantly confused, BP low at 80/40 fluid bolus ordered DW RN  2023  patient seen and examined in bed no acute distress, feeling better this morning.  Patient is awake alert oriented x 3.  Discussed with RN.  Echocardiogram being done now.Blood pressure 110/48.  11/27/2023 patient seen and examined in bed no acute distress, patient still confused.  Vital signs stable, troponin 118 from 70 yesterday, discussed with RN.  BNP 6219 will DC fluids.  11/28/23 patient seen and examined in bed no acute distress, family at bedside, discussed with RN.  Patient still confused.  Will obtain CT head.  Consulted neurology.  11/29/23 patient seen and examined in bed no acute distress, vital signs stable, patient still confused, sitter at bedside,  11/30/23 patient seen and examined in bed no acute distress, vital signs stable, patient still confused, discussed with RN.  Sitter at bedside.  12/1/23 patient seen and examined in bed no acute distress, sitter at bedside, patient still confused.  Vital signs are stable.  12/2/2023 patient seen and examined in bed no acute distress, still confused.  Sitter at bedside, discussed with RN.  Will need to be sitter free for 24 hours before transferring to nursing home.    Review of SystemsReview of Systems   Constitutional:  Negative for chills and fever.   HENT:  Negative for ear pain and hearing loss.    Respiratory:  Negative for apnea and shortness of breath.    Cardiovascular:  Negative for chest pain and palpitations.   Gastrointestinal:  Negative for diarrhea, nausea and vomiting.   Genitourinary:  Negative for dysuria.   Musculoskeletal:  Positive for arthralgias and gait problem. Negative for back pain and joint swelling.   Neurological:  Negative for seizures, light-headedness and headaches.   Psychiatric/Behavioral:  Positive for confusion. Negative for hallucinati    Objective:     Vital Signs  Temp:  [99 °F (37.2 °C)] 99 °F (37.2 °C)  Resp:  [16] 16  BP: (132)/(73) 132/73   Body mass index is 29.88 kg/m².    Physical ExamConstitutional:        General: She is not in acute distress.     Appearance: She is well-developed. She is not ill-appearing, toxic-appearing or diaphoretic.   HENT:      Head: Normocephalic and atraumatic.      Nose: Nose normal. No congestion or rhinorrhea.      Mouth/Throat:      Mouth: Mucous membranes are moist.      Pharynx: No oropharyngeal exudate.   Eyes:      General: No scleral icterus.        Right eye: No discharge.         Left eye: No discharge.      Extraocular Movements: Extraocular movements intact.      Pupils: Pupils are equal, round, and reactive to light.   Neck:      Thyroid: No thyromegaly.      Vascular: No carotid bruit or JVD.      Trachea: No tracheal deviation.   Cardiovascular:      Rate and Rhythm: Normal rate and regular rhythm.      Pulses: Normal pulses.      Heart sounds: Normal heart sounds. No murmur heard.     No friction rub. No gallop.   Pulmonary:      Effort: Pulmonary effort is normal. No respiratory distress.      Breath sounds: Normal breath sounds. No stridor. No wheezing, rhonchi or rales.   Chest:      Chest wall: No tenderness.   Abdominal:      General: Bowel sounds are normal. There is no distension.      Palpations: Abdomen is soft. There is no mass.      Tenderness: There is no abdominal tenderness. There is no guarding or rebound.      Hernia: No hernia is present.   Musculoskeletal:         General: Tenderness present. No swelling, deformity or signs of injury. Normal range of motion.      Cervical back: Normal range of motion and neck supple. No rigidity. No muscular tenderness.      Right lower leg: No edema.      Left lower leg: No edema.   Lymphadenopathy:      Cervical: No cervical adenopathy.   Skin:     General: Skin is warm and dry.      Coloration: Skin is not jaundiced or pale.      Findings: No bruising, erythema or rash.   Neurological:      General: No focal deficit present.      Mental Status: She is alert.      Cranial Nerves: No cranial nerve deficit.      Sensory: No  sensory deficit.      Motor: No weakness or abnormal muscle tone.      Coordination: Coordination normal.      Scheduled Meds   aspirin, 81 mg, Oral, Q12H  [Held by provider] atorvastatin, 10 mg, Oral, Daily  cefTRIAXone, 2,000 mg, Intravenous, Q24H  cyanocobalamin, 1,000 mcg, Intramuscular, Q28 Days  ferric gluconate, 250 mg, Intravenous, Once  folic acid, 1 mg, Oral, Daily  furosemide, 40 mg, Intravenous, Q12H  LORazepam, 1 mg, Oral, Once  oxybutynin XL, 15 mg, Oral, Daily  pantoprazole, 40 mg, Oral, Q AM  potassium chloride, 40 mEq, Oral, Q4H  sertraline, 50 mg, Oral, Nightly       PRN Meds     docusate sodium    HYDROcodone-acetaminophen    LORazepam    [DISCONTINUED] HYDROmorphone **AND** naloxone    ondansetron **OR** ondansetron    Potassium Replacement - Follow Nurse / BPA Driven Protocol    zolpidem   Infusions           Diagnostic Data    Results from last 7 days   Lab Units 12/02/23  0829   WBC 10*3/mm3 8.70   HEMOGLOBIN g/dL 9.5*   HEMATOCRIT % 29.5*   PLATELETS 10*3/mm3 265   GLUCOSE mg/dL 107*   CREATININE mg/dL 0.83   BUN mg/dL 19   SODIUM mmol/L 141   POTASSIUM mmol/L 3.1*   AST (SGOT) U/L 20   ALT (SGPT) U/L 9   ALK PHOS U/L 64   BILIRUBIN mg/dL 0.4   ANION GAP mmol/L 13.0       No radiology results for the last day      I reviewed the patient's new clinical results.    Assessment/Plan:     Active and Resolved Problems  Active Hospital Problems    Diagnosis  POA    **Status post knee replacement [Z96.659]  Not Applicable    Unilateral primary osteoarthritis, right knee [M17.11]  Yes      Resolved Hospital Problems   No resolved problems to display.     Metabolic encephalopathy-was combative 11/24/23, had to be restrained, now off restrain, likely secondary to anesthesia.  -continue supportive care  -UA-neg  -TSH-3.4  -Sats 89 on room air-  - d dimer-0.4  CT head, Mild age-related changes of the brain as above, otherwise without evidence of acute intracranial abnormalit   -psych/neurology  consult:Patient presents with signs and symptoms of delirium. Her QTC is prolonged at 545, so not appropriate for antipsychotics for management at this time      Hypotension-not on any BP meds/was hypotension in OR  -monitor CBC, BMP  -ortho discontinued Dilaudid and oral oxycodone   -fluid bolus given   -monitor BNP-1332>6219>13778  -DC NS   -Lasix IV     Elevated trop/elevated BNP  -EKG  -serial [id number]  -cardiology consulted  -echo Normal LV size and lower limits of normal contractility EF of 50%   Aortic valve leaflets are mildly thickened, have adequate cusp separation  Mitral valve, tricuspid valve appears structurally normal, mild mitral and trace tricuspid regurgitation seen.  Calculated RV systolic pressure 50 mmHg consistent with moderate pulm hypertension sent to.  -Continue lasix 40mg q12     Osteoarthritis-  S/p knee replacement-s/p fall after  surgery  -ortho following  -pain management   -pt/ot  -rehab on dc     Severe anemia-iron deficiency B12 deficient folate deficient  -monitor CBC-hemoglobin 7.7>9.5  -iron-24 > IV iron ordered  - B12: 272 will give vitamin B12  -folate-4.5 will replace     JULIA-back to baseline  Monitor renal function  Avoid hypotension  avoid nephrtoxins  Hold NSAIDS     Hx Depression on zoloft     Hypomagnesemia-replace and monitor    Large hiatal hernia containing the entire stomach as well as abdominal fat and a loop of colon. No distended bowel loops are seen within the hernia to indicate obstruction. There is suggestion of a gastric volvulus. Adjacent to the hernia there is   extensive partial right lower lobe atelectasis. There are also small layering bilateral pleural effusions.       DVT prophylaxis:  Mechanical DVT prophylaxis orders are present.     Code status is   Code Status and Medical Interventions:   Ordered at: 11/24/23 6635     Level Of Support Discussed With:    Patient     Code Status (Patient has no pulse and is not breathing):    CPR  (Attempt to Resuscitate)     Medical Interventions (Patient has pulse or is breathing):    Full       Plan for disposition:nh in 2 days    Time: 30 minutes    Signature: Electronically signed by Jorje Sahni MD, 12/02/23, 10:51 EST.  [facility] Hospitalist Team

## 2023-12-02 NOTE — PLAN OF CARE
Problem: Adult Inpatient Plan of Care  Goal: Absence of Hospital-Acquired Illness or Injury  Intervention: Prevent Skin Injury  Recent Flowsheet Documentation  Taken 12/1/2023 2000 by Cindy Meraz RN  Skin Protection: adhesive use limited     Problem: Adult Inpatient Plan of Care  Goal: Absence of Hospital-Acquired Illness or Injury  Intervention: Identify and Manage Fall Risk  Recent Flowsheet Documentation  Taken 12/1/2023 2000 by Cindy Meraz, RN  Safety Promotion/Fall Prevention:   activity supervised   safety round/check completed   nonskid shoes/slippers when out of bed   fall prevention program maintained     Problem: Bleeding (Knee Arthroplasty)  Goal: Absence of Bleeding  Outcome: Ongoing, Progressing   Goal Outcome Evaluation:  Plan of Care Reviewed With: patient        Progress: improving

## 2023-12-03 LAB
ANION GAP SERPL CALCULATED.3IONS-SCNC: 11 MMOL/L (ref 5–15)
BASOPHILS # BLD AUTO: 0.1 10*3/MM3 (ref 0–0.2)
BASOPHILS NFR BLD AUTO: 0.5 % (ref 0–1.5)
BUN SERPL-MCNC: 19 MG/DL (ref 8–23)
BUN/CREAT SERPL: 17.4 (ref 7–25)
CALCIUM SPEC-SCNC: 9.3 MG/DL (ref 8.6–10.5)
CHLORIDE SERPL-SCNC: 98 MMOL/L (ref 98–107)
CO2 SERPL-SCNC: 29 MMOL/L (ref 22–29)
CREAT SERPL-MCNC: 1.09 MG/DL (ref 0.57–1)
DEPRECATED RDW RBC AUTO: 49.9 FL (ref 37–54)
EGFRCR SERPLBLD CKD-EPI 2021: 54.4 ML/MIN/1.73
EOSINOPHIL # BLD AUTO: 0.2 10*3/MM3 (ref 0–0.4)
EOSINOPHIL NFR BLD AUTO: 2 % (ref 0.3–6.2)
ERYTHROCYTE [DISTWIDTH] IN BLOOD BY AUTOMATED COUNT: 15.8 % (ref 12.3–15.4)
GLUCOSE SERPL-MCNC: 101 MG/DL (ref 65–99)
HCT VFR BLD AUTO: 29.5 % (ref 34–46.6)
HGB BLD-MCNC: 9.5 G/DL (ref 12–15.9)
LYMPHOCYTES # BLD AUTO: 1.7 10*3/MM3 (ref 0.7–3.1)
LYMPHOCYTES NFR BLD AUTO: 15.5 % (ref 19.6–45.3)
MCH RBC QN AUTO: 27.3 PG (ref 26.6–33)
MCHC RBC AUTO-ENTMCNC: 32.3 G/DL (ref 31.5–35.7)
MCV RBC AUTO: 84.5 FL (ref 79–97)
MONOCYTES # BLD AUTO: 1 10*3/MM3 (ref 0.1–0.9)
MONOCYTES NFR BLD AUTO: 9.2 % (ref 5–12)
NEUTROPHILS NFR BLD AUTO: 7.8 10*3/MM3 (ref 1.7–7)
NEUTROPHILS NFR BLD AUTO: 72.8 % (ref 42.7–76)
NRBC BLD AUTO-RTO: 0.3 /100 WBC (ref 0–0.2)
NT-PROBNP SERPL-MCNC: ABNORMAL PG/ML (ref 0–900)
PLATELET # BLD AUTO: 232 10*3/MM3 (ref 140–450)
PMV BLD AUTO: 8.4 FL (ref 6–12)
POTASSIUM SERPL-SCNC: 3.5 MMOL/L (ref 3.5–5.2)
RBC # BLD AUTO: 3.48 10*6/MM3 (ref 3.77–5.28)
SODIUM SERPL-SCNC: 138 MMOL/L (ref 136–145)
WBC NRBC COR # BLD AUTO: 10.7 10*3/MM3 (ref 3.4–10.8)

## 2023-12-03 PROCEDURE — 97530 THERAPEUTIC ACTIVITIES: CPT

## 2023-12-03 PROCEDURE — 85025 COMPLETE CBC W/AUTO DIFF WBC: CPT | Performed by: INTERNAL MEDICINE

## 2023-12-03 PROCEDURE — 80048 BASIC METABOLIC PNL TOTAL CA: CPT | Performed by: INTERNAL MEDICINE

## 2023-12-03 PROCEDURE — 25010000002 LORAZEPAM PER 2 MG

## 2023-12-03 PROCEDURE — 25010000002 CALCIUM GLUCONATE 2-0.675 GM/100ML-% SOLUTION: Performed by: INTERNAL MEDICINE

## 2023-12-03 PROCEDURE — 99232 SBSQ HOSP IP/OBS MODERATE 35: CPT

## 2023-12-03 PROCEDURE — 25010000002 CEFTRIAXONE PER 250 MG: Performed by: NURSE PRACTITIONER

## 2023-12-03 PROCEDURE — 25010000002 FUROSEMIDE PER 20 MG: Performed by: INTERNAL MEDICINE

## 2023-12-03 RX ORDER — HYDROCODONE BITARTRATE AND ACETAMINOPHEN 5; 325 MG/1; MG/1
1 TABLET ORAL EVERY 4 HOURS PRN
Qty: 50 TABLET | Refills: 0 | Status: SHIPPED | OUTPATIENT
Start: 2023-12-03

## 2023-12-03 RX ORDER — CYANOCOBALAMIN 1000 UG/ML
1000 INJECTION, SOLUTION INTRAMUSCULAR; SUBCUTANEOUS
Qty: 30 ML | Refills: 0 | Status: SHIPPED | OUTPATIENT
Start: 2023-12-25

## 2023-12-03 RX ORDER — ONDANSETRON 4 MG/1
4 TABLET, FILM COATED ORAL EVERY 6 HOURS PRN
Qty: 30 TABLET | Refills: 0 | Status: SHIPPED | OUTPATIENT
Start: 2023-12-03

## 2023-12-03 RX ORDER — LORAZEPAM 0.5 MG/1
0.5 TABLET ORAL EVERY 6 HOURS PRN
Qty: 12 TABLET | Refills: 0 | Status: SHIPPED | OUTPATIENT
Start: 2023-12-03 | End: 2023-12-10

## 2023-12-03 RX ORDER — LORAZEPAM 0.5 MG/1
0.5 TABLET ORAL EVERY 6 HOURS PRN
Status: DISCONTINUED | OUTPATIENT
Start: 2023-12-03 | End: 2023-12-04 | Stop reason: HOSPADM

## 2023-12-03 RX ORDER — CALCIUM GLUCONATE 20 MG/ML
2000 INJECTION, SOLUTION INTRAVENOUS ONCE
Status: COMPLETED | OUTPATIENT
Start: 2023-12-03 | End: 2023-12-03

## 2023-12-03 RX ORDER — MEGESTROL ACETATE 40 MG/ML
400 SUSPENSION ORAL DAILY
Status: DISCONTINUED | OUTPATIENT
Start: 2023-12-03 | End: 2023-12-04 | Stop reason: HOSPADM

## 2023-12-03 RX ORDER — FUROSEMIDE 40 MG/1
40 TABLET ORAL
Status: DISCONTINUED | OUTPATIENT
Start: 2023-12-03 | End: 2023-12-04 | Stop reason: HOSPADM

## 2023-12-03 RX ORDER — FOLIC ACID 1 MG/1
1 TABLET ORAL DAILY
Qty: 30 TABLET | Refills: 0 | Status: SHIPPED | OUTPATIENT
Start: 2023-12-04

## 2023-12-03 RX ORDER — POTASSIUM CHLORIDE 20 MEQ/1
40 TABLET, EXTENDED RELEASE ORAL EVERY 4 HOURS
Qty: 4 TABLET | Refills: 0 | Status: DISPENSED | OUTPATIENT
Start: 2023-12-03 | End: 2023-12-04

## 2023-12-03 RX ORDER — FUROSEMIDE 40 MG/1
40 TABLET ORAL 2 TIMES DAILY
Qty: 30 TABLET | Refills: 0 | Status: SHIPPED | OUTPATIENT
Start: 2023-12-03

## 2023-12-03 RX ORDER — ASPIRIN 81 MG/1
81 TABLET ORAL EVERY 12 HOURS
Qty: 60 TABLET | Refills: 0 | Status: SHIPPED | OUTPATIENT
Start: 2023-12-03 | End: 2024-01-02

## 2023-12-03 RX ORDER — POTASSIUM CHLORIDE 750 MG/1
20 TABLET, FILM COATED, EXTENDED RELEASE ORAL 2 TIMES DAILY
Qty: 30 TABLET | Refills: 0 | Status: SHIPPED | OUTPATIENT
Start: 2023-12-03

## 2023-12-03 RX ORDER — CEFDINIR 300 MG/1
300 CAPSULE ORAL 2 TIMES DAILY
Qty: 10 CAPSULE | Refills: 0 | Status: SHIPPED | OUTPATIENT
Start: 2023-12-03

## 2023-12-03 RX ADMIN — PANTOPRAZOLE SODIUM 40 MG: 40 TABLET, DELAYED RELEASE ORAL at 05:25

## 2023-12-03 RX ADMIN — MEGESTROL ACETATE 400 MG: 400 SUSPENSION ORAL at 14:44

## 2023-12-03 RX ADMIN — SERTRALINE HYDROCHLORIDE 50 MG: 50 TABLET ORAL at 19:33

## 2023-12-03 RX ADMIN — FUROSEMIDE 40 MG: 40 TABLET ORAL at 17:25

## 2023-12-03 RX ADMIN — ASPIRIN 81 MG: 81 TABLET, COATED ORAL at 08:09

## 2023-12-03 RX ADMIN — CALCIUM GLUCONATE 2000 MG: 20 INJECTION, SOLUTION INTRAVENOUS at 13:44

## 2023-12-03 RX ADMIN — FUROSEMIDE 40 MG: 10 INJECTION, SOLUTION INTRAMUSCULAR; INTRAVENOUS at 08:08

## 2023-12-03 RX ADMIN — FOLIC ACID 1 MG: 1 TABLET ORAL at 08:09

## 2023-12-03 RX ADMIN — LORAZEPAM 0.5 MG: 2 INJECTION INTRAMUSCULAR; INTRAVENOUS at 11:04

## 2023-12-03 RX ADMIN — CEFTRIAXONE 2000 MG: 2 INJECTION, POWDER, FOR SOLUTION INTRAMUSCULAR; INTRAVENOUS at 23:30

## 2023-12-03 RX ADMIN — ZOLPIDEM TARTRATE 10 MG: 5 TABLET ORAL at 19:32

## 2023-12-03 RX ADMIN — ASPIRIN 81 MG: 81 TABLET, COATED ORAL at 19:33

## 2023-12-03 RX ADMIN — LORAZEPAM 0.5 MG: 0.5 TABLET ORAL at 19:32

## 2023-12-03 RX ADMIN — OXYBUTYNIN CHLORIDE 15 MG: 5 TABLET, EXTENDED RELEASE ORAL at 08:09

## 2023-12-03 RX ADMIN — HYDROCODONE BITARTRATE AND ACETAMINOPHEN 1 TABLET: 5; 325 TABLET ORAL at 05:25

## 2023-12-03 RX ADMIN — POTASSIUM CHLORIDE 40 MEQ: 1500 TABLET, EXTENDED RELEASE ORAL at 17:25

## 2023-12-03 NOTE — CASE MANAGEMENT/SOCIAL WORK
Continued Stay Note   [place]     Patient Name: Sandra Treadwell  MRN: [id number]  Today's Date: 12/3/2023    Admit Date: 11/24/2023    Plan: [facility] pending accept.   (Need Pharm updated, once accepted.).  No precert required.  PASRR approved   Discharge Plan       Row Name 12/03/23 1551       Plan    Plan [facility] pending accept.   (Need Pharm updated, once accepted.).  No precert required.  PASRR approved    Plan Comments pt has been sitter free.  Verified with [facility] that [facility] and no anticipated beds.  Metwith spouse at bedside and obtained additional choices of either [facility] or [facility] in [place].  referral sent in [facility] and text sent to Stefania Sheehan along with information that pt is ready for discharge.  pending her response.  SItter free as of 12/2 @ 1500.  Dc orders noted.  PT recieved IV electrolyte replacement.  spouse voices concerns of pt's lack of appetite-stimulant started.                      Expected Discharge Date and Time       Expected Discharge Date Expected Discharge Time    Dec 3, 2023               Karine Dowd RN

## 2023-12-03 NOTE — DISCHARGE SUMMARY
Orthopaedic Discharge Summary  Dr. KERR “Jose” [facility]  [phone]    NAME: Snadra DERRELL Treadwell PCP: Wm Aguirre MD   :  MRN: [id number]  [id number] LOS:  ADMIT: 8 days  2023   AGE/SEX: 73 y.o. female DC:  today             Admitting Diagnosis: Unilateral primary osteoarthritis, right knee [M17.11]  Status post knee replacement [Z96.659]    Surgery Performed: MO ARTHRP KNE CONDYLE&PLATU MEDIAL&LAT COMPARTMENTS [24146] (TOTAL KNEE ARTHROPLASTY WITH CORI ROBOT)    Discharge Medications:         Discharge Medications        New Medications        Instructions Start Date   aspirin 81 MG EC tablet   81 mg, Oral, Every 12 Hours      cefdinir 300 MG capsule  Commonly known as: OMNICEF   300 mg, Oral, 2 Times Daily      cyanocobalamin 1000 MCG/ML injection   1,000 mcg, Intramuscular, Every 28 Days   Start Date: 2023     folic acid 1 MG tablet  Commonly known as: FOLVITE   1 mg, Oral, Daily      furosemide 40 MG tablet  Commonly known as: LASIX   40 mg, Oral, 2 Times Daily      HYDROcodone-acetaminophen 5-325 MG per tablet  Commonly known as: NORCO   1 tablet, Oral, Every 4 Hours PRN      LORazepam 0.5 MG tablet  Commonly known as: ATIVAN   0.5 mg, Oral, Every 6 Hours PRN      ondansetron 4 MG tablet  Commonly known as: Zofran   4 mg, Oral, Every 6 Hours PRN      potassium chloride 10 MEQ CR tablet   20 mEq, Oral, 2 Times Daily             Continue These Medications        Instructions Start Date   alendronate 70 MG tablet  Commonly known as: FOSAMAX   1 tablet, Oral, Weekly, Sun       atorvastatin 10 MG tablet  Commonly known as: LIPITOR   1 tablet, Oral, Daily      cetirizine 10 MG tablet  Commonly known as: zyrTEC   1 tablet, Oral, Daily      fluticasone 50 MCG/ACT nasal spray  Commonly known as: FLONASE   2 sprays, Each Nare, Daily      omeprazole 40 MG capsule  Commonly known as: priLOSEC   1 capsule, Oral, Daily      oxybutynin XL 15 MG 24 hr tablet  Commonly known as: DITROPAN XL   1  tablet, Oral, Daily      OYSCO 500 + D 500-5 MG-MCG tablet per tablet  Generic drug: Calcium Carb-Cholecalciferol   1 tablet, Oral, 2 Times Daily      sertraline 50 MG tablet  Commonly known as: ZOLOFT   1 tablet, Oral, Nightly      zolpidem 10 MG tablet  Commonly known as: AMBIEN   1 tablet, Oral, Nightly PRN               Vitals:     Vitals:    12/03/23 1224 12/03/23 2124 12/04/23 0507 12/04/23 0900   BP: 96/55 132/77 97/64    BP Location: Right arm Left arm Left arm    Patient Position: Lying Lying Lying    Pulse: 80 93 90    Resp: 14 15 13    Temp: 97.5 °F (36.4 °C) 98.2 °F (36.8 °C) 98 °F (36.7 °C)    TempSrc: Axillary Oral Oral    SpO2: 92% 94% 93%    Weight:    67.2 kg (148 lb 2.4 oz)   Height:           Labs:      No results displayed because visit has over 200 results.           No results found.    Hospital Course:   73 y.o. female was admitted to [facility] to services of Ulises Chen II, MD with Unilateral primary osteoarthritis, right knee [M17.11]  Status post knee replacement [Z96.659] on 11/24/2023 and underwent ID ARTHRP KNE CONDYLE&PLATU MEDIAL&LAT COMPARTMENTS [99321] (TOTAL KNEE ARTHROPLASTY WITH CORI ROBOT). Post-operatively the patient transferred to the floor where the patient underwent mobilization therapy. Opioids were titrated to achieve appropriate pain management to allow for participation in mobilization exercises. Vital signs and laboratory values are now within safe parameters for discharge. The dressings and/or incision is intact without signs or symptoms of active infection. Operative extremity neurovascular status remains intact as compared to the preoperative exam. Appropriate education re: incision care, activity levels, medications, and follow up visits was completed and all questions were answered. The patient is now deemed stable for discharge.  She had significant problems after surgery with mentation.  She became quite confused and agitated.  She required a  "sitter for about a week and a half.  Eventually, her cognition improved and she was deemed stable for discharge to SNF.  Please see the medical notes for details of her stay.    SNF: The patient had a difficult time mobilizing sufficiently with physical therapy. Further rehabilitation was recommended in a SNF facility. Once a bed was available, and the patient was cleared, there were discharged to the SNF in good condition}.       R \"Jose\" April ORTEGA MD  Orthopaedic Surgery  [facility]  [phone]                                               "

## 2023-12-03 NOTE — PROGRESS NOTES
[facility] MEDICINE SERVICE  DAILY PROGRESS NOTE    NAME: Sandra Treadwell  : 1950  MRN: [id number]      LOS: 7 days     PROVIDER OF SERVICE: Jorje Sahni MD    Chief Complaint: Status post knee replacement    Subjective:     Interval History:  History taken from: patient  Patient Complaints:        73 y.o. Not  or  female who presents with End-stage arthritis of the right knee. They failed conservative treatment of their knee pain and a thorough discussion of the risks and benefits of surgery was had. The patient wishes to continue with elective total knee replacement, they were scheduled and are here for surgery. They did get medical clearance as well as a thorough preoperative workup.     Per the documentation by Dr Chen , dated 23 10AM, Patient with delirium that began last night.  Apparently she came to the unit and became acutely confused last night.  She then got up and had a fall directly onto her operative leg.  An x-ray was taken that was concerning for a possible fracture.  After review of the x-ray, I thought everything was okay but out of an abundance of caution ordered a CT scan just to be sure.  CT scan confirmed that everything is okay with the prosthetic and there is no concern for fracture.  She is okay to continue mobilization and physical therapy.  She was given some Ativan yesterday for agitation, but is quite lethargic this morning.  We are going to stop all Ativan at this point.  Plan is home discharge but only after her acute mental status change significantly improves and assuming she works well with physical therapy.  I have consulted the hospitalist service due to her acute mental status changes to make sure they have no further recommendations.       23 we were asked to see patient for medical management, patient is seen in bed NAD, no new complaints, is presently pleasantly confused, BP low at 80/40 fluid bolus ordered DW RN  2023  patient seen and examined in bed no acute distress, feeling better this morning.  Patient is awake alert oriented x 3.  Discussed with RN.  Echocardiogram being done now.Blood pressure 110/48.  11/27/2023 patient seen and examined in bed no acute distress, patient still confused.  Vital signs stable, troponin 118 from 70 yesterday, discussed with RN.  BNP 6219 will DC fluids.  11/28/23 patient seen and examined in bed no acute distress, family at bedside, discussed with RN.  Patient still confused.  Will obtain CT head.  Consulted neurology.  11/29/23 patient seen and examined in bed no acute distress, vital signs stable, patient still confused, sitter at bedside,  11/30/23 patient seen and examined in bed no acute distress, vital signs stable, patient still confused, discussed with RN.  Sitter at bedside.  12/1/23 patient seen and examined in bed no acute distress, sitter at bedside, patient still confused.  Vital signs are stable.  12/2/2023 patient seen and examined in bed no acute distress, still confused.  Sitter at bedside, discussed with RN.  Will need to be sitter free for 24 hours before transferring to nursing home.  12/3/2023 patient seen and examined in bed no acute distress, patient still confused, sitter was DC, pending placement.  Discussed with RN.  Labs are pending.    Review of SystemsReview of Systems   Constitutional:  Negative for chills and fever.   HENT:  Negative for ear pain and hearing loss.    Respiratory:  Negative for apnea and shortness of breath.    Cardiovascular:  Negative for chest pain and palpitations.   Gastrointestinal:  Negative for diarrhea, nausea and vomiting.   Genitourinary:  Negative for dysuria.   Musculoskeletal:  Positive for arthralgias and gait problem. Negative for back pain and joint swelling.   Neurological:  Negative for seizures, light-headedness and headaches.   Psychiatric/Behavioral:  Positive for confusion. Negative for hallucinati    Objective:     Vital  Signs  Temp:  [97.5 °F (36.4 °C)-98.6 °F (37 °C)] 97.5 °F (36.4 °C)  Heart Rate:  [80-93] 80  Resp:  [12-14] 14  BP: ()/(55-58) 96/55   Body mass index is 29.88 kg/m².    Physical ExamConstitutional:       General: She is not in acute distress.     Appearance: She is well-developed. She is not ill-appearing, toxic-appearing or diaphoretic.   HENT:      Head: Normocephalic and atraumatic.      Nose: Nose normal. No congestion or rhinorrhea.      Mouth/Throat:      Mouth: Mucous membranes are moist.      Pharynx: No oropharyngeal exudate.   Eyes:      General: No scleral icterus.        Right eye: No discharge.         Left eye: No discharge.      Extraocular Movements: Extraocular movements intact.      Pupils: Pupils are equal, round, and reactive to light.   Neck:      Thyroid: No thyromegaly.      Vascular: No carotid bruit or JVD.      Trachea: No tracheal deviation.   Cardiovascular:      Rate and Rhythm: Normal rate and regular rhythm.      Pulses: Normal pulses.      Heart sounds: Normal heart sounds. No murmur heard.     No friction rub. No gallop.   Pulmonary:      Effort: Pulmonary effort is normal. No respiratory distress.      Breath sounds: Normal breath sounds. No stridor. No wheezing, rhonchi or rales.   Chest:      Chest wall: No tenderness.   Abdominal:      General: Bowel sounds are normal. There is no distension.      Palpations: Abdomen is soft. There is no mass.      Tenderness: There is no abdominal tenderness. There is no guarding or rebound.      Hernia: No hernia is present.   Musculoskeletal:         General: Tenderness present. No swelling, deformity or signs of injury. Normal range of motion.      Cervical back: Normal range of motion and neck supple. No rigidity. No muscular tenderness.      Right lower leg: No edema.      Left lower leg: No edema.   Lymphadenopathy:      Cervical: No cervical adenopathy.   Skin:     General: Skin is warm and dry.      Coloration: Skin is not  jaundiced or pale.      Findings: No bruising, erythema or rash.   Neurological:      General: No focal deficit present.      Mental Status: She is alert.      Cranial Nerves: No cranial nerve deficit.      Sensory: No sensory deficit.      Motor: No weakness or abnormal muscle tone.      Coordination: Coordination normal.      Scheduled Meds   aspirin, 81 mg, Oral, Q12H  [Held by provider] atorvastatin, 10 mg, Oral, Daily  cefTRIAXone, 2,000 mg, Intravenous, Q24H  cyanocobalamin, 1,000 mcg, Intramuscular, Q28 Days  ferric gluconate, 250 mg, Intravenous, Once  folic acid, 1 mg, Oral, Daily  furosemide, 40 mg, Intravenous, Q12H  oxybutynin XL, 15 mg, Oral, Daily  pantoprazole, 40 mg, Oral, Q AM  sertraline, 50 mg, Oral, Nightly       PRN Meds     docusate sodium    LORazepam    [DISCONTINUED] HYDROmorphone **AND** naloxone    ondansetron **OR** ondansetron    Potassium Replacement - Follow Nurse / BPA Driven Protocol    zolpidem   Infusions           Diagnostic Data    Results from last 7 days   Lab Units 12/02/23  0829   WBC 10*3/mm3 8.70   HEMOGLOBIN g/dL 9.5*   HEMATOCRIT % 29.5*   PLATELETS 10*3/mm3 265   GLUCOSE mg/dL 107*   CREATININE mg/dL 0.83   BUN mg/dL 19   SODIUM mmol/L 141   POTASSIUM mmol/L 3.1*   AST (SGOT) U/L 20   ALT (SGPT) U/L 9   ALK PHOS U/L 64   BILIRUBIN mg/dL 0.4   ANION GAP mmol/L 13.0       No radiology results for the last day      I reviewed the patient's new clinical results.    Assessment/Plan:     Active and Resolved Problems  Active Hospital Problems    Diagnosis  POA    **Status post knee replacement [Z96.659]  Not Applicable    Unilateral primary osteoarthritis, right knee [M17.11]  Yes      Resolved Hospital Problems   No resolved problems to display.     Metabolic encephalopathy-delirium, was combative 11/24/23, had to be restrained, now off restrain, likely secondary to anesthesia.  -continue supportive care  -UA-neg  -TSH-3.4  -Sats 89 on room air-Monitor  -D dimer-0.4  -CT head,  Mild age-related changes of the brain as above, otherwise without evidence of acute intracranial abnormalit   -psych/neurology consult:Patient presents with signs and symptoms of delirium. Her QTC is prolonged at 545, so not appropriate for antipsychotics for management at this time      Hypotension-not on any BP meds/was hypotension in OR  -monitor CBC, BMP  -ortho discontinued Dilaudid and oral oxycodone   -fluid bolus given   -monitor BNP-1332>6219>96743  -DC NS   -Lasix IV>PO     Elevated trop/elevated BNP/CHFpEF acute on chronic  -EKG  -serial oulc-[id number]-  -cardiology consulted  -echo Normal LV size and lower limits of normal contractility EF of 50%   Aortic valve leaflets are mildly thickened, have adequate cusp separation  Mitral valve, tricuspid valve appears structurally normal, mild mitral and trace tricuspid regurgitation seen.  Calculated RV systolic pressure 50 mmHg consistent with moderate pulm hypertension sent to.  -Continue lasix 40mg q12     Osteoarthritis-  S/p knee replacement-s/p fall after  surgery  -ortho following  -pain management   -pt/ot  -rehab on dc     Severe anemia-iron deficiency B12 deficient folate deficient  -monitor CBC-hemoglobin 7.7>9.5  -iron-24 > IV iron ordered  - B12: 272 will give vitamin B12  -folate-4.5 will replace     JULIA-back to baseline  Monitor renal function  Avoid hypotension  avoid nephrtoxins  Hold NSAIDS     Hx Depression on zoloft     Hypomagnesemia-replace and monitor    Large hiatal hernia containing the entire stomach as well as abdominal fat and a loop of colon. No distended bowel loops are seen within the hernia to indicate obstruction. There is suggestion of a gastric volvulus. Adjacent to the hernia there is   extensive partial right lower lobe atelectasis. There are also small layering bilateral pleural effusions.     Hypokalemia, replace and monitor    DVT prophylaxis:  Mechanical DVT prophylaxis orders are present.     Code status is    Code Status and Medical Interventions:   Ordered at: 11/24/23 0732     Level Of Support Discussed With:    Patient     Code Status (Patient has no pulse and is not breathing):    CPR (Attempt to Resuscitate)     Medical Interventions (Patient has pulse or is breathing):    Full       Plan for disposition:nh in 2 days    Time: 30 minutes    Signature: Electronically signed by Jorje Sahni MD, 12/03/23, 12:59 EST.  [facility] Hospitalist Team

## 2023-12-03 NOTE — THERAPY TREATMENT NOTE
"Subjective: Pt agreeable to therapeutic plan of care.Pt stated she needed to get up and get ready because she wanted to go home. Still hallucinating some but can redirect easily most of the time.    Objective:     Bed mobility - Min-A  Transfers - Mod-A and Max-A with assist from the front WBAT RLE  Ambulation -  feet N/A or Not attempted.    Vitals: WNL    Pain:  expresses pain when trying to weight bear  Intervention for pain: RN notified and Therapeutic Presence    Education: Provided education on the importance of mobility in the acute care setting, Verbal/Tactile Cues, Transfer Training, WB'ing status, and Post-Op Precautions    Assessment: Sandra Treadwell presents with functional mobility impairments which indicate the need for skilled intervention. Tolerating session today without incident. Still confused and difficulty following directions. Thinks she can transfer herself and trying to get up to get ready so she can go home. Wasn't safe to try using rwx yet. Still needs mod/max assist to assist with transfer from front to complete pivoting. Plans on dc to rehab.Will continue to follow and progress as tolerated.     Plan/Recommendations:   Moderate Intensity Therapy recommended post-acute care. This is recommended as therapy feels the patient would require 3-4 days per week and wouldn't tolerate \"3 hour daily\" rehab intensity. SNF would be the preferred choice. If the patient does not agree to SNF, arrange HH or OP depending on home bound status. If patient is medically complex, consider LTACH.. Pt requires no DME at discharge.     Pt desires Skilled Rehab placement at discharge. Pt cooperative; agreeable to therapeutic recommendations and plan of care.         Basic Mobility 6-click:  Rollin = Total, A lot = 2, A little = 3; 4 = None  Supine>Sit:   1 = Total, A lot = 2, A little = 3; 4 = None   Sit>Stand with arms:  1 = Total, A lot = 2, A little = 3; 4 = None  Bed>Chair:   1 = Total, A lot = 2, A " little = 3; 4 = None  Ambulate in room:  1 = Total, A lot = 2, A little = 3; 4 = None  3-5 Steps with railin = Total, A lot = 2, A little = 3; 4 = None  Score: 12      Post-Tx Position: Up in Chair, Staff Present, Alarms activated, and Call light and personal items within reach  PPE: gloves

## 2023-12-03 NOTE — DISCHARGE PLACEMENT REQUEST
"Hamzah Quintana (73 y.o. Female)       Date of Birth   1950    Social Security Number       Address   [address] [place] [address] [place] [facility]    Home Phone   [phone]    MRN   [id number]       Anglican   None    Marital Status                               Admission Date   11/24/23    Admission Type   Elective    Admitting Provider   Ulises Chen II, MD    Attending Provider   Ulises Chen II, MD    Department, Room/Bed   [facility] SURGICAL INPATIENT, 4103/1       Discharge Date       Discharge Disposition       Discharge Destination                                 Attending Provider: Ulises Chen II, MD    Allergies: Codeine    Isolation: None   Infection: None   Code Status: CPR    Ht: 152.4 cm (60\")   Wt: 69.4 kg (153 lb)    Admission Cmt: None   Principal Problem: Status post knee replacement [Z96.659]                   Active Insurance as of 11/24/2023       Primary Coverage       Payor Plan Insurance Group Employer/Plan Group    MEDICARE MEDICARE A & B        Payor Plan Address Payor Plan Phone Number Payor Plan Fax Number Effective Dates    [address] [phone]  4/1/2017 - None Entered    [facility] [id number]         Subscriber Name Subscriber Birth Date Member ID       HAMZAH QUINTANA 1950 [id number]               Secondary Coverage       Payor Plan Insurance Group Employer/Plan Group    [employer]ET[employer] COMMERCIAL A[employer] HE[employer] AND LIFE  SUP               Payor Plan Address Payor Plan Phone Number Payor Plan Fax Number Effective Dates    [address] [address]   4/1/2017 - None Entered    [facility] [id number]         Subscriber Name Subscriber Birth Date Member ID       HAMZAH QUINTANA 1950 [id number]                     Emergency Contacts        (Rel.) Home Phone Work Phone Mobile Phone    SKINNY QUINTANA (Spouse) -- -- [phone]                 History & Physical        Ulises Chen II, MD at 11/24/23 0730        "     Orthopaedic Surgery  History & Physical For Elective Total Knee  Dr. USHA Chen II  [phone]    HPI:  Patient is a 73 y.o. Not  or  female who presents with End-stage arthritis of the right knee. They failed conservative treatment of their knee pain and a thorough discussion of the risks and benefits of surgery was had. The patient wishes to continue with elective total knee replacement, they were scheduled and are here for surgery. They did get medical clearance as well as a thorough preoperative workup.    MEDICAL HISTORY  Past Medical History:   Diagnosis Date    Bladder leak     Breast cancer     chemo and radiation    Depression     GERD (gastroesophageal reflux disease)     Hyperlipidemia     Osteoporosis      Past Surgical History:   Procedure Laterality Date    BREAST SURGERY      mastectomy    CHOLECYSTECTOMY      HYSTERECTOMY       Prior to Admission medications    Medication Sig Start Date End Date Taking? Authorizing Provider   alendronate (FOSAMAX) 70 MG tablet Take 1 tablet by mouth 1 (One) Time Per Week. Sun 7/24/23   Ramy Swann MD   atorvastatin (LIPITOR) 10 MG tablet Take 1 tablet by mouth Daily. 8/9/23   Ramy Swann MD   cetirizine (zyrTEC) 10 MG tablet Take 1 tablet by mouth Daily. 9/6/23   Ramy Swann MD   fluticasone (FLONASE) 50 MCG/ACT nasal spray 2 sprays by Each Nare route Daily. 8/4/23   Ramy Swann MD   omeprazole (priLOSEC) 40 MG capsule Take 1 capsule by mouth Daily. 8/17/23   Ramy Swann MD   oxybutynin XL (DITROPAN XL) 15 MG 24 hr tablet Take 1 tablet by mouth Daily. 7/18/23   Ramy Swann MD   OYSCO 500 + D 500-5 MG-MCG tablet per tablet Take 1 tablet by mouth 2 (Two) Times a Day.    Ramy Swann MD   sertraline (ZOLOFT) 50 MG tablet Take 1 tablet by mouth Every Night.    Ramy Swann MD   zolpidem (AMBIEN) 10 MG tablet Take 1 tablet by mouth At Night As Needed.    Hue  Historical, MD     Allergies   Allergen Reactions    Codeine Nausea And Vomiting and Dizziness     Most Recent Immunizations   Administered Date(s) Administered    COVID-19 (MODERNA) 1st,2nd,3rd Dose Monovalent 03/22/2021    COVID-19 (MODERNA) BIVALENT 12+YRS 10/12/2022    COVID-19 (MODERNA) Monovalent Original Booster 11/22/2021     Social History     Tobacco Use    Smoking status: Never    Smokeless tobacco: Never   Substance Use Topics    Alcohol use: Not on file      Social History     Substance and Sexual Activity   Drug Use Never       REVIEW OF SYSTEMS:  Head: negative for headache  Respiratory: negative for shortness of breath.   Cardiovascular: negative for chest pain.   Gastrointestinal: negative abdominal pain.   Neurological: negative for LOC  Psychiatric/Behavioral: negative for memory loss.   All other systems reviewed and are negative    VITALS: There were no vitals taken for this visit. There is no height or weight on file to calculate BMI.    PHYSICAL EXAM:   CONSTITUTIONAL: A&Ox3, No acute distress  LUNGS: Equal chest rise, no shortness of air  CARDIOVASCULAR: palpable peripheral pulses  SKIN: no skin lesions in the area examined  LYMPH: no lymphadenopathy in the area examined  EXTREMITY: Knee  Pulses:  Brisk Capillary Refill  Sensation: Intact to Saphenous, Sural, Deep Peroneal, Superficial Peroneal, and Tibial Nerves and grossly throughout extremity  Motor: 5/5 EHL/FHL/TA/GS motor complexes    RADIOLOGY REVIEW:   No radiology results for the last 7 days    LABS:   Results for the past 24 hours: No results found for this or any previous visit (from the past 24 hour(s)).    IMPRESSION:  Patient is a 73 y.o. Not  or  female with end-stage arthritis of the right knee    PLAN:   Surgery: Elective total knee arthroplasty  Consent: The risks and benefits of operative versus nonoperative treatment were discussed. The patient elected to undergo operative treatment of their knee arthritis.  The risks discussed included but were not limited to blood clots, MI, stroke, other medical complications, infection, damage to neurovascular structures, continued pain, hardware prominence, loss of range of motion, need for further procedures, and and risk of anesthesia..  No guarantees were made   Disposition: Elective right Total Knee Arthroplasty today.    Ulises Chen II, MD  Orthopaedic Surgery  [facility]      Electronically signed by Ulises Chen II, MD at 23 0732       H&P signed by [employer], [place] at 23 0953         [Media Unavailable] Scan on 2023 0927 by [employer], [place]: H&P, [facility], 2023          Electronically signed by [employer], [place] at 23 0953          Physician Progress Notes (last 24 hours)        Jorje Sahni MD at 23 1259              [facility] MEDICINE SERVICE  DAILY PROGRESS NOTE    NAME: Sandra Treadwell  : 1950  MRN: [id number]      LOS: 7 days     PROVIDER OF SERVICE: Jorje Sahni MD    Chief Complaint: Status post knee replacement    Subjective:     Interval History:  History taken from: patient  Patient Complaints:        73 y.o. Not  or  female who presents with End-stage arthritis of the right knee. They failed conservative treatment of their knee pain and a thorough discussion of the risks and benefits of surgery was had. The patient wishes to continue with elective total knee replacement, they were scheduled and are here for surgery. They did get medical clearance as well as a thorough preoperative workup.     Per the documentation by Dr Chen , dated 23 10AM, Patient with delirium that began last night.  Apparently she came to the unit and became acutely confused last night.  She then got up and had a fall directly onto her operative leg.  An x-ray was taken that was concerning for a possible fracture.  After review of the x-ray, I thought  everything was okay but out of an abundance of caution ordered a CT scan just to be sure.  CT scan confirmed that everything is okay with the prosthetic and there is no concern for fracture.  She is okay to continue mobilization and physical therapy.  She was given some Ativan yesterday for agitation, but is quite lethargic this morning.  We are going to stop all Ativan at this point.  Plan is home discharge but only after her acute mental status change significantly improves and assuming she works well with physical therapy.  I have consulted the hospitalist service due to her acute mental status changes to make sure they have no further recommendations.       11/25/23 we were asked to see patient for medical management, patient is seen in bed NAD, no new complaints, is presently pleasantly confused, BP low at 80/40 fluid bolus ordered DW RN  11/26/2023 patient seen and examined in bed no acute distress, feeling better this morning.  Patient is awake alert oriented x 3.  Discussed with RN.  Echocardiogram being done now.Blood pressure 110/48.  11/27/2023 patient seen and examined in bed no acute distress, patient still confused.  Vital signs stable, troponin 118 from 70 yesterday, discussed with RN.  BNP 6219 will DC fluids.  11/28/23 patient seen and examined in bed no acute distress, family at bedside, discussed with RN.  Patient still confused.  Will obtain CT head.  Consulted neurology.  11/29/23 patient seen and examined in bed no acute distress, vital signs stable, patient still confused, sitter at bedside,  11/30/23 patient seen and examined in bed no acute distress, vital signs stable, patient still confused, discussed with RN.  Sitter at bedside.  12/1/23 patient seen and examined in bed no acute distress, sitter at bedside, patient still confused.  Vital signs are stable.  12/2/2023 patient seen and examined in bed no acute distress, still confused.  Sitter at bedside, discussed with RN.  Will need to be  sitter free for 24 hours before transferring to nursing home.  12/3/2023 patient seen and examined in bed no acute distress, patient still confused, sitter was DC, pending placement.  Discussed with RN.  Labs are pending.    Review of SystemsReview of Systems   Constitutional:  Negative for chills and fever.   HENT:  Negative for ear pain and hearing loss.    Respiratory:  Negative for apnea and shortness of breath.    Cardiovascular:  Negative for chest pain and palpitations.   Gastrointestinal:  Negative for diarrhea, nausea and vomiting.   Genitourinary:  Negative for dysuria.   Musculoskeletal:  Positive for arthralgias and gait problem. Negative for back pain and joint swelling.   Neurological:  Negative for seizures, light-headedness and headaches.   Psychiatric/Behavioral:  Positive for confusion. Negative for hallucinati    Objective:     Vital Signs  Temp:  [97.5 °F (36.4 °C)-98.6 °F (37 °C)] 97.5 °F (36.4 °C)  Heart Rate:  [80-93] 80  Resp:  [12-14] 14  BP: ()/(55-58) 96/55   Body mass index is 29.88 kg/m².    Physical ExamConstitutional:       General: She is not in acute distress.     Appearance: She is well-developed. She is not ill-appearing, toxic-appearing or diaphoretic.   HENT:      Head: Normocephalic and atraumatic.      Nose: Nose normal. No congestion or rhinorrhea.      Mouth/Throat:      Mouth: Mucous membranes are moist.      Pharynx: No oropharyngeal exudate.   Eyes:      General: No scleral icterus.        Right eye: No discharge.         Left eye: No discharge.      Extraocular Movements: Extraocular movements intact.      Pupils: Pupils are equal, round, and reactive to light.   Neck:      Thyroid: No thyromegaly.      Vascular: No carotid bruit or JVD.      Trachea: No tracheal deviation.   Cardiovascular:      Rate and Rhythm: Normal rate and regular rhythm.      Pulses: Normal pulses.      Heart sounds: Normal heart sounds. No murmur heard.     No friction rub. No gallop.    Pulmonary:      Effort: Pulmonary effort is normal. No respiratory distress.      Breath sounds: Normal breath sounds. No stridor. No wheezing, rhonchi or rales.   Chest:      Chest wall: No tenderness.   Abdominal:      General: Bowel sounds are normal. There is no distension.      Palpations: Abdomen is soft. There is no mass.      Tenderness: There is no abdominal tenderness. There is no guarding or rebound.      Hernia: No hernia is present.   Musculoskeletal:         General: Tenderness present. No swelling, deformity or signs of injury. Normal range of motion.      Cervical back: Normal range of motion and neck supple. No rigidity. No muscular tenderness.      Right lower leg: No edema.      Left lower leg: No edema.   Lymphadenopathy:      Cervical: No cervical adenopathy.   Skin:     General: Skin is warm and dry.      Coloration: Skin is not jaundiced or pale.      Findings: No bruising, erythema or rash.   Neurological:      General: No focal deficit present.      Mental Status: She is alert.      Cranial Nerves: No cranial nerve deficit.      Sensory: No sensory deficit.      Motor: No weakness or abnormal muscle tone.      Coordination: Coordination normal.      Scheduled Meds   aspirin, 81 mg, Oral, Q12H  [Held by provider] atorvastatin, 10 mg, Oral, Daily  cefTRIAXone, 2,000 mg, Intravenous, Q24H  cyanocobalamin, 1,000 mcg, Intramuscular, Q28 Days  ferric gluconate, 250 mg, Intravenous, Once  folic acid, 1 mg, Oral, Daily  furosemide, 40 mg, Intravenous, Q12H  oxybutynin XL, 15 mg, Oral, Daily  pantoprazole, 40 mg, Oral, Q AM  sertraline, 50 mg, Oral, Nightly       PRN Meds     docusate sodium    LORazepam    [DISCONTINUED] HYDROmorphone **AND** naloxone    ondansetron **OR** ondansetron    Potassium Replacement - Follow Nurse / BPA Driven Protocol    zolpidem   Infusions           Diagnostic Data    Results from last 7 days   Lab Units 12/02/23  0829   WBC 10*3/mm3 8.70   HEMOGLOBIN g/dL 9.5*    HEMATOCRIT % 29.5*   PLATELETS 10*3/mm3 265   GLUCOSE mg/dL 107*   CREATININE mg/dL 0.83   BUN mg/dL 19   SODIUM mmol/L 141   POTASSIUM mmol/L 3.1*   AST (SGOT) U/L 20   ALT (SGPT) U/L 9   ALK PHOS U/L 64   BILIRUBIN mg/dL 0.4   ANION GAP mmol/L 13.0       No radiology results for the last day      I reviewed the patient's new clinical results.    Assessment/Plan:     Active and Resolved Problems  Active Hospital Problems    Diagnosis  POA    **Status post knee replacement [Z96.659]  Not Applicable    Unilateral primary osteoarthritis, right knee [M17.11]  Yes      Resolved Hospital Problems   No resolved problems to display.     Metabolic encephalopathy-delirium, was combative 11/24/23, had to be restrained, now off restrain, likely secondary to anesthesia.  -continue supportive care  -UA-neg  -TSH-3.4  -Sats 89 on room air-Monitor  -D dimer-0.4  -CT head, Mild age-related changes of the brain as above, otherwise without evidence of acute intracranial abnormalit   -psych/neurology consult:Patient presents with signs and symptoms of delirium. Her QTC is prolonged at 545, so not appropriate for antipsychotics for management at this time      Hypotension-not on any BP meds/was hypotension in OR  -monitor CBC, BMP  -ortho discontinued Dilaudid and oral oxycodone   -fluid bolus given   -monitor BNP-1332>6219>67164  -DC NS   -Lasix IV>PO     Elevated trop/elevated BNP/CHFpEF acute on chronic  -EKG  -serial [id number]-  -cardiology consulted  -echo Normal LV size and lower limits of normal contractility EF of 50%   Aortic valve leaflets are mildly thickened, have adequate cusp separation  Mitral valve, tricuspid valve appears structurally normal, mild mitral and trace tricuspid regurgitation seen.  Calculated RV systolic pressure 50 mmHg consistent with moderate pulm hypertension sent to.  -Continue lasix 40mg q12     Osteoarthritis-  S/p knee replacement-s/p fall after  surgery  -ortho following  -pain  management   -pt/ot  -rehab on dc     Severe anemia-iron deficiency B12 deficient folate deficient  -monitor CBC-hemoglobin 7.7>9.5  -iron-24 > IV iron ordered  - B12: 272 will give vitamin B12  -folate-4.5 will replace     JULIA-back to baseline  Monitor renal function  Avoid hypotension  avoid nephrtoxins  Hold NSAIDS     Hx Depression on zoloft     Hypomagnesemia-replace and monitor    Large hiatal hernia containing the entire stomach as well as abdominal fat and a loop of colon. No distended bowel loops are seen within the hernia to indicate obstruction. There is suggestion of a gastric volvulus. Adjacent to the hernia there is   extensive partial right lower lobe atelectasis. There are also small layering bilateral pleural effusions.     Hypokalemia, replace and monitor    DVT prophylaxis:  Mechanical DVT prophylaxis orders are present.     Code status is   Code Status and Medical Interventions:   Ordered at: 11/24/23 0732     Level Of Support Discussed With:    Patient     Code Status (Patient has no pulse and is not breathing):    CPR (Attempt to Resuscitate)     Medical Interventions (Patient has pulse or is breathing):    Full       Plan for disposition:nh in 2 days    Time: 30 minutes    Signature: Electronically signed by Jorje Sahni MD, 12/03/23, 12:59 EST.  [facility] Hospitalist Team    Electronically signed by Jorje Sahni MD at 12/03/23 1310       Adore Clinton APRN at 12/03/23 1258            Chief complaint confusion     Subjective .     History of present illness:  The patient is a 73 y.o. female who was admitted secondary to arthritis of the right knee and a right knee replacement.      PMH: Breast cancer, depression. GERD, hyperlipidemia.      Psych was consulted due to confusion and agitation.      The patient was seen this AM. She was sleeping and her  in the room asked me not to wake her. She was also accompanied by a patient .     The  "patient's  state the patient is generally alert and oriented and independent in her ADLs. She does not have any prior history of dementia or memory problems. He states she has been \"belligerent\" and combative with staff. He states this happened last night.      Per chart review of nursing notes, patient has been confused and only oriented to self, combative and telling at staff.     11/30/23: Patient remains confused. She received PRN lorazepam at 417 this morning. Nursing reports she has been primarily sleeping since then. I attempted to see the patient, and she would respond to stimulation, but was irritable, and answered \"No\" to everything I asked her.     12/3/23: Patient slightly improved today. She is no longer requiring a sitter. She is still confused, but less combative. She is still getting IV lorazepam PRN. Will attempt to switch to oral to see how she responds.       Review of Systems   Review of systems could not be obtained due to   patient confusion.    The following portions of the patient's history were reviewed and updated as appropriate: allergies, current medications, past family history, past medical history, past social history, past surgical history and problem list.    History       Medications Prior to Admission   Medication Sig Dispense Refill Last Dose    atorvastatin (LIPITOR) 10 MG tablet Take 1 tablet by mouth Daily.   11/24/2023    cetirizine (zyrTEC) 10 MG tablet Take 1 tablet by mouth Daily.   11/23/2023    fluticasone (FLONASE) 50 MCG/ACT nasal spray 2 sprays by Each Nare route Daily.   11/23/2023    omeprazole (priLOSEC) 40 MG capsule Take 1 capsule by mouth Daily.   11/24/2023    oxybutynin XL (DITROPAN XL) 15 MG 24 hr tablet Take 1 tablet by mouth Daily.   11/24/2023    OYSCO 500 + D 500-5 MG-MCG tablet per tablet Take 1 tablet by mouth 2 (Two) Times a Day.   11/23/2023    sertraline (ZOLOFT) 50 MG tablet Take 1 tablet by mouth Every Night.   11/23/2023    zolpidem (AMBIEN) " "10 MG tablet Take 1 tablet by mouth At Night As Needed.   11/23/2023    alendronate (FOSAMAX) 70 MG tablet Take 1 tablet by mouth 1 (One) Time Per Week. Sun   11/19/2023        Scheduled Meds:  aspirin, 81 mg, Oral, Q12H  [Held by provider] atorvastatin, 10 mg, Oral, Daily  cefTRIAXone, 2,000 mg, Intravenous, Q24H  cyanocobalamin, 1,000 mcg, Intramuscular, Q28 Days  ferric gluconate, 250 mg, Intravenous, Once  folic acid, 1 mg, Oral, Daily  furosemide, 40 mg, Intravenous, Q12H  oxybutynin XL, 15 mg, Oral, Daily  pantoprazole, 40 mg, Oral, Q AM  sertraline, 50 mg, Oral, Nightly         Continuous Infusions:         PRN Meds:    docusate sodium    LORazepam    [DISCONTINUED] HYDROmorphone **AND** naloxone    ondansetron **OR** ondansetron    Potassium Replacement - Follow Nurse / BPA Driven Protocol    zolpidem      Allergies:  Codeine      Objective     Vital Signs   BP 96/55 (BP Location: Right arm, Patient Position: Lying)   Pulse 80   Temp 97.5 °F (36.4 °C) (Axillary)   Resp 14   Ht 152.4 cm (60\")   Wt 69.4 kg (153 lb)   SpO2 92%   BMI 29.88 kg/m²     Physical Exam:    Musculoskeletal:   Muscle strength and tone: TEMITOPE  Abnormal Movements: None noted   Gait: TEMITOPE                General Appearance:    In bed, in NAD.     Mental Status Exam:   Hygiene:   good  Cooperation:   somewhat cooperative.   Eye Contact:  Poor  Psychomotor Behavior:  Appropriate  Affect:  blunted  Mood:  fluctuates  Hopelessness: Denies  Speech:   Minimal   Thought Process:   appropriate   Thought Content:   appropriate   Suicidal:   Denies  Homicidal:   denies  Hallucinations:  Not demonstrated today  Delusion:  None  Memory:  Deficits  Orientation:  Person  Reliability:  poor  Insight:  Poor  Judgement:  Poor  Impulse Control:  Impaired  Physical/Medical Issues:  Yes            Medications and allergies reviewed.       Result Review:  I have personally reviewed the results from the time of this admission to 12/3/2023 12:58 EST and agree " with these findings:  [x]  Laboratory  []  Microbiology  []  Radiology  [x]  EKG/Telemetry   []  Cardiology/Vascular   []  Pathology  []  Old records  []  Other:  Most notable findings include:  on 11/27/23    Assessment & Plan       Status post knee replacement    Unilateral primary osteoarthritis, right knee    Assessment: delirium   Treatment Plan: Patient presents with signs and symptoms of delirium. Her QTC was prolonged at 545 on 11/27/23 so she was not a candidate for antipsychotic management, and has been receiving PRN lorazepam which is primarily keeping her sedated.     Will switch IV lorazepam to PO lorazepam PRN and see how she tolerates that.     Continue supportive treatment.     Will follow PRN.     Treatment Plan discussed with: Patient and nursing     I discussed the patients findings and my recommendations with patient and nursing staff    I have reviewed and approved the behavioral health treatment plans and problem list. Yes     Referring MD has access to consult report and progress notes in EMR     BERNA Arredondo  12/03/23  12:58 EST              Electronically signed by Adore Clinton APRN at 12/03/23 7324

## 2023-12-03 NOTE — PLAN OF CARE
Goal Outcome Evaluation:           Progress: improving       Pt has been without a sitter since approx 3PM on 12/2 and has been doing fairly well. Pt has been calm & cooperative, only tried to get out of bed once but was easily redirected, only mildly agitated briefly then resumed calm & cooperative behavior. Only c.o pain once which was treated effectively with PO pain medication. Otherwise doing well, bed alarm set, call light within reach, care plan ongoing.

## 2023-12-03 NOTE — PROGRESS NOTES
"  Chief complaint confusion     Subjective .     History of present illness:  The patient is a 73 y.o. female who was admitted secondary to arthritis of the right knee and a right knee replacement.      PMH: Breast cancer, depression. GERD, hyperlipidemia.      Psych was consulted due to confusion and agitation.      The patient was seen this AM. She was sleeping and her  in the room asked me not to wake her. She was also accompanied by a patient .     The patient's  state the patient is generally alert and oriented and independent in her ADLs. She does not have any prior history of dementia or memory problems. He states she has been \"belligerent\" and combative with staff. He states this happened last night.      Per chart review of nursing notes, patient has been confused and only oriented to self, combative and telling at staff.     11/30/23: Patient remains confused. She received PRN lorazepam at 417 this morning. Nursing reports she has been primarily sleeping since then. I attempted to see the patient, and she would respond to stimulation, but was irritable, and answered \"No\" to everything I asked her.     12/3/23: Patient slightly improved today. She is no longer requiring a sitter. She is still confused, but less combative. She is still getting IV lorazepam PRN. Will attempt to switch to oral to see how she responds.       Review of Systems   Review of systems could not be obtained due to   patient confusion.    The following portions of the patient's history were reviewed and updated as appropriate: allergies, current medications, past family history, past medical history, past social history, past surgical history and problem list.    History       Medications Prior to Admission   Medication Sig Dispense Refill Last Dose    atorvastatin (LIPITOR) 10 MG tablet Take 1 tablet by mouth Daily.   11/24/2023    cetirizine (zyrTEC) 10 MG tablet Take 1 tablet by mouth Daily.   " "11/23/2023    fluticasone (FLONASE) 50 MCG/ACT nasal spray 2 sprays by Each Nare route Daily.   11/23/2023    omeprazole (priLOSEC) 40 MG capsule Take 1 capsule by mouth Daily.   11/24/2023    oxybutynin XL (DITROPAN XL) 15 MG 24 hr tablet Take 1 tablet by mouth Daily.   11/24/2023    OYSCO 500 + D 500-5 MG-MCG tablet per tablet Take 1 tablet by mouth 2 (Two) Times a Day.   11/23/2023    sertraline (ZOLOFT) 50 MG tablet Take 1 tablet by mouth Every Night.   11/23/2023    zolpidem (AMBIEN) 10 MG tablet Take 1 tablet by mouth At Night As Needed.   11/23/2023    alendronate (FOSAMAX) 70 MG tablet Take 1 tablet by mouth 1 (One) Time Per Week. Sun   11/19/2023        Scheduled Meds:  aspirin, 81 mg, Oral, Q12H  [Held by provider] atorvastatin, 10 mg, Oral, Daily  cefTRIAXone, 2,000 mg, Intravenous, Q24H  cyanocobalamin, 1,000 mcg, Intramuscular, Q28 Days  ferric gluconate, 250 mg, Intravenous, Once  folic acid, 1 mg, Oral, Daily  furosemide, 40 mg, Intravenous, Q12H  oxybutynin XL, 15 mg, Oral, Daily  pantoprazole, 40 mg, Oral, Q AM  sertraline, 50 mg, Oral, Nightly         Continuous Infusions:         PRN Meds:    docusate sodium    LORazepam    [DISCONTINUED] HYDROmorphone **AND** naloxone    ondansetron **OR** ondansetron    Potassium Replacement - Follow Nurse / BPA Driven Protocol    zolpidem      Allergies:  Codeine      Objective     Vital Signs   BP 96/55 (BP Location: Right arm, Patient Position: Lying)   Pulse 80   Temp 97.5 °F (36.4 °C) (Axillary)   Resp 14   Ht 152.4 cm (60\")   Wt 69.4 kg (153 lb)   SpO2 92%   BMI 29.88 kg/m²     Physical Exam:    Musculoskeletal:   Muscle strength and tone: TEMITOPE  Abnormal Movements: None noted   Gait: TEMITOPE                General Appearance:    In bed, in NAD.     Mental Status Exam:   Hygiene:   good  Cooperation:   somewhat cooperative.   Eye Contact:  Poor  Psychomotor Behavior:  Appropriate  Affect:  blunted  Mood:  fluctuates  Hopelessness: Denies  Speech:   " Minimal   Thought Process:   appropriate   Thought Content:   appropriate   Suicidal:   Denies  Homicidal:   denies  Hallucinations:  Not demonstrated today  Delusion:  None  Memory:  Deficits  Orientation:  Person  Reliability:  poor  Insight:  Poor  Judgement:  Poor  Impulse Control:  Impaired  Physical/Medical Issues:  Yes            Medications and allergies reviewed.       Result Review:  I have personally reviewed the results from the time of this admission to 12/3/2023 12:58 EST and agree with these findings:  [x]  Laboratory  []  Microbiology  []  Radiology  [x]  EKG/Telemetry   []  Cardiology/Vascular   []  Pathology  []  Old records  []  Other:  Most notable findings include:  on 11/27/23    Assessment & Plan       Status post knee replacement    Unilateral primary osteoarthritis, right knee    Assessment: delirium   Treatment Plan: Patient presents with signs and symptoms of delirium. Her QTC was prolonged at 545 on 11/27/23 so she was not a candidate for antipsychotic management, and has been receiving PRN lorazepam which is primarily keeping her sedated.     Will switch IV lorazepam to PO lorazepam PRN and see how she tolerates that.     Continue supportive treatment.     Will follow PRN.     Treatment Plan discussed with: Patient and nursing     I discussed the patients findings and my recommendations with patient and nursing staff    I have reviewed and approved the behavioral health treatment plans and problem list. Yes     Referring MD has access to consult report and progress notes in EMR     BERNA Arredondo  12/03/23  12:58 EST

## 2023-12-03 NOTE — PLAN OF CARE
Goal Outcome Evaluation:         Pt is resting in bed. Spouse at bedside. Pts still confused and agitated this am. Agitation has improved after Ativan given. Urinating on own. Pts spouse concerned that she has not been eating, Megace ordered. Plan to discharge when accepted to [facility].

## 2023-12-03 NOTE — PLAN OF CARE
Assessment: Sandra Treadwell presents with functional mobility impairments which indicate the need for skilled intervention. Tolerating session today without incident. Still confused and difficulty following directions. Thinks she can transfer herself and trying to get up to get ready so she can go home. Wasn't safe to try using rwx yet. Still needs mod/max assist to assist with transfer from front to complete pivoting. Plans on dc to rehab.Will continue to follow and progress as tolerated.

## 2023-12-03 NOTE — DISCHARGE INSTRUCTIONS
Total Knee  Discharge Instructions  Dr. USHA Howard” April II  [phone]    INCISION CARE  Wash your hands prior to dressing changes  PAOLA Wound VAC: Postoperatively you had a PAOLA Wound Vac placed on the incision. This was placed under sterile conditions in the operating room. It remains in place for 7 days postoperatively. After 7 days, the entire dressing must be removed, including all of the sticky adhesive. The dressing and battery pack provide gentle suction to the incision and provide several benefits over a traditional dressing:  It maintains the sterile environment of the OR and reduces the risk of infection  The suction removes unwanted buildup of blood/hematoma under the skin to reduce swelling  The suction also promotes fresh blood supply to the skin and soft tissue to speed up healing  The postoperative scar is reduced in size  Showering is permitted immediately after surgery, but the battery pack must be protected or removed during the shower.   After 7 days the PAOLA Wound Vac is removed. If there is no drainage, no dressing is required. If there is some scant drainage a dry bandage can be applied and changed daily until seen in the office or until the drainage stops.   No creams or ointments to the incisions until 4 weeks post op.  Do not touch or pick at the incision  Check incision every day and notify surgeon immediately if any of the following signs or symptoms are seen:  Increase in redness  Increase in swelling around the incision and of the entire extremity  Increase in pain  NEW drainage or oozing from the incision  Pulling apart of the edges of the incision  Increase in overall body temperature (greater than 100.4 degrees)  Zip-Line: your incision was closed with a state of the art device.   Is a non-invasive and easy to use wound closure device that replaces sutures, staples and glue for surgical incisions  It minimizes scarring and eliminating “railroad” marks that come with staples or  sutures  It makes removal as atraumatic as peeling off a bandage  Can be removed at home or by a physical therapist or nurse at 14 days postoperatively    ACTIVITIES  Exercises:  Physical therapy will begin immediately while in the hospital. Patients going to a nursing home will get therapy as part of their care at the SNU/SNF facility. Patients going home may also have a therapist come to the house to help them mobilize until they can safely get to an outpatient therapy facility.  Elevate the affected leg most of the day during the first week post operatively. Caution must be taken to avoid pillow placement directly under the heel of the leg, as this can cause pressure ulcers even with a soft pillow. All pillows and blankets should be placed underneath of the thigh and calf so that the heel is free-floating.  Use cold packs for 20-30 minutes approximately 5 times per day.  You should perform the daily stretching and strengthening exercises as taught by the therapist as often as possible. This can be done many times a day.  Full weight bearing is allowed after surgery. It will be sore/painful to put weight on the leg, but this will help the bone to heal and prevent complications such as pneumonia, bed sores and blood clots. Mobilization is vital to the recovery process.  Activities of Daily Living:  No tub baths, hot tubs, or swimming pools for 4 weeks.  May shower and let water run over the incision immediately after surgery. The battery pack of the PAOLA Wound Vac must be protected or removed while in the shower. After the PAOLA is removed 7 days after surgery showering is permitted as long as there is no drainage from the wound.     Restrictions  Weight: It is ok to allow full weight bearing after surgery. Weight on the leg actually quickens the recovery process. While it will be sore/painful to put weight on the leg, it is safe to do so. Hip replacement after hip fracture has a much slower recover process. It can  take months to heal fully from a hip fracture and patients even make some slow benefits up to a year afterwards.   Driving: Many patients have questions about when it is safe to return to driving. The answer is that this is extremely variable. It depends on the extent of the surgery, as well as how quickly you heal. Certainly left leg surgeries make returning to driving easier while right leg surgeries require more extensive rehabilitation before driving can be safe. Until you can press down on the brake hard, and are off of all narcotics, driving is not permitted. Your surgeon cannot “clear” you to return to driving, only you can make the decision when you feel it is safe.    Medications  Anticoagulants: After upper extremity surgery most patients do not require an anticoagulant unless you have another injury that will be keeping you from mobilizing. Lower extremity surgery typically does require use of an anticoagulation medicine.   IF YOU HAD LOWER EXTREMITY SURGERY AND ARE NOT DISCHARGED HOME WITH ANY ANTICOAGULANT MEDICINE YOU SHOULD TAKE ASPIRIN 325mg DAILY FOR 30 DAYS POSTOPERATIVELY.  If you are discharged home with an anticoagulant such as Aspirin, Xarelto, Eliquis, Coumadin, or Lovenox, follow these simple instructions:   Notify surgeon immediately if any mckenzie bleeding is noted in the urine, stool, emesis, or from the nose or the incision. Blood in the stool will often appear as black rather than red. Blood in urine may appear as pink. Blood in emesis may appear as brown/black like coffee grounds.  You will need to apply pressure for longer periods of time to any cuts or abrasions to stop bleeding  Avoid alcohol while taking anticoagulants  Most anticoagulants are to be taken for 30 days postoperatively. After this time, you may stop using them unless instructed otherwise.   If you were already taking an anticoagulant (commonly Aspirin, Coumadin, or Plavix) you will likely be resuming your normal dose  postoperatively and will be continuing that medication at the discretion of the prescribing physician.  Stool Softeners: You will be at greater risk of constipation after surgery due to being less mobile and the pain medications.  Take stool softeners as needed. Over the counter Colace 100 mg 1-2 capsules twice daily can be taken.  If stools become too loose or too frequent, please decreases the dosage or stop the stool softener.  If constipation occurs despite use of stool softeners, you are to continue the stool softeners and add a laxative (Milk of Magnesia 1 ounce daily as needed)  Drink plenty of fluids, and eat fruits and vegetables during your recovery time. Getting up and mobilizing will help the bowels to recover their regular function, as will weaning off of all narcotics when the pain becomes tolerable.  Pain Medications: Utilized after surgery are narcotics. This is some general information about these medications.  CLASSIFICATION: Pain medications are called Opioids and are narcotics  LEGALITIES: It is illegal to share narcotics with others  DRIVING: it is illegal to drive while under the influence of narcotics. Doing so is a DUI.  POTENTIAL SIDE EFFECTS: nausea, vomiting, itching, dizziness, drowsiness, dry mouth, constipation, and difficulty urinating.  POTENTIAL ADVERSE EFFECTS:  Opioid tolerance can develop with use of pain medications and this simply means that it requires more and more of the medication to control pain. However, this is seen more in patients that use opioids for longer periods of time.  Opioid dependence can develop with use of Opioids. People with opioid dependence will experience withdrawal symptoms upon cessation of the medication.  Opioid addiction can develop with use of Opioids. The incidence of this is very unlikely in patients who take the medications as ordered and stop the medications as instructed.  Opioid overdose can be dangerous, but is unlikely when the medication  is taken as ordered and stopped when ordered. It is important not to mix opioids with alcohol as this can lead to over sedation and respiratory difficulty.  DOSAGE:  After the initial surgical pain begins to resolve, you may begin to decrease the pain medication. By the end of a few weeks, you should be off of pain medications.  Refills will not be given by the office during evening hours, on weekends, or after 6 weeks post-op. You are responsible for weaning off of pain medication. You can increase the time between narcotic pills, taking one every 4 then 6 then 8 hours and so on.  To seek refills on pain medications during the initial 6-week post-operative period, you must call the office to request the refill. The office will then notify you when to  the prescription. DO NOT wait until you are out of the medication to request a refill. Prescriptions will not be filled over the weekend and depending on the schedule, it may take a couple days for the prescription to be available. Someone will have to pick the prescription in person at the office.    FOLLOW-UP VISITS  You will need to follow up in the office with your surgeon in 3 weeks, or as instructed elsewhere in your discharge paperwork. Please call this number [phone] to schedule this appointment. If you are going to an SNF/SNU facility, they will arrange for you to follow up in the office.  If you have any concerns or suspected complications prior to your follow up visit, please call the office. Do not wait until your appointment time if you suspect complications. These will need to be addressed in the office promptly.      Ulises Chen II, MD  Orthopaedic Surgery  [facility]

## 2023-12-04 VITALS
OXYGEN SATURATION: 93 % | HEART RATE: 90 BPM | HEIGHT: 60 IN | BODY MASS INDEX: 29.09 KG/M2 | RESPIRATION RATE: 13 BRPM | WEIGHT: 148.15 LBS | TEMPERATURE: 98 F | SYSTOLIC BLOOD PRESSURE: 97 MMHG | DIASTOLIC BLOOD PRESSURE: 64 MMHG

## 2023-12-04 LAB
NT-PROBNP SERPL-MCNC: 7922 PG/ML (ref 0–900)
POTASSIUM SERPL-SCNC: 4 MMOL/L (ref 3.5–5.2)

## 2023-12-04 PROCEDURE — 84132 ASSAY OF SERUM POTASSIUM: CPT | Performed by: INTERNAL MEDICINE

## 2023-12-04 PROCEDURE — 83880 ASSAY OF NATRIURETIC PEPTIDE: CPT | Performed by: INTERNAL MEDICINE

## 2023-12-04 RX ADMIN — PANTOPRAZOLE SODIUM 40 MG: 40 TABLET, DELAYED RELEASE ORAL at 05:58

## 2023-12-04 NOTE — PROGRESS NOTES
[facility] MEDICINE SERVICE  DAILY PROGRESS NOTE    NAME: Sandra Treadwell  : 1950  MRN: [id number]      LOS: 8 days     PROVIDER OF SERVICE: Jorje Sahni MD    Chief Complaint: Status post knee replacement    Subjective:     Interval History:  History taken from: patient  Patient Complaints:        73 y.o. Not  or  female who presents with End-stage arthritis of the right knee. They failed conservative treatment of their knee pain and a thorough discussion of the risks and benefits of surgery was had. The patient wishes to continue with elective total knee replacement, they were scheduled and are here for surgery. They did get medical clearance as well as a thorough preoperative workup.     Per the documentation by Dr Chen , dated 23 10AM, Patient with delirium that began last night.  Apparently she came to the unit and became acutely confused last night.  She then got up and had a fall directly onto her operative leg.  An x-ray was taken that was concerning for a possible fracture.  After review of the x-ray, I thought everything was okay but out of an abundance of caution ordered a CT scan just to be sure.  CT scan confirmed that everything is okay with the prosthetic and there is no concern for fracture.  She is okay to continue mobilization and physical therapy.  She was given some Ativan yesterday for agitation, but is quite lethargic this morning.  We are going to stop all Ativan at this point.  Plan is home discharge but only after her acute mental status change significantly improves and assuming she works well with physical therapy.  I have consulted the hospitalist service due to her acute mental status changes to make sure they have no further recommendations.       23 we were asked to see patient for medical management, patient is seen in bed NAD, no new complaints, is presently pleasantly confused, BP low at 80/40 fluid bolus ordered DW RN  2023  patient seen and examined in bed no acute distress, feeling better this morning.  Patient is awake alert oriented x 3.  Discussed with RN.  Echocardiogram being done now.Blood pressure 110/48.  11/27/2023 patient seen and examined in bed no acute distress, patient still confused.  Vital signs stable, troponin 118 from 70 yesterday, discussed with RN.  BNP 6219 will DC fluids.  11/28/23 patient seen and examined in bed no acute distress, family at bedside, discussed with RN.  Patient still confused.  Will obtain CT head.  Consulted neurology.  11/29/23 patient seen and examined in bed no acute distress, vital signs stable, patient still confused, sitter at bedside,  11/30/23 patient seen and examined in bed no acute distress, vital signs stable, patient still confused, discussed with RN.  Sitter at bedside.  12/1/23 patient seen and examined in bed no acute distress, sitter at bedside, patient still confused.  Vital signs are stable.  12/2/2023 patient seen and examined in bed no acute distress, still confused.  Sitter at bedside, discussed with RN.  Will need to be sitter free for 24 hours before transferring to nursing home.  12/3/2023 patient seen and examined in bed no acute distress, patient still confused, sitter was DC, pending placement.  Discussed with RN.  Labs are pending.  12/4/23 patient seen and examined in bed no acute distress, vital signs stable, discussed with RN, family at bedside, per  she is very confused. to Discharge to rehab     Review of SystemsReview of Systems   Constitutional:  Negative for chills and fever.   HENT:  Negative for ear pain and hearing loss.    Respiratory:  Negative for apnea and shortness of breath.    Cardiovascular:  Negative for chest pain and palpitations.   Gastrointestinal:  Negative for diarrhea, nausea and vomiting.   Genitourinary:  Negative for dysuria.   Musculoskeletal:  Positive for arthralgias and gait problem. Negative for back pain and joint  swelling.   Neurological:  Negative for seizures, light-headedness and headaches.   Psychiatric/Behavioral:  Positive for confusion. Negative for hallucinati    Objective:     Vital Signs  Temp:  [97.5 °F (36.4 °C)-98.2 °F (36.8 °C)] 98 °F (36.7 °C)  Heart Rate:  [80-93] 90  Resp:  [13-15] 13  BP: ()/(55-77) 97/64   Body mass index is 28.93 kg/m².    Physical ExamConstitutional:       General: She is not in acute distress.     Appearance: She is well-developed. She is not ill-appearing, toxic-appearing or diaphoretic.   HENT:      Head: Normocephalic and atraumatic.      Nose: Nose normal. No congestion or rhinorrhea.      Mouth/Throat:      Mouth: Mucous membranes are moist.      Pharynx: No oropharyngeal exudate.   Eyes:      General: No scleral icterus.        Right eye: No discharge.         Left eye: No discharge.      Extraocular Movements: Extraocular movements intact.      Pupils: Pupils are equal, round, and reactive to light.   Neck:      Thyroid: No thyromegaly.      Vascular: No carotid bruit or JVD.      Trachea: No tracheal deviation.   Cardiovascular:      Rate and Rhythm: Normal rate and regular rhythm.      Pulses: Normal pulses.      Heart sounds: Normal heart sounds. No murmur heard.     No friction rub. No gallop.   Pulmonary:      Effort: Pulmonary effort is normal. No respiratory distress.      Breath sounds: Normal breath sounds. No stridor. No wheezing, rhonchi or rales.   Chest:      Chest wall: No tenderness.   Abdominal:      General: Bowel sounds are normal. There is no distension.      Palpations: Abdomen is soft. There is no mass.      Tenderness: There is no abdominal tenderness. There is no guarding or rebound.      Hernia: No hernia is present.   Musculoskeletal:         General: Tenderness present. No swelling, deformity or signs of injury. Normal range of motion.      Cervical back: Normal range of motion and neck supple. No rigidity. No muscular tenderness.      Right lower  leg: No edema.      Left lower leg: No edema.   Lymphadenopathy:      Cervical: No cervical adenopathy.   Skin:     General: Skin is warm and dry.      Coloration: Skin is not jaundiced or pale.      Findings: No bruising, erythema or rash.   Neurological:      General: No focal deficit present.      Mental Status: She is alert.      Cranial Nerves: No cranial nerve deficit.      Sensory: No sensory deficit.      Motor: No weakness or abnormal muscle tone.      Coordination: Coordination normal.      Scheduled Meds   aspirin, 81 mg, Oral, Q12H  atorvastatin, 10 mg, Oral, Daily  cefTRIAXone, 2,000 mg, Intravenous, Q24H  cyanocobalamin, 1,000 mcg, Intramuscular, Q28 Days  ferric gluconate, 250 mg, Intravenous, Once  folic acid, 1 mg, Oral, Daily  furosemide, 40 mg, Oral, BID  megestrol acetate, 400 mg, Oral, Daily  oxybutynin XL, 15 mg, Oral, Daily  pantoprazole, 40 mg, Oral, Q AM  sertraline, 50 mg, Oral, Nightly       PRN Meds     docusate sodium    LORazepam    [DISCONTINUED] HYDROmorphone **AND** naloxone    ondansetron **OR** ondansetron    Potassium Replacement - Follow Nurse / BPA Driven Protocol    zolpidem   Infusions           Diagnostic Data    Results from last 7 days   Lab Units 12/04/23  0213 12/03/23  1421 12/02/23  0829   WBC 10*3/mm3  --  10.70 8.70   HEMOGLOBIN g/dL  --  9.5* 9.5*   HEMATOCRIT %  --  29.5* 29.5*   PLATELETS 10*3/mm3  --  232 265   GLUCOSE mg/dL  --  101* 107*   CREATININE mg/dL  --  1.09* 0.83   BUN mg/dL  --  19 19   SODIUM mmol/L  --  138 141   POTASSIUM mmol/L 4.0 3.5 3.1*   AST (SGOT) U/L  --   --  20   ALT (SGPT) U/L  --   --  9   ALK PHOS U/L  --   --  64   BILIRUBIN mg/dL  --   --  0.4   ANION GAP mmol/L  --  11.0 13.0       No radiology results for the last day      I reviewed the patient's new clinical results.    Assessment/Plan:     Active and Resolved Problems  Active Hospital Problems    Diagnosis  POA    **Status post knee replacement [Z96.659]  Not Applicable     Unilateral primary osteoarthritis, right knee [M17.11]  Yes      Resolved Hospital Problems   No resolved problems to display.     Metabolic encephalopathy-delirium, was combative 11/24/23, had to be restrained, now off restrain, likely secondary to anesthesia.  -continue supportive care  -UA-neg  -TSH-3.4  -Sats 89 on room air-Monitor  -D dimer-0.4  -CT head, Mild age-related changes of the brain as above, otherwise without evidence of acute intracranial abnormalit   -psych/neurology consult:Patient presents with signs and symptoms of delirium. Her QTC is prolonged at 545, so not appropriate for antipsychotics for management at this time      Hypotension-not on any BP meds/was hypotension in OR  -monitor CBC, BMP  -ortho discontinued Dilaudid and oral oxycodone   -fluid bolus given   -monitor BNP-1332>6219>01408  -DC NS   -Lasix IV>PO     Elevated trop/elevated BNP/CHFpEF acute on chronic  -EKG  -serial [id number]-  -cardiology consulted  -echo Normal LV size and lower limits of normal contractility EF of 50%   Aortic valve leaflets are mildly thickened, have adequate cusp separation  Mitral valve, tricuspid valve appears structurally normal, mild mitral and trace tricuspid regurgitation seen.  Calculated RV systolic pressure 50 mmHg consistent with moderate pulm hypertension sent to.  -Continue lasix 40mg q12     Osteoarthritis-  S/p knee replacement-s/p fall after  surgery  -ortho following  -pain management   -pt/ot  -rehab on dc     Severe anemia-iron deficiency B12 deficient folate deficient  -monitor CBC-hemoglobin 7.7>9.5  -iron-24 > IV iron ordered  - B12: 272 will give vitamin B12  -folate-4.5 will replace     JULIA-back to baseline  Monitor renal function  Avoid hypotension  avoid nephrtoxins  Hold NSAIDS     Hx Depression on zoloft     Hypomagnesemia-replace and monitor    Large hiatal hernia containing the entire stomach as well as abdominal fat and a loop of colon. No distended bowel loops  are seen within the hernia to indicate obstruction. There is suggestion of a gastric volvulus. Adjacent to the hernia there is   extensive partial right lower lobe atelectasis. There are also small layering bilateral pleural effusions.     Hypokalemia, replace and monitor    DVT prophylaxis:  Mechanical DVT prophylaxis orders are present.     Code status is   Code Status and Medical Interventions:   Ordered at: 11/24/23 0732     Level Of Support Discussed With:    Patient     Code Status (Patient has no pulse and is not breathing):    CPR (Attempt to Resuscitate)     Medical Interventions (Patient has pulse or is breathing):    Full       Plan for disposition:nh in 2 days    Time: 30 minutes    Signature: Electronically signed by Jorje Sahni MD, 12/04/23, 12:11 EST.  [facility] Hospitalist Team

## 2023-12-04 NOTE — CONSULTS
Nutrition Services    Patient Name: Sandra Treadwell  YOB: 1950  MRN: [id number]  Admission date: 11/24/2023    Continue current diet. Encourage good PO intake.     PPE Documentation        PPE Worn By Provider none   PPE Worn By Patient  N/A     NUTRITION SCREENING      Trending Narrative: 12/4: Pt being assessed for LOS x 10 days. Pt admitted to [facility] for an elective total knee replacement after failed outpatient treatment for end-stage arthritis of R knee. RD met with pt's  at bedside. Pt slept during interview. Pt's  noted he was very concerned about how poorly pt has been eating this admission. Pt's  noted he even brought pt her favorite breakfast, rice chex with chocolate, and pt wasn't interested in it. Pt sometimes drinks ONS at home but when she has been offered these here she hasn't been interested. Pt's  reports pt hardly waking up. Pt has reportedly gained ~20# in the last year r/t immobility with her knee. RD asked pt's  if he was agreeable to RD performing NFPE. Pt's  noted that pt may become irritable. Unable to complete NFPE.        PO Diet: Diet: Regular/House Diet; Texture: Regular Texture (IDDSI 7); Fluid Consistency: Thin (IDDSI 0)   PO Supplements: -   Trending PO Intake:  12/4: 0-25%       Nutritionally-Pertinent Medications RDN Reviewed, C/W clinical course         Labs (reviewed below): Reviewed, management per attending      Results from last 7 days   Lab Units 12/04/23  0213 12/03/23  1421 12/02/23  0829 11/30/23  1239 11/29/23  1338   SODIUM mmol/L  --  138 141 142 139   POTASSIUM mmol/L 4.0 3.5 3.1* 3.9 3.9   CHLORIDE mmol/L  --  98 101 108* 107   CO2 mmol/L  --  29.0 27.0 20.0* 21.0*   BUN mg/dL  --  19 19 21 13   CREATININE mg/dL  --  1.09* 0.83 0.99 0.87   CALCIUM mg/dL  --  9.3 8.4* 8.6 8.5*   BILIRUBIN mg/dL  --   --  0.4 0.6 0.7   ALK PHOS U/L  --   --  64 59 56   ALT (SGPT) U/L  --   --  9 7 6   AST (SGOT) U/L  --   --  20 21 27  "  GLUCOSE mg/dL  --  101* 107* 95 91     Results from last 7 days   Lab Units 12/03/23  1421 12/02/23  0829   MAGNESIUM mg/dL  --  1.7   HEMOGLOBIN g/dL 9.5* 9.5*   HEMATOCRIT % 29.5* 29.5*     Lab Results   Component Value Date    HGBA1C 5.60 11/15/2023          GI Function:  + BM on 12/3       Skin: Intact        Weight Review: Estimated body mass index is 29.88 kg/m² as calculated from the following:    Height as of this encounter: 152.4 cm (60\").    Weight as of this encounter: 69.4 kg (153 lb).    Comment:   No prior wt hx to review. RD to order new wt.     Wt Readings from Last 30 Encounters:   11/26/23 1311 69.4 kg (153 lb)   11/24/23 0748 69.6 kg (153 lb 6.4 oz)   11/15/23 1139 69.2 kg (152 lb 9.6 oz)          --       Nutrition Problem Statement: Inadequate energy intake related to decreased appetite and alertness as evidenced by poor PO intake since admission.         Nutrition Intervention: Continue current diet. Encourage good PO intake.           Monitoring/Evaluation Per protocol, PO intake, Pertinent labs, Weight          RD to follow up per protocol.    Electronically signed by:  Brigid Lopez RD  12/04/23 08:27 EST      "

## 2023-12-04 NOTE — PLAN OF CARE
Goal Outcome Evaluation:   Pt resting abed with call light in place, bed alarm on and functioning. Pt denies pain or discomfort at this time. Dsg to R knee c/d/I.

## 2023-12-04 NOTE — CASE MANAGEMENT/SOCIAL WORK
Continued Stay Note   [facility]     Patient Name: Sandra Treadwell  MRN: [id number]  Today's Date: 12/4/2023    Admit Date: 11/24/2023    Plan: [facility].  No precert needed,  PASRR  approved.  Pharmacy updated.   Discharge Plan       Row Name 12/04/23 1348       Plan    Plan [facility].  No precert needed,  PASRR  approved.  Pharmacy updated.    Plan Comments Will discharge to [facility] today                         Expected Discharge Date and Time       Expected Discharge Date Expected Discharge Time    Dec 4, 2023           Sendy Pires RN

## 2023-12-04 NOTE — PLAN OF CARE
Goal Outcome Evaluation:              Outcome Evaluation: pt is going to discharge by [facility]omez to [facility]

## 2023-12-05 LAB
BACTERIA SPEC AEROBE CULT: NORMAL
BACTERIA SPEC AEROBE CULT: NORMAL

## 2023-12-05 NOTE — CASE MANAGEMENT/SOCIAL WORK
Case Management Discharge Note      Final Note: [facility]    Provided Post Acute Provider List?: Yes  Post Acute Provider List: Nursing Home  Delivered To: Support Person  Method of Delivery: In person    Selected Continued Care - Discharged on 12/4/2023 Admission date: 11/24/2023 - Discharge disposition: Skilled Nursing Facility (DC - External)      Destination Coordination complete.      Service Provider Selected Services Address Phone Fax Patient Preferred    [facility] [address] [place] [id number] [phone] --                      Transportation Services  Private: Car    Final Discharge Disposition Code: 03 - skilled nursing facility (SNF)

## 2023-12-06 LAB
QT INTERVAL: 407 MS
QTC INTERVAL: 509 MS

## 2024-01-01 ENCOUNTER — ANESTHESIA (OUTPATIENT)
Dept: ADMINISTRATIVE | Facility: OTHER | Age: 74
End: 2024-01-01

## 2024-01-01 ENCOUNTER — ANESTHESIA EVENT (OUTPATIENT)
Dept: ADMINISTRATIVE | Facility: OTHER | Age: 74
End: 2024-01-01

## 2024-01-09 NOTE — DISCHARGE PLACEMENT REQUEST
"Hamzah Quintana (73 y.o. Female)       Date of Birth   1950    Social Security Number       Address   [address] [place] [address] [place] [facility]    Home Phone   [phone]    MRN   [id number]       Synagogue   None    Marital Status                               Admission Date   11/24/23    Admission Type   Elective    Admitting Provider   Ulises Chen II, MD    Attending Provider   Ulises Chen II, MD    Department, Room/Bed   [facility] SURGICAL INPATIENT, 4115/1       Discharge Date       Discharge Disposition       Discharge Destination                                 Attending Provider: Ulises Chen II, MD    Allergies: Codeine    Isolation: None   Infection: None   Code Status: CPR    Ht: 152.4 cm (60\")   Wt: 69.6 kg (153 lb 6.4 oz)    Admission Cmt: None   Principal Problem: Status post knee replacement [Z96.659]                   Active Insurance as of 11/24/2023       Primary Coverage       Payor Plan Insurance Group Employer/Plan Group    MEDICARE MEDICARE A & B        Payor Plan Address Payor Plan Phone Number Payor Plan Fax Number Effective Dates    [address] [phone]  4/1/2017 - None Entered    [facility] [id number]         Subscriber Name Subscriber Birth Date Member ID       HAMZAH QUINTANA 1950 [id number]               Secondary Coverage       Payor Plan Insurance Group Employer/Plan Group    AETNA COMMERCIAL AETNA HEALTH AND LIFE  SUP               Payor Plan Address Payor Plan Phone Number Payor Plan Fax Number Effective Dates    [address] [address]   4/1/2017 - None Entered    [facility] [phone]         Subscriber Name Subscriber Birth Date Member ID       HAMZAH QUINTANA 1950 [id number]                     Emergency Contacts        (Rel.) Home Phone Work Phone Mobile Phone    SKINNY QUINTANA (Spouse) -- -- [phone]                " [facility] Pending    Insurance response  Prescription Drug Insurance: [employer]  Notes: Prior authorization submitted - will update provider when decision has been made by insurance.

## 2024-02-16 ENCOUNTER — APPOINTMENT (OUTPATIENT)
Dept: GENERAL RADIOLOGY | Facility: HOSPITAL | Age: 74
DRG: 208 | End: 2024-02-16
Payer: MEDICARE

## 2024-02-16 ENCOUNTER — HOSPITAL ENCOUNTER (INPATIENT)
Facility: HOSPITAL | Age: 74
LOS: 9 days | Discharge: NURSING, REHAB OR LONG-TERM CARE | DRG: 208 | End: 2024-02-25
Attending: EMERGENCY MEDICINE | Admitting: INTERNAL MEDICINE
Payer: MEDICARE

## 2024-02-16 ENCOUNTER — APPOINTMENT (OUTPATIENT)
Dept: CT IMAGING | Facility: HOSPITAL | Age: 74
DRG: 208 | End: 2024-02-16
Payer: MEDICARE

## 2024-02-16 ENCOUNTER — APPOINTMENT (OUTPATIENT)
Dept: INTERVENTIONAL RADIOLOGY/VASCULAR | Facility: HOSPITAL | Age: 74
DRG: 208 | End: 2024-02-16
Payer: MEDICARE

## 2024-02-16 DIAGNOSIS — J90 PLEURAL EFFUSION, RIGHT: Primary | ICD-10-CM

## 2024-02-16 DIAGNOSIS — I95.9 HYPOTENSION, UNSPECIFIED HYPOTENSION TYPE: ICD-10-CM

## 2024-02-16 DIAGNOSIS — J85.1 ABSCESS OF UPPER LOBE OF LEFT LUNG WITH PNEUMONIA: ICD-10-CM

## 2024-02-16 DIAGNOSIS — J96.01 ACUTE RESPIRATORY FAILURE WITH HYPOXEMIA: ICD-10-CM

## 2024-02-16 DIAGNOSIS — J18.9 PNEUMONIA DUE TO INFECTIOUS ORGANISM, UNSPECIFIED LATERALITY, UNSPECIFIED PART OF LUNG: ICD-10-CM

## 2024-02-16 DIAGNOSIS — J86.9 EMPYEMA OF PLEURA: ICD-10-CM

## 2024-02-16 PROBLEM — M17.11 OSTEOARTHRITIS OF RIGHT KNEE: Status: ACTIVE | Noted: 2023-10-20

## 2024-02-16 PROBLEM — T85.42XA: Status: ACTIVE | Noted: 2024-02-16

## 2024-02-16 PROBLEM — J30.89 PERENNIAL ALLERGIC RHINITIS WITH SEASONAL VARIATION: Status: ACTIVE | Noted: 2024-02-16

## 2024-02-16 PROBLEM — F51.01 PRIMARY INSOMNIA: Status: ACTIVE | Noted: 2024-02-16

## 2024-02-16 PROBLEM — N26.1 RENAL ATROPHY: Status: ACTIVE | Noted: 2024-02-16

## 2024-02-16 PROBLEM — J30.2 PERENNIAL ALLERGIC RHINITIS WITH SEASONAL VARIATION: Status: ACTIVE | Noted: 2024-02-16

## 2024-02-16 PROBLEM — R94.31 ABNORMAL EKG: Status: ACTIVE | Noted: 2023-08-30

## 2024-02-16 PROBLEM — K21.9 GERD (GASTROESOPHAGEAL REFLUX DISEASE): Status: ACTIVE | Noted: 2023-08-29

## 2024-02-16 PROBLEM — M25.561 KNEE PAIN, RIGHT: Status: ACTIVE | Noted: 2023-08-29

## 2024-02-16 PROBLEM — H91.91 DEAFNESS IN RIGHT EAR: Status: ACTIVE | Noted: 2023-08-29

## 2024-02-16 PROBLEM — E78.5 DYSLIPIDEMIA: Status: ACTIVE | Noted: 2024-02-16

## 2024-02-16 PROBLEM — K44.9 HIATAL HERNIA: Status: ACTIVE | Noted: 2024-02-16

## 2024-02-16 PROBLEM — R01.1 SYSTOLIC MURMUR: Status: ACTIVE | Noted: 2023-08-30

## 2024-02-16 PROBLEM — F32.A DEPRESSION: Status: ACTIVE | Noted: 2023-08-29

## 2024-02-16 PROBLEM — M81.0 OSTEOPOROSIS: Status: ACTIVE | Noted: 2023-08-29

## 2024-02-16 PROBLEM — R06.00 DYSPNEA: Status: ACTIVE | Noted: 2023-08-30

## 2024-02-16 LAB
ALBUMIN FLD-MCNC: 0.8 G/DL
ALBUMIN SERPL-MCNC: 2.1 G/DL (ref 3.5–5.2)
ALBUMIN SERPL-MCNC: 2.2 G/DL (ref 3.5–5.2)
ALBUMIN/GLOB SERPL: 0.7 G/DL
ALBUMIN/GLOB SERPL: 0.7 G/DL
ALP SERPL-CCNC: 57 U/L (ref 39–117)
ALP SERPL-CCNC: 61 U/L (ref 39–117)
ALT SERPL W P-5'-P-CCNC: 5 U/L (ref 1–33)
ALT SERPL W P-5'-P-CCNC: 6 U/L (ref 1–33)
AMYLASE FLD-CCNC: 225 U/L
ANION GAP SERPL CALCULATED.3IONS-SCNC: 10 MMOL/L (ref 5–15)
ANION GAP SERPL CALCULATED.3IONS-SCNC: 10 MMOL/L (ref 5–15)
APPEARANCE FLD: ABNORMAL
APTT PPP: 23.4 SECONDS (ref 61–76.5)
AST SERPL-CCNC: 8 U/L (ref 1–32)
AST SERPL-CCNC: 9 U/L (ref 1–32)
BASOPHILS # BLD AUTO: 0 10*3/MM3 (ref 0–0.2)
BASOPHILS NFR BLD AUTO: 0.9 % (ref 0–1.5)
BILIRUB SERPL-MCNC: 0.4 MG/DL (ref 0–1.2)
BILIRUB SERPL-MCNC: 0.4 MG/DL (ref 0–1.2)
BUN SERPL-MCNC: 15 MG/DL (ref 8–23)
BUN SERPL-MCNC: 18 MG/DL (ref 8–23)
BUN/CREAT SERPL: 20 (ref 7–25)
BUN/CREAT SERPL: 20.7 (ref 7–25)
CA-I SERPL ISE-MCNC: 1.38 MMOL/L (ref 1.2–1.3)
CALCIUM SPEC-SCNC: 9.4 MG/DL (ref 8.6–10.5)
CALCIUM SPEC-SCNC: 9.8 MG/DL (ref 8.6–10.5)
CHLORIDE SERPL-SCNC: 100 MMOL/L (ref 98–107)
CHLORIDE SERPL-SCNC: 102 MMOL/L (ref 98–107)
CHOLESTEROL FLUID: 46 MG/DL
CO2 SERPL-SCNC: 23 MMOL/L (ref 22–29)
CO2 SERPL-SCNC: 28 MMOL/L (ref 22–29)
COLOR FLD: ABNORMAL
CREAT SERPL-MCNC: 0.75 MG/DL (ref 0.57–1)
CREAT SERPL-MCNC: 0.87 MG/DL (ref 0.57–1)
D DIMER PPP FEU-MCNC: 9.77 MG/L (FEU) (ref 0–0.71)
DEPRECATED RDW RBC AUTO: 67.4 FL (ref 37–54)
DEPRECATED RDW RBC AUTO: 70.4 FL (ref 37–54)
EGFRCR SERPLBLD CKD-EPI 2021: 71.3 ML/MIN/1.73
EGFRCR SERPLBLD CKD-EPI 2021: 85.2 ML/MIN/1.73
EOSINOPHIL # BLD AUTO: 0 10*3/MM3 (ref 0–0.4)
EOSINOPHIL NFR BLD AUTO: 0 % (ref 0.3–6.2)
EOSINOPHIL NFR FLD MANUAL: 2 %
ERYTHROCYTE [DISTWIDTH] IN BLOOD BY AUTOMATED COUNT: 21.8 % (ref 12.3–15.4)
ERYTHROCYTE [DISTWIDTH] IN BLOOD BY AUTOMATED COUNT: 22.4 % (ref 12.3–15.4)
GEN 5 2HR TROPONIN T REFLEX: 92 NG/L
GLOBULIN UR ELPH-MCNC: 3.2 GM/DL
GLOBULIN UR ELPH-MCNC: 3.2 GM/DL
GLUCOSE FLD-MCNC: <2 MG/DL
GLUCOSE SERPL-MCNC: 104 MG/DL (ref 65–99)
GLUCOSE SERPL-MCNC: 119 MG/DL (ref 65–99)
HCT VFR BLD AUTO: 29.1 % (ref 34–46.6)
HCT VFR BLD AUTO: 34.1 % (ref 34–46.6)
HGB BLD-MCNC: 10.3 G/DL (ref 12–15.9)
HGB BLD-MCNC: 9.4 G/DL (ref 12–15.9)
HOLD SPECIMEN: NORMAL
INR PPP: 1.27 (ref 0.93–1.1)
LDH FLD-CCNC: 214 U/L
LYMPHOCYTES # BLD AUTO: 0.5 10*3/MM3 (ref 0.7–3.1)
LYMPHOCYTES NFR BLD AUTO: 17.5 % (ref 19.6–45.3)
LYMPHOCYTES NFR FLD MANUAL: 1 %
MCH RBC QN AUTO: 26.9 PG (ref 26.6–33)
MCH RBC QN AUTO: 28.9 PG (ref 26.6–33)
MCHC RBC AUTO-ENTMCNC: 30.3 G/DL (ref 31.5–35.7)
MCHC RBC AUTO-ENTMCNC: 32.2 G/DL (ref 31.5–35.7)
MCV RBC AUTO: 88.7 FL (ref 79–97)
MCV RBC AUTO: 89.5 FL (ref 79–97)
METHOD: ABNORMAL
MONOCYTES # BLD AUTO: 0 10*3/MM3 (ref 0.1–0.9)
MONOCYTES NFR BLD AUTO: 1 % (ref 5–12)
MONOCYTES NFR FLD: 2 %
NEUTROPHILS NFR BLD AUTO: 2.2 10*3/MM3 (ref 1.7–7)
NEUTROPHILS NFR BLD AUTO: 80.6 % (ref 42.7–76)
NEUTROPHILS NFR FLD MANUAL: 95 %
NRBC BLD AUTO-RTO: 0.5 /100 WBC (ref 0–0.2)
NT-PROBNP SERPL-MCNC: 4141 PG/ML (ref 0–900)
NUC CELL # FLD: ABNORMAL /MM3
PH FLD: 7.5 [PH] (ref 6.5–7.5)
PLATELET # BLD AUTO: 293 10*3/MM3 (ref 140–450)
PLATELET # BLD AUTO: 393 10*3/MM3 (ref 140–450)
PMV BLD AUTO: 7.3 FL (ref 6–12)
PMV BLD AUTO: 7.4 FL (ref 6–12)
POTASSIUM SERPL-SCNC: 3.9 MMOL/L (ref 3.5–5.2)
POTASSIUM SERPL-SCNC: 4.1 MMOL/L (ref 3.5–5.2)
PROT FLD-MCNC: 2.4 G/DL
PROT SERPL-MCNC: 5.3 G/DL (ref 6–8.5)
PROT SERPL-MCNC: 5.4 G/DL (ref 6–8.5)
PROTHROMBIN TIME: 13.6 SECONDS (ref 9.6–11.7)
RBC # BLD AUTO: 3.25 10*6/MM3 (ref 3.77–5.28)
RBC # BLD AUTO: 3.84 10*6/MM3 (ref 3.77–5.28)
SCAN SLIDE: NORMAL
SODIUM SERPL-SCNC: 135 MMOL/L (ref 136–145)
SODIUM SERPL-SCNC: 138 MMOL/L (ref 136–145)
TRIGL FLD-MCNC: 37 MG/DL
TROPONIN T DELTA: 10 NG/L
TROPONIN T SERPL HS-MCNC: 82 NG/L
WBC NRBC COR # BLD AUTO: 2.8 10*3/MM3 (ref 3.4–10.8)
WBC NRBC COR # BLD AUTO: 8.3 10*3/MM3 (ref 3.4–10.8)

## 2024-02-16 PROCEDURE — 25810000003 SODIUM CHLORIDE 0.9 % SOLUTION: Performed by: EMERGENCY MEDICINE

## 2024-02-16 PROCEDURE — 85379 FIBRIN DEGRADATION QUANT: CPT | Performed by: EMERGENCY MEDICINE

## 2024-02-16 PROCEDURE — 87116 MYCOBACTERIA CULTURE: CPT | Performed by: INTERNAL MEDICINE

## 2024-02-16 PROCEDURE — 25010000002 ENOXAPARIN PER 10 MG: Performed by: INTERNAL MEDICINE

## 2024-02-16 PROCEDURE — 84478 ASSAY OF TRIGLYCERIDES: CPT | Performed by: INTERNAL MEDICINE

## 2024-02-16 PROCEDURE — 87206 SMEAR FLUORESCENT/ACID STAI: CPT | Performed by: INTERNAL MEDICINE

## 2024-02-16 PROCEDURE — 85730 THROMBOPLASTIN TIME PARTIAL: CPT | Performed by: EMERGENCY MEDICINE

## 2024-02-16 PROCEDURE — 71045 X-RAY EXAM CHEST 1 VIEW: CPT

## 2024-02-16 PROCEDURE — 83880 ASSAY OF NATRIURETIC PEPTIDE: CPT | Performed by: EMERGENCY MEDICINE

## 2024-02-16 PROCEDURE — 89051 BODY FLUID CELL COUNT: CPT | Performed by: INTERNAL MEDICINE

## 2024-02-16 PROCEDURE — 87077 CULTURE AEROBIC IDENTIFY: CPT | Performed by: INTERNAL MEDICINE

## 2024-02-16 PROCEDURE — 87075 CULTR BACTERIA EXCEPT BLOOD: CPT | Performed by: INTERNAL MEDICINE

## 2024-02-16 PROCEDURE — 82042 OTHER SOURCE ALBUMIN QUAN EA: CPT | Performed by: INTERNAL MEDICINE

## 2024-02-16 PROCEDURE — C1729 CATH, DRAINAGE: HCPCS

## 2024-02-16 PROCEDURE — C1751 CATH, INF, PER/CENT/MIDLINE: HCPCS

## 2024-02-16 PROCEDURE — 84311 SPECTROPHOTOMETRY: CPT | Performed by: INTERNAL MEDICINE

## 2024-02-16 PROCEDURE — 25010000002 FENTANYL CITRATE (PF) 50 MCG/ML SOLUTION: Performed by: RADIOLOGY

## 2024-02-16 PROCEDURE — 82330 ASSAY OF CALCIUM: CPT

## 2024-02-16 PROCEDURE — 85610 PROTHROMBIN TIME: CPT | Performed by: EMERGENCY MEDICINE

## 2024-02-16 PROCEDURE — 87205 SMEAR GRAM STAIN: CPT | Performed by: INTERNAL MEDICINE

## 2024-02-16 PROCEDURE — 85007 BL SMEAR W/DIFF WBC COUNT: CPT

## 2024-02-16 PROCEDURE — 36415 COLL VENOUS BLD VENIPUNCTURE: CPT | Performed by: EMERGENCY MEDICINE

## 2024-02-16 PROCEDURE — 84484 ASSAY OF TROPONIN QUANT: CPT | Performed by: EMERGENCY MEDICINE

## 2024-02-16 PROCEDURE — 99291 CRITICAL CARE FIRST HOUR: CPT

## 2024-02-16 PROCEDURE — 88108 CYTOPATH CONCENTRATE TECH: CPT | Performed by: INTERNAL MEDICINE

## 2024-02-16 PROCEDURE — P9047 ALBUMIN (HUMAN), 25%, 50ML: HCPCS

## 2024-02-16 PROCEDURE — 82150 ASSAY OF AMYLASE: CPT | Performed by: INTERNAL MEDICINE

## 2024-02-16 PROCEDURE — C1769 GUIDE WIRE: HCPCS

## 2024-02-16 PROCEDURE — 87040 BLOOD CULTURE FOR BACTERIA: CPT | Performed by: EMERGENCY MEDICINE

## 2024-02-16 PROCEDURE — 25510000001 IOPAMIDOL PER 1 ML: Performed by: EMERGENCY MEDICINE

## 2024-02-16 PROCEDURE — 87070 CULTURE OTHR SPECIMN AEROBIC: CPT | Performed by: INTERNAL MEDICINE

## 2024-02-16 PROCEDURE — 87102 FUNGUS ISOLATION CULTURE: CPT | Performed by: INTERNAL MEDICINE

## 2024-02-16 PROCEDURE — 25010000002 LIDOCAINE 1 % SOLUTION: Performed by: RADIOLOGY

## 2024-02-16 PROCEDURE — 83615 LACTATE (LD) (LDH) ENZYME: CPT | Performed by: INTERNAL MEDICINE

## 2024-02-16 PROCEDURE — 84157 ASSAY OF PROTEIN OTHER: CPT | Performed by: INTERNAL MEDICINE

## 2024-02-16 PROCEDURE — 25010000002 MORPHINE PER 10 MG

## 2024-02-16 PROCEDURE — 82945 GLUCOSE OTHER FLUID: CPT | Performed by: INTERNAL MEDICINE

## 2024-02-16 PROCEDURE — 25010000002 ALBUMIN HUMAN 25% PER 50 ML

## 2024-02-16 PROCEDURE — B548ZZA ULTRASONOGRAPHY OF SUPERIOR VENA CAVA, GUIDANCE: ICD-10-PCS

## 2024-02-16 PROCEDURE — 71275 CT ANGIOGRAPHY CHEST: CPT

## 2024-02-16 PROCEDURE — 84145 PROCALCITONIN (PCT): CPT

## 2024-02-16 PROCEDURE — C1894 INTRO/SHEATH, NON-LASER: HCPCS

## 2024-02-16 PROCEDURE — 87186 SC STD MICRODIL/AGAR DIL: CPT | Performed by: INTERNAL MEDICINE

## 2024-02-16 PROCEDURE — 25810000003 LACTATED RINGERS PER 1000 ML

## 2024-02-16 PROCEDURE — 93005 ELECTROCARDIOGRAM TRACING: CPT | Performed by: EMERGENCY MEDICINE

## 2024-02-16 PROCEDURE — 83986 ASSAY PH BODY FLUID NOS: CPT | Performed by: INTERNAL MEDICINE

## 2024-02-16 PROCEDURE — 85025 COMPLETE CBC W/AUTO DIFF WBC: CPT | Performed by: EMERGENCY MEDICINE

## 2024-02-16 PROCEDURE — 80053 COMPREHEN METABOLIC PANEL: CPT

## 2024-02-16 PROCEDURE — C1887 CATHETER, GUIDING: HCPCS

## 2024-02-16 PROCEDURE — 02HV33Z INSERTION OF INFUSION DEVICE INTO SUPERIOR VENA CAVA, PERCUTANEOUS APPROACH: ICD-10-PCS

## 2024-02-16 PROCEDURE — 85025 COMPLETE CBC W/AUTO DIFF WBC: CPT

## 2024-02-16 PROCEDURE — 80053 COMPREHEN METABOLIC PANEL: CPT | Performed by: EMERGENCY MEDICINE

## 2024-02-16 RX ORDER — MORPHINE SULFATE 2 MG/ML
2 INJECTION, SOLUTION INTRAMUSCULAR; INTRAVENOUS ONCE
Status: DISCONTINUED | OUTPATIENT
Start: 2024-02-16 | End: 2024-02-16

## 2024-02-16 RX ORDER — CYANOCOBALAMIN 1000 UG/ML
1000 INJECTION, SOLUTION INTRAMUSCULAR; SUBCUTANEOUS
Status: DISCONTINUED | OUTPATIENT
Start: 2024-02-16 | End: 2024-02-16

## 2024-02-16 RX ORDER — MIDODRINE HYDROCHLORIDE 5 MG/1
5 TABLET ORAL ONCE
Status: COMPLETED | OUTPATIENT
Start: 2024-02-16 | End: 2024-02-16

## 2024-02-16 RX ORDER — FUROSEMIDE 10 MG/ML
80 INJECTION INTRAMUSCULAR; INTRAVENOUS ONCE
Status: DISCONTINUED | OUTPATIENT
Start: 2024-02-16 | End: 2024-02-17

## 2024-02-16 RX ORDER — LANOLIN ALCOHOL/MO/W.PET/CERES
400 CREAM (GRAM) TOPICAL DAILY
COMMUNITY
End: 2024-02-25

## 2024-02-16 RX ORDER — AMOXICILLIN 250 MG
2 CAPSULE ORAL 2 TIMES DAILY PRN
Status: DISCONTINUED | OUTPATIENT
Start: 2024-02-16 | End: 2024-02-18

## 2024-02-16 RX ORDER — ENOXAPARIN SODIUM 100 MG/ML
40 INJECTION SUBCUTANEOUS DAILY
Status: DISCONTINUED | OUTPATIENT
Start: 2024-02-16 | End: 2024-02-25

## 2024-02-16 RX ORDER — FERROUS SULFATE 325(65) MG
325 TABLET ORAL 2 TIMES DAILY
COMMUNITY
End: 2024-02-25

## 2024-02-16 RX ORDER — ONDANSETRON 2 MG/ML
4 INJECTION INTRAMUSCULAR; INTRAVENOUS EVERY 6 HOURS PRN
Status: DISCONTINUED | OUTPATIENT
Start: 2024-02-16 | End: 2024-02-25

## 2024-02-16 RX ORDER — DIVALPROEX SODIUM 125 MG/1
125 TABLET, DELAYED RELEASE ORAL 2 TIMES DAILY
COMMUNITY
End: 2024-02-25

## 2024-02-16 RX ORDER — CITALOPRAM HYDROBROMIDE 10 MG/1
10 TABLET ORAL DAILY
COMMUNITY
End: 2024-02-25

## 2024-02-16 RX ORDER — PANTOPRAZOLE SODIUM 40 MG/1
40 TABLET, DELAYED RELEASE ORAL
Status: DISCONTINUED | OUTPATIENT
Start: 2024-02-17 | End: 2024-02-16

## 2024-02-16 RX ORDER — ATORVASTATIN CALCIUM 10 MG/1
10 TABLET, FILM COATED ORAL DAILY
Status: DISCONTINUED | OUTPATIENT
Start: 2024-02-17 | End: 2024-02-16

## 2024-02-16 RX ORDER — CALCIUM CARBONATE 500(1250)
500 TABLET ORAL 4 TIMES DAILY
COMMUNITY
End: 2024-02-25

## 2024-02-16 RX ORDER — SODIUM CHLORIDE 0.9 % (FLUSH) 0.9 %
10 SYRINGE (ML) INJECTION AS NEEDED
Status: DISCONTINUED | OUTPATIENT
Start: 2024-02-16 | End: 2024-02-25

## 2024-02-16 RX ORDER — FENTANYL CITRATE 50 UG/ML
INJECTION, SOLUTION INTRAMUSCULAR; INTRAVENOUS
Status: ACTIVE
Start: 2024-02-16 | End: 2024-02-17

## 2024-02-16 RX ORDER — NITROGLYCERIN 0.4 MG/1
0.4 TABLET SUBLINGUAL
COMMUNITY
End: 2024-02-25

## 2024-02-16 RX ORDER — FOLIC ACID 1 MG/1
1 TABLET ORAL DAILY
Status: DISCONTINUED | OUTPATIENT
Start: 2024-02-17 | End: 2024-02-18

## 2024-02-16 RX ORDER — POLYETHYLENE GLYCOL 3350 17 G/17G
17 POWDER, FOR SOLUTION ORAL DAILY PRN
Status: DISCONTINUED | OUTPATIENT
Start: 2024-02-16 | End: 2024-02-18

## 2024-02-16 RX ORDER — FENTANYL CITRATE 50 UG/ML
INJECTION, SOLUTION INTRAMUSCULAR; INTRAVENOUS AS NEEDED
Status: COMPLETED | OUTPATIENT
Start: 2024-02-16 | End: 2024-02-16

## 2024-02-16 RX ORDER — NOREPINEPHRINE BITARTRATE 0.03 MG/ML
.02-.5 INJECTION, SOLUTION INTRAVENOUS
Status: DISCONTINUED | OUTPATIENT
Start: 2024-02-16 | End: 2024-02-19

## 2024-02-16 RX ORDER — BISACODYL 5 MG/1
5 TABLET, DELAYED RELEASE ORAL DAILY PRN
Status: DISCONTINUED | OUTPATIENT
Start: 2024-02-16 | End: 2024-02-18

## 2024-02-16 RX ORDER — VANCOMYCIN/0.9 % SOD CHLORIDE 1.5G/250ML
1500 PLASTIC BAG, INJECTION (ML) INTRAVENOUS ONCE
Status: DISCONTINUED | OUTPATIENT
Start: 2024-02-16 | End: 2024-02-17

## 2024-02-16 RX ORDER — BISACODYL 10 MG
10 SUPPOSITORY, RECTAL RECTAL DAILY PRN
Status: DISCONTINUED | OUTPATIENT
Start: 2024-02-16 | End: 2024-02-18

## 2024-02-16 RX ORDER — ACETAMINOPHEN 325 MG/1
650 TABLET ORAL 3 TIMES DAILY
COMMUNITY
End: 2024-02-25

## 2024-02-16 RX ORDER — SODIUM CHLORIDE, SODIUM LACTATE, POTASSIUM CHLORIDE, CALCIUM CHLORIDE 600; 310; 30; 20 MG/100ML; MG/100ML; MG/100ML; MG/100ML
100 INJECTION, SOLUTION INTRAVENOUS CONTINUOUS
Status: DISCONTINUED | OUTPATIENT
Start: 2024-02-16 | End: 2024-02-18

## 2024-02-16 RX ORDER — LANOLIN ALCOHOL/MO/W.PET/CERES
1000 CREAM (GRAM) TOPICAL DAILY
COMMUNITY
End: 2024-02-25

## 2024-02-16 RX ORDER — SODIUM CHLORIDE 9 MG/ML
40 INJECTION, SOLUTION INTRAVENOUS AS NEEDED
Status: DISCONTINUED | OUTPATIENT
Start: 2024-02-16 | End: 2024-02-25

## 2024-02-16 RX ORDER — CETIRIZINE HYDROCHLORIDE 10 MG/1
10 TABLET ORAL DAILY
Status: DISCONTINUED | OUTPATIENT
Start: 2024-02-17 | End: 2024-02-16

## 2024-02-16 RX ORDER — ACETAMINOPHEN 500 MG
1000 TABLET ORAL ONCE
Status: COMPLETED | OUTPATIENT
Start: 2024-02-16 | End: 2024-02-16

## 2024-02-16 RX ORDER — FUROSEMIDE 40 MG/1
40 TABLET ORAL 2 TIMES DAILY
Status: DISCONTINUED | OUTPATIENT
Start: 2024-02-16 | End: 2024-02-19

## 2024-02-16 RX ORDER — ALBUMIN (HUMAN) 12.5 G/50ML
50 SOLUTION INTRAVENOUS ONCE
Status: COMPLETED | OUTPATIENT
Start: 2024-02-16 | End: 2024-02-16

## 2024-02-16 RX ORDER — SODIUM CHLORIDE 0.9 % (FLUSH) 0.9 %
10 SYRINGE (ML) INJECTION EVERY 12 HOURS SCHEDULED
Status: DISCONTINUED | OUTPATIENT
Start: 2024-02-16 | End: 2024-02-25

## 2024-02-16 RX ORDER — AMOXICILLIN 250 MG
2 CAPSULE ORAL DAILY
COMMUNITY
End: 2024-02-25

## 2024-02-16 RX ORDER — ACETAMINOPHEN 325 MG/1
650 TABLET ORAL EVERY 4 HOURS PRN
COMMUNITY
End: 2024-02-25

## 2024-02-16 RX ORDER — FLUTICASONE PROPIONATE AND SALMETEROL 100; 50 UG/1; UG/1
1 POWDER RESPIRATORY (INHALATION) DAILY
COMMUNITY
End: 2024-02-25

## 2024-02-16 RX ORDER — MIDODRINE HYDROCHLORIDE 5 MG/1
10 TABLET ORAL
COMMUNITY
End: 2024-02-25

## 2024-02-16 RX ORDER — ONDANSETRON 4 MG/1
4 TABLET, ORALLY DISINTEGRATING ORAL EVERY 8 HOURS PRN
Status: DISCONTINUED | OUTPATIENT
Start: 2024-02-16 | End: 2024-02-25

## 2024-02-16 RX ORDER — ZOLPIDEM TARTRATE 5 MG/1
5 TABLET ORAL NIGHTLY PRN
Status: DISCONTINUED | OUTPATIENT
Start: 2024-02-16 | End: 2024-02-16

## 2024-02-16 RX ORDER — OXYCODONE HYDROCHLORIDE 5 MG/1
5 TABLET ORAL EVERY 4 HOURS PRN
Status: DISCONTINUED | OUTPATIENT
Start: 2024-02-16 | End: 2024-02-18

## 2024-02-16 RX ORDER — HYDROCODONE BITARTRATE AND ACETAMINOPHEN 5; 325 MG/1; MG/1
1 TABLET ORAL EVERY 4 HOURS PRN
Status: DISCONTINUED | OUTPATIENT
Start: 2024-02-16 | End: 2024-02-16

## 2024-02-16 RX ORDER — POTASSIUM CHLORIDE 20 MEQ/1
20 TABLET, EXTENDED RELEASE ORAL 2 TIMES DAILY
Status: DISCONTINUED | OUTPATIENT
Start: 2024-02-16 | End: 2024-02-18

## 2024-02-16 RX ORDER — RISPERIDONE 0.25 MG/1
0.25 TABLET ORAL NIGHTLY
COMMUNITY
End: 2024-02-25

## 2024-02-16 RX ORDER — MORPHINE SULFATE 2 MG/ML
2 INJECTION, SOLUTION INTRAMUSCULAR; INTRAVENOUS ONCE
Status: COMPLETED | OUTPATIENT
Start: 2024-02-16 | End: 2024-02-16

## 2024-02-16 RX ORDER — LIDOCAINE HYDROCHLORIDE 10 MG/ML
INJECTION, SOLUTION INFILTRATION; PERINEURAL AS NEEDED
Status: COMPLETED | OUTPATIENT
Start: 2024-02-16 | End: 2024-02-16

## 2024-02-16 RX ORDER — POTASSIUM CHLORIDE 20 MEQ/1
40 TABLET, EXTENDED RELEASE ORAL 2 TIMES DAILY
COMMUNITY
End: 2024-02-25

## 2024-02-16 RX ADMIN — Medication 0.02 MCG/KG/MIN: at 15:17

## 2024-02-16 RX ADMIN — ALBUMIN (HUMAN) 50 G: 0.25 INJECTION, SOLUTION INTRAVENOUS at 20:47

## 2024-02-16 RX ADMIN — SODIUM CHLORIDE, POTASSIUM CHLORIDE, SODIUM LACTATE AND CALCIUM CHLORIDE 100 ML/HR: 600; 310; 30; 20 INJECTION, SOLUTION INTRAVENOUS at 21:32

## 2024-02-16 RX ADMIN — MIDODRINE HYDROCHLORIDE 5 MG: 5 TABLET ORAL at 12:54

## 2024-02-16 RX ADMIN — ACETAMINOPHEN 1000 MG: 500 TABLET ORAL at 10:48

## 2024-02-16 RX ADMIN — ENOXAPARIN SODIUM 40 MG: 100 INJECTION SUBCUTANEOUS at 20:12

## 2024-02-16 RX ADMIN — IOPAMIDOL 100 ML: 755 INJECTION, SOLUTION INTRAVENOUS at 13:55

## 2024-02-16 RX ADMIN — SODIUM CHLORIDE 500 ML: 0.9 INJECTION, SOLUTION INTRAVENOUS at 13:43

## 2024-02-16 RX ADMIN — LIDOCAINE HYDROCHLORIDE 5 ML: 10 INJECTION, SOLUTION INFILTRATION; PERINEURAL at 15:53

## 2024-02-16 RX ADMIN — OXYCODONE 5 MG: 5 TABLET ORAL at 23:30

## 2024-02-16 RX ADMIN — SODIUM CHLORIDE, POTASSIUM CHLORIDE, SODIUM LACTATE AND CALCIUM CHLORIDE 100 ML/HR: 600; 310; 30; 20 INJECTION, SOLUTION INTRAVENOUS at 23:30

## 2024-02-16 RX ADMIN — MORPHINE SULFATE 2 MG: 2 INJECTION, SOLUTION INTRAMUSCULAR; INTRAVENOUS at 22:29

## 2024-02-16 RX ADMIN — FENTANYL CITRATE 50 MCG: 50 INJECTION, SOLUTION INTRAMUSCULAR; INTRAVENOUS at 15:52

## 2024-02-16 NOTE — CASE MANAGEMENT/SOCIAL WORK
Discharge Planning Assessment   [facility]     Patient Name: Sandra Treadwell  MRN: [id number]  Today's Date: 2/16/2024    Admit Date: 2/16/2024    Plan: return to [facility].  No precert or PASRR required.   Discharge Needs Assessment       Row Name 02/16/24 1530       Living Environment    People in Home other (see comments)    Unique Family Situation LTC    Current Living Arrangements extended care facility    Duration at Residence since November 2023.  previously from home with spouse.    Potentially Unsafe Housing Conditions none    In the past 12 months has the electric, gas, oil, or water company threatened to shut off services in your home? No    Primary Care Provided by other (see comments)    Provides Primary Care For no one    Family Caregiver if Needed other (see comments)    Quality of Family Relationships helpful;involved;supportive    Able to Return to Prior Arrangements yes       Resource/Environmental Concerns    Resource/Environmental Concerns none    Transportation Concerns none       Transportation Needs    In the past 12 months, has lack of transportation kept you from medical appointments or from getting medications? no    In the past 12 months, has lack of transportation kept you from meetings, work, or from getting things needed for daily living? No       Food Insecurity    Within the past 12 months, you worried that your food would run out before you got the money to buy more. Never true    Within the past 12 months, the food you bought just didn't last and you didn't have money to get more. Never true       Transition Planning    Patient/Family Anticipates Transition to long-term care facility    Patient/Family Anticipated Services at Transition none    Transportation Anticipated health plan transportation;family or friend will provide       Discharge Needs Assessment    Readmission Within the Last 30 Days no previous admission in last 30 days    Equipment Currently Used at Home  wheelchair    Anticipated Changes Related to Illness none    Discharge Facility/Level of Care Needs nursing facility, skilled    Provided Post Acute Provider List? N/A                   Discharge Plan       Row Name 02/16/24 1525       Plan    Plan return to [facility].  No precert or PASRR required.    Plan Comments pt is from [facility].  text sent to azselena danae informing of admission- she is skilled..  pt has not been able to ambulate since knee surgery in November.  spouse in agreement for pt to return.  pt is dependent for all care.                  Continued Care and Services - Admitted Since 2/16/2024       Destination       Service Provider Request Status Selected Services Address Phone Fax Patient Preferred    [facility] Pending - Request Sent N/A [address] [place] [address] [phone] --                  Selected Continued Care - Prior Encounters Includes continued care and service providers with selected services from prior encounters from 11/18/2023 to 2/16/2024      Discharged on 12/4/2023 Admission date: 11/24/2023 - Discharge disposition: Skilled Nursing Facility (DC - External)      Destination       Service Provider Selected Services Address Phone Fax Patient Preferred    [facility] [address] [place] [address] [phone] --              Home Medical Care       Service Provider Selected Services Address Phone Fax Patient Preferred    [facility] [address] [place] [address], [facility] [address] [id number] [phone] --                          Expected Discharge Date and Time       Expected Discharge Date Expected Discharge Time    Feb 20, 2024            Demographic Summary       Row Name 02/16/24 1530       General Information    Admission Type inpatient    Arrived From emergency department    Required Notices Provided  Important Message from Medicare    Referral Source admission list    Reason for Consult discharge planning    Preferred Language English      Row Name 02/16/24 1524       General Information    Admission Type inpatient    Arrived From emergency department    Required Notices Provided Important Message from Medicare    Referral Source admission list    Reason for Consult discharge planning    Preferred Language English                   Functional Status       Row Name 02/16/24 1530       Functional Status    Usual Activity Tolerance poor    Current Activity Tolerance poor       Physical Activity    On average, how many days per week do you engage in moderate to strenuous exercise (like a brisk walk)? 0 days    On average, how many minutes do you engage in exercise at this level? 0 min    Number of minutes of exercise per week 0       Functional Status, IADL    Medications completely dependent    Meal Preparation completely dependent    Housekeeping completely dependent    Laundry completely dependent    Shopping completely dependent       Mental Status    General Appearance WDL WDL       Mental Status Summary    Recent Changes in Mental Status/Cognitive Functioning no changes                   Psychosocial    No documentation.                  Abuse/Neglect    No documentation.                  Legal    No documentation.                  Substance Abuse    No documentation.                  Patient Forms       Row Name 02/16/24 1525       Patient Forms    Important Message from Medicare (IMM) Delivered  imm per reg 2/16                      Karine Dowd RN

## 2024-02-16 NOTE — CONSULTS
Called to evaluate midline due to no blood return. Line flushes with ease with no complaints of pain, but no blood return noted. Primary nurse notified that blood return is not always guaranteed with a midline. Dressing changed utilizing sterile technique.

## 2024-02-16 NOTE — ED NOTES
currently at bedside. Pt and spouse reports pt has been at [facility] since November-post rt knee sx. Pt has not been able to ambulate since, they report pt has been having issues with her BP dropping weekly-facility does give meds to aid in this. Pt c/o CP today-was given 2 SL nitros at facility then BP dropped.

## 2024-02-16 NOTE — LETTER
EMS Transport Request  For use at [facility], [place], [place], [place], and [place] only   Patient Name: Sandra Treadwell : 1950   Weight:61.5 kg (135 lb 9.3 oz) Pick-up Location: [facility] BLS/ALS: BLS/ALS: BLS   Insurance: MEDICARE    Pre-Cert #: D/C Summary complete:    Destination: Other [facility]   Contact Precautions: Other Contact, MRSA, ESBL E coli.   Equipment (O2, Fluids, etc.): O2, settings 4L NC   Arrive By Date/Time: 24, 1530 Stretcher/WC: Stretcher   CM Requesting: Maddy Rangel RN Ext: [phone]   Notes/Medical Necessity: Medical attendant required, Requires oxygen - unable to self administer, and Unable to tolerate seated position for time needed to transport      ______________________________________________________________________    *Only 2 patient bags OR 1 carry-on size bag are permitted.  Wheelchairs and walkers CANNOT transported with the patient. Acknowledge: Yes

## 2024-02-16 NOTE — Clinical Note
Level of Care: Stepdown [25]   Diagnosis: Large pleural effusion [[id number]]   Admitting Physician: JOHNNY CAMACHO [[id number]]   Attending Physician: JOHNNY CAMACHO [[id number]]   Certification: I Certify That Inpatient Hospital Services Are Medically Necessary For Greater Than 2 Midnights

## 2024-02-16 NOTE — ED PROVIDER NOTES
"Subjective   History of Present Illness  Chief complaint: Chest pain    73-year-old female presents with chest pain.  Patient states symptoms started this morning.  She was given 2 sublingual nitros by her facility staff.  She denies any improvement in her pain.  She had some hypotension after the nitro.  Patient is somewhat of a poor historian but she does report some shortness of breath associated with this.  No known alleviating or exacerbating factors.    History provided by:  Patient      Review of Systems   Constitutional:  Negative for fever.   HENT:  Negative for congestion.    Respiratory:  Positive for shortness of breath.    Cardiovascular:  Positive for chest pain.   Gastrointestinal:  Negative for abdominal pain and vomiting.   Musculoskeletal:  Negative for back pain.   Neurological:  Negative for headaches.       Past Medical History:   Diagnosis Date    Bladder leak     Breast cancer     chemo and radiation    Depression     GERD (gastroesophageal reflux disease)     Hyperlipidemia     Osteoporosis        Allergies   Allergen Reactions    Codeine Nausea And Vomiting and Dizziness       Past Surgical History:   Procedure Laterality Date    BREAST SURGERY      lumpectomy    CHOLECYSTECTOMY      HYSTERECTOMY      TOTAL KNEE ARTHROPLASTY Right 11/24/2023    Procedure: TOTAL KNEE ARTHROPLASTY WITH CORI ROBOT;  Surgeon: Ulises Chen II, MD;  Location: [facility] ER;  Service: Robotics - Ortho;  Laterality: Right;       No family history on file.    Social History     Socioeconomic History    Marital status:    Tobacco Use    Smoking status: Never     Passive exposure: Never    Smokeless tobacco: Never   Vaping Use    Vaping Use: Never used   Substance and Sexual Activity    Alcohol use: Never    Drug use: Never    Sexual activity: Defer       BP 97/52   Pulse 113   Temp 97.7 °F (36.5 °C) (Oral)   Resp 19   Ht 152.4 cm (60\")   Wt 67.1 kg (148 lb)   SpO2 93%   BMI 28.90 kg/m² "       Objective   Physical Exam  Vitals and nursing note reviewed.   Constitutional:       Appearance: She is well-developed.   HENT:      Head: Normocephalic and atraumatic.   Cardiovascular:      Rate and Rhythm: Normal rate and regular rhythm.      Heart sounds: Normal heart sounds.   Pulmonary:      Effort: Pulmonary effort is normal. No respiratory distress.      Breath sounds: Normal breath sounds.   Abdominal:      General: Bowel sounds are normal.      Palpations: Abdomen is soft.      Tenderness: There is no abdominal tenderness.   Musculoskeletal:      Right lower leg: No tenderness. No edema.      Left lower leg: No tenderness. No edema.   Skin:     General: Skin is warm and dry.   Neurological:      Mental Status: She is alert and oriented to person, place, and time.         Procedures           ED Course      My interpretation of EKG shows sinus rhythm, rate of 99, LVH with secondary repolarization abnormality, artifact present                           Results for orders placed or performed during the hospital encounter of 02/16/24   Comprehensive Metabolic Panel    Specimen: Arm, Right; Blood   Result Value Ref Range    Glucose 104 (H) 65 - 99 mg/dL    BUN 15 8 - 23 mg/dL    Creatinine 0.75 0.57 - 1.00 mg/dL    Sodium 138 136 - 145 mmol/L    Potassium 4.1 3.5 - 5.2 mmol/L    Chloride 100 98 - 107 mmol/L    CO2 28.0 22.0 - 29.0 mmol/L    Calcium 9.8 8.6 - 10.5 mg/dL    Total Protein 5.4 (L) 6.0 - 8.5 g/dL    Albumin 2.2 (L) 3.5 - 5.2 g/dL    ALT (SGPT) 5 1 - 33 U/L    AST (SGOT) 8 1 - 32 U/L    Alkaline Phosphatase 61 39 - 117 U/L    Total Bilirubin 0.4 0.0 - 1.2 mg/dL    Globulin 3.2 gm/dL    A/G Ratio 0.7 g/dL    BUN/Creatinine Ratio 20.0 7.0 - 25.0    Anion Gap 10.0 5.0 - 15.0 mmol/L    eGFR 85.2 >60.0 mL/min/1.73   Protime-INR    Specimen: Blood   Result Value Ref Range    Protime 13.6 (H) 9.6 - 11.7 Seconds    INR 1.27 (H) 0.93 - 1.10   aPTT    Specimen: Blood   Result Value Ref Range    PTT 23.4  (L) 61.0 - 76.5 seconds   D-dimer, Quantitative    Specimen: Blood   Result Value Ref Range    D-Dimer, Quantitative 9.77 (H) 0.00 - 0.71 mg/L (FEU)   BNP    Specimen: Arm, Right; Blood   Result Value Ref Range    proBNP 4,141.0 (H) 0.0 - 900.0 pg/mL   CBC Auto Differential    Specimen: Arm, Right; Blood   Result Value Ref Range    WBC 2.80 (L) 3.40 - 10.80 10*3/mm3    RBC 3.25 (L) 3.77 - 5.28 10*6/mm3    Hemoglobin 9.4 (L) 12.0 - 15.9 g/dL    Hematocrit 29.1 (L) 34.0 - 46.6 %    MCV 89.5 79.0 - 97.0 fL    MCH 28.9 26.6 - 33.0 pg    MCHC 32.2 31.5 - 35.7 g/dL    RDW 22.4 (H) 12.3 - 15.4 %    RDW-SD 70.4 (H) 37.0 - 54.0 fl    MPV 7.3 6.0 - 12.0 fL    Platelets 293 140 - 450 10*3/mm3    Neutrophil % 80.6 (H) 42.7 - 76.0 %    Lymphocyte % 17.5 (L) 19.6 - 45.3 %    Monocyte % 1.0 (L) 5.0 - 12.0 %    Eosinophil % 0.0 (L) 0.3 - 6.2 %    Basophil % 0.9 0.0 - 1.5 %    Neutrophils, Absolute 2.20 1.70 - 7.00 10*3/mm3    Lymphocytes, Absolute 0.50 (L) 0.70 - 3.10 10*3/mm3    Monocytes, Absolute 0.00 (L) 0.10 - 0.90 10*3/mm3    Eosinophils, Absolute 0.00 0.00 - 0.40 10*3/mm3    Basophils, Absolute 0.00 0.00 - 0.20 10*3/mm3    nRBC 0.5 (H) 0.0 - 0.2 /100 WBC   ECG 12 Lead Chest Pain   Result Value Ref Range    QT Interval 354 ms    QTC Interval 443 ms   Gold Top - [facility]   Result Value Ref Range    Extra Tube Hold for add-ons.      CT Angiogram Chest Pulmonary Embolism    Result Date: 2/16/2024  Impression: Negative exam for pulmonary embolism. Very large right pleural effusion with complete atelectasis of the right lower lobe and partial atelectasis of remaining portions of right lung and left lower lobe. Small right pneumothorax. Air and fluid containing structure of the left upper lobe may represent infected bleb or lung abscess. Very large hiatal hernia. Electronically Signed: Lori Sharp MD  2/16/2024 2:07 PM EST  Workstation ID: [id number]    XR Chest 1 View    Result Date: 2/16/2024  Impression: 1.Moderate CHF/volume  overload features. 2.There appears to be a new left lung cavitary mass. However, this may be artifactual. Repeat imaging after removal of any overlying bandaging recommended. If no resolution then CT recommended. Electronically Signed: Rob Morris MD  2/16/2024 10:00 AM EST  Workstation ID: [id number]               Medical Decision Making  Problems Addressed:  Hypotension, unspecified hypotension type: complicated acute illness or injury  Pleural effusion, right: complicated acute illness or injury  Pneumonia due to infectious organism, unspecified laterality, unspecified part of lung: complicated acute illness or injury    Amount and/or Complexity of Data Reviewed  Labs: ordered.  Radiology: ordered.  ECG/medicine tests: ordered.    Risk  OTC drugs.  Prescription drug management.  Decision regarding hospitalization.      Patient had the above evaluation.  Results were discussed with the patient.  White blood cell count is 2.8.  CMP is unremarkable.  BNP is 4000.  D-dimer is elevated at 9.7.  CT PE protocol was obtained which showed no PE.  It does show a very large right pleural effusion with complete atelectasis of the right lower lobe and partial atelectasis of the remaining portions of the right lung.  There is an area in the left upper lobe which could be an infected bleb or lung abscess.  Patient was started on IV antibiotics.  Blood cultures were obtained.  Patient did have some hypotension in the emergency room.  She is on midodrine at her facility.  She was given a dose of this.  She was also given gentle IV fluids and her blood pressure responded.  I discussed with the hospitalist and the patient will be admitted for further evaluation and management.  There was a problem in lab and patient's troponin has not yet resulted.        After patient was discussed with hospitalist she became hypotensive again.  She already appears to be fluid overloaded so I am concerned about further fluids.  She was started on  Lynda and I discussed with the nurse practitioner on-call for the intensivist and patient will be admitted to the ICU.  Critical care time totaled 35 minutes.      Final diagnoses:   Pleural effusion, right   Pneumonia due to infectious organism, unspecified laterality, unspecified part of lung   Hypotension, unspecified hypotension type       ED Disposition  ED Disposition       ED Disposition   Decision to Admit    Condition   --    Comment   Level of Care: Progressive Care [20]   Admitting Physician: JOHNNY CAMACHO [[id number]]   Attending Physician: JOHNNY CAMACHO [[id number]]                 No follow-up provider specified.       Medication List      No changes were made to your prescriptions during this visit.            Chadwick Nation MD  02/16/24 1432       Chadwick Nation MD  02/16/24 1433       Chadwick Nation MD  02/16/24 [id number]

## 2024-02-16 NOTE — DISCHARGE PLACEMENT REQUEST
"Hamzah Quintana (73 y.o. Female)       Date of Birth   1950    Social Security Number       Address   [address] [place] [address] [place] [address]    Home Phone   [phone]    MRN   [id number]       Sikhism   None    Marital Status                               Admission Date   2/16/24    Admission Type   Emergency    Admitting Provider   Sai Angel DO    Attending Provider   Chadwick Nation MD    Department, Room/Bed   [facility] EMERGENCY DEPARTMENT, 21/21       Discharge Date       Discharge Disposition       Discharge Destination                                 Attending Provider: Chadwick Nation MD    Allergies: Codeine    Isolation: None   Infection: None   Code Status: CPR    Ht: 152.4 cm (60\")   Wt: 67.1 kg (148 lb)    Admission Cmt: None   Principal Problem: Large pleural effusion [J90]                   Active Insurance as of 2/16/2024       Primary Coverage       Payor Plan Insurance Group Employer/Plan Group    MEDICARE MEDICARE A & B        Payor Plan Address Payor Plan Phone Number Payor Plan Fax Number Effective Dates    [address] [phone]  4/1/2017 - None Entered    [facility] [id number]         Subscriber Name Subscriber Birth Date Member ID       HAMZAH QUINTANA 1950 [id number]               Secondary Coverage       Payor Plan Insurance Group Employer/Plan Group    [employer] COMMERCIAL [employer]  SUP               Payor Plan Address Payor Plan Phone Number Payor Plan Fax Number Effective Dates    [address] [address]   4/1/2017 - None Entered    [facility] [phone]         Subscriber Name Subscriber Birth Date Member ID       HAMZAH QUINTANA 1950 [id number]                     Emergency Contacts        (Rel.) Home Phone Work Phone Mobile Phone    SKINNY QUINTANA (Spouse) -- -- [phone]              History & Physical    No notes of this type exist for this encounter.       Physician Progress Notes (last 24 hours)  Notes " from 02/15/24 1523 through 02/16/24 1523   No notes of this type exist for this encounter.

## 2024-02-16 NOTE — H&P
Critical Care History and Physical     Sandra Treadwell : 1950 MRN:[id number] LOS:0 ROOM: [facility]     Reason for admission: Large pleural effusion     Assessment / Plan     Shock state  Likely due to hypovolemia with chronic diuresis contributing factor of the large pulmonary effusion  Will do blood / urine / sputum cultures.   Started on cefepime and vancomycin  Persistent hypotension in spite of modified IV fluid resuscitation.  Patient did not receive 30 mL/kg due to volume overload  Titrate vasopressors for a target MAP of 65.    Echocardiogram 2023, EF 50%.  Echocardiogram pending  Chronic hypotension, midodrine home medication    Acute hypoxic respiratory failure  Etiology: Large right pleural effusion with possible superimposed pneumonia  Right chest tube placed by IR 2024 with drainage of large volume dark brown output  Follow fluid studies    Leukopenia, etiology unclear  Broad-spectrum antibiotics  Pancultures    Pulmonary hypertension, RVSP 50 mmHg by echocardiogram 2023  Echocardiogram pending  Consider sildenafil once hypotension is resolved    History of breast cancer, details not yet available.  Patient reports she underwent radiation however she is a poor historian  S/p right total knee replacement 2023    Code Status (Patient has no pulse and is not breathing): CPR (Attempt to Resuscitate)  Medical Interventions (Patient has pulse or is breathing): Full Support       Nutrition:   Diet: Regular/House Diet; Texture: Regular Texture (IDDSI 7); Fluid Consistency: Thin (IDDSI 0)     DVT prophylaxis:  Medical DVT prophylaxis orders are present.         History of Present illness     The patient presented to the emergency department today for evaluation of chest pain and shortness of air as reported by the personnel at the facility where the patient currently resides.  EMS reported the patient was given 2 nitroglycerin after which the patient  denied any improvement in her chest  pain however it was noted that she developed hypotension following the nitroglycerin.  The patient is a very poor historian and confused, she is unable to give any further constitutional information.  In the ED the patient was noted to be hypoxic and hypotensive and she received a modified IV fluid bolus due to apparent volume overload with extensive, pitting bilateral lower extremity edema and elevated BNP.  Her D-dimer was elevated to 9.7 therefore she underwent a CT per PE protocol which was negative for PE however did reveal a large right pleural effusion with a very small right pneumothorax.  IR was consulted and the patient underwent a small bore chest tube placement to the right with drainage of a large volume of dark brown fluid, possibly old blood.  She had persistent hypotension requiring vasopressor support and Levophed was initiated.  The patient will be admitted to the ICU for further medical management.    ACP: No ACP documentation on file    Patient was seen and examined on 02/16/24 at 17:51 EST .    Subjective / Review of systems     Review of Systems   The patient is unable to contribute to full ROS due to confusion.  She denies any complaints  Past Medical/Surgical/Social/Family History & Allergies     Past Medical History:   Diagnosis Date    Bladder leak     Breast cancer     chemo and radiation    Depression     GERD (gastroesophageal reflux disease)     Hyperlipidemia     Osteoporosis       Past Surgical History:   Procedure Laterality Date    BREAST SURGERY      lumpectomy    CHOLECYSTECTOMY      HYSTERECTOMY      TOTAL KNEE ARTHROPLASTY Right 11/24/2023    Procedure: TOTAL KNEE ARTHROPLASTY WITH CORI ROBOT;  Surgeon: Ulises Chen II, MD;  Location: [facility] MAIN OR;  Service: Robotics - Ortho;  Laterality: Right;      Social History     Socioeconomic History    Marital status:    Tobacco Use    Smoking status: Never     Passive exposure: Never    Smokeless tobacco: Never    Vaping Use    Vaping Use: Never used   Substance and Sexual Activity    Alcohol use: Never    Drug use: Never    Sexual activity: Defer      No family history on file.   Allergies   Allergen Reactions    Codeine Nausea And Vomiting and Dizziness      Social Determinants of Health     Tobacco Use: Low Risk  (11/24/2023)    Patient History     Smoking Tobacco Use: Never     Smokeless Tobacco Use: Never     Passive Exposure: Never   Alcohol Use: Not At Risk (11/27/2023)    AUDIT-C     Frequency of Alcohol Consumption: Never     Average Number of Drinks: Patient does not drink     Frequency of Binge Drinking: Never   Financial Resource Strain: Not on file   Food Insecurity: No Food Insecurity (2/16/2024)    Hunger Vital Sign     Worried About Running Out of Food in the Last Year: Never true     Ran Out of Food in the Last Year: Never true   Transportation Needs: No Transportation Needs (2/16/2024)    PRAPARE - Transportation     Lack of Transportation (Medical): No     Lack of Transportation (Non-Medical): No   Physical Activity: Inactive (2/16/2024)    Exercise Vital Sign     Days of Exercise per Week: 0 days     Minutes of Exercise per Session: 0 min   Stress: Not on file   Social Connections: Unknown (10/12/2023)    Family and Community Support     Help with Day-to-Day Activities: Not on file     Lonely or Isolated: Not on file   Interpersonal Safety: Not At Risk (2/16/2024)    Abuse Screen     Unsafe at Home or Work/School: no     Feels Threatened by Someone?: no     Does Anyone Keep You from Contacting Others or Doint Things Outside the Home?: no     Physical Sign of Abuse Present: no   Depression: Not on file   Housing Stability: Not At Risk (2/16/2024)    Housing Stability     Current Living Arrangements: extended care facility     Potentially Unsafe Housing Conditions: none   Utilities: Not At Risk (2/16/2024)    [facility] Utilities     Threatened with loss of utilities: No   Health Literacy: Unknown (2/16/2024)     Education     Help with school or training?: Not on file     Preferred Language: English   Employment: Unknown (10/12/2023)    Employment     Do you want help finding or keeping work or a job?: Not on file   Disabilities: Not At Risk (11/24/2023)    Disabilities     Concentrating, Remembering, or Making Decisions Difficulty: no     Doing Errands Independently Difficulty: no        Home Medications     Prior to Admission medications    Medication Sig Start Date End Date Taking? Authorizing Provider   acetaminophen (TYLENOL) 325 MG tablet Take 2 tablets by mouth Every 4 (Four) Hours As Needed for Mild Pain.    Ramy Swann MD   acetaminophen (TYLENOL) 325 MG tablet Take 2 tablets by mouth 3 times a day.    Ramy Swann MD   calcium carbonate, oyster shell, 500 MG tablet tablet Take 1 tablet by mouth 4 (Four) Times a Day.    Ramy Swann MD   citalopram (CeleXA) 10 MG tablet Take 1 tablet by mouth Daily.    Raym Swann MD   Diclofenac Sodium (VOLTAREN) 1 % gel gel Apply 4 g topically to the appropriate area as directed 2 (Two) Times a Day. Apply to knees    Ramy Swann MD   divalproex (DEPAKOTE) 125 MG DR tablet Take 1 tablet by mouth 2 (Two) Times a Day.    Ramy Swann MD   ferrous sulfate 325 (65 FE) MG tablet Take 1 tablet by mouth 2 (Two) Times a Day.    Ramy Swann MD   Fluticasone-Salmeterol (ADVAIR/WIXELA) 100-50 MCG/ACT DISKUS Inhale 1 puff Daily.    Ramy Swann MD   folic acid (FOLVITE) 400 MCG tablet Take 1 tablet by mouth Daily.    Ramy Swann MD   furosemide (LASIX) 40 MG tablet Take 1 tablet by mouth 2 (Two) Times a Day. 12/3/23   Jorje Sahni MD   midodrine (PROAMATINE) 5 MG tablet Take 2 tablets by mouth 3 (Three) Times a Day Before Meals. Hold if SBP >120    Ramy Swann MD   nitroglycerin (NITROSTAT) 0.4 MG SL tablet Place 1 tablet under the tongue Every 5 (Five) Minutes As Needed for Chest Pain.  Take no more than 3 doses in 15 minutes.    Ramy Swann MD   potassium chloride (K-DUR,KLOR-CON) 20 MEQ CR tablet Take 2 tablets by mouth 2 (Two) Times a Day.    Ramy Swann MD   risperiDONE (risperDAL) 0.25 MG tablet Take 1 tablet by mouth Every Night.    Ramy Swann MD   sennosides-docusate (senna-docusate sodium) 8.6-50 MG per tablet Take 2 tablets by mouth Daily.    Ramy Swann MD   vitamin B-12 (CYANOCOBALAMIN) 1000 MCG tablet Take 1 tablet by mouth Daily.    Ramy Swann MD   alendronate (FOSAMAX) 70 MG tablet Take 1 tablet by mouth 1 (One) Time Per Week. Sun 7/24/23 2/16/24  Ramy Swann MD   atorvastatin (LIPITOR) 10 MG tablet Take 1 tablet by mouth Daily. 8/9/23 2/16/24  Ramy Swann MD   cefdinir (OMNICEF) 300 MG capsule Take 1 capsule by mouth 2 (Two) Times a Day. 12/3/23 2/16/24  Jorje Sahni MD   cetirizine (zyrTEC) 10 MG tablet Take 1 tablet by mouth Daily. 9/6/23 2/16/24  Ramy Swann MD   cyanocobalamin 1000 MCG/ML injection Inject 1 mL into the appropriate muscle as directed by prescriber Every 28 (Twenty-Eight) Days. 12/25/23 2/16/24  Jorje Sahni MD   fluticasone (FLONASE) 50 MCG/ACT nasal spray 2 sprays by Each Nare route Daily. 8/4/23 2/16/24  Ramy Swann MD   folic acid (FOLVITE) 1 MG tablet Take 1 tablet by mouth Daily. 12/4/23 2/16/24  Jorje Sahni MD   HYDROcodone-acetaminophen (NORCO) 5-325 MG per tablet Take 1 tablet by mouth Every 4 (Four) Hours As Needed for Severe Pain. 12/3/23 2/16/24  Ulises Chen II, MD   omeprazole (priLOSEC) 40 MG capsule Take 1 capsule by mouth Daily. 8/17/23 2/16/24  Ramy Swann MD   ondansetron (Zofran) 4 MG tablet Take 1 tablet by mouth Every 6 (Six) Hours As Needed for Nausea or Vomiting. 12/3/23 2/16/24  Ulises Chen II, MD   oxybutynin XL (DITROPAN XL) 15 MG 24 hr tablet Take 1 tablet by mouth Daily. 7/18/23 2/16/24   ProviderRamy MD   OYSCO 500 + D 500-5 MG-MCG tablet per tablet Take 1 tablet by mouth 2 (Two) Times a Day.  2/16/24  Ramy Swann MD   potassium chloride 10 MEQ CR tablet Take 2 tablets by mouth 2 (Two) Times a Day. 12/3/23 2/16/24  Jorje Sahni MD   sertraline (ZOLOFT) 50 MG tablet Take 1 tablet by mouth Every Night.  2/16/24  Ramy Swann MD   zolpidem (AMBIEN) 10 MG tablet Take 1 tablet by mouth At Night As Needed.  2/16/24  Ramy Swann MD        Objective / Physical Exam     Vital signs:  Temp: 97.7 °F (36.5 °C)  BP: 143/82  Heart Rate: (!) 130  Resp: 26  SpO2: 97 %  Weight: 67.1 kg (148 lb)    Admission Weight: Weight: 67.1 kg (148 lb)    Physical Exam  Vitals and nursing note reviewed.   Constitutional:       General: She is not in acute distress.     Appearance: Normal appearance.   HENT:      Head: Normocephalic and atraumatic.      Right Ear: External ear normal.      Left Ear: External ear normal.      Nose: Nose normal.      Mouth/Throat:      Mouth: Mucous membranes are moist.      Pharynx: Oropharynx is clear.   Eyes:      General: No scleral icterus.     Conjunctiva/sclera: Conjunctivae normal.      Pupils: Pupils are equal, round, and reactive to light.   Cardiovascular:      Heart sounds: Normal heart sounds, S1 normal and S2 normal. No murmur heard.     Comments: Sinus tachycardia  Pulmonary:      Effort: Pulmonary effort is normal. No respiratory distress.      Breath sounds: Normal breath sounds. No wheezing or rhonchi.      Comments: Right-sided chest tube, no air leak, small amount of subcu air right upper chest  Diminished right base  Fine crackles left base  Abdominal:      General: Bowel sounds are normal. There is no distension.      Palpations: Abdomen is soft.      Tenderness: There is no abdominal tenderness. There is no guarding.   Musculoskeletal:      Cervical back: Neck supple. No rigidity.      Right lower leg: Edema (Pitting) present.       Left lower leg: Edema (Pitting) present.   Skin:     General: Skin is warm and dry.   Neurological:      General: No focal deficit present.      Mental Status: She is alert. She is disoriented.          Labs     Results from last 7 days   Lab Units 02/16/24  1228 02/12/24  1800   WBC 10*3/mm3 2.80* 5.80   HEMATOCRIT % 29.1* 25.4*   PLATELETS 10*3/mm3 293 285      Results from last 7 days   Lab Units 02/16/24  1228 02/12/24  1800   SODIUM mmol/L 138 136   POTASSIUM mmol/L 4.1 3.5   CHLORIDE mmol/L 100 101   CO2 mmol/L 28.0 28.0   BUN mg/dL 15 9   CREATININE mg/dL 0.75 0.65        Imaging     Chest X ray: My independent assessment showed no discrete infiltrates.  There is a large right pleural effusion, small bore chest tube in place.  Moderate right-sided pneumothorax.  Small left, loculated effusion    EKG: My independent evaluation showed sinus rhythm, no ST -T changes    Current Medications     Scheduled Meds:  atorvastatin, 10 mg, Oral, Daily  cefepime, 2,000 mg, Intravenous, Once  cetirizine, 10 mg, Oral, Daily  cyanocobalamin, 1,000 mcg, Intramuscular, Q28 Days  enoxaparin, 40 mg, Subcutaneous, Daily  [START ON 2/17/2024] folic acid, 1 mg, Oral, Daily  furosemide, 80 mg, Intravenous, Once  [Held by provider] furosemide, 40 mg, Oral, BID  oxybutynin XL, 15 mg, Oral, Daily  [START ON 2/17/2024] pantoprazole, 40 mg, Oral, Q AM  potassium chloride, 20 mEq, Oral, BID  sertraline, 50 mg, Oral, Nightly  sodium chloride, 10 mL, Intravenous, Q12H  vancomycin, 1,500 mg, Intravenous, Once         Continuous Infusions:  norepinephrine, 0.02-0.3 mcg/kg/min, Last Rate: 0.1 mcg/kg/min (02/16/24 1610)           BERNA Ibanez   Critical Care  02/16/24   17:51 EST

## 2024-02-17 ENCOUNTER — APPOINTMENT (OUTPATIENT)
Dept: CARDIOLOGY | Facility: HOSPITAL | Age: 74
DRG: 208 | End: 2024-02-17
Payer: MEDICARE

## 2024-02-17 ENCOUNTER — APPOINTMENT (OUTPATIENT)
Dept: GENERAL RADIOLOGY | Facility: HOSPITAL | Age: 74
DRG: 208 | End: 2024-02-17
Payer: MEDICARE

## 2024-02-17 LAB
ABO GROUP BLD: NORMAL
ANION GAP SERPL CALCULATED.3IONS-SCNC: 8 MMOL/L (ref 5–15)
ANISOCYTOSIS BLD QL: ABNORMAL
ARTERIAL PATENCY WRIST A: ABNORMAL
ATMOSPHERIC PRESS: ABNORMAL MM[HG]
B PARAPERT DNA SPEC QL NAA+PROBE: NOT DETECTED
B PERT DNA SPEC QL NAA+PROBE: NOT DETECTED
BASE EXCESS BLDA CALC-SCNC: -3.3 MMOL/L (ref 0–3)
BDY SITE: ABNORMAL
BH CV ECHO MEAS - AO MAX PG: 29.6 MMHG
BH CV ECHO MEAS - AO MEAN PG: 17 MMHG
BH CV ECHO MEAS - AO V2 MAX: 272 CM/SEC
BH CV ECHO MEAS - AO V2 VTI: 38.5 CM
BH CV ECHO MEAS - AVA(I,D): 0.81 CM2
BH CV ECHO MEAS - EDV(CUBED): 59.3 ML
BH CV ECHO MEAS - EDV(MOD-SP4): 66.1 ML
BH CV ECHO MEAS - EF(MOD-BP): 43 %
BH CV ECHO MEAS - EF(MOD-SP4): 43.4 %
BH CV ECHO MEAS - ESV(CUBED): 19.7 ML
BH CV ECHO MEAS - ESV(MOD-SP4): 37.4 ML
BH CV ECHO MEAS - FS: 30.8 %
BH CV ECHO MEAS - IVS/LVPW: 1 CM
BH CV ECHO MEAS - IVSD: 0.9 CM
BH CV ECHO MEAS - LA DIMENSION: 2.4 CM
BH CV ECHO MEAS - LV DIASTOLIC VOL/BSA (35-75): 41.1 CM2
BH CV ECHO MEAS - LV MASS(C)D: 105.3 GRAMS
BH CV ECHO MEAS - LV MAX PG: 2.06 MMHG
BH CV ECHO MEAS - LV MEAN PG: 1 MMHG
BH CV ECHO MEAS - LV SYSTOLIC VOL/BSA (12-30): 23.2 CM2
BH CV ECHO MEAS - LV V1 MAX: 71.7 CM/SEC
BH CV ECHO MEAS - LV V1 VTI: 12.3 CM
BH CV ECHO MEAS - LVIDD: 3.9 CM
BH CV ECHO MEAS - LVIDS: 2.7 CM
BH CV ECHO MEAS - LVOT AREA: 2.5 CM2
BH CV ECHO MEAS - LVOT DIAM: 1.8 CM
BH CV ECHO MEAS - LVPWD: 0.9 CM
BH CV ECHO MEAS - MR MAX PG: 78.9 MMHG
BH CV ECHO MEAS - MR MAX VEL: 444 CM/SEC
BH CV ECHO MEAS - MV E MAX VEL: 135 CM/SEC
BH CV ECHO MEAS - MV MAX PG: 6.4 MMHG
BH CV ECHO MEAS - MV MEAN PG: 3 MMHG
BH CV ECHO MEAS - MV V2 VTI: 21.4 CM
BH CV ECHO MEAS - MVA(VTI): 1.46 CM2
BH CV ECHO MEAS - PA V2 MAX: 131 CM/SEC
BH CV ECHO MEAS - RAP SYSTOLE: 8 MMHG
BH CV ECHO MEAS - RV MAX PG: 1.93 MMHG
BH CV ECHO MEAS - RV V1 MAX: 69.5 CM/SEC
BH CV ECHO MEAS - RV V1 VTI: 10.4 CM
BH CV ECHO MEAS - RVSP: 40.7 MMHG
BH CV ECHO MEAS - SI(MOD-SP4): 17.8 ML/M2
BH CV ECHO MEAS - SV(LVOT): 31.3 ML
BH CV ECHO MEAS - SV(MOD-SP4): 28.7 ML
BH CV ECHO MEAS - TAPSE (>1.6): 1.61 CM
BH CV ECHO MEAS - TR MAX PG: 32.7 MMHG
BH CV ECHO MEAS - TR MAX VEL: 286 CM/SEC
BLD GP AB SCN SERPL QL: NEGATIVE
BUN SERPL-MCNC: 16 MG/DL (ref 8–23)
BUN/CREAT SERPL: 19.3 (ref 7–25)
C PNEUM DNA NPH QL NAA+NON-PROBE: NOT DETECTED
CALCIUM SPEC-SCNC: 9.3 MG/DL (ref 8.6–10.5)
CHLORIDE SERPL-SCNC: 103 MMOL/L (ref 98–107)
CO2 BLDA-SCNC: 23 MMOL/L (ref 22–29)
CO2 SERPL-SCNC: 25 MMOL/L (ref 22–29)
CREAT SERPL-MCNC: 0.83 MG/DL (ref 0.57–1)
D-LACTATE SERPL-SCNC: 1.6 MMOL/L (ref 0.5–2)
D-LACTATE SERPL-SCNC: 2.1 MMOL/L (ref 0.5–2)
D-LACTATE SERPL-SCNC: 2.9 MMOL/L (ref 0.5–2)
D-LACTATE SERPL-SCNC: 3.4 MMOL/L (ref 0.5–2)
D-LACTATE SERPL-SCNC: 3.4 MMOL/L (ref 0.5–2)
DEPRECATED RDW RBC AUTO: 68.3 FL (ref 37–54)
EGFRCR SERPLBLD CKD-EPI 2021: 75.5 ML/MIN/1.73
ERYTHROCYTE [DISTWIDTH] IN BLOOD BY AUTOMATED COUNT: 21.2 % (ref 12.3–15.4)
FLUAV SUBTYP SPEC NAA+PROBE: NOT DETECTED
FLUBV RNA ISLT QL NAA+PROBE: NOT DETECTED
GIE STN SPEC: NORMAL
GLUCOSE BLDC GLUCOMTR-MCNC: 109 MG/DL (ref 70–105)
GLUCOSE BLDC GLUCOMTR-MCNC: 71 MG/DL (ref 70–105)
GLUCOSE BLDC GLUCOMTR-MCNC: 87 MG/DL (ref 70–105)
GLUCOSE SERPL-MCNC: 96 MG/DL (ref 65–99)
HADV DNA SPEC NAA+PROBE: NOT DETECTED
HCO3 BLDA-SCNC: 21.8 MMOL/L (ref 21–28)
HCOV 229E RNA SPEC QL NAA+PROBE: NOT DETECTED
HCOV HKU1 RNA SPEC QL NAA+PROBE: NOT DETECTED
HCOV NL63 RNA SPEC QL NAA+PROBE: NOT DETECTED
HCOV OC43 RNA SPEC QL NAA+PROBE: NOT DETECTED
HCT VFR BLD AUTO: 23.4 % (ref 34–46.6)
HCT VFR BLD AUTO: 29.9 % (ref 34–46.6)
HEMODILUTION: NO
HGB BLD-MCNC: 6.9 G/DL (ref 12–15.9)
HGB BLD-MCNC: 9.4 G/DL (ref 12–15.9)
HMPV RNA NPH QL NAA+NON-PROBE: NOT DETECTED
HPIV1 RNA ISLT QL NAA+PROBE: NOT DETECTED
HPIV2 RNA SPEC QL NAA+PROBE: NOT DETECTED
HPIV3 RNA NPH QL NAA+PROBE: NOT DETECTED
HPIV4 P GENE NPH QL NAA+PROBE: NOT DETECTED
INHALED O2 CONCENTRATION: 100 %
L PNEUMO1 AG UR QL IA: NEGATIVE
LYMPHOCYTES # BLD MANUAL: 1.58 10*3/MM3 (ref 0.7–3.1)
LYMPHOCYTES NFR BLD MANUAL: 3 % (ref 5–12)
M PNEUMO IGG SER IA-ACNC: NOT DETECTED
MAGNESIUM SERPL-MCNC: 1.2 MG/DL (ref 1.6–2.4)
MCH RBC QN AUTO: 25.7 PG (ref 26.6–33)
MCHC RBC AUTO-ENTMCNC: 29.4 G/DL (ref 31.5–35.7)
MCV RBC AUTO: 87.5 FL (ref 79–97)
METAMYELOCYTES NFR BLD MANUAL: 6 % (ref 0–0)
MODALITY: ABNORMAL
MONOCYTES # BLD: 0.22 10*3/MM3 (ref 0.1–0.9)
MRSA DNA SPEC QL NAA+PROBE: ABNORMAL
MYELOCYTES NFR BLD MANUAL: 1 % (ref 0–0)
NEUTROPHILS # BLD AUTO: 4.9 10*3/MM3 (ref 1.7–7)
NEUTROPHILS NFR BLD MANUAL: 42 % (ref 42.7–76)
NEUTS BAND NFR BLD MANUAL: 26 % (ref 0–5)
NEUTS VAC BLD QL SMEAR: ABNORMAL
PCO2 BLDA: 38.4 MM HG (ref 35–48)
PEEP RESPIRATORY: 10 CM[H2O]
PH BLDA: 7.36 PH UNITS (ref 7.35–7.45)
PHOSPHATE SERPL-MCNC: 3 MG/DL (ref 2.5–4.5)
PLAT MORPH BLD: NORMAL
PLATELET # BLD AUTO: 224 10*3/MM3 (ref 140–450)
PMV BLD AUTO: 7.5 FL (ref 6–12)
PO2 BLDA: 365 MM HG (ref 83–108)
POLYCHROMASIA BLD QL SMEAR: ABNORMAL
POTASSIUM SERPL-SCNC: 4.4 MMOL/L (ref 3.5–5.2)
PROCALCITONIN SERPL-MCNC: 8.66 NG/ML (ref 0–0.25)
QT INTERVAL: 354 MS
QTC INTERVAL: 443 MS
RBC # BLD AUTO: 2.68 10*6/MM3 (ref 3.77–5.28)
RH BLD: POSITIVE
RHINOVIRUS RNA SPEC NAA+PROBE: NOT DETECTED
RSV RNA NPH QL NAA+NON-PROBE: NOT DETECTED
S PNEUM AG SPEC QL LA: NEGATIVE
SAO2 % BLDCOA: 99.9 % (ref 94–98)
SARS-COV-2 RNA NPH QL NAA+NON-PROBE: NOT DETECTED
SCAN SLIDE: NORMAL
SINUS: 2.4 CM
SODIUM SERPL-SCNC: 136 MMOL/L (ref 136–145)
T&S EXPIRATION DATE: NORMAL
TOXIC GRANULATION: ABNORMAL
VARIANT LYMPHS NFR BLD MANUAL: 18 % (ref 19.6–45.3)
VARIANT LYMPHS NFR BLD MANUAL: 4 % (ref 0–5)
VENTILATOR MODE: AC
WBC NRBC COR # BLD AUTO: 7.2 10*3/MM3 (ref 3.4–10.8)

## 2024-02-17 PROCEDURE — 87147 CULTURE TYPE IMMUNOLOGIC: CPT | Performed by: INTERNAL MEDICINE

## 2024-02-17 PROCEDURE — 93306 TTE W/DOPPLER COMPLETE: CPT | Performed by: INTERNAL MEDICINE

## 2024-02-17 PROCEDURE — 93306 TTE W/DOPPLER COMPLETE: CPT

## 2024-02-17 PROCEDURE — 25010000002 METRONIDAZOLE 500 MG/100ML SOLUTION: Performed by: INTERNAL MEDICINE

## 2024-02-17 PROCEDURE — 82948 REAGENT STRIP/BLOOD GLUCOSE: CPT

## 2024-02-17 PROCEDURE — 84100 ASSAY OF PHOSPHORUS: CPT | Performed by: INTERNAL MEDICINE

## 2024-02-17 PROCEDURE — 0202U NFCT DS 22 TRGT SARS-COV-2: CPT | Performed by: INTERNAL MEDICINE

## 2024-02-17 PROCEDURE — 5A1945Z RESPIRATORY VENTILATION, 24-96 CONSECUTIVE HOURS: ICD-10-PCS | Performed by: INTERNAL MEDICINE

## 2024-02-17 PROCEDURE — 25010000002 VANCOMYCIN HCL IN NACL 1.5-0.9 GM/500ML-% SOLUTION: Performed by: INTERNAL MEDICINE

## 2024-02-17 PROCEDURE — 85007 BL SMEAR W/DIFF WBC COUNT: CPT | Performed by: INTERNAL MEDICINE

## 2024-02-17 PROCEDURE — 25010000002 FENTANYL CITRATE (PF) 50 MCG/ML SOLUTION: Performed by: NURSE PRACTITIONER

## 2024-02-17 PROCEDURE — 0B9G8ZX DRAINAGE OF LEFT UPPER LUNG LOBE, VIA NATURAL OR ARTIFICIAL OPENING ENDOSCOPIC, DIAGNOSTIC: ICD-10-PCS | Performed by: INTERNAL MEDICINE

## 2024-02-17 PROCEDURE — 25010000002 CEFEPIME PER 500 MG: Performed by: INTERNAL MEDICINE

## 2024-02-17 PROCEDURE — 94799 UNLISTED PULMONARY SVC/PX: CPT

## 2024-02-17 PROCEDURE — 25810000003 LACTATED RINGERS SOLUTION

## 2024-02-17 PROCEDURE — 87015 SPECIMEN INFECT AGNT CONCNTJ: CPT | Performed by: NURSE PRACTITIONER

## 2024-02-17 PROCEDURE — 87075 CULTR BACTERIA EXCEPT BLOOD: CPT | Performed by: NURSE PRACTITIONER

## 2024-02-17 PROCEDURE — 87071 CULTURE AEROBIC QUANT OTHER: CPT | Performed by: INTERNAL MEDICINE

## 2024-02-17 PROCEDURE — 25810000003 SODIUM CHLORIDE 0.9 % SOLUTION 250 ML FLEX CONT: Performed by: INTERNAL MEDICINE

## 2024-02-17 PROCEDURE — 83605 ASSAY OF LACTIC ACID: CPT

## 2024-02-17 PROCEDURE — 86923 COMPATIBILITY TEST ELECTRIC: CPT

## 2024-02-17 PROCEDURE — 87186 SC STD MICRODIL/AGAR DIL: CPT | Performed by: INTERNAL MEDICINE

## 2024-02-17 PROCEDURE — 86850 RBC ANTIBODY SCREEN: CPT

## 2024-02-17 PROCEDURE — 82803 BLOOD GASES ANY COMBINATION: CPT

## 2024-02-17 PROCEDURE — 87102 FUNGUS ISOLATION CULTURE: CPT | Performed by: INTERNAL MEDICINE

## 2024-02-17 PROCEDURE — P9016 RBC LEUKOCYTES REDUCED: HCPCS

## 2024-02-17 PROCEDURE — 25010000002 PHENYLEPHRINE 10 MG/ML SOLUTION 5 ML VIAL: Performed by: INTERNAL MEDICINE

## 2024-02-17 PROCEDURE — 87070 CULTURE OTHR SPECIMN AEROBIC: CPT | Performed by: INTERNAL MEDICINE

## 2024-02-17 PROCEDURE — 83735 ASSAY OF MAGNESIUM: CPT | Performed by: INTERNAL MEDICINE

## 2024-02-17 PROCEDURE — 87206 SMEAR FLUORESCENT/ACID STAI: CPT | Performed by: INTERNAL MEDICINE

## 2024-02-17 PROCEDURE — 86901 BLOOD TYPING SEROLOGIC RH(D): CPT

## 2024-02-17 PROCEDURE — 87449 NOS EACH ORGANISM AG IA: CPT | Performed by: INTERNAL MEDICINE

## 2024-02-17 PROCEDURE — 87641 MR-STAPH DNA AMP PROBE: CPT | Performed by: INTERNAL MEDICINE

## 2024-02-17 PROCEDURE — 36430 TRANSFUSION BLD/BLD COMPNT: CPT

## 2024-02-17 PROCEDURE — 85025 COMPLETE CBC W/AUTO DIFF WBC: CPT | Performed by: INTERNAL MEDICINE

## 2024-02-17 PROCEDURE — 94002 VENT MGMT INPAT INIT DAY: CPT

## 2024-02-17 PROCEDURE — 86900 BLOOD TYPING SEROLOGIC ABO: CPT

## 2024-02-17 PROCEDURE — 87116 MYCOBACTERIA CULTURE: CPT | Performed by: INTERNAL MEDICINE

## 2024-02-17 PROCEDURE — 25010000002 MIDAZOLAM PER 1 MG

## 2024-02-17 PROCEDURE — 31500 INSERT EMERGENCY AIRWAY: CPT | Performed by: INTERNAL MEDICINE

## 2024-02-17 PROCEDURE — 87205 SMEAR GRAM STAIN: CPT | Performed by: INTERNAL MEDICINE

## 2024-02-17 PROCEDURE — 25010000002 ENOXAPARIN PER 10 MG: Performed by: INTERNAL MEDICINE

## 2024-02-17 PROCEDURE — 85018 HEMOGLOBIN: CPT | Performed by: INTERNAL MEDICINE

## 2024-02-17 PROCEDURE — 0BH17EZ INSERTION OF ENDOTRACHEAL AIRWAY INTO TRACHEA, VIA NATURAL OR ARTIFICIAL OPENING: ICD-10-PCS | Performed by: INTERNAL MEDICINE

## 2024-02-17 PROCEDURE — 88108 CYTOPATH CONCENTRATE TECH: CPT | Performed by: INTERNAL MEDICINE

## 2024-02-17 PROCEDURE — 25810000003 LACTATED RINGERS PER 1000 ML

## 2024-02-17 PROCEDURE — 25010000002 CEFTAZIDIME 2 G RECONSTITUTED SOLUTION 1 EACH VIAL: Performed by: INTERNAL MEDICINE

## 2024-02-17 PROCEDURE — 71045 X-RAY EXAM CHEST 1 VIEW: CPT

## 2024-02-17 PROCEDURE — 80048 BASIC METABOLIC PNL TOTAL CA: CPT | Performed by: INTERNAL MEDICINE

## 2024-02-17 PROCEDURE — 85014 HEMATOCRIT: CPT | Performed by: INTERNAL MEDICINE

## 2024-02-17 RX ORDER — MIDAZOLAM HYDROCHLORIDE 1 MG/ML
6 INJECTION INTRAMUSCULAR; INTRAVENOUS ONCE
Status: COMPLETED | OUTPATIENT
Start: 2024-02-17 | End: 2024-02-17

## 2024-02-17 RX ORDER — FENTANYL CITRATE 50 UG/ML
12.5 INJECTION, SOLUTION INTRAMUSCULAR; INTRAVENOUS
Status: DISCONTINUED | OUTPATIENT
Start: 2024-02-17 | End: 2024-02-21

## 2024-02-17 RX ORDER — METOPROLOL TARTRATE 1 MG/ML
5 INJECTION, SOLUTION INTRAVENOUS ONCE
Status: COMPLETED | OUTPATIENT
Start: 2024-02-17 | End: 2024-02-17

## 2024-02-17 RX ORDER — ACETAMINOPHEN 325 MG/1
650 TABLET ORAL EVERY 6 HOURS PRN
Status: DISCONTINUED | OUTPATIENT
Start: 2024-02-17 | End: 2024-02-25

## 2024-02-17 RX ORDER — METOPROLOL TARTRATE 1 MG/ML
INJECTION, SOLUTION INTRAVENOUS EVERY 6 HOURS
Status: CANCELLED | OUTPATIENT
Start: 2024-02-17

## 2024-02-17 RX ORDER — ACETAMINOPHEN 650 MG/1
650 SUPPOSITORY RECTAL EVERY 6 HOURS PRN
Status: DISCONTINUED | OUTPATIENT
Start: 2024-02-17 | End: 2024-02-25

## 2024-02-17 RX ORDER — METRONIDAZOLE 500 MG/100ML
500 INJECTION, SOLUTION INTRAVENOUS EVERY 8 HOURS
Status: DISCONTINUED | OUTPATIENT
Start: 2024-02-17 | End: 2024-02-18

## 2024-02-17 RX ORDER — ETOMIDATE 2 MG/ML
INJECTION INTRAVENOUS
Status: COMPLETED
Start: 2024-02-17 | End: 2024-02-17

## 2024-02-17 RX ORDER — ETOMIDATE 2 MG/ML
20 INJECTION INTRAVENOUS ONCE
Status: COMPLETED | OUTPATIENT
Start: 2024-02-17 | End: 2024-02-17

## 2024-02-17 RX ORDER — ALBUMIN, HUMAN INJ 5% 5 %
500 SOLUTION INTRAVENOUS ONCE
Status: DISCONTINUED | OUTPATIENT
Start: 2024-02-17 | End: 2024-02-17

## 2024-02-17 RX ORDER — DEXMEDETOMIDINE HYDROCHLORIDE 4 UG/ML
.2-1.5 INJECTION, SOLUTION INTRAVENOUS
Status: DISCONTINUED | OUTPATIENT
Start: 2024-02-17 | End: 2024-02-20

## 2024-02-17 RX ORDER — ROCURONIUM BROMIDE 10 MG/ML
50 INJECTION, SOLUTION INTRAVENOUS ONCE
Status: COMPLETED | OUTPATIENT
Start: 2024-02-17 | End: 2024-02-17

## 2024-02-17 RX ORDER — MIDAZOLAM HYDROCHLORIDE 1 MG/ML
INJECTION INTRAMUSCULAR; INTRAVENOUS
Status: COMPLETED
Start: 2024-02-17 | End: 2024-02-17

## 2024-02-17 RX ORDER — FENTANYL CITRATE-0.9 % NACL/PF 10 MCG/ML
50-300 PLASTIC BAG, INJECTION (ML) INTRAVENOUS
Status: DISCONTINUED | OUTPATIENT
Start: 2024-02-17 | End: 2024-02-21

## 2024-02-17 RX ORDER — VANCOMYCIN/0.9 % SOD CHLORIDE 1.5G/250ML
1500 PLASTIC BAG, INJECTION (ML) INTRAVENOUS ONCE
Status: COMPLETED | OUTPATIENT
Start: 2024-02-17 | End: 2024-02-17

## 2024-02-17 RX ORDER — METOPROLOL TARTRATE 1 MG/ML
INJECTION, SOLUTION INTRAVENOUS
Status: COMPLETED
Start: 2024-02-17 | End: 2024-02-17

## 2024-02-17 RX ADMIN — SODIUM CHLORIDE, POTASSIUM CHLORIDE, SODIUM LACTATE AND CALCIUM CHLORIDE 100 ML/HR: 600; 310; 30; 20 INJECTION, SOLUTION INTRAVENOUS at 12:33

## 2024-02-17 RX ADMIN — METRONIDAZOLE 500 MG: 500 INJECTION, SOLUTION INTRAVENOUS at 20:46

## 2024-02-17 RX ADMIN — SODIUM CHLORIDE, POTASSIUM CHLORIDE, SODIUM LACTATE AND CALCIUM CHLORIDE 100 ML/HR: 600; 310; 30; 20 INJECTION, SOLUTION INTRAVENOUS at 03:06

## 2024-02-17 RX ADMIN — Medication 0.5 MCG/KG/MIN: at 18:32

## 2024-02-17 RX ADMIN — OXYCODONE 5 MG: 5 TABLET ORAL at 08:14

## 2024-02-17 RX ADMIN — MIDAZOLAM HYDROCHLORIDE 4 MG: 1 INJECTION INTRAMUSCULAR; INTRAVENOUS at 17:08

## 2024-02-17 RX ADMIN — ENOXAPARIN SODIUM 40 MG: 100 INJECTION SUBCUTANEOUS at 15:11

## 2024-02-17 RX ADMIN — Medication 10 ML: at 21:09

## 2024-02-17 RX ADMIN — ACETAMINOPHEN 650 MG: 650 SUPPOSITORY RECTAL at 20:45

## 2024-02-17 RX ADMIN — Medication 10 ML: at 08:07

## 2024-02-17 RX ADMIN — POTASSIUM CHLORIDE 20 MEQ: 1500 TABLET, EXTENDED RELEASE ORAL at 20:47

## 2024-02-17 RX ADMIN — METOPROLOL TARTRATE 5 MG: 1 INJECTION, SOLUTION INTRAVENOUS at 17:08

## 2024-02-17 RX ADMIN — SODIUM CHLORIDE, POTASSIUM CHLORIDE, SODIUM LACTATE AND CALCIUM CHLORIDE 1000 ML: 600; 310; 30; 20 INJECTION, SOLUTION INTRAVENOUS at 00:40

## 2024-02-17 RX ADMIN — METOPROLOL TARTRATE 5 MG: 1 INJECTION, SOLUTION INTRAVENOUS at 18:28

## 2024-02-17 RX ADMIN — METRONIDAZOLE 500 MG: 500 INJECTION, SOLUTION INTRAVENOUS at 14:05

## 2024-02-17 RX ADMIN — POTASSIUM CHLORIDE 20 MEQ: 1500 TABLET, EXTENDED RELEASE ORAL at 00:14

## 2024-02-17 RX ADMIN — OXYCODONE 5 MG: 5 TABLET ORAL at 03:06

## 2024-02-17 RX ADMIN — Medication 0.5 MCG/KG/MIN: at 22:35

## 2024-02-17 RX ADMIN — ROCURONIUM BROMIDE 50 MG: 10 INJECTION, SOLUTION INTRAVENOUS at 17:09

## 2024-02-17 RX ADMIN — ETOMIDATE 10 MG: 2 INJECTION INTRAVENOUS at 17:07

## 2024-02-17 RX ADMIN — Medication 50 MCG/HR: at 17:40

## 2024-02-17 RX ADMIN — ETOMIDATE 10 MG: 20 INJECTION, SOLUTION INTRAVENOUS at 17:07

## 2024-02-17 RX ADMIN — MIDAZOLAM 4 MG: 1 INJECTION INTRAMUSCULAR; INTRAVENOUS at 17:08

## 2024-02-17 RX ADMIN — CEFTAZIDIME 2000 MG: 2 INJECTION, POWDER, FOR SOLUTION INTRAVENOUS at 15:11

## 2024-02-17 RX ADMIN — SODIUM CHLORIDE, POTASSIUM CHLORIDE, SODIUM LACTATE AND CALCIUM CHLORIDE 100 ML/HR: 600; 310; 30; 20 INJECTION, SOLUTION INTRAVENOUS at 18:54

## 2024-02-17 RX ADMIN — FENTANYL CITRATE 12.5 MCG: 50 INJECTION, SOLUTION INTRAMUSCULAR; INTRAVENOUS at 15:11

## 2024-02-17 RX ADMIN — Medication 0.3 MCG/KG/MIN: at 00:12

## 2024-02-17 RX ADMIN — FOLIC ACID 1 MG: 1 TABLET ORAL at 08:06

## 2024-02-17 RX ADMIN — CEFEPIME 2000 MG: 2 INJECTION, POWDER, FOR SOLUTION INTRAVENOUS at 10:02

## 2024-02-17 RX ADMIN — Medication 1500 MG: at 11:08

## 2024-02-17 RX ADMIN — OXYCODONE 5 MG: 5 TABLET ORAL at 12:32

## 2024-02-17 RX ADMIN — PHENYLEPHRINE HYDROCHLORIDE 0.5 MCG/KG/MIN: 10 INJECTION INTRAVENOUS at 18:10

## 2024-02-17 NOTE — PROCEDURES
"Bronchoscopy    Date/Time: 2/17/2024 5:11 PM    Performed by: Sai Angel DO  Authorized by: Sai Angel DO  Consent: Verbal consent obtained. Written consent obtained.  Consent given by: spouse  Patient identity confirmed: arm band  Time out: Immediately prior to procedure a \"time out\" was called to verify the correct patient, procedure, equipment, support staff and site/side marked as required.    Sedation:  Patient sedated: Pt sedated from intubation procedure.    Patient tolerance: patient tolerated the procedure well with no immediate complications      After proper informed consent was obtained and timeout performed, patient was placed supine and the FiO2 on the ventilator was increased to 100%, where it stayed throughout the procedure.  Bronchoscope was advanced via the ET tube into the main airway the trachea appeared normal.  The tonie appeared sharp.  The endotracheal tube was approximately 2-1/2 to 3 cm above the tonie.  The bronchoscope was advanced into the left mainstem bronchus and directed up to the left upper lobe bronchi.  In this position BAL was performed with 50 mL of fluid with good return.  After this was performed, the left upper lobe followed by the lingular segment, followed by the left lower lobe bronchi were all visualized.  The mucosa was normal.  No masses were seen.  No significant secretions whatsoever.  Scope was then retracted above the tonie and directed into the right mainstem bronchus.  The right upper lobe, followed by the right lower lobe, followed by the right middle lobe bronchi were all visualized.  No masses were seen, mucosa appeared normal, no significant secretions.  At this time the procedure was terminated and the scope was withdrawn from the ET tube.  The patient tolerated the procedure well without any acute postprocedural complications.  Procedure time was not included in today's note.  "

## 2024-02-17 NOTE — PLAN OF CARE
Goal Outcome Evaluation:                 LR and Levo gtts infusing. Douglass and central line placed, stark was anchored. No bm through the night.

## 2024-02-17 NOTE — PROCEDURES
"Insert Central Line At Bedside    Date/Time: 2/16/2024 11:29 PM    Performed by: Adeline Power APRN  Authorized by: Adeline Power APRN  Consent: Written consent obtained.  Risks and benefits: risks, benefits and alternatives were discussed  Consent given by: power of   Patient understanding: patient states understanding of the procedure being performed  Patient consent: the patient's understanding of the procedure matches consent given  Procedure consent: procedure consent matches procedure scheduled  Required items: required blood products, implants, devices, and special equipment available  Patient identity confirmed: verbally with patient and arm band  Time out: Immediately prior to procedure a \"time out\" was called to verify the correct patient, procedure, equipment, support staff and site/side marked as required.  Indications: vascular access (Patient requiring vasopressor support)  Anesthesia: local infiltration    Anesthesia:  Local Anesthetic: lidocaine 1% without epinephrine  Anesthetic total: 5 mL    Sedation:  Patient sedated: no    Preparation: skin prepped with ChloraPrep  Skin prep agent dried: skin prep agent completely dried prior to procedure  Sterile barriers: all five maximum sterile barriers used - cap, mask, sterile gown, sterile gloves, and large sterile sheet  Hand hygiene: hand hygiene performed prior to central venous catheter insertion  Location details: right internal jugular  Patient position: flat  Catheter type: triple lumen  Catheter size: 7 Fr  Ultrasound guidance: yes  Sterile ultrasound techniques: sterile gel and sterile probe covers were used  Number of attempts: 1  Successful placement: yes (line pulled back slightly)  Post-procedure: line sutured and dressing applied  Assessment: blood return through all ports, free fluid flow, no pneumothorax on x-ray and placement verified by x-ray  Patient tolerance: patient tolerated the procedure well with no immediate " complications      Electronically signed by BERNA Garcia, 02/16/24, 11:30 PM EST.

## 2024-02-17 NOTE — PROGRESS NOTES
Critical Care Progress Note   Sandra Treadwell : 1950 MRN:[id number] LOS:1     Principal Problem: Large pleural effusion     Reason for follow up: All the medical problems listed below    Summary     A 73 y.o. female who presented to the emergency department today for evaluation of chest pain and shortness of air as reported by the personnel at the facility where the patient currently resides.  EMS reported the patient was given 2 nitroglycerin after which the patient  denied any improvement in her chest pain however it was noted that she developed hypotension following the nitroglycerin.  The patient is a very poor historian and confused, she is unable to give any further constitutional information.  In the ED the patient was noted to be hypoxic and hypotensive and she received a modified IV fluid bolus due to apparent volume overload with extensive, pitting bilateral lower extremity edema and elevated BNP.  Her D-dimer was elevated to 9.7 therefore she underwent a CT per PE protocol which was negative for PE however did reveal a large right pleural effusion with a very small right pneumothorax.  IR was consulted and the patient underwent a small bore chest tube placement to the right with drainage of a large volume of dark brown fluid, possibly old blood.  She had persistent hypotension requiring vasopressor support and Levophed was initiated.  The patient was admitted to the ICU for further medical management.     Significant events     24 : Patient has been afebrile since arrival.  Heart rate is running in the 120s, respirations are in the mid 30s, blood pressure is within normal limits though patient still requiring low-dose Levophed.  O2 sats 92%.  Patient has had 415 mL of urine output over the last 24 hours, has had 3 L of chest tube output, and is net +400 mL.  Chemistry panel is completely unremarkable other than magnesium and is low at 1.2 and is being replaced.  Most recent lactate was down to  2.1.  White blood cell count is within normal limits at 7.2 (suspect that the initial 2.8 reading may have been false).  Hemoglobin noted to have dropped from 10.3-6.9.  Patient is getting 1 unit of packed red blood cells and then will repeat the hemoglobin.  26% bands on differential.  MRSA PCR was positive.  Urinary antigens for strep and Legionella were negative.  Pleural fluid culture is growing gram-negative rods.  ID has been consulted and appreciate their assistance.  Repeat chest x-ray ordered for in the a.m.  Added cytology onto the pleural fluid.  MRI of left foot ordered.    Assessment / Plan     Gram negative septic shock  R pleural effusion  R GNR empyema  Left upper lobe cavitary lesion most likely consistent with left lung abscess  Left calcaneus wound  Chronic baseline hypotension  Right chest tube placed by IR 2/16/2024 with drainage of large volume dark brown output  Started on cefepime and vancomycin  Persistent hypotension in spite of modified IV fluid resuscitation.  Patient did not receive 30 mL/kg due to volume overload  Titrate vasopressors for a target MAP of 65.    Echocardiogram 11/26/2023, EF 50%.  Echocardiogram now shows EF of 40-45%  Chronic hypotension, midodrine home medication  Pleural fluid studies c/w empyema--growing GNR's  ID consulted  Cefepime changed to ceftaz       Acute hypoxic respiratory failure  Etiology: Large right pleural effusion with possible superimposed pneumonia  Right chest tube placed by IR 2/16/2024 with drainage of large volume dark brown output  Titrate O2 to keep sat >92%       Pulmonary hypertension, RVSP 50 mmHg by echocardiogram 11/26/2023  Echocardiogram pending  Consider sildenafil once hypotension is resolved       Acute encephalopathy vs baseline dementia  Need to talk to her  when he comes in regarding this  He will have to be the decision-maker.      Cachexia  Consult nutrition      History of breast cancer   S/p right total knee replacement  "11/12/2023        Critical Care Time:  34 mins.        Code status:   Code Status (Patient has no pulse and is not breathing): CPR (Attempt to Resuscitate)  Medical Interventions (Patient has pulse or is breathing): Full Support       Nutrition: Diet: Regular/House Diet; Texture: Regular Texture (IDDSI 7); Fluid Consistency: Thin (IDDSI 0)   Patient isn't on Tube Feeding    DVT prophylaxis:  Medical DVT prophylaxis orders are present.         Subjective / Review of systems     Review of Systems   Unable to obtain much 2/2 pt's confusion.  States her breathing is \"fine\".    Objective / Physical Exam   Vital signs:  Temp: 98.2 °F (36.8 °C)  BP: 113/49  Heart Rate: (!) 121  Resp: (!) 36  SpO2: 92 %  Weight: 64 kg (141 lb)    Admission Weight: Weight: 67.1 kg (148 lb)  Current Weight: Weight: 64 kg (141 lb)    Input/Output in last 24 hours:    Intake/Output Summary (Last 24 hours) at 2/17/2024 1313  Last data filed at 2/17/2024 1114  Gross per 24 hour   Intake 3821 ml   Output 3415 ml   Net 406 ml      Physical Exam       GEN:  Confused, elderly woman who appears cachectic and acute-on-chronically ill.  Sitting up in bed on NC.  NEURO:  Brainstem reflexes intact.  No obvious focal deficit.  Moves all 4 ext.  HEENT:  N/AT.  PERRL.  MMM.  Oropharynx non-erythematous.  No drainage from the eyes/ears/nose.  No conjunctival petechiae.  No oral thrush.  Auditory and visual acuity grossly wnl. Voice normal.  Loss of periorbital and bitemporal fat pads.  NECK:  Supple, NT, trachea midline.  No meningismus.    CHEST/LUNGS:  Breath sounds are VERY diminished t/o.  Pt quite tachypneic.  Chest excursion equal bilaterally.    CARDIOVASCULAR:  Tachycardic, RR w/o murmur noted.    GI:  Abdomen soft, NT, ND, +BS.   :  Deferred.  EXTREMITIES:  No deformity or amputation.  No cyanosis, edema, or asymmetry.  Left heel wound.    SKIN:  Other than as noted above, skin is warm, dry, and pink.  No rash, breakdown, or track marks " noted.  LYMPHATICS/HEME:  No overt LAD or abnormal bruising.  No lymphedema.  MSK:  No joint abnormalities noted.    PSYCH:  Confused.  Alert, oriented x 2.  Thought she was in my kitchen.  Intermittently responds appropriately to commands and appears to comprehend instructions.          Radiology and Labs     Results from last 7 days   Lab Units 02/17/24  0510 02/16/24 2055 02/16/24  1228 02/12/24  1800   WBC 10*3/mm3 7.20 8.30 2.80* 5.80   HEMATOCRIT % 23.4* 34.1 29.1* 25.4*   PLATELETS 10*3/mm3 224 393 293 285      Results from last 7 days   Lab Units 02/17/24  0510 02/16/24 2017 02/16/24  1228 02/12/24  1800   SODIUM mmol/L 136 135* 138 136   POTASSIUM mmol/L 4.4 3.9 4.1 3.5   CHLORIDE mmol/L 103 102 100 101   CO2 mmol/L 25.0 23.0 28.0 28.0   BUN mg/dL 16 18 15 9   CREATININE mg/dL 0.83 0.87 0.75 0.65      Current medications   Scheduled Meds: cefTAZidime, 2,000 mg, Intravenous, Q12H  enoxaparin, 40 mg, Subcutaneous, Daily  folic acid, 1 mg, Oral, Daily  [Held by provider] furosemide, 40 mg, Oral, BID  lactated ringers, 500 mL, Intravenous, Once  lactated ringers, 500 mL, Intravenous, Once  metroNIDAZOLE, 500 mg, Intravenous, Q8H  potassium chloride, 20 mEq, Oral, BID  sodium chloride, 10 mL, Intravenous, Q12H  [START ON 2/18/2024] vancomycin, 1,250 mg, Intravenous, Q24H      Continuous Infusions: lactated ringers, 100 mL/hr, Last Rate: 100 mL/hr (02/17/24 1233)  norepinephrine, 0.02-0.5 mcg/kg/min, Last Rate: 0.03 mcg/kg/min (02/17/24 1140)  Pharmacy to dose vancomycin,         Plan discussed with RN. Reviewed all other data in the last 24 hours, including but not limited to vitals, labs, microbiology, imaging and pertinent notes from other providers.     Sai Angel, DO   Critical Care  02/17/24   13:13 EST

## 2024-02-17 NOTE — CONSULTS
Infectious Diseases Consult Note    Referring Provider: Sai Angel DO    Reason for Consultation: Left lung cavitary lesion and right side empyema    Patient Care Team:  Wm Aguirre MD as PCP - General (Sports Medicine)    Chief complaint presented with chest pain.  The patient is very confused    Subjective     History of present illness:      This is 73-year-old female who was hospitalized [facility] on February 16, 2024 after she sent from nursing home to ER with the complaints of chest pain.  Patient was found to have large right-sided pleural effusion.  She underwent chest tube placement by IR.  Culture from the fluid is a growing gram-negative bacilli.  The patient is very poor historian is not able to provide any good history.  She is currently on IV vancomycin and IV cefepime.  MRSA screen was positive.  Patient was found to be hypotensive required to be transferred to ICU and currently on low-dose of norepinephrine drip.  She is currently on room air.    Review of Systems   Review of Systems   Unable to perform ROS: Dementia   Respiratory:  Positive for shortness of breath.    Skin:  Positive for wound.       Medications  Medications Prior to Admission   Medication Sig Dispense Refill Last Dose    acetaminophen (TYLENOL) 325 MG tablet Take 2 tablets by mouth Every 4 (Four) Hours As Needed for Mild Pain.       acetaminophen (TYLENOL) 325 MG tablet Take 2 tablets by mouth 3 times a day.       calcium carbonate, oyster shell, 500 MG tablet tablet Take 1 tablet by mouth 4 (Four) Times a Day.       citalopram (CeleXA) 10 MG tablet Take 1 tablet by mouth Daily.       Diclofenac Sodium (VOLTAREN) 1 % gel gel Apply 4 g topically to the appropriate area as directed 2 (Two) Times a Day. Apply to knees       divalproex (DEPAKOTE) 125 MG DR tablet Take 1 tablet by mouth 2 (Two) Times a Day.       ferrous sulfate 325 (65 FE) MG tablet Take 1 tablet by mouth 2 (Two) Times a Day.        Fluticasone-Salmeterol (ADVAIR/WIXELA) 100-50 MCG/ACT DISKUS Inhale 1 puff Daily.       folic acid (FOLVITE) 400 MCG tablet Take 1 tablet by mouth Daily.       furosemide (LASIX) 40 MG tablet Take 1 tablet by mouth 2 (Two) Times a Day. 30 tablet 0     midodrine (PROAMATINE) 5 MG tablet Take 2 tablets by mouth 3 (Three) Times a Day Before Meals. Hold if SBP >120       nitroglycerin (NITROSTAT) 0.4 MG SL tablet Place 1 tablet under the tongue Every 5 (Five) Minutes As Needed for Chest Pain. Take no more than 3 doses in 15 minutes.       potassium chloride (K-DUR,KLOR-CON) 20 MEQ CR tablet Take 2 tablets by mouth 2 (Two) Times a Day.       risperiDONE (risperDAL) 0.25 MG tablet Take 1 tablet by mouth Every Night.       sennosides-docusate (senna-docusate sodium) 8.6-50 MG per tablet Take 2 tablets by mouth Daily.       vitamin B-12 (CYANOCOBALAMIN) 1000 MCG tablet Take 1 tablet by mouth Daily.          History  Past Medical History:   Diagnosis Date    Bladder leak     Breast cancer     chemo and radiation    Depression     GERD (gastroesophageal reflux disease)     Hyperlipidemia     Osteoporosis      Past Surgical History:   Procedure Laterality Date    BREAST SURGERY      lumpectomy    CHOLECYSTECTOMY      HYSTERECTOMY      TOTAL KNEE ARTHROPLASTY Right 11/24/2023    Procedure: TOTAL KNEE ARTHROPLASTY WITH CORI ROBOT;  Surgeon: Ulises Chen II, MD;  Location: [facility];  Service: Robotics - Ortho;  Laterality: Right;       Family History  History reviewed. No pertinent family history.    Social History   reports that she has never smoked. She has never been exposed to tobacco smoke. She has never used smokeless tobacco. She reports that she does not drink alcohol and does not use drugs.    Allergies  Codeine    Objective     Vital Signs   Vital Signs (last 24 hours)         02/16 0700  02/17 0659 02/17 0700  02/17 1214   Most Recent      Temp (°F) 97.6 -  99.2    98 -  98.5     98.2 (36.8) 02/17  1114    Heart Rate 84 -  160    113 -  125     121 02/17 1114    Resp 14 -  42    34 -  36     36 02/17 1114    BP 63/34 -  143/82    94/43 -  114/47     113/49 02/17 1114    SpO2 (%) 84 -  100    92 -  95     92 02/17 1114    Flow (L/min) 2 -  4       2 02/17 0406            Physical Exam:  Physical Exam  Constitutional:       Comments: Awake with no obvious distress but confused   HENT:      Head: Normocephalic and atraumatic.   Eyes:      Pupils: Pupils are equal, round, and reactive to light.   Cardiovascular:      Rate and Rhythm: Normal rate and regular rhythm.   Pulmonary:      Comments: Diminished breathing sounds and dullness at the right base  Abdominal:      General: Abdomen is flat. Bowel sounds are normal.      Palpations: Abdomen is soft.   Musculoskeletal:      Cervical back: Neck supple.      Right lower leg: No edema.      Left lower leg: No edema.      Comments: Pulse was palpable in both feet   Skin:     Comments: Left calcaneus unstageable wound   Neurological:      Mental Status: She is disoriented.         Microbiology  Microbiology Results (last 10 days)       Procedure Component Value - Date/Time    MRSA Screen, PCR (Inpatient) - Swab, Nares [[id number]]  (Abnormal) Collected: 02/17/24 0854    Lab Status: Final result Specimen: Swab from Nares Updated: 02/17/24 1015     MRSA PCR MRSA Detected    Narrative:      The negative predictive value of this diagnostic test is high and should only be used to consider de-escalating anti-MRSA therapy. A positive result may indicate colonization with MRSA and must be correlated clinically.    Respiratory Panel PCR w/COVID-19(SARS-CoV-2) NAYELI/CAMILLE/HUSAM/PAD/COR/SUMAN In-House, NP Swab in UTM/VTM, 2 HR TAT - Swab, Nasopharynx [[id number]]  (Normal) Collected: 02/17/24 0854    Lab Status: Final result Specimen: Swab from Nasopharynx Updated: 02/17/24 0953     ADENOVIRUS, PCR Not Detected     Coronavirus 229E Not Detected     Coronavirus HKU1 Not Detected      Coronavirus NL63 Not Detected     Coronavirus OC43 Not Detected     COVID19 Not Detected     Human Metapneumovirus Not Detected     Human Rhinovirus/Enterovirus Not Detected     Influenza A PCR Not Detected     Influenza B PCR Not Detected     Parainfluenza Virus 1 Not Detected     Parainfluenza Virus 2 Not Detected     Parainfluenza Virus 3 Not Detected     Parainfluenza Virus 4 Not Detected     RSV, PCR Not Detected     Bordetella pertussis pcr Not Detected     Bordetella parapertussis PCR Not Detected     Chlamydophila pneumoniae PCR Not Detected     Mycoplasma pneumo by PCR Not Detected    Narrative:      In the setting of a positive respiratory panel with a viral infection PLUS a negative procalcitonin without other underlying concern for bacterial infection, consider observing off antibiotics or discontinuation of antibiotics and continue supportive care. If the respiratory panel is positive for atypical bacterial infection (Bordetella pertussis, Chlamydophila pneumoniae, or Mycoplasma pneumoniae), consider antibiotic de-escalation to target atypical bacterial infection.    Legionella Antigen, Urine - Urine, Urine, Clean Catch [[id number]]  (Normal) Collected: 02/17/24 0852    Lab Status: Final result Specimen: Urine, Clean Catch Updated: 02/17/24 0927     LEGIONELLA ANTIGEN, URINE Negative    S. Pneumo Ag Urine or CSF - Urine, Urine, Clean Catch [[id number]]  (Normal) Collected: 02/17/24 0852    Lab Status: Final result Specimen: Urine, Clean Catch Updated: 02/17/24 0927     Strep Pneumo Ag Negative    AFB Culture - Body Fluid, Pleural Cavity [[id number]] Collected: 02/16/24 1556    Lab Status: Preliminary result Specimen: Body Fluid from Pleural Cavity Updated: 02/17/24 1142     AFB Stain No acid fast bacilli seen on concentrated smear    Body Fluid Culture - Body Fluid, Pleural Cavity [[id number]]  (Abnormal) Collected: 02/16/24 1556    Lab Status: Preliminary result Specimen: Body Fluid from Pleural Cavity  Updated: 02/17/24 1113     Body Fluid Culture Scant growth (1+) Gram Negative Bacilli     Gram Stain Moderate (3+) WBCs per low power field      No organisms seen    Fungus Smear - Body Fluid, Pleural Cavity [[id number]] Collected: 02/16/24 1556    Lab Status: Final result Specimen: Body Fluid from Pleural Cavity Updated: 02/17/24 1205     Fungal Stain No fungal elements seen            Laboratory  Results from last 7 days   Lab Units 02/17/24  0510   WBC 10*3/mm3 7.20   HEMOGLOBIN g/dL 6.9*   HEMATOCRIT % 23.4*   PLATELETS 10*3/mm3 224     Results from last 7 days   Lab Units 02/17/24  0510   SODIUM mmol/L 136   POTASSIUM mmol/L 4.4   CHLORIDE mmol/L 103   CO2 mmol/L 25.0   BUN mg/dL 16   CREATININE mg/dL 0.83   GLUCOSE mg/dL 96   CALCIUM mg/dL 9.3     Results from last 7 days   Lab Units 02/17/24  0510   SODIUM mmol/L 136   POTASSIUM mmol/L 4.4   CHLORIDE mmol/L 103   CO2 mmol/L 25.0   BUN mg/dL 16   CREATININE mg/dL 0.83   GLUCOSE mg/dL 96   CALCIUM mg/dL 9.3                   Radiology  Imaging Results (Last 72 Hours)       Procedure Component Value Units Date/Time    XR Chest 1 View [[id number]] Collected: 02/16/24 2307     Updated: 02/16/24 2312    Narrative:      XR CHEST 1 VW    Date of Exam: 2/16/2024 10:58 PM EST    Indication: central line placement    Comparison: 2/16/2024.    Findings:  Repositioning of the left-sided chest tube. Only a tiny amount of pleural separation seen along the lateral right lung apex measuring 5 mm. Interval improvement in right-sided airspace disease likely related to resolving atelectasis and pleural fluid.   Only a small amount of right-sided pleural fluid present. Stable small effusion present on the left. Stable rounded opacity within the left midlung. Right internal jugular CVC catheter present with the tip in the right atrium.      Impression:      Impression:    1. Right internal jugular CVC catheter with the tip in the right atrium.  2. Repositioning of the left-sided  chest tube with only a tiny amount of pleural separation seen along the lateral right lung apex measuring 5 mm.  3. Interval improvement in right-sided airspace disease likely related to resolving atelectasis and pleural fluid.        Electronically Signed: Tracy Knowles MD    2/16/2024 11:10 PM EST    Workstation ID: [id number]    IR Pleural Drain Plcmt [[id number]] Collected: 02/16/24 1612     Updated: 02/16/24 1628    Narrative:      DATE OF EXAM:  2/16/2024 3:42 PM EST    PROCEDURE:  IR PLEURAL DRAIN PLCMT    INDICATIONS:  Large right pleural effusion    COMPARISON:  CXR 2/16/2024, CT chest 2/16/2024    FLUOROSCOPIC TIME:  None    PHYSICIAN MONITORED CONSCIOUS SEDATION TIME:  Fentanyl only    TECHNIQUE:   A detailed explanation of the procedure, including the risks, benefits, and alternatives was provided. A preprocedure timeout was performed. The patient was placed in the left lateral decubitus position. The right posterior chest wall was ultrasounded   demonstrating a large complex fluid collection. The area was marked, prepped and draped using maximal sterile barrier technique. The skin and subcutaneous tissue was anesthetized with 1% lidocaine. A small skin incision was made and blunt dissection was   used to dilate the subcutaneous tissue. Next, a 5 Thai Yueh needle was advanced to the fluid collection and the presence of serosanguineous fluid was noted. An Amplatz wire was advanced without difficulty through the Yueh needle into the collection.   The track was serially dilated up to 14 Thai without difficulty. A 16 Thai pigtail catheter was advanced over the wire into the fluid collection. A total of 40 mL of serosanguineous fluid was collected for lab examination. The drain was secured to   the skin using Prolene sutures. The drain was placed to low wall suction at bedside. The patient tolerated procedure well without immediate complication. Post procedure chest x-ray pending.    FINDINGS:  See above       Impression:      Technically successful 16 Wolof right chest tube placement using ultrasound guidance.    I, Oscar Kaur, have personally reviewed the image(s) and the prepared and signed interpretation by Yaakov Pate NP.  Based on my review, I agree with the findings.      Electronically Signed: Oscar Kaur MD    2/16/2024 4:26 PM EST    Workstation ID: [id number]    XR Chest 1 View [[id number]] Collected: 02/16/24 1618     Updated: 02/16/24 1621    Narrative:      XR CHEST 1 VW    Date of Exam: 2/16/2024 4:12 PM EST    Indication: left thoracentesis    Comparison: Earlier in the day    Findings:  Interval placement of right base chest tube. Right-sided pneumothorax and right effusion persist. Cavitary left lung nodule is unchanged. Cardiac mediastinal contours within normal limits.      Impression:      Impression:    1. Interval placement of right-sided chest tube in satisfactory position.  2. Right-sided hydropneumothorax persists.      Electronically Signed: Oscar Kaur MD    2/16/2024 4:19 PM EST    Workstation ID: [id number]    XR Chest 1 View [[id number]] Collected: 02/16/24 1527     Updated: 02/16/24 1538    Narrative:      XR CHEST 1 VW    Date of Exam: 2/16/2024 3:26 PM EST    Indication: Right thora    Comparison: 2/16/2024    Findings:  The large right-sided pleural effusion has increased in size from the previous exam. There is a right apical pneumothorax which is new compared with the last study. There is compressive atelectasis of the right lung particularly involving the right lower   lobe. On the left, the masslike density is again identified with central cavitation. There is dense consolidation in the left lower lobe.      Impression:      Impression:    1. Increase in the right-sided pleural fluid collection compared with the last study with development of a small right apical pneumothorax.  2. Continued compressive atelectasis of the right lung.  3. Cavitary lesion in the left lung  unchanged.  4. Continued dense left lower lobe consolidation.      Electronically Signed: Goran Marshall MD    2/16/2024 3:36 PM EST    Workstation ID: [id number]    CT Angiogram Chest Pulmonary Embolism [[id number]] Collected: 02/16/24 1400     Updated: 02/16/24 1409    Narrative:      CT ANGIOGRAM CHEST PULMONARY EMBOLISM    Date of Exam: 2/16/2024 1:49 PM EST    Indication: chest pain, elevated d-dimer.    Comparison: 11/28/2023.    Technique: Axial CT images were obtained of the chest after the uneventful intravenous administration of iodinated contrast utilizing pulmonary embolism protocol.  Sagittal and coronal reconstructions were performed.  Automated exposure control and   iterative reconstruction methods were used.      Findings:  There are no filling defects suspicious for pulmonary emboli. There is a very large right pleural effusion which has increased in size. There is a small left pleural effusion which has increased slightly in size. The right lower lobe is completely   atelectatic. The left lower lobe is partially atelectatic. There is a small right pneumothorax. There is compressive atelectasis of the posterior aspect of the right upper lobe and lateral right middle lobe. There is a new oval air and fluid-containing   structure in the left upper lobe posteriorly. This abnormality measures approximately 3.2 x 3.4 cm measured on image #65 of series 7. It has a thin peripheral wall. There is a very large hiatal hernia. There are no enlarged mediastinal nodes. There is   old granulomatous disease.      Impression:      Impression:  Negative exam for pulmonary embolism. Very large right pleural effusion with complete atelectasis of the right lower lobe and partial atelectasis of remaining portions of right lung and left lower lobe.    Small right pneumothorax.    Air and fluid containing structure of the left upper lobe may represent infected bleb or lung abscess.    Very large hiatal  hernia.      Electronically Signed: Lori Sharp MD    2/16/2024 2:07 PM EST    Workstation ID: [id number]    XR Chest 1 View [[id number]] Collected: 02/16/24 0957     Updated: 02/16/24 1002    Narrative:      XR CHEST 1 VW    Date of Exam: 2/16/2024 9:51 AM EST    Indication: chest pain    Comparison: 11/26/2023    Findings:  Cardiomediastinal silhouette is partially obscured by airspace disease. There is bilateral airspace disease, right greater than left with moderate right and trace left effusion. There is a 4.5 cm cavitary mass that appears to be within the left midlung   however, this may be artifactual due to overlying bandaging/material.. No acute osseous abnormality identified.      Impression:      Impression:  1.Moderate CHF/volume overload features.  2.There appears to be a new left lung cavitary mass. However, this may be artifactual. Repeat imaging after removal of any overlying bandaging recommended. If no resolution then CT recommended.      Electronically Signed: Rob Morris MD    2/16/2024 10:00 AM EST    Workstation ID: [id number]            Cardiology      Results Review:  I have reviewed all clinical data, test, lab, and imaging results.       Schedule Meds  cefTAZidime, 2,000 mg, Intravenous, Q12H  enoxaparin, 40 mg, Subcutaneous, Daily  folic acid, 1 mg, Oral, Daily  [Held by provider] furosemide, 40 mg, Oral, BID  lactated ringers, 500 mL, Intravenous, Once  lactated ringers, 500 mL, Intravenous, Once  metroNIDAZOLE, 500 mg, Intravenous, Q8H  potassium chloride, 20 mEq, Oral, BID  sodium chloride, 10 mL, Intravenous, Q12H  [START ON 2/18/2024] vancomycin, 1,250 mg, Intravenous, Q24H  vancomycin, 1,500 mg, Intravenous, Once        Infusion Meds  lactated ringers, 100 mL/hr, Last Rate: 100 mL/hr (02/17/24 0306)  norepinephrine, 0.02-0.5 mcg/kg/min, Last Rate: 0.03 mcg/kg/min (02/17/24 1140)  Pharmacy to dose vancomycin,         PRN Meds    senna-docusate sodium **AND** polyethylene glycol  **AND** bisacodyl **AND** bisacodyl    ondansetron    ondansetron ODT    oxyCODONE    Pharmacy to dose vancomycin    [COMPLETED] Insert Peripheral IV **AND** sodium chloride    sodium chloride    sodium chloride      Assessment & Plan       Assessment    Probable right thoracic empyema based on the fluid analysis and cultures are growing gram-negative bacilli.  I am concerned about micro fistula between the abdomen and thoracic cavity.  Patient currently has chest tube with good output    Left upper lobe cavitary lesion most likely consistent with left lung abscess.  Suspecting the same pathogen involving the right lung causing this infection  MRSA screen was positive    Left calcaneus wound, unstageable with black eschar.  I doubt this is a peripheral vascular disease patient has strong pulse.  Most likely this is a pressure ulcer    Probable underlying dementia based on my assessment.  Patient symptoms might get worse and with cefepime.    Mild septic shock requiring very low-dose of norepinephrine drip.    Plan    Continue IV vancomycin for now, pharmacy currently following and dosing  Discontinue IV cefepime concern about cefepime worsening patient mental status  Start ceftazidime 2 g IV every 12 hours waiting on right-sided pleural fluid culture results  Start IV Flagyl 500 mg every 8 hours empirically for anaerobic coverage  May need to repeat imaging study of the chest in few days to document the improvement of the right-sided effusion  MRI of the left foot was ordered by primary service to rule out underlying osteomyelitis  Continue supportive care  A.m. labs with sed rate    Himanshu Spaulding MD  02/17/24  12:14 EST    Note is dictated utilizing voice recognition software/Dragon

## 2024-02-17 NOTE — PROCEDURES
Intubation    Date/Time: 2/17/2024 5:09 PM    Performed by: Sai Angel DO  Authorized by: Sai Angel DO  Consent: Verbal consent obtained. Written consent obtained.  Risks and benefits: risks, benefits and alternatives were discussed  Consent given by: patient and spouse  Patient identity confirmed: arm band  Indications: respiratory distress, respiratory failure and hypoxemia  Intubation method: direct  Patient status: paralyzed (RSI)  Preoxygenation: BVM  Pretreatment medications: midazolam  Sedatives: etomidate  Paralytic: rocuronium  Laryngoscope size: Mac 3  Tube size: 8.0 mm  Tube type: cuffed  Number of attempts: 1  Cricoid pressure: no  Cords visualized: yes  Post-procedure assessment: chest rise and CO2 detector  Breath sounds: reduced on left  Cuff inflated: yes  ETT to lip: 22 cm  Tube secured with: ETT hauser  Chest x-ray interpreted by me.  Chest x-ray findings: endotracheal tube in appropriate position  Patient tolerance: patient tolerated the procedure well with no immediate complications

## 2024-02-17 NOTE — PROGRESS NOTES
"Pharmacy Antimicrobial Dosing Service    Subjective:  Sandra Treadwell is a 73 y.o.female admitted with large pleural effusion. Pharmacy has been consulted to dose Vancomycin and Cefepime for possible PNA.    HPI: s/p IR placed chest tube/pleural drain with 2.3 L out. Pleural cultures pending.   PMH:  clemencia cancer      Assessment/Plan    1. Day #1 Vancomycin: Goal -600 mcg*h/mL.   Stable renal function. Weaning vasopressor requirements.    Will load with vancomycin 1,500 mg IV once, then start vancomycin 1,250 mg IV q24 hours (estimated , torugh 12.9 mcg/mL). Check trough prior to 3rd dose for safety.    2. Day #1 Cefepime: 2 IV q12h for estCrCl 30-59 mL/min.    Will continue to monitor drug levels, renal function, culture and sensitivities, and patient clinical status.       Objective:  Relevant clinical data and objective history reviewed:  152.4 cm (60\")   64 kg (141 lb)   Ideal body weight: 45.5 kg (100 lb 4.9 oz)  Adjusted ideal body weight: 52.9 kg (116 lb 9.4 oz)  Body mass index is 27.54 kg/m².        Results from last 7 days   Lab Units 02/17/24  0510 02/16/24 2017 02/16/24  1228   CREATININE mg/dL 0.83 0.87 0.75     Estimated Creatinine Clearance: 51.9 mL/min (by C-G formula based on SCr of 0.83 mg/dL).  I/O last 3 completed shifts:  In: 3436 [I.V.:2936; IV Piggyback:500]  Out: 2600 [Urine:300; Chest Tube:2300]    Results from last 7 days   Lab Units 02/17/24  0510 02/16/24 2055 02/16/24  1228   WBC 10*3/mm3 7.20 8.30 2.80*     Temperature    02/17/24 0823 02/17/24 0845 02/17/24 1114   Temp: 98.5 °F (36.9 °C) 98 °F (36.7 °C) 98.2 °F (36.8 °C)     Baseline culture/source/susceptibility:  Microbiology Results (last 10 days)       Procedure Component Value - Date/Time    MRSA Screen, PCR (Inpatient) - Swab, Nares [[id number]]  (Abnormal) Collected: 02/17/24 0854    Lab Status: Final result Specimen: Swab from Nares Updated: 02/17/24 1015     MRSA PCR MRSA Detected    Narrative:      The " negative predictive value of this diagnostic test is high and should only be used to consider de-escalating anti-MRSA therapy. A positive result may indicate colonization with MRSA and must be correlated clinically.    Respiratory Panel PCR w/COVID-19(SARS-CoV-2) NAYELI/CAMILLE/HUSAM/PAD/COR/SUMAN In-House, NP Swab in UTM/VTM, 2 HR TAT - Swab, Nasopharynx [[id number]]  (Normal) Collected: 02/17/24 0854    Lab Status: Final result Specimen: Swab from Nasopharynx Updated: 02/17/24 0953     ADENOVIRUS, PCR Not Detected     Coronavirus 229E Not Detected     Coronavirus HKU1 Not Detected     Coronavirus NL63 Not Detected     Coronavirus OC43 Not Detected     COVID19 Not Detected     Human Metapneumovirus Not Detected     Human Rhinovirus/Enterovirus Not Detected     Influenza A PCR Not Detected     Influenza B PCR Not Detected     Parainfluenza Virus 1 Not Detected     Parainfluenza Virus 2 Not Detected     Parainfluenza Virus 3 Not Detected     Parainfluenza Virus 4 Not Detected     RSV, PCR Not Detected     Bordetella pertussis pcr Not Detected     Bordetella parapertussis PCR Not Detected     Chlamydophila pneumoniae PCR Not Detected     Mycoplasma pneumo by PCR Not Detected    Narrative:      In the setting of a positive respiratory panel with a viral infection PLUS a negative procalcitonin without other underlying concern for bacterial infection, consider observing off antibiotics or discontinuation of antibiotics and continue supportive care. If the respiratory panel is positive for atypical bacterial infection (Bordetella pertussis, Chlamydophila pneumoniae, or Mycoplasma pneumoniae), consider antibiotic de-escalation to target atypical bacterial infection.    Legionella Antigen, Urine - Urine, Urine, Clean Catch [[id number]]  (Normal) Collected: 02/17/24 0852    Lab Status: Final result Specimen: Urine, Clean Catch Updated: 02/17/24 0927     LEGIONELLA ANTIGEN, URINE Negative    S. Pneumo Ag Urine or CSF - Urine, Urine, Clean  Catch [[id number]]  (Normal) Collected: 02/17/24 0852    Lab Status: Final result Specimen: Urine, Clean Catch Updated: 02/17/24 0927     Strep Pneumo Ag Negative    AFB Culture - Body Fluid, Pleural Cavity [[id number]] Collected: 02/16/24 1556    Lab Status: Preliminary result Specimen: Body Fluid from Pleural Cavity Updated: 02/17/24 1142     AFB Stain No acid fast bacilli seen on concentrated smear    Body Fluid Culture - Body Fluid, Pleural Cavity [[id number]]  (Abnormal) Collected: 02/16/24 1556    Lab Status: Preliminary result Specimen: Body Fluid from Pleural Cavity Updated: 02/17/24 1113     Body Fluid Culture Scant growth (1+) Gram Negative Bacilli     Gram Stain Moderate (3+) WBCs per low power field      No organisms seen            Sendy Bustos PharmDOMINICK  02/17/24 11:46 EST

## 2024-02-17 NOTE — CONSULTS
Picc team consult:    Picc line attempted RUE brachial vessel utilizing US guidance and modified seldinger technique.  Picc line necessary for vesicant administration.  Unable to thread picc line completely into SVC.  Line continues to curl back in the area of the brachiocephalic vessel. Attempted aborted after repositioning maneuvers unsuccessful.  Arterial line successfully placed right radial artery.

## 2024-02-18 ENCOUNTER — APPOINTMENT (OUTPATIENT)
Dept: CT IMAGING | Facility: HOSPITAL | Age: 74
DRG: 208 | End: 2024-02-18
Payer: MEDICARE

## 2024-02-18 ENCOUNTER — APPOINTMENT (OUTPATIENT)
Dept: GENERAL RADIOLOGY | Facility: HOSPITAL | Age: 74
DRG: 208 | End: 2024-02-18
Payer: MEDICARE

## 2024-02-18 LAB
ALBUMIN SERPL-MCNC: 3.5 G/DL (ref 3.5–5.2)
ALBUMIN/GLOB SERPL: 2.9 G/DL
ALP SERPL-CCNC: 26 U/L (ref 39–117)
ALT SERPL W P-5'-P-CCNC: <5 U/L (ref 1–33)
ANION GAP SERPL CALCULATED.3IONS-SCNC: 13 MMOL/L (ref 5–15)
ANISOCYTOSIS BLD QL: ABNORMAL
ARTERIAL PATENCY WRIST A: ABNORMAL
AST SERPL-CCNC: 7 U/L (ref 1–32)
ATMOSPHERIC PRESS: ABNORMAL MM[HG]
BACTERIA FLD CULT: ABNORMAL
BACTERIA FLD CULT: ABNORMAL
BASE EXCESS BLDA CALC-SCNC: -5.2 MMOL/L (ref 0–3)
BDY SITE: ABNORMAL
BH BB BLOOD EXPIRATION DATE: NORMAL
BH BB BLOOD TYPE BARCODE: 5100
BH BB DISPENSE STATUS: NORMAL
BH BB PRODUCT CODE: NORMAL
BH BB UNIT NUMBER: NORMAL
BILIRUB SERPL-MCNC: 0.7 MG/DL (ref 0–1.2)
BUN SERPL-MCNC: 17 MG/DL (ref 8–23)
BUN/CREAT SERPL: 19.3 (ref 7–25)
CALCIUM SPEC-SCNC: 8.2 MG/DL (ref 8.6–10.5)
CHLORIDE SERPL-SCNC: 106 MMOL/L (ref 98–107)
CO2 BLDA-SCNC: 21.2 MMOL/L (ref 22–29)
CO2 SERPL-SCNC: 18 MMOL/L (ref 22–29)
CREAT SERPL-MCNC: 0.88 MG/DL (ref 0.57–1)
CROSSMATCH INTERPRETATION: NORMAL
DEPRECATED RDW RBC AUTO: 63.4 FL (ref 37–54)
DOHLE BOD BLD QL SMEAR: PRESENT
EGFRCR SERPLBLD CKD-EPI 2021: 70.4 ML/MIN/1.73
ERYTHROCYTE [DISTWIDTH] IN BLOOD BY AUTOMATED COUNT: 19.6 % (ref 12.3–15.4)
ERYTHROCYTE [SEDIMENTATION RATE] IN BLOOD: <1 MM/HR (ref 0–30)
GLOBULIN UR ELPH-MCNC: 1.2 GM/DL
GLUCOSE BLDC GLUCOMTR-MCNC: 109 MG/DL (ref 70–105)
GLUCOSE BLDC GLUCOMTR-MCNC: 55 MG/DL (ref 70–105)
GLUCOSE BLDC GLUCOMTR-MCNC: 77 MG/DL (ref 70–105)
GLUCOSE SERPL-MCNC: 83 MG/DL (ref 65–99)
GRAM STN SPEC: ABNORMAL
GRAM STN SPEC: ABNORMAL
HCO3 BLDA-SCNC: 20.1 MMOL/L (ref 21–28)
HCT VFR BLD AUTO: 27.9 % (ref 34–46.6)
HEMODILUTION: NO
HGB BLD-MCNC: 8.6 G/DL (ref 12–15.9)
INHALED O2 CONCENTRATION: 40 %
LYMPHOCYTES # BLD MANUAL: 0.67 10*3/MM3 (ref 0.7–3.1)
LYMPHOCYTES NFR BLD MANUAL: 12 % (ref 5–12)
MCH RBC QN AUTO: 26.8 PG (ref 26.6–33)
MCHC RBC AUTO-ENTMCNC: 30.7 G/DL (ref 31.5–35.7)
MCV RBC AUTO: 87.4 FL (ref 79–97)
METAMYELOCYTES NFR BLD MANUAL: 5 % (ref 0–0)
MODALITY: ABNORMAL
MONOCYTES # BLD: 0.44 10*3/MM3 (ref 0.1–0.9)
MYELOCYTES NFR BLD MANUAL: 2 % (ref 0–0)
NEUTROPHILS # BLD AUTO: 2.33 10*3/MM3 (ref 1.7–7)
NEUTROPHILS NFR BLD MANUAL: 43 % (ref 42.7–76)
NEUTS BAND NFR BLD MANUAL: 20 % (ref 0–5)
NEUTS VAC BLD QL SMEAR: ABNORMAL
PCO2 BLDA: 37 MM HG (ref 35–48)
PEEP RESPIRATORY: 6 CM[H2O]
PH BLDA: 7.34 PH UNITS (ref 7.35–7.45)
PLAT MORPH BLD: NORMAL
PLATELET # BLD AUTO: 109 10*3/MM3 (ref 140–450)
PMV BLD AUTO: 7.5 FL (ref 6–12)
PO2 BLDA: 142 MM HG (ref 83–108)
POIKILOCYTOSIS BLD QL SMEAR: ABNORMAL
POTASSIUM SERPL-SCNC: 4.1 MMOL/L (ref 3.5–5.2)
PROT SERPL-MCNC: 4.7 G/DL (ref 6–8.5)
RBC # BLD AUTO: 3.19 10*6/MM3 (ref 3.77–5.28)
RESPIRATORY RATE: 16
SAO2 % BLDCOA: 99.1 % (ref 94–98)
SCAN SLIDE: NORMAL
SODIUM SERPL-SCNC: 137 MMOL/L (ref 136–145)
TOXIC GRANULATION: ABNORMAL
UNIT  ABO: NORMAL
UNIT  RH: NORMAL
VARIANT LYMPHS NFR BLD MANUAL: 16 % (ref 19.6–45.3)
VARIANT LYMPHS NFR BLD MANUAL: 2 % (ref 0–5)
VENTILATOR MODE: AC
VT ON VENT VENT: 400 ML
WBC NRBC COR # BLD AUTO: 3.7 10*3/MM3 (ref 3.4–10.8)

## 2024-02-18 PROCEDURE — 25010000002 CEFTAZIDIME 2 G RECONSTITUTED SOLUTION 1 EACH VIAL: Performed by: INTERNAL MEDICINE

## 2024-02-18 PROCEDURE — 99223 1ST HOSP IP/OBS HIGH 75: CPT | Performed by: NURSE PRACTITIONER

## 2024-02-18 PROCEDURE — 85007 BL SMEAR W/DIFF WBC COUNT: CPT | Performed by: INTERNAL MEDICINE

## 2024-02-18 PROCEDURE — 94002 VENT MGMT INPAT INIT DAY: CPT

## 2024-02-18 PROCEDURE — 82948 REAGENT STRIP/BLOOD GLUCOSE: CPT

## 2024-02-18 PROCEDURE — 25010000002 VANCOMYCIN HCL 1.25 G RECONSTITUTED SOLUTION 1 EACH VIAL: Performed by: INTERNAL MEDICINE

## 2024-02-18 PROCEDURE — 71250 CT THORAX DX C-: CPT

## 2024-02-18 PROCEDURE — 93005 ELECTROCARDIOGRAM TRACING: CPT

## 2024-02-18 PROCEDURE — 71045 X-RAY EXAM CHEST 1 VIEW: CPT

## 2024-02-18 PROCEDURE — 25010000002 ENOXAPARIN PER 10 MG: Performed by: INTERNAL MEDICINE

## 2024-02-18 PROCEDURE — 94761 N-INVAS EAR/PLS OXIMETRY MLT: CPT

## 2024-02-18 PROCEDURE — 94799 UNLISTED PULMONARY SVC/PX: CPT

## 2024-02-18 PROCEDURE — 25810000003 LACTATED RINGERS PER 1000 ML

## 2024-02-18 PROCEDURE — 25010000002 ALBUMIN HUMAN 25% PER 50 ML

## 2024-02-18 PROCEDURE — P9047 ALBUMIN (HUMAN), 25%, 50ML: HCPCS

## 2024-02-18 PROCEDURE — 25810000003 SODIUM CHLORIDE 0.9 % SOLUTION 250 ML FLEX CONT: Performed by: INTERNAL MEDICINE

## 2024-02-18 PROCEDURE — 80165 DIPROPYLACETIC ACID FREE: CPT | Performed by: NURSE PRACTITIONER

## 2024-02-18 PROCEDURE — 25010000002 METRONIDAZOLE 500 MG/100ML SOLUTION: Performed by: INTERNAL MEDICINE

## 2024-02-18 PROCEDURE — 94003 VENT MGMT INPAT SUBQ DAY: CPT

## 2024-02-18 PROCEDURE — 80053 COMPREHEN METABOLIC PANEL: CPT | Performed by: INTERNAL MEDICINE

## 2024-02-18 PROCEDURE — 0W9930Z DRAINAGE OF RIGHT PLEURAL CAVITY WITH DRAINAGE DEVICE, PERCUTANEOUS APPROACH: ICD-10-PCS

## 2024-02-18 PROCEDURE — 85025 COMPLETE CBC W/AUTO DIFF WBC: CPT | Performed by: INTERNAL MEDICINE

## 2024-02-18 PROCEDURE — 85652 RBC SED RATE AUTOMATED: CPT | Performed by: INTERNAL MEDICINE

## 2024-02-18 PROCEDURE — 25810000003 LACTATED RINGERS SOLUTION

## 2024-02-18 PROCEDURE — 25010000002 PHENYLEPHRINE 10 MG/ML SOLUTION 5 ML VIAL: Performed by: INTERNAL MEDICINE

## 2024-02-18 PROCEDURE — 25010000002 MEROPENEM PER 100 MG: Performed by: NURSE PRACTITIONER

## 2024-02-18 PROCEDURE — 25010000002 FUROSEMIDE PER 20 MG: Performed by: INTERNAL MEDICINE

## 2024-02-18 PROCEDURE — 82803 BLOOD GASES ANY COMBINATION: CPT

## 2024-02-18 RX ORDER — IBUPROFEN 600 MG/1
1 TABLET ORAL
Status: DISCONTINUED | OUTPATIENT
Start: 2024-02-18 | End: 2024-02-25

## 2024-02-18 RX ORDER — BISACODYL 10 MG
10 SUPPOSITORY, RECTAL RECTAL DAILY PRN
Status: DISCONTINUED | OUTPATIENT
Start: 2024-02-18 | End: 2024-02-20

## 2024-02-18 RX ORDER — FUROSEMIDE 10 MG/ML
40 INJECTION INTRAMUSCULAR; INTRAVENOUS EVERY 12 HOURS
Status: DISCONTINUED | OUTPATIENT
Start: 2024-02-18 | End: 2024-02-19

## 2024-02-18 RX ORDER — ALBUMIN (HUMAN) 12.5 G/50ML
50 SOLUTION INTRAVENOUS ONCE
Status: COMPLETED | OUTPATIENT
Start: 2024-02-18 | End: 2024-02-18

## 2024-02-18 RX ORDER — RISPERIDONE 0.25 MG/1
0.25 TABLET ORAL NIGHTLY
Status: DISCONTINUED | OUTPATIENT
Start: 2024-02-18 | End: 2024-02-25

## 2024-02-18 RX ORDER — NICOTINE POLACRILEX 4 MG
15 LOZENGE BUCCAL
Status: DISCONTINUED | OUTPATIENT
Start: 2024-02-18 | End: 2024-02-25

## 2024-02-18 RX ORDER — CITALOPRAM 20 MG/1
10 TABLET ORAL DAILY
Status: DISCONTINUED | OUTPATIENT
Start: 2024-02-18 | End: 2024-02-25

## 2024-02-18 RX ORDER — DIVALPROEX SODIUM 125 MG/1
125 CAPSULE, COATED PELLETS ORAL EVERY 12 HOURS SCHEDULED
Status: DISCONTINUED | OUTPATIENT
Start: 2024-02-18 | End: 2024-02-25

## 2024-02-18 RX ORDER — DEXTROSE MONOHYDRATE 25 G/50ML
25 INJECTION, SOLUTION INTRAVENOUS
Status: DISCONTINUED | OUTPATIENT
Start: 2024-02-18 | End: 2024-02-25

## 2024-02-18 RX ORDER — MIDODRINE HYDROCHLORIDE 5 MG/1
10 TABLET ORAL EVERY 8 HOURS
Status: DISCONTINUED | OUTPATIENT
Start: 2024-02-18 | End: 2024-02-23

## 2024-02-18 RX ORDER — POTASSIUM CHLORIDE 1.5 G/1.58G
20 POWDER, FOR SOLUTION ORAL 2 TIMES DAILY
Status: DISCONTINUED | OUTPATIENT
Start: 2024-02-18 | End: 2024-02-18

## 2024-02-18 RX ORDER — POTASSIUM CHLORIDE 1.5 G/1.58G
20 POWDER, FOR SOLUTION ORAL 2 TIMES DAILY
Status: DISCONTINUED | OUTPATIENT
Start: 2024-02-18 | End: 2024-02-25

## 2024-02-18 RX ORDER — BISACODYL 5 MG/1
5 TABLET, DELAYED RELEASE ORAL DAILY PRN
Status: DISCONTINUED | OUTPATIENT
Start: 2024-02-18 | End: 2024-02-20

## 2024-02-18 RX ORDER — DEXTROSE MONOHYDRATE 25 G/50ML
INJECTION, SOLUTION INTRAVENOUS
Status: COMPLETED
Start: 2024-02-18 | End: 2024-02-18

## 2024-02-18 RX ORDER — OXYCODONE HYDROCHLORIDE 5 MG/1
5 TABLET ORAL EVERY 4 HOURS PRN
Status: DISCONTINUED | OUTPATIENT
Start: 2024-02-18 | End: 2024-02-20

## 2024-02-18 RX ORDER — FOLIC ACID 1 MG/1
1 TABLET ORAL DAILY
Status: DISCONTINUED | OUTPATIENT
Start: 2024-02-19 | End: 2024-02-25

## 2024-02-18 RX ORDER — AMOXICILLIN 250 MG
2 CAPSULE ORAL 2 TIMES DAILY PRN
Status: DISCONTINUED | OUTPATIENT
Start: 2024-02-18 | End: 2024-02-20

## 2024-02-18 RX ORDER — POLYETHYLENE GLYCOL 3350 17 G/17G
17 POWDER, FOR SOLUTION ORAL DAILY PRN
Status: DISCONTINUED | OUTPATIENT
Start: 2024-02-18 | End: 2024-02-20

## 2024-02-18 RX ADMIN — FUROSEMIDE 40 MG: 10 INJECTION, SOLUTION INTRAMUSCULAR; INTRAVENOUS at 21:10

## 2024-02-18 RX ADMIN — POTASSIUM CHLORIDE 20 MEQ: 1.5 POWDER, FOR SOLUTION ORAL at 21:10

## 2024-02-18 RX ADMIN — FUROSEMIDE 40 MG: 10 INJECTION, SOLUTION INTRAMUSCULAR; INTRAVENOUS at 10:03

## 2024-02-18 RX ADMIN — MEROPENEM 1000 MG: 1 INJECTION, POWDER, FOR SOLUTION INTRAVENOUS at 23:00

## 2024-02-18 RX ADMIN — DEXMEDETOMIDINE HYDROCHLORIDE 0.8 MCG/KG/HR: 4 INJECTION, SOLUTION INTRAVENOUS at 01:39

## 2024-02-18 RX ADMIN — DEXMEDETOMIDINE HYDROCHLORIDE 0.8 MCG/KG/HR: 4 INJECTION, SOLUTION INTRAVENOUS at 21:30

## 2024-02-18 RX ADMIN — Medication 10 ML: at 21:10

## 2024-02-18 RX ADMIN — DIVALPROEX SODIUM 125 MG: 125 CAPSULE, COATED PELLETS ORAL at 14:44

## 2024-02-18 RX ADMIN — METRONIDAZOLE 500 MG: 500 INJECTION, SOLUTION INTRAVENOUS at 12:22

## 2024-02-18 RX ADMIN — SODIUM CHLORIDE, POTASSIUM CHLORIDE, SODIUM LACTATE AND CALCIUM CHLORIDE 1000 ML: 600; 310; 30; 20 INJECTION, SOLUTION INTRAVENOUS at 03:11

## 2024-02-18 RX ADMIN — RISPERIDONE 0.25 MG: 0.25 TABLET, FILM COATED ORAL at 21:10

## 2024-02-18 RX ADMIN — POTASSIUM CHLORIDE 20 MEQ: 1.5 POWDER, FOR SOLUTION ORAL at 10:03

## 2024-02-18 RX ADMIN — Medication 0.4 MCG/KG/MIN: at 18:30

## 2024-02-18 RX ADMIN — MIDODRINE HYDROCHLORIDE 10 MG: 5 TABLET ORAL at 21:10

## 2024-02-18 RX ADMIN — DEXMEDETOMIDINE HYDROCHLORIDE 0.8 MCG/KG/HR: 4 INJECTION, SOLUTION INTRAVENOUS at 12:38

## 2024-02-18 RX ADMIN — ENOXAPARIN SODIUM 40 MG: 100 INJECTION SUBCUTANEOUS at 16:30

## 2024-02-18 RX ADMIN — Medication 10 ML: at 10:10

## 2024-02-18 RX ADMIN — SODIUM CHLORIDE, POTASSIUM CHLORIDE, SODIUM LACTATE AND CALCIUM CHLORIDE 100 ML/HR: 600; 310; 30; 20 INJECTION, SOLUTION INTRAVENOUS at 04:15

## 2024-02-18 RX ADMIN — METRONIDAZOLE 500 MG: 500 INJECTION, SOLUTION INTRAVENOUS at 04:59

## 2024-02-18 RX ADMIN — Medication 0.5 MCG/KG/MIN: at 02:37

## 2024-02-18 RX ADMIN — PHENYLEPHRINE HYDROCHLORIDE 3 MCG/KG/MIN: 10 INJECTION INTRAVENOUS at 01:38

## 2024-02-18 RX ADMIN — CEFTAZIDIME 2000 MG: 2 INJECTION, POWDER, FOR SOLUTION INTRAVENOUS at 04:50

## 2024-02-18 RX ADMIN — DEXTROSE MONOHYDRATE 25 G: 25 INJECTION, SOLUTION INTRAVENOUS at 18:26

## 2024-02-18 RX ADMIN — MIDODRINE HYDROCHLORIDE 10 MG: 5 TABLET ORAL at 14:44

## 2024-02-18 RX ADMIN — VANCOMYCIN HYDROCHLORIDE 1250 MG: 1.25 INJECTION, POWDER, LYOPHILIZED, FOR SOLUTION INTRAVENOUS at 06:34

## 2024-02-18 RX ADMIN — MEROPENEM 1000 MG: 1 INJECTION, POWDER, FOR SOLUTION INTRAVENOUS at 14:43

## 2024-02-18 RX ADMIN — Medication 0.2 MCG/KG/MIN: at 08:34

## 2024-02-18 RX ADMIN — ALBUMIN (HUMAN) 50 G: 0.25 INJECTION, SOLUTION INTRAVENOUS at 03:12

## 2024-02-18 RX ADMIN — CITALOPRAM HYDROBROMIDE 10 MG: 20 TABLET ORAL at 14:43

## 2024-02-18 NOTE — PROGRESS NOTES
Critical Care Progress Note   Sandra Treadwell : 1950 MRN:[id number] LOS:2     Principal Problem: Large pleural effusion     Reason for follow up: All the medical problems listed below    Summary     A 73 y.o. female who presented to the emergency department today for evaluation of chest pain and shortness of air as reported by the personnel at the facility where the patient currently resides.  EMS reported the patient was given 2 nitroglycerin after which the patient  denied any improvement in her chest pain however it was noted that she developed hypotension following the nitroglycerin.  The patient is a very poor historian and confused, she is unable to give any further constitutional information.  In the ED the patient was noted to be hypoxic and hypotensive and she received a modified IV fluid bolus due to apparent volume overload with extensive, pitting bilateral lower extremity edema and elevated BNP.  Her D-dimer was elevated to 9.7 therefore she underwent a CT per PE protocol which was negative for PE however did reveal a large right pleural effusion with a very small right pneumothorax.  IR was consulted and the patient underwent a small bore chest tube placement to the right with drainage of a large volume of dark brown fluid, possibly old blood.  She had persistent hypotension requiring vasopressor support and Levophed was initiated.  The patient was admitted to the ICU for further medical management.     Significant events     24 : Yesterday afternoon the patient had respiratory distress with tachypnea/respiratory rate into the 60s.  Case was discussed with patient and her  at bedside.  They elected for intubation.  They also elected for DNR status.  Thusly, the patient was intubated and placed on mechanical ventilation.  Bronchoscopy was done immediately after intubation and a BAL was obtained from the left upper lobe where the pulmonary abscess is located and sent for appropriate  studies.  Overnight, patient's chest tube was clogging and the pigtail chest tube was changed to a large bore surgical chest tube.  Patient continues to have significant output from the chest tube.  She is only had 317 mL of urine output but has had 1.8 L of chest tube output.  She is net +6.4 L in total.  She did fever to 101 °F at 8 PM last night.  She has noted to be quite edematous.  Have started her on Lasix 40 mg IV push twice daily.  CO2 is slightly low at 18, but ABG not suggestive of acidosis to the point of needing bicarb drip.  White blood cell count is low at 3.7.  Hemoglobin is essentially stable since transfusion yesterday.  Given the degree of chest tube output and the pulmonary abscess, thoracic surgery has been consulted.  They got a CT chest this morning which showed residual small left pleural effusion, small right hydropneumothorax.  Left upper lobe pulmonary abscess was again seen.  Patient with bibasilar atelectasis.  There is also an 8 mm right upper lobe nodule noted.  Per thoracic surgery note, they are planning to have IR switch to a Pleurx catheter tomorrow.  Pleural fluid is growing ESBL E coli.  ID following--will defer to them to adjust abx.  BAL was done on ZEINAB yesterday--have asked RN to call lab about it, as I don't see any results at all yet.  TTE from yesterday showed an ejection fraction of 40 to 45%, diastolic dysfunction, mild to moderate pulmonary hypertension.  Appreciate assistance of all consultants.    Assessment / Plan     Gram negative septic shock  R pleural effusion  R GNR empyema  Left upper lobe cavitary lesion most likely consistent with left lung abscess  Left calcaneus wound  Chronic baseline hypotension  Right chest tube placed by IR 2/16/2024 with drainage of large volume dark brown output  Started on cefepime and vancomycin  Persistent hypotension in spite of modified IV fluid resuscitation.  Patient did not receive 30 mL/kg due to volume overload  Titrate  vasopressors for a target MAP of 65.    Echocardiogram 11/26/2023, EF 50%.  Echocardiogram now shows EF of 40-45%  Chronic hypotension, midodrine home medication  Pleural fluid studies c/w empyema--growing GNR's  ID consulted  Cefepime changed to ceftaz       Acute hypoxic respiratory failure  Etiology: Large right pleural effusion with possible superimposed pneumonia  Right chest tube placed by IR 2/16/2024 with drainage of large volume dark brown output  Titrate O2 to keep sat >92%       Pulmonary hypertension, RVSP 50 mmHg by echocardiogram 11/26/2023  TTE from yesterday showed an ejection fraction of 40 to 45%, diastolic dysfunction, mild to moderate pulmonary hypertension.  Consider sildenafil once hypotension is resolved       Acute encephalopathy vs baseline dementia  Need to talk to her  when he comes in regarding this  He will have to be the decision-maker.      Cachexia  Consult nutrition      History of breast cancer   S/p right total knee replacement 11/12/2023        Critical Care Time:  34 mins.        Code status:   Medical Intervention Limits: NO intubation (DNI)  Code Status (Patient has no pulse and is not breathing): No CPR (Do Not Attempt to Resuscitate)  Medical Interventions (Patient has pulse or is breathing): Limited Support       Nutrition: NPO Diet NPO Type: Strict NPO   Patient isn't on Tube Feeding    DVT prophylaxis:  Medical DVT prophylaxis orders are present.         Subjective / Review of systems     Review of Systems   Unable to obtain much 2/2 pt's current condition.    Objective / Physical Exam   Vital signs:  Temp: 97.8 °F (36.6 °C)  BP: 113/49  Heart Rate: 86  Resp: 20  SpO2: 100 %  Weight: 62.3 kg (137 lb 5.6 oz)    Admission Weight: Weight: 67.1 kg (148 lb)  Current Weight: Weight: 62.3 kg (137 lb 5.6 oz)    Input/Output in last 24 hours:    Intake/Output Summary (Last 24 hours) at 2/18/2024 1156  Last data filed at 2/18/2024 0530  Gross per 24 hour   Intake 7236 ml    Output 1287 ml   Net 5949 ml      Physical Exam       GEN:  Elderly, cachectic and acute-on-chronically ill woman.  Intubated, sedated, on vent.  NAD.  NEURO:  Brainstem reflexes intact.  No obvious focal deficit.  Moves all 4 ext.  HEENT:  N/AT.  PERRL.  MMM.  Oropharynx non-erythematous.  No drainage from the eyes/ears/nose.  No conjunctival petechiae.  No oral thrush.  Loss of periorbital and bitemporal fat pads.  ETT in place.  NECK:  Supple, NT, trachea midline.  No meningismus.    CHEST/LUNGS:  Breath sounds are VERY diminished t/o and very coarse on the right.  Chest excursion equal bilaterally.    CARDIOVASCULAR:  RRR w/o murmur noted.    GI:  Abdomen soft, NT, ND, +BS.   :  Deferred.  EXTREMITIES:  No deformity or amputation.  No cyanosis, edema, or asymmetry.  Left heel wound.    SKIN:  Other than as noted above, skin is warm, dry, and pink.  No rash, breakdown, or track marks noted.  LYMPHATICS/HEME:  No overt LAD or abnormal bruising.  No lymphedema.  MSK:  No joint abnormalities noted.    PSYCH:  Unable to assess secondary to patient's current condition.        Radiology and Labs     Results from last 7 days   Lab Units 02/18/24  0520 02/17/24  1408 02/17/24  0510 02/16/24 2055 02/16/24  1228 02/12/24  1800   WBC 10*3/mm3 3.70  --  7.20 8.30 2.80* 5.80   HEMATOCRIT % 27.9* 29.9* 23.4* 34.1 29.1* 25.4*   PLATELETS 10*3/mm3 109*  --  224 393 293 285      Results from last 7 days   Lab Units 02/18/24  0520 02/17/24  0510 02/16/24 2017 02/16/24  1228 02/12/24  1800   SODIUM mmol/L 137 136 135* 138 136   POTASSIUM mmol/L 4.1 4.4 3.9 4.1 3.5   CHLORIDE mmol/L 106 103 102 100 101   CO2 mmol/L 18.0* 25.0 23.0 28.0 28.0   BUN mg/dL 17 16 18 15 9   CREATININE mg/dL 0.88 0.83 0.87 0.75 0.65      Current medications   Scheduled Meds: cefTAZidime, 2,000 mg, Intravenous, Q12H  enoxaparin, 40 mg, Subcutaneous, Daily  [START ON 2/19/2024] folic acid, 1 mg, Nasogastric, Daily  furosemide, 40 mg, Intravenous,  Q12H  [Held by provider] furosemide, 40 mg, Oral, BID  lactated ringers, 500 mL, Intravenous, Once  lactated ringers, 500 mL, Intravenous, Once  metroNIDAZOLE, 500 mg, Intravenous, Q8H  potassium chloride, 20 mEq, Nasogastric, BID  sodium chloride, 250 mL, Intravenous, Once  sodium chloride, 10 mL, Intravenous, Q12H  vancomycin, 1,250 mg, Intravenous, Q24H      Continuous Infusions: dexmedetomidine, 0.2-1.5 mcg/kg/hr, Last Rate: 0.8 mcg/kg/hr (02/18/24 0139)  fentanyl 10 mcg/mL,  mcg/hr, Last Rate: 100 mcg/hr (02/18/24 1024)  norepinephrine, 0.02-0.5 mcg/kg/min, Last Rate: 0.2 mcg/kg/min (02/18/24 5008)  Pharmacy to dose vancomycin,   phenylephrine, 0.5-3 mcg/kg/min, Last Rate: Stopped (02/18/24 4972)        Plan discussed with RN. Reviewed all other data in the last 24 hours, including but not limited to vitals, labs, microbiology, imaging and pertinent notes from other providers.     Sai Angel, DO   Critical Care  02/18/24   11:56 EST

## 2024-02-18 NOTE — PROCEDURES
"Chest Tube Insertion    Date/Time: 2/18/2024 1:15 AM    Performed by: Adeline Power APRN  Authorized by: Adeline Power APRN  Consent: Written consent obtained.  Risks and benefits: risks, benefits and alternatives were discussed  Consent given by: spouse  Patient understanding: patient states understanding of the procedure being performed  Patient consent: the patient's understanding of the procedure matches consent given  Procedure consent: procedure consent matches procedure scheduled  Required items: required blood products, implants, devices, and special equipment available  Patient identity confirmed: anonymous protocol, patient vented/unresponsive  Time out: Immediately prior to procedure a \"time out\" was called to verify the correct patient, procedure, equipment, support staff and site/side marked as required.  Indications: pleural effusion    Sedation:  Patient sedated: no    Preparation: sterile field established and skin prepped with chlorhexidine  Placement location: right lateral  Tube size: 24 [place]  Dissection instrument: Karis clamp  Ultrasound guidance: no  Tension pneumothorax heard: no  Tube connected to: suction  Drainage characteristics: serosanguinous  Suture material: 2-0 silk  Dressing: 4x4 sterile gauze  Patient tolerance: patient tolerated the procedure well with no immediate complications  Comments: Previous small bore chest tube was clogging. Unable to remedy it with repositioning. Therefore large pore chest tube placement to to sediment in drainage.       Electronically signed by BERNA Garcia, 02/18/24, 1:20 AM EST.    "

## 2024-02-18 NOTE — PLAN OF CARE
Goal Outcome Evaluation:  Plan of Care Reviewed With: patient, family        Progress: no change     Pt was in NSR for most of the shift. Became tachycardic after 1500, with a few PVCs. Titrations with Fentanyl to maintain comfort and titrations with Levophed to maintain BP. CT was obtained. Plans to go to IR tomorrow for new chest tube. NPO @ midnight.

## 2024-02-18 NOTE — PROGRESS NOTES
Infectious Diseases Progress Note      LOS: 2 days   Patient Care Team:  Wm Aguirre MD as PCP - General (Sports Medicine)    Chief Complaint: Intubated    Subjective       Patient has had a fever as high as 1 1 degrees for the last 24 hours.  She is now intubated at 40% FiO2 and 6 of PEEP.  Currently on norepinephrine.      Review of Systems:   Review of Systems   Unable to perform ROS: Intubated        Objective     Vital Signs  Temp:  [97.7 °F (36.5 °C)-101 °F (38.3 °C)] 97.7 °F (36.5 °C)  Heart Rate:  [] 86  Resp:  [20-28] 20  FiO2 (%):  [28 %-100 %] 28 %    Physical Exam:  Physical Exam  Vitals and nursing note reviewed.   Constitutional:       General: She is not in acute distress.     Appearance: She is well-developed and normal weight. She is ill-appearing. She is not diaphoretic.   HENT:      Head: Normocephalic and atraumatic.   Eyes:      General: No scleral icterus.     Extraocular Movements: Extraocular movements intact.      Conjunctiva/sclera: Conjunctivae normal.      Pupils: Pupils are equal, round, and reactive to light.   Cardiovascular:      Rate and Rhythm: Normal rate and regular rhythm.      Heart sounds: Normal heart sounds, S1 normal and S2 normal. No murmur heard.  Pulmonary:      Effort: Pulmonary effort is normal. No respiratory distress.      Breath sounds: Normal breath sounds. No stridor. No wheezing or rales.      Comments: Intubated    Right chest tube    Very diminished in the right lobe  Chest:      Chest wall: No tenderness.   Abdominal:      General: Bowel sounds are normal. There is no distension.      Palpations: Abdomen is soft. There is no mass.      Tenderness: There is no abdominal tenderness. There is no guarding.   Genitourinary:     Comments:  Ribera catheter  Musculoskeletal:         General: No swelling, tenderness or deformity.   Skin:     General: Skin is warm and dry.      Coloration: Skin is not pale.      Findings: No bruising, erythema or rash.       Comments: Left calcaneus unstageable wound          Results Review:    I have reviewed all clinical data, test, lab, and imaging results.     Radiology  CT Chest Without Contrast Diagnostic    Result Date: 2/18/2024  CT CHEST WO CONTRAST DIAGNOSTIC Date of Exam: 2/18/2024 9:43 AM EST Indication: Empyema. Comparison: Chest CTA dated 2/16/2024 Technique: Axial CT images were obtained of the chest without contrast administration.  Sagittal and coronal reconstructions were performed.  Automated exposure control and iterative reconstruction methods were used. FINDINGS: Thoracic inlet: Unremarkable. Great vessels: Atherosclerotic plaque is seen within the thoracic aorta and proximal arch vessels. There is a right IJ central venous catheter which terminates in the right atrium. Mediastinum/Afua: No pathologically enlarged mediastinal lymph nodes are seen. Enteric tube noted within the esophagus, incompletely visualized. Hiatal hernia is present. Calcified paratracheal and right hilar lymph nodes are noted. Lung parenchyma/pleura: There is an indwelling right thoracostomy tube which terminates within the right medial thorax. There is a small right hydropneumothorax which has significantly decreased in size since 2/16/2024. There is a small left pleural effusion which appears similar in size since the prior study. New small locules of left pleural air cannot be excluded (series 2, image 72). This may indicate the presence of a small left empyema. 3.5 cm rounded opacity with air-fluid level may represent  a left upper lobe pulmonary abscess (series 2, image 44). This finding is unchanged. Bilateral lung base atelectasis or infiltrates. Indeterminate 8 mm nodule within the right upper lobe (series 2, image 30).  trachea and airways: Endotracheal tube terminates within the distal trachea. Central airways are otherwise unremarkable. Heart and pericardium: Coronary artery calcifications are present. Aortic valvular  calcifications are seen. No significant pericardial effusion. Chest wall: No acute osseous lesion is identified. Bones are demineralized. Superficial soft tissues demonstrate no acute abnormality. Bilateral breast implants are noted. Upper abdomen: No acute abnormality is identified within the visualized upper abdomen. Status post cholecystectomy. Atrophic left kidney noted. Small liver hypodensity, incompletely characterized on this noncontrast study.     1. There is an indwelling right thoracostomy tube which terminates within the right medial thorax. There is a small right hydropneumothorax which has significantly decreased in size since 2/16/2024. There is a small left pleural effusion which appears similar in size since the prior study. New small locules of left pleural air cannot be excluded (series 2, image 72). 2.Possible 3.5 cm left upper lobe pulmonary abscess, similar since the previous study 3. Bilateral lung base atelectasis or infiltrates. 4.Indeterminate 8 mm nodule within the right upper lobe (series 2, image 30). 5.Recommend continued short interval chest CT follow-up. Electronically Signed: Cy Lopez MD  2/18/2024 10:11 AM EST  Workstation ID: [id number]    XR Chest 1 View    Result Date: 2/18/2024  XR CHEST 1 VW Date of Exam: 2/18/2024 5:08 AM EST Indication: F/U pleural effusion, PNA Comparison: February 18, 2024 at 0012 and 0052. February 17, 2024, February 16, 2024 Findings: Endotracheal tube tip is approximately 2.5 cm above the tonie. Enteric tube extends into the left upper quadrant. Heart size appears unchanged. There is a small left pleural effusion. Stable focal opacity in the periphery of the left midlung containing a small amount of gas as seen on prior CT. Right chest tube appears to have been pulled back now terminating in the lower-medial right thorax. There is a small peripheral right basilar pneumothorax. There also appears to be a small amount of right pleural effusion.  Overall size of the pneumothorax component appears similar to the amount of pleural fluid on prior chest radiographs in this location suggesting ex vacuo pneumothorax. Stable bibasilar opacities.     Impression: 1.Right chest tube appears to have been pulled back now terminating in the lower-medial right thorax. There is a small peripheral right basilar pneumothorax which appears similar to the amount of pleural fluid on prior chest radiographs in this location suggesting ex vacuo pneumothorax. There is also a small amount of persistent right pleural fluid. 2.Small left pleural effusion and bibasilar opacities, as before. 3.Peripheral opacity in the left midlung containing a small amount of gas as seen on recent prior chest CT. Electronically Signed: Donny Morales  2/18/2024 7:59 AM EST  Workstation ID: [id number]    XR Chest 1 View    Result Date: 2/18/2024  XR CHEST 1 VW Date of Exam: 2/18/2024 12:48 AM EST Indication: chest tube placement Comparison: 2/18/2024 Findings: Right pigtail chest tube replaced with large bore right-sided chest tube. No evidence of pneumothorax. Otherwise, stable exam.     Impression: Interval replacement of the right-sided chest tube with large bore chest tube. No evidence of pneumothorax. Electronically Signed: Tracy Knowles MD  2/18/2024 1:05 AM EST  Workstation ID: [id number]    XR Chest 1 View    Result Date: 2/18/2024  XR CHEST 1 VW Date of Exam: 2/18/2024 12:02 AM EST Indication: cHEST TUBE PLACEMENT Comparison: 2/17/2024 Findings: Enteric tube present within the stomach. Interval placement of a right-sided pigtail chest tube. No definite pneumothorax identified. Small right-sided pleural effusion present, improved as compared to the previous study. Stable opacity within the left midlung. Stable small left-sided pleural effusion present. Otherwise, no significant change. Right internal jugular CVC catheter present with the tip in the distal SVC.     Impression: Interval placement of a  right-sided pigtail chest tube with improvement in right-sided pleural effusion and resolution of pneumothorax. Otherwise, stable exam. Electronically Signed: Tracy Knowles MD  2/18/2024 12:28 AM EST  Workstation ID: [id number]    XR Chest 1 View    Result Date: 2/17/2024  XR CHEST 1 VW Date of Exam: 2/17/2024 6:01 PM EST Indication: ET placement Comparison: 2/16/2024 Findings: Endotracheal tube overlies the midthoracic trachea approximately 4.7 cm from the tonie. Nasogastric tube distal tip and sideport overlie the stomach. Right pigtail thoracostomy tube in unchanged position. Right approach central venous catheter with distal tip overlying the right atrium. Unchanged left midlung airspace opacity. There is increased right-sided pneumothorax now measuring 2.2 cm at the right lung apex. There is also pleural fluid noted. Small left pleural effusion. No left-sided pneumothorax. Osseous structures are unchanged.     Impression: Interval increased size of the right-sided hydropneumothorax with relatively similar positioning of the right chest tube. Interval intubation with endotracheal tube overlying the mid thoracic trachea approximately 4.7 cm from the tonie. Persistent left lung airspace opacity and small left pleural effusion. Electronically Signed: Calvin Rose MD  2/17/2024 6:25 PM EST  Workstation ID: [id number]     Cardiology    Laboratory    Results from last 7 days   Lab Units 02/18/24  0520 02/17/24  1408 02/17/24  0510 02/16/24 2055 02/16/24  1228 02/12/24  1800   WBC 10*3/mm3 3.70  --  7.20 8.30 2.80* 5.80   HEMOGLOBIN g/dL 8.6* 9.4* 6.9* 10.3* 9.4* 7.9*   HEMATOCRIT % 27.9* 29.9* 23.4* 34.1 29.1* 25.4*   PLATELETS 10*3/mm3 109*  --  224 393 293 285     Results from last 7 days   Lab Units 02/18/24  0520 02/17/24  0510 02/16/24 2017 02/16/24  1228 02/12/24  1800   SODIUM mmol/L 137 136 135* 138 136   POTASSIUM mmol/L 4.1 4.4 3.9 4.1 3.5   CHLORIDE mmol/L 106 103 102 100 101   CO2 mmol/L 18.0* 25.0 23.0  28.0 28.0   BUN mg/dL 17 16 18 15 9   CREATININE mg/dL 0.88 0.83 0.87 0.75 0.65   GLUCOSE mg/dL 83 96 119* 104* 98   ALBUMIN g/dL 3.5  --  2.1* 2.2*  --    BILIRUBIN mg/dL 0.7  --  0.4 0.4  --    ALK PHOS U/L 26*  --  57 61  --    AST (SGOT) U/L 7  --  9 8  --    ALT (SGPT) U/L <5  --  6 5  --    CALCIUM mg/dL 8.2* 9.3 9.4 9.8 8.7         Results from last 7 days   Lab Units 02/18/24  0520   SED RATE mm/hr <1         Microbiology   Microbiology Results (last 10 days)       Procedure Component Value - Date/Time    MRSA Screen, PCR (Inpatient) - Swab, Nares [[id number]]  (Abnormal) Collected: 02/17/24 0854    Lab Status: Final result Specimen: Swab from Nares Updated: 02/17/24 1015     MRSA PCR MRSA Detected    Narrative:      The negative predictive value of this diagnostic test is high and should only be used to consider de-escalating anti-MRSA therapy. A positive result may indicate colonization with MRSA and must be correlated clinically.    Respiratory Panel PCR w/COVID-19(SARS-CoV-2) [facility]/H[facility]AM/[facility] In-House, NP Swab in UTM/VTM, 2 HR TAT - Swab, Nasopharynx [[id number]]  (Normal) Collected: 02/17/24 0854    Lab Status: Final result Specimen: Swab from Nasopharynx Updated: 02/17/24 0953     ADENOVIRUS, PCR Not Detected     Coronavirus 229E Not Detected     Coronavirus HKU1 Not Detected     Coronavirus NL63 Not Detected     Coronavirus OC43 Not Detected     COVID19 Not Detected     Human Metapneumovirus Not Detected     Human Rhinovirus/Enterovirus Not Detected     Influenza A PCR Not Detected     Influenza B PCR Not Detected     Parainfluenza Virus 1 Not Detected     Parainfluenza Virus 2 Not Detected     Parainfluenza Virus 3 Not Detected     Parainfluenza Virus 4 Not Detected     RSV, PCR Not Detected     Bordetella pertussis pcr Not Detected     Bordetella parapertussis PCR Not Detected     Chlamydophila pneumoniae PCR Not Detected     Mycoplasma pneumo by PCR Not Detected    Narrative:      In the  setting of a positive respiratory panel with a viral infection PLUS a negative procalcitonin without other underlying concern for bacterial infection, consider observing off antibiotics or discontinuation of antibiotics and continue supportive care. If the respiratory panel is positive for atypical bacterial infection (Bordetella pertussis, Chlamydophila pneumoniae, or Mycoplasma pneumoniae), consider antibiotic de-escalation to target atypical bacterial infection.    Legionella Antigen, Urine - Urine, Urine, Clean Catch [[id number]]  (Normal) Collected: 02/17/24 0852    Lab Status: Final result Specimen: Urine, Clean Catch Updated: 02/17/24 0927     LEGIONELLA ANTIGEN, URINE Negative    S. Pneumo Ag Urine or CSF - Urine, Urine, Clean Catch [[id number]]  (Normal) Collected: 02/17/24 0852    Lab Status: Final result Specimen: Urine, Clean Catch Updated: 02/17/24 0927     Strep Pneumo Ag Negative    Blood Culture - Blood, Hand, Right [[id number]]  (Normal) Collected: 02/16/24 1737    Lab Status: Preliminary result Specimen: Blood from Hand, Right Updated: 02/17/24 1800     Blood Culture No growth at 24 hours    Blood Culture - Blood, Hand, Left [[id number]]  (Normal) Collected: 02/16/24 1736    Lab Status: Preliminary result Specimen: Blood from Hand, Left Updated: 02/17/24 1800     Blood Culture No growth at 24 hours    Narrative:      Less than seven (7) mL's of blood was collected.  Insufficient quantity may yield false negative results.    AFB Culture - Body Fluid, Pleural Cavity [[id number]] Collected: 02/16/24 1556    Lab Status: Preliminary result Specimen: Body Fluid from Pleural Cavity Updated: 02/17/24 1142     AFB Stain No acid fast bacilli seen on concentrated smear    Body Fluid Culture - Body Fluid, Pleural Cavity [[id number]]  (Abnormal)  (Susceptibility) Collected: 02/16/24 1556    Lab Status: Final result Specimen: Body Fluid from Pleural Cavity Updated: 02/18/24 1143     Body Fluid Culture Scant growth  (1+) Escherichia coli ESBL     Comment:   Consider infectious disease consult.  Susceptibility results may not correlate to clinical outcomes.         Rare Normal Skin Maday     Gram Stain Moderate (3+) WBCs per low power field      No organisms seen    Susceptibility        Escherichia coli ESBL      TARYN      Ertapenem Susceptible      Levofloxacin Susceptible      Meropenem Susceptible      Trimethoprim + Sulfamethoxazole Susceptible                       Susceptibility Comments       Escherichia coli ESBL    Cefazolin sensitivity will not be reported for Enterobacteriaceae in non-urine isolates. If cefazolin is preferred, please call the microbiology lab to request an E-test.  With the exception of urinary-sourced infections, aminoglycosides should not be used as monotherapy.               Fungus Smear - Body Fluid, Pleural Cavity [[id number]] Collected: 02/16/24 2939    Lab Status: Final result Specimen: Body Fluid from Pleural Cavity Updated: 02/17/24 1205     Fungal Stain No fungal elements seen            Medication Review:       Schedule Meds  cefTAZidime, 2,000 mg, Intravenous, Q12H  citalopram, 10 mg, Nasogastric, Daily  Divalproex Sodium, 125 mg, Nasogastric, Q12H  enoxaparin, 40 mg, Subcutaneous, Daily  [START ON 2/19/2024] folic acid, 1 mg, Nasogastric, Daily  furosemide, 40 mg, Intravenous, Q12H  [Held by provider] furosemide, 40 mg, Oral, BID  lactated ringers, 500 mL, Intravenous, Once  lactated ringers, 500 mL, Intravenous, Once  metroNIDAZOLE, 500 mg, Intravenous, Q8H  midodrine, 10 mg, Nasogastric, Q8H  potassium chloride, 20 mEq, Nasogastric, BID  risperiDONE, 0.25 mg, Nasogastric, Nightly  sodium chloride, 250 mL, Intravenous, Once  sodium chloride, 10 mL, Intravenous, Q12H  vancomycin, 1,250 mg, Intravenous, Q24H        Infusion Meds  dexmedetomidine, 0.2-1.5 mcg/kg/hr, Last Rate: 0.8 mcg/kg/hr (02/18/24 1238)  fentanyl 10 mcg/mL,  mcg/hr, Last Rate: 100 mcg/hr (02/18/24  1024)  norepinephrine, 0.02-0.5 mcg/kg/min, Last Rate: 0.15 mcg/kg/min (02/18/24 1239)  Pharmacy to dose vancomycin,   phenylephrine, 0.5-3 mcg/kg/min, Last Rate: Stopped (02/18/24 0472)        PRN Meds    acetaminophen **OR** acetaminophen    senna-docusate sodium **AND** polyethylene glycol **AND** bisacodyl **AND** bisacodyl    fentaNYL citrate (PF)    ondansetron    ondansetron ODT    oxyCODONE    Pharmacy to dose vancomycin    [COMPLETED] Insert Peripheral IV **AND** sodium chloride    sodium chloride    sodium chloride        Assessment & Plan       Antimicrobial Therapy   1.  IV vancomycin        2.  IV ceftazidime        3.  IV Flagyl        4.  IV Merrem        5.              Assessment     Probable right thoracic empyema based on the fluid analysis and cultures are growing ESBL  E. coli..  I am concerned about micro fistula between the abdomen and thoracic cavity.  Patient currently has chest tube with good output.  Right chest tube had to be replaced on 2/17/2023.  Blood cultures are negative so far     Left upper lobe cavitary lesion most likely consistent with left lung abscess.  Suspecting the same pathogen involving the right lung causing this infection.  Pneumococcal and Legionella urine antigen was negative  MRSA screen was positive    Acute hypoxic respiratory failure-likely related to the above.  Patient is now intubated at 40% FiO2    Septic shock-patient is currently on norepinephrine     Left calcaneus wound, unstageable with black eschar.  I doubt this is a peripheral vascular disease patient has strong pulse.  Most likely this is a pressure ulcer     Probable underlying dementia based on my assessment.  Patient symptoms might get worse and with cefepime.     Mild septic shock requiring very low-dose of norepinephrine drip.     Plan     Continue IV vancomycin for now, pharmacy currently following and dosing  Discontinue IV ceftazidime and IV Flagyl  Start IV Merrem 1 g every 8  hours  Waiting on anaerobic pleural fluid cultures  May need to repeat imaging study of the chest in few days to document the improvement of the right-sided effusion  MRI of the left foot was ordered by primary service to rule out underlying osteomyelitis-will be performed and patient is more stable  Continue supportive care  A.m. labs   Case discussed with patient's family members and RN at bedside      BERNA Lubin  02/18/24  14:08 EST    Note is dictated utilizing voice recognition software/Dragon

## 2024-02-18 NOTE — NURSING NOTE
Patient started having increased respiration and trouble catching her breath. Dr. Angel discussed plan of care with  who was at the bedside.  agreed to intubation. Patient intubated at 1700. Vent settings 100% with a Peep of 10.     Patient currently has Fentanyl, Precedex, Levophed, Destin and LR drips running.     Chest tube in place but had to be irrigated by Tatyana Day, APRN. 1500 ml of drainage out of chest tube throughout this shift.    MRI of left foot ordered, will be completed tomorrow.    Restraints started. Family updated.

## 2024-02-18 NOTE — CONSULTS
Inpatient Thoracic Surgery Consult  Consult performed by: Desiree Aquino APRN  Consult ordered by: Sai Angel DO  Reason for consult: empyema          Patient Care Team:  Wm Aguirre MD as PCP - General (Sports Medicine)    Chief Complaint   Patient presents with    Chest Pain       Subjective     Chest Pain     Sandra Treadwell is a 73-year-old female who we have been asked to see concerning a right fluid collection suggestive of empyema.  Patient initially presented to emergency department at [facility] on 2/16/2024.  She was sent to the hospital from her nursing facility due to complaints of chest pain.  On evaluation in the emergency department she was found to have a large right-sided pleural effusion.  The patient underwent chest tube placement per interventional radiology on 2/16/2024.  Culture from the fluid collection grew gram-negative bacilli.  Infectious diseases following and managing antibiotic treatment.  Unfortunately, the patient worsened with progressive hypotension.  She was transferred to the intensive care unit and initiated on norepinephrine.  Patient was intubated on 2/17/2024 and also underwent bronchoscopy per the intensive care service.  Apparently, the chest tube was irrigated yesterday per the intensive care APRN.  She had approximately 1500 mL of drainage during the day.  At some point overnight, the tube was replaced with a larger bore chest tube due to persistent clogging of the small bore tube.  We were then asked to see this lady in consultation today.    Review of Systems   Unable to perform ROS: Intubated   Cardiovascular:  Positive for chest pain.        Past Medical History:   Diagnosis Date    Bladder leak     Breast cancer     chemo and radiation    Depression     GERD (gastroesophageal reflux disease)     Hyperlipidemia     Osteoporosis      Past Surgical History:   Procedure Laterality Date    BREAST SURGERY      lumpectomy     CHOLECYSTECTOMY      HYSTERECTOMY      TOTAL KNEE ARTHROPLASTY Right 11/24/2023    Procedure: TOTAL KNEE ARTHROPLASTY WITH CORI ROBOT;  Surgeon: Ulises Chen II, MD;  Location: [facility] MAIN OR;  Service: Robotics - Ortho;  Laterality: Right;     History reviewed. No pertinent family history.  Social History     Socioeconomic History    Marital status:    Tobacco Use    Smoking status: Never     Passive exposure: Never    Smokeless tobacco: Never   Vaping Use    Vaping Use: Never used   Substance and Sexual Activity    Alcohol use: Never    Drug use: Never    Sexual activity: Defer     Medications Prior to Admission   Medication Sig Dispense Refill Last Dose    acetaminophen (TYLENOL) 325 MG tablet Take 2 tablets by mouth Every 4 (Four) Hours As Needed for Mild Pain.       acetaminophen (TYLENOL) 325 MG tablet Take 2 tablets by mouth 3 times a day.       calcium carbonate, oyster shell, 500 MG tablet tablet Take 1 tablet by mouth 4 (Four) Times a Day.       citalopram (CeleXA) 10 MG tablet Take 1 tablet by mouth Daily.       Diclofenac Sodium (VOLTAREN) 1 % gel gel Apply 4 g topically to the appropriate area as directed 2 (Two) Times a Day. Apply to knees       divalproex (DEPAKOTE) 125 MG DR tablet Take 1 tablet by mouth 2 (Two) Times a Day.       ferrous sulfate 325 (65 FE) MG tablet Take 1 tablet by mouth 2 (Two) Times a Day.       Fluticasone-Salmeterol (ADVAIR/WIXELA) 100-50 MCG/ACT DISKUS Inhale 1 puff Daily.       folic acid (FOLVITE) 400 MCG tablet Take 1 tablet by mouth Daily.       furosemide (LASIX) 40 MG tablet Take 1 tablet by mouth 2 (Two) Times a Day. 30 tablet 0     midodrine (PROAMATINE) 5 MG tablet Take 2 tablets by mouth 3 (Three) Times a Day Before Meals. Hold if SBP >120       nitroglycerin (NITROSTAT) 0.4 MG SL tablet Place 1 tablet under the tongue Every 5 (Five) Minutes As Needed for Chest Pain. Take no more than 3 doses in 15 minutes.       potassium chloride  (K-DUR,KLOR-CON) 20 MEQ CR tablet Take 2 tablets by mouth 2 (Two) Times a Day.       risperiDONE (risperDAL) 0.25 MG tablet Take 1 tablet by mouth Every Night.       sennosides-docusate (senna-docusate sodium) 8.6-50 MG per tablet Take 2 tablets by mouth Daily.       vitamin B-12 (CYANOCOBALAMIN) 1000 MCG tablet Take 1 tablet by mouth Daily.        Allergies   Allergen Reactions    Codeine Nausea And Vomiting and Dizziness       Objective      Vital Signs  Temp:  [97.8 °F (36.6 °C)-101 °F (38.3 °C)] 97.8 °F (36.6 °C)  Heart Rate:  [] 86  Resp:  [20-36] 20  BP: (113)/(49) 113/49  FiO2 (%):  [40 %-100 %] 40 %    Intake & Output (last day)         02/17 0701  02/18 0700 02/18 0701  02/19 0700    P.O. 0     I.V. (mL/kg) 7116 (114.2)     Blood 385     NG/     IV Piggyback      Total Intake(mL/kg) 7621 (122.3)     Urine (mL/kg/hr) 317 (0.2)     Stool 0     Chest Tube 1785     Total Output 2102     Net +5519                   Physical Exam  Vitals and nursing note reviewed.   Constitutional:       General: She is sleeping. She is not in acute distress.     Appearance: She is ill-appearing.      Interventions: She is sedated and intubated.   HENT:      Head: Normocephalic.      Mouth/Throat:      Pharynx: Oropharynx is clear.   Cardiovascular:      Rate and Rhythm: Normal rate and regular rhythm.   Pulmonary:      Effort: Pulmonary effort is normal. She is intubated.      Breath sounds: Rhonchi present.   Abdominal:      Palpations: Abdomen is soft. There is no mass.   Skin:     General: Skin is warm.   Neurological:      Mental Status: She is unresponsive.      Motor: Weakness present.      Comments: Sedated               Chest tube:   Site: Right and Drainage around chest tube site  Suction: -20 cm  Air Leak: negative  24 Hour Total: 1785 mL     Results Review:    I reviewed the patient's new clinical results.  I reviewed the patient's new imaging results and agree with the interpretation.  I reviewed the  patient's other test results and agree with the interpretation  Discussed with Dr. Gallagher.    Imaging Results (Last 24 Hours)       Procedure Component Value Units Date/Time    CT Chest Without Contrast Diagnostic [[id number]] Collected: 02/18/24 1002     Updated: 02/18/24 1013    Narrative:      CT CHEST WO CONTRAST DIAGNOSTIC    Date of Exam: 2/18/2024 9:43 AM EST    Indication: Empyema.    Comparison: Chest CTA dated 2/16/2024    Technique: Axial CT images were obtained of the chest without contrast administration.  Sagittal and coronal reconstructions were performed.  Automated exposure control and iterative reconstruction methods were used.    FINDINGS:    Thoracic inlet: Unremarkable.    Great vessels: Atherosclerotic plaque is seen within the thoracic aorta and proximal arch vessels. There is a right IJ central venous catheter which terminates in the right atrium.    Mediastinum/Afua: No pathologically enlarged mediastinal lymph nodes are seen. Enteric tube noted within the esophagus, incompletely visualized. Hiatal hernia is present. Calcified paratracheal and right hilar lymph nodes are noted.    Lung parenchyma/pleura: There is an indwelling right thoracostomy tube which terminates within the right medial thorax. There is a small right hydropneumothorax which has significantly decreased in size since 2/16/2024. There is a small left pleural   effusion which appears similar in size since the prior study. New small locules of left pleural air cannot be excluded (series 2, image 72). This may indicate the presence of a small left empyema. 3.5 cm rounded opacity with air-fluid level may represent   a left upper lobe pulmonary abscess (series 2, image 44). This finding is unchanged. Bilateral lung base atelectasis or infiltrates. Indeterminate 8 mm nodule within the right upper lobe (series 2, image 30).       trachea and airways: Endotracheal tube terminates within the distal trachea. Central airways are  otherwise unremarkable.    Heart and pericardium: Coronary artery calcifications are present. Aortic valvular calcifications are seen. No significant pericardial effusion.    Chest wall: No acute osseous lesion is identified. Bones are demineralized. Superficial soft tissues demonstrate no acute abnormality. Bilateral breast implants are noted.    Upper abdomen: No acute abnormality is identified within the visualized upper abdomen. Status post cholecystectomy. Atrophic left kidney noted. Small liver hypodensity, incompletely characterized on this noncontrast study.      Impression:      1. There is an indwelling right thoracostomy tube which terminates within the right medial thorax. There is a small right hydropneumothorax which has significantly decreased in size since 2/16/2024. There is a small left pleural effusion which appears   similar in size since the prior study. New small locules of left pleural air cannot be excluded (series 2, image 72).   2.Possible 3.5 cm left upper lobe pulmonary abscess, similar since the previous study  3. Bilateral lung base atelectasis or infiltrates.  4.Indeterminate 8 mm nodule within the right upper lobe (series 2, image 30).  5.Recommend continued short interval chest CT follow-up.    Electronically Signed: Cy Lopez MD    2/18/2024 10:11 AM EST    Workstation ID: [id number]    XR Chest 1 View [[id number]] Collected: 02/18/24 0753     Updated: 02/18/24 0802    Narrative:      XR CHEST 1 VW    Date of Exam: 2/18/2024 5:08 AM EST    Indication: F/U pleural effusion, PNA    Comparison: February 18, 2024 at 0012 and 0052. February 17, 2024, February 16, 2024    Findings:  Endotracheal tube tip is approximately 2.5 cm above the tonie. Enteric tube extends into the left upper quadrant. Heart size appears unchanged. There is a small left pleural effusion. Stable focal opacity in the periphery of the left midlung containing   a small amount of gas as seen on prior CT.    Right  chest tube appears to have been pulled back now terminating in the lower-medial right thorax. There is a small peripheral right basilar pneumothorax. There also appears to be a small amount of right pleural effusion. Overall size of the   pneumothorax component appears similar to the amount of pleural fluid on prior chest radiographs in this location suggesting ex vacuo pneumothorax. Stable bibasilar opacities.      Impression:      Impression:  1.Right chest tube appears to have been pulled back now terminating in the lower-medial right thorax. There is a small peripheral right basilar pneumothorax which appears similar to the amount of pleural fluid on prior chest radiographs in this location   suggesting ex vacuo pneumothorax. There is also a small amount of persistent right pleural fluid.  2.Small left pleural effusion and bibasilar opacities, as before.  3.Peripheral opacity in the left midlung containing a small amount of gas as seen on recent prior chest CT.        Electronically Signed: Donny Morales    2/18/2024 7:59 AM EST    Workstation ID: [id number]    XR Chest 1 View [[id number]] Collected: 02/18/24 0103     Updated: 02/18/24 0107    Narrative:      XR CHEST 1 VW    Date of Exam: 2/18/2024 12:48 AM EST    Indication: chest tube placement    Comparison: 2/18/2024    Findings:  Right pigtail chest tube replaced with large bore right-sided chest tube. No evidence of pneumothorax. Otherwise, stable exam.      Impression:      Impression:  Interval replacement of the right-sided chest tube with large bore chest tube. No evidence of pneumothorax.        Electronically Signed: Tracy Knowles MD    2/18/2024 1:05 AM EST    Workstation ID: [id number]    XR Chest 1 View [[id number]] Collected: 02/18/24 0026     Updated: 02/18/24 0030    Narrative:      XR CHEST 1 VW    Date of Exam: 2/18/2024 12:02 AM EST    Indication: cHEST TUBE PLACEMENT    Comparison: 2/17/2024    Findings:  Enteric tube present within the stomach.  Interval placement of a right-sided pigtail chest tube. No definite pneumothorax identified. Small right-sided pleural effusion present, improved as compared to the previous study. Stable opacity within the left   midlung. Stable small left-sided pleural effusion present. Otherwise, no significant change. Right internal jugular CVC catheter present with the tip in the distal SVC.      Impression:      Impression:  Interval placement of a right-sided pigtail chest tube with improvement in right-sided pleural effusion and resolution of pneumothorax. Otherwise, stable exam.      Electronically Signed: Tracy Knowles MD    2/18/2024 12:28 AM EST    Workstation ID: [id number]    XR Chest 1 View [[id number]] Collected: 02/17/24 1822     Updated: 02/17/24 1827    Narrative:      XR CHEST 1 VW    Date of Exam: 2/17/2024 6:01 PM EST    Indication: ET placement    Comparison: 2/16/2024    Findings:  Endotracheal tube overlies the midthoracic trachea approximately 4.7 cm from the tonie. Nasogastric tube distal tip and sideport overlie the stomach. Right pigtail thoracostomy tube in unchanged position. Right approach central venous catheter with   distal tip overlying the right atrium. Unchanged left midlung airspace opacity. There is increased right-sided pneumothorax now measuring 2.2 cm at the right lung apex. There is also pleural fluid noted. Small left pleural effusion. No left-sided   pneumothorax. Osseous structures are unchanged.      Impression:      Impression:  Interval increased size of the right-sided hydropneumothorax with relatively similar positioning of the right chest tube.    Interval intubation with endotracheal tube overlying the mid thoracic trachea approximately 4.7 cm from the tonie.    Persistent left lung airspace opacity and small left pleural effusion.      Electronically Signed: Calvin Rose MD    2/17/2024 6:25 PM EST    Workstation ID: [id number]            Lab Results:  Lab Results (last 24 hours)        Procedure Component Value Units Date/Time    Blood Gas, Arterial - [[id number]]  (Abnormal) Collected: 02/18/24 0818    Specimen: Arterial Blood Updated: 02/18/24 0820     Site Arterial Line     Antoni's Test N/A     pH, Arterial 7.343 pH units      pCO2, Arterial 37.0 mm Hg      pO2, Arterial 142.0 mm Hg      HCO3, Arterial 20.1 mmol/L      Base Excess, Arterial -5.2 mmol/L      Comment: Serial Number: [id number]:  [id number]        O2 Saturation, Arterial 99.1 %      CO2 Content 21.2 mmol/L      Barometric Pressure for Blood Gas --     Comment: N/A        Modality Adult Vent     FIO2 40 %      Ventilator Mode AC     Set Tidal Volume 400     PEEP 6     Hemodilution No     Respiratory Rate 16    CBC & Differential [[id number]]  (Abnormal) Collected: 02/18/24 0520    Specimen: Blood Updated: 02/18/24 0634    Narrative:      The following orders were created for panel order CBC & Differential.  Procedure                               Abnormality         Status                     ---------                               -----------         ------                     CBC Auto Differential[[id number]]        Abnormal            Final result               Scan Slide[[id number]]                                       Final result                 Please view results for these tests on the individual orders.    CBC Auto Differential [[id number]]  (Abnormal) Collected: 02/18/24 0520    Specimen: Blood Updated: 02/18/24 0634     WBC 3.70 10*3/mm3      RBC 3.19 10*6/mm3      Hemoglobin 8.6 g/dL      Hematocrit 27.9 %      MCV 87.4 fL      MCH 26.8 pg      MCHC 30.7 g/dL      RDW 19.6 %      RDW-SD 63.4 fl      MPV 7.5 fL      Platelets 109 10*3/mm3     Narrative:      The previously reported component NRBC is no longer being reported. Previous result was 0.3 /100 WBC (Reference Range: 0.0-0.2 /100 WBC) on 2/18/2024 at 0547 EST.    Scan Slide [[id number]] Collected: 02/18/24 0520    Specimen: Blood Updated: 02/18/24 0634     Scan  Slide --     Comment: See Manual Differential Results       Manual Differential [[id number]]  (Abnormal) Collected: 02/18/24 0520    Specimen: Blood Updated: 02/18/24 0634     Neutrophil % 43.0 %      Lymphocyte % 16.0 %      Monocyte % 12.0 %      Bands %  20.0 %      Metamyelocyte % 5.0 %      Myelocyte % 2.0 %      Atypical Lymphocyte % 2.0 %      Neutrophils Absolute 2.33 10*3/mm3      Lymphocytes Absolute 0.67 10*3/mm3      Monocytes Absolute 0.44 10*3/mm3      Anisocytosis Slight/1+     Poikilocytes Slight/1+     Dohle Bodies Present     Toxic Granulation Slight/1+     Vacuolated Neutrophils Slight/1+     Platelet Morphology Normal    Comprehensive Metabolic Panel [[id number]]  (Abnormal) Collected: 02/18/24 0520    Specimen: Blood Updated: 02/18/24 0611     Glucose 83 mg/dL      BUN 17 mg/dL      Creatinine 0.88 mg/dL      Sodium 137 mmol/L      Potassium 4.1 mmol/L      Chloride 106 mmol/L      CO2 18.0 mmol/L      Calcium 8.2 mg/dL      Total Protein 4.7 g/dL      Albumin 3.5 g/dL      ALT (SGPT) <5 U/L      AST (SGOT) 7 U/L      Alkaline Phosphatase 26 U/L      Total Bilirubin 0.7 mg/dL      Globulin 1.2 gm/dL      A/G Ratio 2.9 g/dL      BUN/Creatinine Ratio 19.3     Anion Gap 13.0 mmol/L      eGFR 70.4 mL/min/1.73     Narrative:      GFR Normal >60  Chronic Kidney Disease <60  Kidney Failure <15    The GFR formula is only valid for adults with stable renal function between ages 18 and 70.    Sedimentation Rate [[id number]]  (Normal) Collected: 02/18/24 0520    Specimen: Blood Updated: 02/18/24 0601     Sed Rate <1 mm/hr     Blood Gas, Arterial - [[id number]]  (Abnormal) Collected: 02/17/24 1840    Specimen: Arterial Blood Updated: 02/17/24 2035     Site Arterial Line     Antoni's Test N/A     pH, Arterial 7.362 pH units      pCO2, Arterial 38.4 mm Hg      pO2, Arterial 365.0 mm Hg      HCO3, Arterial 21.8 mmol/L      Base Excess, Arterial -3.3 mmol/L      Comment: Serial Number: [id number]:  [id number]         O2 Saturation, Arterial 99.9 %      CO2 Content 23.0 mmol/L      Barometric Pressure for Blood Gas --     Comment: N/A        Modality Adult Vent     FIO2 100 %      Ventilator Mode AC     PEEP 10     Hemodilution No    POC Glucose Once [[id number]]  (Abnormal) Collected: 02/17/24 2006    Specimen: Blood Updated: 02/17/24 2008     Glucose 109 mg/dL      Comment: Serial Number: [id number]:  [id number]       Blood Culture - Blood, Hand, Right [[id number]]  (Normal) Collected: 02/16/24 1737    Specimen: Blood from Hand, Right Updated: 02/17/24 1800     Blood Culture No growth at 24 hours    Blood Culture - Blood, Hand, Left [[id number]]  (Normal) Collected: 02/16/24 1736    Specimen: Blood from Hand, Left Updated: 02/17/24 1800     Blood Culture No growth at 24 hours    Narrative:      Less than seven (7) mL's of blood was collected.  Insufficient quantity may yield false negative results.    STAT Lactic Acid, Reflex [[id number]]  (Normal) Collected: 02/17/24 1408    Specimen: Blood Updated: 02/17/24 1433     Lactate 1.6 mmol/L     Hemoglobin & Hematocrit, Blood [[id number]]  (Abnormal) Collected: 02/17/24 1408    Specimen: Blood Updated: 02/17/24 1418     Hemoglobin 9.4 g/dL      Comment: Result checked          Hematocrit 29.9 %     POC Glucose Once [[id number]]  (Normal) Collected: 02/17/24 1414    Specimen: Blood Updated: 02/17/24 1416     Glucose 71 mg/dL      Comment: Serial Number: [id number]:  [id number]       Fungus Smear - Body Fluid, Pleural Cavity [[id number]] Collected: 02/16/24 1556    Specimen: Body Fluid from Pleural Cavity Updated: 02/17/24 1205     Fungal Stain No fungal elements seen    AFB Culture - Body Fluid, Pleural Cavity [[id number]] Collected: 02/16/24 1556    Specimen: Body Fluid from Pleural Cavity Updated: 02/17/24 1142     AFB Stain No acid fast bacilli seen on concentrated smear    Body Fluid Culture - Body Fluid, Pleural Cavity [[id number]]  (Abnormal) Collected: 02/16/24 1556     Specimen: Body Fluid from Pleural Cavity Updated: 02/17/24 1113     Body Fluid Culture Scant growth (1+) Gram Negative Bacilli     Gram Stain Moderate (3+) WBCs per low power field      No organisms seen                Assessment & Plan       Large pleural effusion      Assessment & Plan    CT of the chest was ordered by our service today. The imaging was independently reviewed by me.  Right thoracostomy tube appears to be indwelling within the medial thorax.  Significant decrease in hydropneumothorax compared to previously.  There is a left upper lobe lesion measuring approximately 3.5 cm concerning for pulmonary abscess.  The lung bases indicate atelectasis versus infiltrates.  There is an indeterminate 8 mm right upper lobe pulmonary nodule which will need to be followed.    Pleural effusion: Significantly improved with chest tube drainage. Fluid growing gram-negative bacilli. ID is following and managing antibiotics.  Initial IR guided tube became clogged and was replaced overnight with a larger bore chest tube.  We will request IR replace the current tube tomorrow with a 14 Indonesian tube with stopcock into the remaining fluid collection. At that point we can initiate intrapleural lytic therapy to further evacuate the remaining effusion.  Based on her response to lytics, we can evaluated if surgical decortication is needed. Given her critical illness, I'm concerned about her ability to tolerate a major surgery with single lung ventilation so would like to attempt conservative management with chest tube and lytics first.    Pulmonary abscess: Surgical intervention is not recommended with likelihood of development of bronchopleural fistula in the setting of abscess.  Continue antibiotic treatment.    Respiratory failure: Presently intubated and sedated. Ventilator management per intensive care team.    Left foot wound: Plan for MRI with and without contrast to assess for osteomyelitis which could also be  contributing to her sepsis.    Pulmonary nodules: Will need continued surveillance.    I discussed the patient's findings and our recommendations with nursing staff, consulting provider, and Dr. Gallagher.    Thank you for this consult and allowing us to participate in the care of your patient.  We will follow along with you during this hospitalization.       BERNA Mishra  [facility]  02/18/24  10:50 EST    Greater than 76 minutes was spent reviewing the patient's chart, radiographic imaging, labs, provider notes, assessing the patient and developing a plan of care which was discussed with the patient/family and other providers.

## 2024-02-19 ENCOUNTER — APPOINTMENT (OUTPATIENT)
Dept: GENERAL RADIOLOGY | Facility: HOSPITAL | Age: 74
DRG: 208 | End: 2024-02-19
Payer: MEDICARE

## 2024-02-19 ENCOUNTER — APPOINTMENT (OUTPATIENT)
Dept: CT IMAGING | Facility: HOSPITAL | Age: 74
DRG: 208 | End: 2024-02-19
Payer: MEDICARE

## 2024-02-19 LAB
ANION GAP SERPL CALCULATED.3IONS-SCNC: 8 MMOL/L (ref 5–15)
ANISOCYTOSIS BLD QL: ABNORMAL
ANISOCYTOSIS BLD QL: ABNORMAL
BUN SERPL-MCNC: 16 MG/DL (ref 8–23)
BUN/CREAT SERPL: 19.3 (ref 7–25)
CALCIUM SPEC-SCNC: 7.6 MG/DL (ref 8.6–10.5)
CHLORIDE SERPL-SCNC: 108 MMOL/L (ref 98–107)
CO2 SERPL-SCNC: 24 MMOL/L (ref 22–29)
CREAT SERPL-MCNC: 0.83 MG/DL (ref 0.57–1)
DEPRECATED RDW RBC AUTO: 64.8 FL (ref 37–54)
EGFRCR SERPLBLD CKD-EPI 2021: 75.5 ML/MIN/1.73
EOSINOPHIL # BLD MANUAL: 0.14 10*3/MM3 (ref 0–0.4)
EOSINOPHIL NFR BLD MANUAL: 3 % (ref 0.3–6.2)
ERYTHROCYTE [DISTWIDTH] IN BLOOD BY AUTOMATED COUNT: 20.7 % (ref 12.3–15.4)
GLUCOSE BLDC GLUCOMTR-MCNC: 106 MG/DL (ref 70–105)
GLUCOSE BLDC GLUCOMTR-MCNC: 109 MG/DL (ref 70–105)
GLUCOSE BLDC GLUCOMTR-MCNC: 113 MG/DL (ref 70–105)
GLUCOSE BLDC GLUCOMTR-MCNC: 151 MG/DL (ref 70–105)
GLUCOSE BLDC GLUCOMTR-MCNC: 82 MG/DL (ref 70–105)
GLUCOSE SERPL-MCNC: 121 MG/DL (ref 65–99)
HCT VFR BLD AUTO: 35.5 % (ref 34–46.6)
HGB BLD-MCNC: 10.6 G/DL (ref 12–15.9)
LAB AP CASE REPORT: NORMAL
LYMPHOCYTES # BLD MANUAL: 1.26 10*3/MM3 (ref 0.7–3.1)
LYMPHOCYTES # BLD MANUAL: 1.91 10*3/MM3 (ref 0.7–3.1)
LYMPHOCYTES NFR BLD MANUAL: 4 % (ref 5–12)
LYMPHOCYTES NFR BLD MANUAL: 7 % (ref 5–12)
MAGNESIUM SERPL-MCNC: 1.1 MG/DL (ref 1.6–2.4)
MCH RBC QN AUTO: 26.8 PG (ref 26.6–33)
MCHC RBC AUTO-ENTMCNC: 30 G/DL (ref 31.5–35.7)
MCV RBC AUTO: 89.5 FL (ref 79–97)
METAMYELOCYTES NFR BLD MANUAL: 1 % (ref 0–0)
METAMYELOCYTES NFR BLD MANUAL: 9 % (ref 0–0)
MONOCYTES # BLD: 0.18 10*3/MM3 (ref 0.1–0.9)
MONOCYTES # BLD: 0.58 10*3/MM3 (ref 0.1–0.9)
MYELOCYTES NFR BLD MANUAL: 1 % (ref 0–0)
MYELOCYTES NFR BLD MANUAL: 4 % (ref 0–0)
NEUTROPHILS # BLD AUTO: 2.84 10*3/MM3 (ref 1.7–7)
NEUTROPHILS # BLD AUTO: 4.73 10*3/MM3 (ref 1.7–7)
NEUTROPHILS NFR BLD MANUAL: 12 % (ref 42.7–76)
NEUTROPHILS NFR BLD MANUAL: 35 % (ref 42.7–76)
NEUTS BAND NFR BLD MANUAL: 28 % (ref 0–5)
NEUTS BAND NFR BLD MANUAL: 45 % (ref 0–5)
NEUTS VAC BLD QL SMEAR: ABNORMAL
NRBC SPEC MANUAL: 1 /100 WBC (ref 0–0.2)
NRBC SPEC MANUAL: 1 /100 WBC (ref 0–0.2)
PATH REPORT.FINAL DX SPEC: NORMAL
PATHOLOGY REVIEW: YES
PHOSPHATE SERPL-MCNC: 1.9 MG/DL (ref 2.5–4.5)
PHOSPHATE SERPL-MCNC: 2.7 MG/DL (ref 2.5–4.5)
PLAT MORPH BLD: NORMAL
PLAT MORPH BLD: NORMAL
PLATELET # BLD AUTO: 107 10*3/MM3 (ref 140–450)
PMV BLD AUTO: 8.5 FL (ref 6–12)
POIKILOCYTOSIS BLD QL SMEAR: ABNORMAL
POLYCHROMASIA BLD QL SMEAR: ABNORMAL
POLYCHROMASIA BLD QL SMEAR: ABNORMAL
POTASSIUM SERPL-SCNC: 4.1 MMOL/L (ref 3.5–5.2)
RBC # BLD AUTO: 3.97 10*6/MM3 (ref 3.77–5.28)
SCAN SLIDE: NORMAL
SODIUM SERPL-SCNC: 140 MMOL/L (ref 136–145)
TOXIC GRANULATION: ABNORMAL
VANCOMYCIN TROUGH SERPL-MCNC: 20 MCG/ML (ref 5–20)
VARIANT LYMPHS NFR BLD MANUAL: 1 % (ref 0–5)
VARIANT LYMPHS NFR BLD MANUAL: 23 % (ref 19.6–45.3)
VARIANT LYMPHS NFR BLD MANUAL: 27 % (ref 19.6–45.3)
WBC MORPH BLD: NORMAL
WBC NRBC COR # BLD AUTO: 4.5 10*3/MM3 (ref 3.4–10.8)

## 2024-02-19 PROCEDURE — 94003 VENT MGMT INPAT SUBQ DAY: CPT

## 2024-02-19 PROCEDURE — 85025 COMPLETE CBC W/AUTO DIFF WBC: CPT | Performed by: NURSE PRACTITIONER

## 2024-02-19 PROCEDURE — 25010000002 VANCOMYCIN 750 MG RECONSTITUTED SOLUTION 1 EACH VIAL: Performed by: INTERNAL MEDICINE

## 2024-02-19 PROCEDURE — 82948 REAGENT STRIP/BLOOD GLUCOSE: CPT | Performed by: NURSE PRACTITIONER

## 2024-02-19 PROCEDURE — 87205 SMEAR GRAM STAIN: CPT | Performed by: NURSE PRACTITIONER

## 2024-02-19 PROCEDURE — 25010000002 MEROPENEM PER 100 MG: Performed by: NURSE PRACTITIONER

## 2024-02-19 PROCEDURE — C1729 CATH, DRAINAGE: HCPCS

## 2024-02-19 PROCEDURE — 87070 CULTURE OTHR SPECIMN AEROBIC: CPT | Performed by: NURSE PRACTITIONER

## 2024-02-19 PROCEDURE — 84100 ASSAY OF PHOSPHORUS: CPT | Performed by: INTERNAL MEDICINE

## 2024-02-19 PROCEDURE — 99232 SBSQ HOSP IP/OBS MODERATE 35: CPT

## 2024-02-19 PROCEDURE — 94799 UNLISTED PULMONARY SVC/PX: CPT

## 2024-02-19 PROCEDURE — 25810000003 SODIUM CHLORIDE 0.9 % SOLUTION: Performed by: INTERNAL MEDICINE

## 2024-02-19 PROCEDURE — 25010000002 PHENYLEPHRINE 10 MG/ML SOLUTION: Performed by: INTERNAL MEDICINE

## 2024-02-19 PROCEDURE — 25810000003 SODIUM CHLORIDE 0.9 % SOLUTION 250 ML FLEX CONT: Performed by: INTERNAL MEDICINE

## 2024-02-19 PROCEDURE — 25010000002 VASOPRESSIN 0.2 UNIT/ML SOLUTION: Performed by: INTERNAL MEDICINE

## 2024-02-19 PROCEDURE — 25010000002 FUROSEMIDE PER 20 MG: Performed by: INTERNAL MEDICINE

## 2024-02-19 PROCEDURE — 85007 BL SMEAR W/DIFF WBC COUNT: CPT | Performed by: NURSE PRACTITIONER

## 2024-02-19 PROCEDURE — 94761 N-INVAS EAR/PLS OXIMETRY MLT: CPT

## 2024-02-19 PROCEDURE — 80202 ASSAY OF VANCOMYCIN: CPT | Performed by: INTERNAL MEDICINE

## 2024-02-19 PROCEDURE — 74018 RADEX ABDOMEN 1 VIEW: CPT

## 2024-02-19 PROCEDURE — 83735 ASSAY OF MAGNESIUM: CPT

## 2024-02-19 PROCEDURE — 25010000002 LIDOCAINE 1 % SOLUTION: Performed by: RADIOLOGY

## 2024-02-19 PROCEDURE — 25010000002 ENOXAPARIN PER 10 MG: Performed by: INTERNAL MEDICINE

## 2024-02-19 PROCEDURE — 87186 SC STD MICRODIL/AGAR DIL: CPT | Performed by: NURSE PRACTITIONER

## 2024-02-19 PROCEDURE — 87077 CULTURE AEROBIC IDENTIFY: CPT | Performed by: NURSE PRACTITIONER

## 2024-02-19 PROCEDURE — 80048 BASIC METABOLIC PNL TOTAL CA: CPT | Performed by: INTERNAL MEDICINE

## 2024-02-19 PROCEDURE — 82948 REAGENT STRIP/BLOOD GLUCOSE: CPT

## 2024-02-19 PROCEDURE — 3E0G76Z INTRODUCTION OF NUTRITIONAL SUBSTANCE INTO UPPER GI, VIA NATURAL OR ARTIFICIAL OPENING: ICD-10-PCS | Performed by: NURSE PRACTITIONER

## 2024-02-19 PROCEDURE — 84100 ASSAY OF PHOSPHORUS: CPT

## 2024-02-19 PROCEDURE — C1769 GUIDE WIRE: HCPCS

## 2024-02-19 PROCEDURE — 88108 CYTOPATH CONCENTRATE TECH: CPT | Performed by: NURSE PRACTITIONER

## 2024-02-19 PROCEDURE — 25010000002 MAGNESIUM SULFATE 2 GM/50ML SOLUTION: Performed by: INTERNAL MEDICINE

## 2024-02-19 PROCEDURE — 71045 X-RAY EXAM CHEST 1 VIEW: CPT

## 2024-02-19 PROCEDURE — 87147 CULTURE TYPE IMMUNOLOGIC: CPT | Performed by: NURSE PRACTITIONER

## 2024-02-19 RX ORDER — LANSOPRAZOLE 30 MG/1
30 TABLET, ORALLY DISINTEGRATING, DELAYED RELEASE ORAL
Status: DISCONTINUED | OUTPATIENT
Start: 2024-02-19 | End: 2024-02-25

## 2024-02-19 RX ORDER — LIDOCAINE HYDROCHLORIDE 10 MG/ML
INJECTION, SOLUTION INFILTRATION; PERINEURAL AS NEEDED
Status: DISCONTINUED | OUTPATIENT
Start: 2024-02-19 | End: 2024-02-25 | Stop reason: HOSPADM

## 2024-02-19 RX ORDER — MAGNESIUM SULFATE 1 G/100ML
3 INJECTION INTRAVENOUS
Status: DISCONTINUED | OUTPATIENT
Start: 2024-02-19 | End: 2024-02-19

## 2024-02-19 RX ORDER — MAGNESIUM SULFATE HEPTAHYDRATE 40 MG/ML
2 INJECTION, SOLUTION INTRAVENOUS
Qty: 150 ML | Refills: 0 | Status: COMPLETED | OUTPATIENT
Start: 2024-02-19 | End: 2024-02-19

## 2024-02-19 RX ORDER — FUROSEMIDE 10 MG/ML
40 INJECTION INTRAMUSCULAR; INTRAVENOUS EVERY 6 HOURS
Status: DISCONTINUED | OUTPATIENT
Start: 2024-02-19 | End: 2024-02-20

## 2024-02-19 RX ADMIN — MEROPENEM 1000 MG: 1 INJECTION, POWDER, FOR SOLUTION INTRAVENOUS at 14:43

## 2024-02-19 RX ADMIN — FUROSEMIDE 40 MG: 10 INJECTION, SOLUTION INTRAMUSCULAR; INTRAVENOUS at 15:03

## 2024-02-19 RX ADMIN — DIVALPROEX SODIUM 125 MG: 125 CAPSULE, COATED PELLETS ORAL at 21:19

## 2024-02-19 RX ADMIN — DEXMEDETOMIDINE HYDROCHLORIDE 0.8 MCG/KG/HR: 4 INJECTION, SOLUTION INTRAVENOUS at 05:05

## 2024-02-19 RX ADMIN — MAGNESIUM SULFATE HEPTAHYDRATE 2 G: 40 INJECTION, SOLUTION INTRAVENOUS at 12:15

## 2024-02-19 RX ADMIN — MAGNESIUM SULFATE HEPTAHYDRATE 2 G: 40 INJECTION, SOLUTION INTRAVENOUS at 14:12

## 2024-02-19 RX ADMIN — POTASSIUM CHLORIDE 20 MEQ: 1.5 POWDER, FOR SOLUTION ORAL at 08:32

## 2024-02-19 RX ADMIN — Medication 0.43 MCG/KG/MIN: at 06:44

## 2024-02-19 RX ADMIN — VASOPRESSIN 0.03 UNITS/MIN: 0.2 INJECTION INTRAVENOUS at 17:06

## 2024-02-19 RX ADMIN — PHENYLEPHRINE HYDROCHLORIDE 1 MCG/KG/MIN: 10 INJECTION INTRAVENOUS at 09:09

## 2024-02-19 RX ADMIN — MEROPENEM 1000 MG: 1 INJECTION, POWDER, FOR SOLUTION INTRAVENOUS at 07:24

## 2024-02-19 RX ADMIN — MIDODRINE HYDROCHLORIDE 10 MG: 5 TABLET ORAL at 05:05

## 2024-02-19 RX ADMIN — CITALOPRAM HYDROBROMIDE 10 MG: 20 TABLET ORAL at 08:32

## 2024-02-19 RX ADMIN — MIDODRINE HYDROCHLORIDE 10 MG: 5 TABLET ORAL at 14:11

## 2024-02-19 RX ADMIN — Medication 10 ML: at 21:19

## 2024-02-19 RX ADMIN — LANSOPRAZOLE 30 MG: 30 TABLET, ORALLY DISINTEGRATING, DELAYED RELEASE ORAL at 08:32

## 2024-02-19 RX ADMIN — FOLIC ACID 1 MG: 1 TABLET ORAL at 08:33

## 2024-02-19 RX ADMIN — POTASSIUM CHLORIDE 20 MEQ: 1.5 POWDER, FOR SOLUTION ORAL at 21:19

## 2024-02-19 RX ADMIN — MEROPENEM 1000 MG: 1 INJECTION, POWDER, FOR SOLUTION INTRAVENOUS at 23:30

## 2024-02-19 RX ADMIN — SODIUM PHOSPHATE, MONOBASIC, MONOHYDRATE AND SODIUM PHOSPHATE, DIBASIC, ANHYDROUS 15 MMOL: 142; 276 INJECTION, SOLUTION INTRAVENOUS at 11:43

## 2024-02-19 RX ADMIN — Medication 0.43 MCG/KG/MIN: at 00:20

## 2024-02-19 RX ADMIN — ENOXAPARIN SODIUM 40 MG: 100 INJECTION SUBCUTANEOUS at 15:03

## 2024-02-19 RX ADMIN — MIDODRINE HYDROCHLORIDE 10 MG: 5 TABLET ORAL at 21:19

## 2024-02-19 RX ADMIN — FUROSEMIDE 40 MG: 10 INJECTION, SOLUTION INTRAMUSCULAR; INTRAVENOUS at 09:09

## 2024-02-19 RX ADMIN — VASOPRESSIN 0.03 UNITS/MIN: 0.2 INJECTION INTRAVENOUS at 08:47

## 2024-02-19 RX ADMIN — VANCOMYCIN HYDROCHLORIDE 750 MG: 750 INJECTION, POWDER, LYOPHILIZED, FOR SOLUTION INTRAVENOUS at 09:17

## 2024-02-19 RX ADMIN — RISPERIDONE 0.25 MG: 0.25 TABLET, FILM COATED ORAL at 21:19

## 2024-02-19 RX ADMIN — Medication 100 MCG/HR: at 00:44

## 2024-02-19 RX ADMIN — DIVALPROEX SODIUM 125 MG: 125 CAPSULE, COATED PELLETS ORAL at 08:32

## 2024-02-19 RX ADMIN — Medication 10 ML: at 08:32

## 2024-02-19 RX ADMIN — DIVALPROEX SODIUM 125 MG: 125 CAPSULE, COATED PELLETS ORAL at 01:40

## 2024-02-19 RX ADMIN — LIDOCAINE HYDROCHLORIDE 6 ML: 10 INJECTION, SOLUTION INFILTRATION; PERINEURAL at 11:07

## 2024-02-19 RX ADMIN — MAGNESIUM SULFATE HEPTAHYDRATE 2 G: 40 INJECTION, SOLUTION INTRAVENOUS at 10:02

## 2024-02-19 RX ADMIN — FUROSEMIDE 40 MG: 10 INJECTION, SOLUTION INTRAMUSCULAR; INTRAVENOUS at 21:19

## 2024-02-19 NOTE — PROGRESS NOTES
Critical Care Progress Note   Sandra Treadwell : 1950 MRN:[id number] LOS:3     Principal Problem: Large pleural effusion     Reason for follow up: All the medical problems listed below    Summary     A 73 y.o. female who presented to the emergency department today for evaluation of chest pain and shortness of air as reported by the personnel at the facility where the patient currently resides.  EMS reported the patient was given 2 nitroglycerin after which the patient  denied any improvement in her chest pain however it was noted that she developed hypotension following the nitroglycerin.  The patient is a very poor historian and confused, she is unable to give any further constitutional information.  In the ED the patient was noted to be hypoxic and hypotensive and she received a modified IV fluid bolus due to apparent volume overload with extensive, pitting bilateral lower extremity edema and elevated BNP.  Her D-dimer was elevated to 9.7 therefore she underwent a CT per PE protocol which was negative for PE however did reveal a large right pleural effusion with a very small right pneumothorax.  IR was consulted and the patient underwent a small bore chest tube placement to the right with drainage of a large volume of dark brown fluid, possibly old blood.  She had persistent hypotension requiring vasopressor support and Levophed was initiated.  The patient was admitted to the ICU for further medical management.     Significant events     24 : Patient remains afebrile, vital signs are stable.  Had to change from Levophed to kevin, secondary to tachycardia, and had to add vasopressin.  She has had 3 L of urine output over the last 24 hours; however, she remains net +7.1 L for the admission.  Chemistry noted for a very low phosphorus of 1.9 which is being repleted with sodium Phos.  White blood cell count up to 4.5, 28% bands on differential.  Chest x-ray essentially unchanged.  The only culture that is  growing anything so far is the pleural fluid culture from 2/16 which is growing an ESBL E. coli.  Antibiotics were changed yesterday to meropenem.    Assessment / Plan     Gram negative septic shock  R pleural effusion  R GNR empyema  Left upper lobe cavitary lesion most likely consistent with left lung abscess  Left calcaneus wound  Chronic baseline hypotension  Right chest tube placed by IR 2/16/2024 with drainage of large volume dark brown output  Started on cefepime and vancomycin  Persistent hypotension in spite of modified IV fluid resuscitation.  Patient did not receive 30 mL/kg due to volume overload  Titrate vasopressors for a target MAP of 65.    Echocardiogram 11/26/2023, EF 50%.  Echocardiogram now shows EF of 40-45%  Chronic hypotension, midodrine home medication  Pleural fluid studies c/w empyema--growing ESBL E coli  ID consulted  Cefepime changed to ceftaz then to meropenem       Acute hypoxic respiratory failure  Etiology: Large right pleural effusion with possible superimposed pneumonia  Right chest tube placed by IR 2/16/2024 with drainage of large volume dark brown output  Titrate O2 to keep sat >92%       Pulmonary hypertension, RVSP 50 mmHg by echocardiogram 11/26/2023  TTE from yesterday showed an ejection fraction of 40 to 45%, diastolic dysfunction, mild to moderate pulmonary hypertension.  Consider sildenafil once hypotension is resolved       Acute encephalopathy vs baseline dementia  Need to talk to her  when he comes in regarding this  He will have to be the decision-maker.      Cachexia  Consult nutrition      History of breast cancer   S/p right total knee replacement 11/12/2023        Critical Care Time:  34 mins.        Code status:   Medical Intervention Limits: NO intubation (DNI)  Code Status (Patient has no pulse and is not breathing): No CPR (Do Not Attempt to Resuscitate)  Medical Interventions (Patient has pulse or is breathing): Limited Support       Nutrition: NPO  Diet NPO Type: Strict NPO   Diet, Tube Feeding Tube Feeding Formula: Peptamen AF (Vital AF 1.2); Tube Feeding Type: Continuous; Continuous Tube Feeding Start Rate (mL/hr): 20; Then Advance Rate By (mL/hr): Do Not Advance; Every __ Hours: Patient at Goal Rate; To Goal Rate of...    DVT prophylaxis:  Medical DVT prophylaxis orders are present.         Subjective / Review of systems     Review of Systems   Unable to obtain much 2/2 pt's current condition.    Objective / Physical Exam   Vital signs:  Temp: 97.6 °F (36.4 °C)  BP: 108/75  Heart Rate: 85  Resp: 16  SpO2: 100 %  Weight: 65.6 kg (144 lb 10 oz)    Admission Weight: Weight: 67.1 kg (148 lb)  Current Weight: Weight: 65.6 kg (144 lb 10 oz)    Input/Output in last 24 hours:    Intake/Output Summary (Last 24 hours) at 2/19/2024 1100  Last data filed at 2/19/2024 1028  Gross per 24 hour   Intake 3797 ml   Output 3084 ml   Net 713 ml      Physical Exam       GEN:  Elderly, cachectic and acute-on-chronically ill woman.  Intubated, sedated, on vent.  NAD.  NEURO:  Brainstem reflexes intact.  No obvious focal deficit.  Moves all 4 ext.  HEENT:  N/AT.  PERRL.  MMM.  Oropharynx non-erythematous.  No drainage from the eyes/ears/nose.  No conjunctival petechiae.  No oral thrush.  Loss of periorbital and bitemporal fat pads.  ETT in place.  NECK:  Supple, NT, trachea midline.  No meningismus.    CHEST/LUNGS:  Breath sounds are VERY diminished t/o and very coarse on the right.  Chest excursion equal bilaterally.    CARDIOVASCULAR:  RRR w/o murmur noted.    GI:  Abdomen soft, NT, ND, +BS.   :  Deferred.  EXTREMITIES:  No deformity or amputation.  No cyanosis, edema, or asymmetry.  Left heel wound.    SKIN:  Other than as noted above, skin is warm, dry, and pink.  No rash, breakdown, or track marks noted.  LYMPHATICS/HEME:  No overt LAD or abnormal bruising.  No lymphedema.  MSK:  No joint abnormalities noted.    PSYCH:  Unable to assess secondary to patient's current  condition.        Radiology and Labs     Results from last 7 days   Lab Units 02/19/24  0646 02/18/24  0520 02/17/24  1408 02/17/24  0510 02/16/24 2055 02/16/24  1228   WBC 10*3/mm3 4.50 3.70  --  7.20 8.30 2.80*   HEMATOCRIT % 35.5 27.9* 29.9* 23.4* 34.1 29.1*   PLATELETS 10*3/mm3 107* 109*  --  224 393 293      Results from last 7 days   Lab Units 02/19/24  0646 02/18/24  0520 02/17/24  0510 02/16/24 2017 02/16/24  1228   SODIUM mmol/L 140 137 136 135* 138   POTASSIUM mmol/L 4.1 4.1 4.4 3.9 4.1   CHLORIDE mmol/L 108* 106 103 102 100   CO2 mmol/L 24.0 18.0* 25.0 23.0 28.0   BUN mg/dL 16 17 16 18 15   CREATININE mg/dL 0.83 0.88 0.83 0.87 0.75      Current medications   Scheduled Meds: citalopram, 10 mg, Nasogastric, Daily  Divalproex Sodium, 125 mg, Nasogastric, Q12H  enoxaparin, 40 mg, Subcutaneous, Daily  folic acid, 1 mg, Nasogastric, Daily  furosemide, 40 mg, Intravenous, Q6H  lactated ringers, 500 mL, Intravenous, Once  lactated ringers, 500 mL, Intravenous, Once  lansoprazole, 30 mg, Nasogastric, Q AM  magnesium sulfate, 2 g, Intravenous, Q2H  meropenem, 1,000 mg, Intravenous, Q8H  midodrine, 10 mg, Nasogastric, Q8H  potassium chloride, 20 mEq, Nasogastric, BID  risperiDONE, 0.25 mg, Nasogastric, Nightly  sodium chloride, 250 mL, Intravenous, Once  sodium chloride, 10 mL, Intravenous, Q12H  sodium phosphate, 15 mmol, Intravenous, Once  vancomycin, 750 mg, Intravenous, Q24H      Continuous Infusions: dexmedetomidine, 0.2-1.5 mcg/kg/hr, Last Rate: 0.5 mcg/kg/hr (02/19/24 0742)  fentanyl 10 mcg/mL,  mcg/hr, Last Rate: 100 mcg/hr (02/19/24 0843)  Pharmacy to dose vancomycin,   phenylephrine, 0.5-6 mcg/kg/min, Last Rate: 1.8 mcg/kg/min (02/19/24 1006)  vasopressin, 0.03 Units/min, Last Rate: 0.03 Units/min (02/19/24 8560)        Plan discussed with RN. Reviewed all other data in the last 24 hours, including but not limited to vitals, labs, microbiology, imaging and pertinent notes from other providers.      Sai Angel, DO   Critical Care  02/19/24   11:00 EST

## 2024-02-19 NOTE — SIGNIFICANT NOTE
02/19/24 0900   PASRR   PASRR Status Approved/Complete  (From [facility] - PASRR previously completed.)

## 2024-02-19 NOTE — PROGRESS NOTES
"Pharmacy Antimicrobial Dosing Service    Subjective:  Sandra Treadwell is a 73 y.o.female admitted with large pleural effusion. Pharmacy has been consulted to dose Vancomycin and Cefepime for possible PNA.    HPI: s/p IR placed chest tube/pleural drain with 2.3 L out. Pleural cultures pending.   PMH: Hx bret cancer      Assessment/Plan    1. Day #3 Vancomycin: Goal -600 mcg*h/mL.   Loaded with vanc 1.5 g IV x1 2/18, followed by vanc 1.25 g (~19 mg/kg TBW) IV q24h. Based on Bayesian modeling, ssAUC calculated to be supra-therapeutic. Will adjust regimen to vanc 750 mg (~11 mg/kg TBW) IV q24h. Obtain two levels to avoid accumulation. Peak 2/20 at 14:00 & Tr 2/21 at 09:00.    2. Day #2 Meropenem: 2 IV q12h for estCrCl 30-59 mL/min.    Will continue to monitor drug levels, renal function, culture and sensitivities, and patient clinical status.       Objective:  Relevant clinical data and objective history reviewed:  152.4 cm (60\")   65.6 kg (144 lb 10 oz)   Ideal body weight: 45.5 kg (100 lb 4.9 oz)  Adjusted ideal body weight: 53.5 kg (118 lb 0.5 oz)  Body mass index is 28.24 kg/m².    Results from last 7 days   Lab Units 02/19/24  0646   VANCOMYCIN TR mcg/mL 20.00     Results from last 7 days   Lab Units 02/19/24  0646 02/18/24  0520 02/17/24  0510   CREATININE mg/dL 0.83 0.88 0.83     Estimated Creatinine Clearance: 52.5 mL/min (by C-G formula based on SCr of 0.83 mg/dL).  I/O last 3 completed shifts:  In: 6018 [I.V.:5634; Other:81; NG/GT:303]  Out: 3052 [Urine:2727; Chest Tube:325]    Results from last 7 days   Lab Units 02/19/24  0646 02/18/24  0520 02/17/24  0510   WBC 10*3/mm3 4.50 3.70 7.20     Temperature    02/18/24 1953 02/19/24 0050 02/19/24 0700   Temp: 97.9 °F (36.6 °C) 97.8 °F (36.6 °C) 97.6 °F (36.4 °C)     Baseline culture/source/susceptibility:  Microbiology Results (last 10 days)       Procedure Component Value - Date/Time    Respiratory Culture - Lavage, Lung, Left Upper Lobe [[id number]] " Collected: 02/17/24 1709    Lab Status: Preliminary result Specimen: Lavage from Lung, Left Upper Lobe Updated: 02/19/24 0639     Gram Stain Rare (1+) WBCs per low power field      Rare (1+) Epithelial cells per low power field      No organisms seen    MRSA Screen, PCR (Inpatient) - Swab, Nares [[id number]]  (Abnormal) Collected: 02/17/24 0854    Lab Status: Final result Specimen: Swab from Nares Updated: 02/17/24 1015     MRSA PCR MRSA Detected    Narrative:      The negative predictive value of this diagnostic test is high and should only be used to consider de-escalating anti-MRSA therapy. A positive result may indicate colonization with MRSA and must be correlated clinically.    Respiratory Panel PCR w/COVID-19(SARS-CoV-2) NAYELI/CAMILLE/HUSAM/PAD/COR/SUMAN In-House, NP Swab in UTM/VTM, 2 HR TAT - Swab, Nasopharynx [[id number]]  (Normal) Collected: 02/17/24 0854    Lab Status: Final result Specimen: Swab from Nasopharynx Updated: 02/17/24 0953     ADENOVIRUS, PCR Not Detected     Coronavirus 229E Not Detected     Coronavirus HKU1 Not Detected     Coronavirus NL63 Not Detected     Coronavirus OC43 Not Detected     COVID19 Not Detected     Human Metapneumovirus Not Detected     Human Rhinovirus/Enterovirus Not Detected     Influenza A PCR Not Detected     Influenza B PCR Not Detected     Parainfluenza Virus 1 Not Detected     Parainfluenza Virus 2 Not Detected     Parainfluenza Virus 3 Not Detected     Parainfluenza Virus 4 Not Detected     RSV, PCR Not Detected     Bordetella pertussis pcr Not Detected     Bordetella parapertussis PCR Not Detected     Chlamydophila pneumoniae PCR Not Detected     Mycoplasma pneumo by PCR Not Detected    Narrative:      In the setting of a positive respiratory panel with a viral infection PLUS a negative procalcitonin without other underlying concern for bacterial infection, consider observing off antibiotics or discontinuation of antibiotics and continue supportive care. If the respiratory  panel is positive for atypical bacterial infection (Bordetella pertussis, Chlamydophila pneumoniae, or Mycoplasma pneumoniae), consider antibiotic de-escalation to target atypical bacterial infection.    Legionella Antigen, Urine - Urine, Urine, Clean Catch [[id number]]  (Normal) Collected: 02/17/24 0852    Lab Status: Final result Specimen: Urine, Clean Catch Updated: 02/17/24 0927     LEGIONELLA ANTIGEN, URINE Negative    S. Pneumo Ag Urine or CSF - Urine, Urine, Clean Catch [[id number]]  (Normal) Collected: 02/17/24 0852    Lab Status: Final result Specimen: Urine, Clean Catch Updated: 02/17/24 0927     Strep Pneumo Ag Negative    Blood Culture - Blood, Hand, Right [[id number]]  (Normal) Collected: 02/16/24 1737    Lab Status: Preliminary result Specimen: Blood from Hand, Right Updated: 02/18/24 1800     Blood Culture No growth at 2 days    Blood Culture - Blood, Hand, Left [[id number]]  (Normal) Collected: 02/16/24 1736    Lab Status: Preliminary result Specimen: Blood from Hand, Left Updated: 02/18/24 1800     Blood Culture No growth at 2 days    Narrative:      Less than seven (7) mL's of blood was collected.  Insufficient quantity may yield false negative results.    AFB Culture - Body Fluid, Pleural Cavity [[id number]] Collected: 02/16/24 1556    Lab Status: Preliminary result Specimen: Body Fluid from Pleural Cavity Updated: 02/17/24 1142     AFB Stain No acid fast bacilli seen on concentrated smear    Body Fluid Culture - Body Fluid, Pleural Cavity [[id number]]  (Abnormal)  (Susceptibility) Collected: 02/16/24 1556    Lab Status: Final result Specimen: Body Fluid from Pleural Cavity Updated: 02/18/24 1143     Body Fluid Culture Scant growth (1+) Escherichia coli ESBL     Comment:   Consider infectious disease consult.  Susceptibility results may not correlate to clinical outcomes.         Rare Normal Skin Maday     Gram Stain Moderate (3+) WBCs per low power field      No organisms seen    Susceptibility         Escherichia coli ESBL      TARYN      Ertapenem <=0.5 ug/ml Susceptible      Levofloxacin <=0.12 ug/ml Susceptible      Meropenem <=0.25 ug/ml Susceptible      Trimethoprim + Sulfamethoxazole <=20 ug/ml Susceptible                       Susceptibility Comments       Escherichia coli ESBL    Cefazolin sensitivity will not be reported for Enterobacteriaceae in non-urine isolates. If cefazolin is preferred, please call the microbiology lab to request an E-test.  With the exception of urinary-sourced infections, aminoglycosides should not be used as monotherapy.               Fungus Smear - Body Fluid, Pleural Cavity [[id number]] Collected: 02/16/24 1556    Lab Status: Final result Specimen: Body Fluid from Pleural Cavity Updated: 02/17/24 1205     Fungal Stain No fungal elements seen            Mayra Pena RPH  02/19/24 07:37 EST

## 2024-02-19 NOTE — CASE MANAGEMENT/SOCIAL WORK
Continued Stay Note   [place]     Patient Name: Sandra Treadwell  MRN: [id number]  Today's Date: 2/19/2024    Admit Date: 2/16/2024    Plan: Return to [facility]. No precert or PASRR required.   Discharge Plan       Row Name 02/19/24 1234       Plan    Plan Return to [facility]. No precert or PASRR required.    Plan Comments DC Barriers:  vent 60/5, Vaso/Levo/[place] gtt, Dex/Fentanyl gtt, NG TF, C-line, A-line, Midline, FC, R chest tube, IV Abxs, MRI L foot pending, Throacic Sx/ID/WC following.               Expected Discharge Date and Time       Expected Discharge Date Expected Discharge Time    Feb 23, 2024               SANJUANA Gonzales RN  SIPS/ICU   O: [phone]  C: [phone]  [email or web address]

## 2024-02-19 NOTE — PROGRESS NOTES
Infectious Diseases Progress Note      LOS: 3 days   Patient Care Team:  Wm Aguirre MD as PCP - General (Sports Medicine)    Chief Complaint: Intubated on the ventilator     Subjective       The patient had no fever during the last 24 hours.  The patient is currently on Destin-Synephrine and vasopressin drips.  She is currently on the ventilator on 28% FiO2.  She just came back from IR after the right chest tube was replaced      Review of Systems:   Review of Systems   Unable to perform ROS: Intubated        Objective     Vital Signs  Temp:  [97.4 °F (36.3 °C)-98 °F (36.7 °C)] 98 °F (36.7 °C)  Heart Rate:  [] 75  Resp:  [16-19] 16  BP: ()/(45-75) 100/49  FiO2 (%):  [28 %] 28 %    Physical Exam:  Physical Exam  Vitals and nursing note reviewed.   Constitutional:       General: She is not in acute distress.     Appearance: She is well-developed and normal weight. She is ill-appearing. She is not diaphoretic.   HENT:      Head: Normocephalic and atraumatic.   Eyes:      General: No scleral icterus.     Extraocular Movements: Extraocular movements intact.      Conjunctiva/sclera: Conjunctivae normal.      Pupils: Pupils are equal, round, and reactive to light.   Cardiovascular:      Rate and Rhythm: Normal rate and regular rhythm.      Heart sounds: Normal heart sounds, S1 normal and S2 normal. No murmur heard.  Pulmonary:      Effort: Pulmonary effort is normal. No respiratory distress.      Breath sounds: Normal breath sounds. No stridor. No wheezing or rales.      Comments: Intubated    Right chest tube    Very diminished in the right lobe  Chest:      Chest wall: No tenderness.   Abdominal:      General: Bowel sounds are normal. There is no distension.      Palpations: Abdomen is soft. There is no mass.      Tenderness: There is no abdominal tenderness. There is no guarding.   Genitourinary:     Comments:  Ribera catheter  Musculoskeletal:         General: No swelling, tenderness or deformity.    Skin:     General: Skin is warm and dry.      Coloration: Skin is not pale.      Findings: No bruising, erythema or rash.      Comments: Left calcaneus unstageable wound          Results Review:    I have reviewed all clinical data, test, lab, and imaging results.     Radiology  CT Drain Perq Pleura Insert Cath    Result Date: 2/19/2024  CT DRAIN PERQ PLEURA INSERT CATH Date of Exam: 2/19/2024 10:46 AM EST Indication: Loculated right pleural effusion. Existing large bore chest tube placed at the bedside by critical care is not functioning. Comparison: 2/18/2024 CT Fluoroscopic Time: 67 seconds. 766 mGy. Sedation time: 0. Patient is sedated on a vent. Description of procedure: Informed consent was obtained by telephone from patient's consenting family member. She was placed prone on the CT table and a brief noncontrasted scan performed through the region of interest using a localization grid. This demonstrates a small posteriorly loculated pleural fluid collection measuring 1.4 cm in maximum thickness. The overlying skin was marked and the area prepped and draped sterilely. Lidocaine was used for local anesthesia. Access was obtained in over the rib fashion using an 18-gauge Sood blunt needle. After confirming appropriate position of the needle tip within the collection, a Bentson wire was coiled in the pleural space, and the tract was dilated to accommodate a 10 Wallisian pigtail drain catheter. A larger tube was not possible given the small amount of fluid. The catheter was aspirated by syringe which yielded clear serosanguineous fluid. A sample was sent for the requested studies. The catheter was secured in place at the chest wall using 0 Ethibond suture material. Vaseline gauze and sterile dressings were applied. The catheter was attached to Pleur-evac at low wall suction. Patient tolerated the procedure well.     Impression: CT-guided placement of additional right-sided thoracostomy tube for management of  posteriorly loculated pleural fluid collection. Fluid sample was sent for all requested studies. Electronically Signed: Nima Briggs MD  2/19/2024 12:03 PM EST  Workstation ID: [id number]    XR Chest 1 View    Result Date: 2/19/2024  XR CHEST 1 VW Date of Exam: 2/19/2024 12:13 AM EST Indication: chest tube management Comparison: 2/18/2024. Findings: Enteric tube within the stomach. Endotracheal tube tip in the distal trachea. Right internal jugular CVC catheter present with the tip at the cavoatrial junction. Stable right-sided chest tube present. No definite pneumothorax identified. Small bilateral  pleural effusions are present. Patchy airspace disease is present within the lung bases bilaterally, left greater than right, improved as compared to the previous study.     Impression: No definite pneumothorax identified. Stable right-sided chest tube. Improving bibasilar airspace disease, left greater than right. Stable small bilateral pleural effusions are present. Electronically Signed: Tracy Knowles MD  2/19/2024 12:45 AM EST  Workstation ID: [id number]    XR Abdomen KUB    Result Date: 2/19/2024  XR ABDOMEN KUB Date of Exam: 2/19/2024 12:13 AM EST Indication: NG placement Comparison: None available. Findings: Enteric tube within the stomach.     Impression: Enteric tube within the stomach. Electronically Signed: Tracy Knowles MD  2/19/2024 12:29 AM EST  Workstation ID: [id number]     Cardiology    Laboratory    Results from last 7 days   Lab Units 02/19/24  0646 02/18/24  0520 02/17/24  1408 02/17/24  0510 02/16/24  2055 02/16/24  1228 02/12/24  1800   WBC 10*3/mm3 4.50 3.70  --  7.20 8.30 2.80* 5.80   HEMOGLOBIN g/dL 10.6* 8.6* 9.4* 6.9* 10.3* 9.4* 7.9*   HEMATOCRIT % 35.5 27.9* 29.9* 23.4* 34.1 29.1* 25.4*   PLATELETS 10*3/mm3 107* 109*  --  224 393 293 285     Results from last 7 days   Lab Units 02/19/24  0646 02/18/24  0520 02/17/24  0510 02/16/24 2017 02/16/24  1228 02/12/24  1800   SODIUM mmol/L 140 137 136 135*  138 136   POTASSIUM mmol/L 4.1 4.1 4.4 3.9 4.1 3.5   CHLORIDE mmol/L 108* 106 103 102 100 101   CO2 mmol/L 24.0 18.0* 25.0 23.0 28.0 28.0   BUN mg/dL 16 17 16 18 15 9   CREATININE mg/dL 0.83 0.88 0.83 0.87 0.75 0.65   GLUCOSE mg/dL 121* 83 96 119* 104* 98   ALBUMIN g/dL  --  3.5  --  2.1* 2.2*  --    BILIRUBIN mg/dL  --  0.7  --  0.4 0.4  --    ALK PHOS U/L  --  26*  --  57 61  --    AST (SGOT) U/L  --  7  --  9 8  --    ALT (SGPT) U/L  --  <5  --  6 5  --    CALCIUM mg/dL 7.6* 8.2* 9.3 9.4 9.8 8.7         Results from last 7 days   Lab Units 02/18/24  0520   SED RATE mm/hr <1         Microbiology   Microbiology Results (last 10 days)       Procedure Component Value - Date/Time    Body Fluid Culture - Body Fluid, Pleural Cavity [[id number]] Collected: 02/19/24 1124    Lab Status: Preliminary result Specimen: Body Fluid from Pleural Cavity Updated: 02/19/24 1255     Body Fluid Culture Abnormal Stain     Gram Stain Many (4+) WBCs per low power field      Few (2+) Budding yeast    BAL Culture, Quantitative - Lavage, Bronchus [[id number]]  (Normal) Collected: 02/17/24 1709    Lab Status: Preliminary result Specimen: Lavage from Bronchus Updated: 02/19/24 1056     BAL Culture Growth present, too young to evaluate    Respiratory Culture - Lavage, Lung, Left Upper Lobe [[id number]] Collected: 02/17/24 1709    Lab Status: Preliminary result Specimen: Lavage from Lung, Left Upper Lobe Updated: 02/19/24 1042     Respiratory Culture No growth     Gram Stain Rare (1+) WBCs per low power field      Rare (1+) Epithelial cells per low power field      No organisms seen    AFB Culture - Lavage, Lung, Left Upper Lobe [[id number]] Collected: 02/17/24 1709    Lab Status: Preliminary result Specimen: Lavage from Lung, Left Upper Lobe Updated: 02/19/24 0954     AFB Stain No acid fast bacilli seen on concentrated smear    MRSA Screen, PCR (Inpatient) - Swab, Nares [[id number]]  (Abnormal) Collected: 02/17/24 0854    Lab Status: Final result  Specimen: Swab from Nares Updated: 02/17/24 1015     MRSA PCR MRSA Detected    Narrative:      The negative predictive value of this diagnostic test is high and should only be used to consider de-escalating anti-MRSA therapy. A positive result may indicate colonization with MRSA and must be correlated clinically.    Respiratory Panel PCR w/COVID-19(SARS-CoV-2) NAYELI/CAMILLE/HUSAM/PAD/COR/SUMAN In-House, NP Swab in UTM/VTM, 2 HR TAT - Swab, Nasopharynx [[id number]]  (Normal) Collected: 02/17/24 0854    Lab Status: Final result Specimen: Swab from Nasopharynx Updated: 02/17/24 0953     ADENOVIRUS, PCR Not Detected     Coronavirus 229E Not Detected     Coronavirus HKU1 Not Detected     Coronavirus NL63 Not Detected     Coronavirus OC43 Not Detected     COVID19 Not Detected     Human Metapneumovirus Not Detected     Human Rhinovirus/Enterovirus Not Detected     Influenza A PCR Not Detected     Influenza B PCR Not Detected     Parainfluenza Virus 1 Not Detected     Parainfluenza Virus 2 Not Detected     Parainfluenza Virus 3 Not Detected     Parainfluenza Virus 4 Not Detected     RSV, PCR Not Detected     Bordetella pertussis pcr Not Detected     Bordetella parapertussis PCR Not Detected     Chlamydophila pneumoniae PCR Not Detected     Mycoplasma pneumo by PCR Not Detected    Narrative:      In the setting of a positive respiratory panel with a viral infection PLUS a negative procalcitonin without other underlying concern for bacterial infection, consider observing off antibiotics or discontinuation of antibiotics and continue supportive care. If the respiratory panel is positive for atypical bacterial infection (Bordetella pertussis, Chlamydophila pneumoniae, or Mycoplasma pneumoniae), consider antibiotic de-escalation to target atypical bacterial infection.    Legionella Antigen, Urine - Urine, Urine, Clean Catch [[id number]]  (Normal) Collected: 02/17/24 0852    Lab Status: Final result Specimen: Urine, Clean Catch Updated:  02/17/24 0927     LEGIONELLA ANTIGEN, URINE Negative    S. Pneumo Ag Urine or CSF - Urine, Urine, Clean Catch [[id number]]  (Normal) Collected: 02/17/24 0852    Lab Status: Final result Specimen: Urine, Clean Catch Updated: 02/17/24 0927     Strep Pneumo Ag Negative    Blood Culture - Blood, Hand, Right [[id number]]  (Normal) Collected: 02/16/24 1737    Lab Status: Preliminary result Specimen: Blood from Hand, Right Updated: 02/18/24 1800     Blood Culture No growth at 2 days    Blood Culture - Blood, Hand, Left [[id number]]  (Normal) Collected: 02/16/24 1736    Lab Status: Preliminary result Specimen: Blood from Hand, Left Updated: 02/18/24 1800     Blood Culture No growth at 2 days    Narrative:      Less than seven (7) mL's of blood was collected.  Insufficient quantity may yield false negative results.    AFB Culture - Body Fluid, Pleural Cavity [[id number]] Collected: 02/16/24 1556    Lab Status: Preliminary result Specimen: Body Fluid from Pleural Cavity Updated: 02/17/24 1142     AFB Stain No acid fast bacilli seen on concentrated smear    Body Fluid Culture - Body Fluid, Pleural Cavity [[id number]]  (Abnormal)  (Susceptibility) Collected: 02/16/24 1556    Lab Status: Final result Specimen: Body Fluid from Pleural Cavity Updated: 02/18/24 1143     Body Fluid Culture Scant growth (1+) Escherichia coli ESBL     Comment:   Consider infectious disease consult.  Susceptibility results may not correlate to clinical outcomes.         Rare Normal Skin Maday     Gram Stain Moderate (3+) WBCs per low power field      No organisms seen    Susceptibility        Escherichia coli ESBL      TARYN      Ertapenem Susceptible      Levofloxacin Susceptible      Meropenem Susceptible      Trimethoprim + Sulfamethoxazole Susceptible                       Susceptibility Comments       Escherichia coli ESBL    Cefazolin sensitivity will not be reported for Enterobacteriaceae in non-urine isolates. If cefazolin is preferred, please call  the microbiology lab to request an E-test.  With the exception of urinary-sourced infections, aminoglycosides should not be used as monotherapy.               Anaerobic Culture - Pleural Fluid, Pleural Cavity [[id number]]  (Normal) Collected: 02/16/24 1556    Lab Status: Preliminary result Specimen: Pleural Fluid from Pleural Cavity Updated: 02/19/24 0940     Anaerobic Culture No anaerobes isolated at 3 days    Fungus Smear - Body Fluid, Pleural Cavity [[id number]] Collected: 02/16/24 1556    Lab Status: Final result Specimen: Body Fluid from Pleural Cavity Updated: 02/17/24 1205     Fungal Stain No fungal elements seen            Medication Review:       Schedule Meds  citalopram, 10 mg, Nasogastric, Daily  Divalproex Sodium, 125 mg, Nasogastric, Q12H  enoxaparin, 40 mg, Subcutaneous, Daily  folic acid, 1 mg, Nasogastric, Daily  furosemide, 40 mg, Intravenous, Q6H  lactated ringers, 500 mL, Intravenous, Once  lactated ringers, 500 mL, Intravenous, Once  lansoprazole, 30 mg, Nasogastric, Q AM  magnesium sulfate, 2 g, Intravenous, Q2H  meropenem, 1,000 mg, Intravenous, Q8H  midodrine, 10 mg, Nasogastric, Q8H  potassium chloride, 20 mEq, Nasogastric, BID  risperiDONE, 0.25 mg, Nasogastric, Nightly  sodium chloride, 250 mL, Intravenous, Once  sodium chloride, 10 mL, Intravenous, Q12H  sodium phosphate, 15 mmol, Intravenous, Once  vancomycin, 750 mg, Intravenous, Q24H        Infusion Meds  dexmedetomidine, 0.2-1.5 mcg/kg/hr, Last Rate: 0.5 mcg/kg/hr (02/19/24 0742)  fentanyl 10 mcg/mL,  mcg/hr, Last Rate: 100 mcg/hr (02/19/24 0843)  Pharmacy to dose vancomycin,   phenylephrine, 0.5-6 mcg/kg/min, Last Rate: 1.8 mcg/kg/min (02/19/24 1006)  vasopressin, 0.03 Units/min, Last Rate: 0.03 Units/min (02/19/24 0847)        PRN Meds    acetaminophen **OR** acetaminophen    senna-docusate sodium **AND** polyethylene glycol **AND** bisacodyl **AND** bisacodyl    dextrose    dextrose    fentaNYL citrate (PF)    glucagon (human  recombinant)    lidocaine    ondansetron    ondansetron ODT    oxyCODONE    Pharmacy to dose vancomycin    [COMPLETED] Insert Peripheral IV **AND** sodium chloride    sodium chloride    sodium chloride        Assessment & Plan       Antimicrobial Therapy   1.  IV vancomycin        2.  IV meropenem            Assessment     Right thoracic empyema.  Fluid analysis showed findings consistent with empyema with cultures growing ESBL  E. coli.  The presentation is concerning for micro fistula between the abdomen and thoracic cavity.  Patient s/p chest tube placement by IR which was replaced on February 19, 2024.     Left upper lobe cavitary lesion most likely consistent with left lung abscess.  Suspecting the same pathogen involving the right lung causing this infection.     Acute hypoxic respiratory failure-likely related to the above.  Patient is now intubated on 28% FiO2    Septic shock-patient is currently on [place]in-Synephrine and vasopressin     Left calcaneus wound, unstageable with black eschar.  I doubt this is a peripheral vascular disease patient has strong pulse.  Most likely this is a pressure ulcer     Probable underlying dementia based on my assessment.  Patient symptoms might get worse and with cefepime.          Plan     Continue IV vancomycin for now, pharmacy currently following and dosing waiting on BAL culture results  Continue IV meropenem 1 g every 8 hours  The patient will need to be on antibiotics for at least 4 weeks  May need to repeat imaging study of the chest in few days to document the improvement of the right-sided effusion  MRI of the left foot was ordered by primary service to rule out underlying osteomyelitis-will be performed and patient is more stable  Continue supportive care  A.m. labs   The case was discussed with family at bedside      Himanshu Spaulding MD  02/19/24  13:06 EST    Note is dictated utilizing voice recognition software/Dragon

## 2024-02-19 NOTE — CONSULTS
"Nutrition Services    Patient Name: Sandra Treadwell  YOB: 1950  MRN: [id number]  Admission date: 2/16/2024    Comment:  -- Continue Peptamen AF at 20mL/hr + 10mL/hr water flush, when able to restart         CLINICAL NUTRITION ASSESSMENT      Reason for Assessment 2/19: MST of 3, Consult for tube feeding     H&P      Past Medical History:   Diagnosis Date    Bladder leak     Breast cancer     chemo and radiation    Depression     GERD (gastroesophageal reflux disease)     Hyperlipidemia     Osteoporosis        Past Surgical History:   Procedure Laterality Date    BREAST SURGERY      lumpectomy    CHOLECYSTECTOMY      HYSTERECTOMY      TOTAL KNEE ARTHROPLASTY Right 11/24/2023    Procedure: TOTAL KNEE ARTHROPLASTY WITH CORI ROBOT;  Surgeon: Ulises Chen II, MD;  Location: [facility] OR;  Service: Robotics - Ortho;  Laterality: Right;        Current Problems   Large pleural effusion  - CT surgery following    Shock state  - ID following    Acute hypoxic respiratory failure  - intubated 2/17  - S/P bronch 2/17    Leukopenia     Pulmonary hypertension    History of breast cancer       Encounter Information        Trending Narrative     2/19: Admitted for chest pain and SOB on 2/16 and diagnosed with pleural effusion.  Patient intubated 2/17 and S/P bronch 2/17 post respiratory distress event.  Patient discussed in AM rounds.  TF being held for decision of what to do with chest tube.  Also getting MRI of foot today.  On precedex, fentanyl, vaso, kevin.  RD ordered Mag/Phos for review today, both now being replaced.  Patient getting nursing care at time of RD visit, visually well nourished.         Anthropometrics        Current Height, Weight Height: 152.4 cm (60\")  Weight: 65.6 kg (144 lb 10 oz) (02/19/24 0500)       Usual Body Weight (UBW) Unable to obtain from patient        Trending Weight Hx     This admission: 2/19: 144#             PTA: 5.2% weight loss x 3 months     Wt Readings from Last 30 " Encounters:   02/19/24 0500 65.6 kg (144 lb 10 oz)   02/18/24 0600 62.3 kg (137 lb 5.6 oz)   02/17/24 1010 64 kg (141 lb)   02/17/24 0400 64.4 kg (141 lb 15.6 oz)   02/16/24 0848 67.1 kg (148 lb)   12/04/23 0900 67.2 kg (148 lb 2.4 oz)   11/26/23 1311 69.4 kg (153 lb)   11/24/23 0748 69.6 kg (153 lb 6.4 oz)   11/15/23 1139 69.2 kg (152 lb 9.6 oz)      BMI kg/m2 Body mass index is 28.24 kg/m².       Labs        Pertinent Labs    Results from last 7 days   Lab Units 02/18/24  0520 02/17/24  0510 02/16/24 2017 02/16/24  1228   SODIUM mmol/L 137 136 135* 138   POTASSIUM mmol/L 4.1 4.4 3.9 4.1   CHLORIDE mmol/L 106 103 102 100   CO2 mmol/L 18.0* 25.0 23.0 28.0   BUN mg/dL 17 16 18 15   CREATININE mg/dL 0.88 0.83 0.87 0.75   CALCIUM mg/dL 8.2* 9.3 9.4 9.8   BILIRUBIN mg/dL 0.7  --  0.4 0.4   ALK PHOS U/L 26*  --  57 61   ALT (SGPT) U/L <5  --  6 5   AST (SGOT) U/L 7  --  9 8   GLUCOSE mg/dL 83 96 119* 104*     Results from last 7 days   Lab Units 02/19/24  0646 02/17/24  1408 02/17/24  0510   MAGNESIUM mg/dL 1.1*  --  1.2*   PHOSPHORUS mg/dL  --   --  3.0   HEMOGLOBIN g/dL 10.6*   < > 6.9*   HEMATOCRIT % 35.5   < > 23.4*    < > = values in this interval not displayed.     Lab Results   Component Value Date    HGBA1C 5.60 11/15/2023        Medications    Scheduled Medications citalopram, 10 mg, Nasogastric, Daily  Divalproex Sodium, 125 mg, Nasogastric, Q12H  enoxaparin, 40 mg, Subcutaneous, Daily  folic acid, 1 mg, Nasogastric, Daily  furosemide, 40 mg, Intravenous, Q12H  [Held by provider] furosemide, 40 mg, Oral, BID  lactated ringers, 500 mL, Intravenous, Once  lactated ringers, 500 mL, Intravenous, Once  meropenem, 1,000 mg, Intravenous, Q8H  midodrine, 10 mg, Nasogastric, Q8H  potassium chloride, 20 mEq, Nasogastric, BID  risperiDONE, 0.25 mg, Nasogastric, Nightly  sodium chloride, 250 mL, Intravenous, Once  sodium chloride, 10 mL, Intravenous, Q12H  vancomycin, 1,250 mg, Intravenous, Q24H        Infusions  dexmedetomidine, 0.2-1.5 mcg/kg/hr, Last Rate: 0.8 mcg/kg/hr (02/19/24 1441)  fentanyl 10 mcg/mL,  mcg/hr, Last Rate: 150 mcg/hr (02/19/24 0317)  norepinephrine, 0.02-0.5 mcg/kg/min, Last Rate: 0.43 mcg/kg/min (02/19/24 0644)  Pharmacy to dose vancomycin,   phenylephrine, 0.5-3 mcg/kg/min, Last Rate: Stopped (02/18/24 0352)        PRN Medications   acetaminophen **OR** acetaminophen    senna-docusate sodium **AND** polyethylene glycol **AND** bisacodyl **AND** bisacodyl    dextrose    dextrose    fentaNYL citrate (PF)    glucagon (human recombinant)    ondansetron    ondansetron ODT    oxyCODONE    Pharmacy to dose vancomycin    [COMPLETED] Insert Peripheral IV **AND** sodium chloride    sodium chloride    sodium chloride     Physical Findings        Trending Physical   Appearance, NFPE 2/19: Visually well nourished    --  Edema  3+ generalized, feet      Bowel Function No BM since admission (x 3 days)     Tubes NG tube      Chewing/Swallowing Intubated      Skin WOCN consulted - nonblanchable area to coccyx        Estimated/Assessed Needs    Estimated 2/19/24   Energy Requirements    EST Needs, Method, Wt used 1151 kcal/day ([facility] 2003b with CBW 65.6 kg)       Protein Requirements    EST Needs, Method, Wt used  g/day (1.2-2.0 g/kg of CBW 65.6 kg)       Fluid Requirements     Estimated Needs (mL/day) 1 mL/kcal, will monitor hydration status      Fluid Deficit    Current Na Level (mEq/L)    Desired Na Level (mEq/L)    Estimated Fluid Deficit (L)       Current Nutrition Orders & Evaluation of Intake       Oral Nutrition     Food Allergies NKFA   Current PO Diet NPO Diet NPO Type: Strict NPO   Supplement None ordered    PO Evaluation     Trending % PO Intake 2/19: NPO, no documented PO intakes prior to admission      Enteral Nutrition    Enteral Route NG tube   Order, Modulars, Flushes Peptamen AF at 20 mL/hour + 10 mL/hour water flush   Residual/Tolerance WNL   TF Observation  Running as above per EMR        Parenteral Nutrition     TPN Route    Total # Days on TPN    TPN Order, Lipid Details    MVI & Trace Element Freq    TPN Observation         Nutrition Course Details    PO Diets: Regular 2/16-2/18  NPO 2/18 to current 2/19   Nutrition Support: Tube feeding began 2/19     Nutritional Risk Screening        NRS-2002 Score          Nutrition Diagnosis         Nutrition Dx Problem 1 Inadequate energy intake related to vent as evidenced by NPO/TF for nutrition.        Nutrition Dx Problem 2        Intervention Goal         Intervention Goal(s) Begin and tolerate EN     Nutrition Intervention        RD Action Order EN     Nutrition Prescription          Diet Prescription NPO   Supplement Prescription      Enteral Prescription Initial Goal:  *initial goal conservative d/t risk of RFS     Peptamen AF at 20mL/hr + 10mL/hr water flush      End Goal:    Peptamen AF at 45 mL/hr + water flush per clinical picture      Calories  1188 kcals (103%)    Protein  75 g (95% lower end)    Free water  802 mL   Flushes  Will monitor hydration status      The above end goal rate is for 22 hrs/day to assume interruptions for ADLs. Water flushes adjusted based on clinical picture + Rx flushes/IV fluids     Specialized formula chosen r/t high protein needs in the critical care setting.           TPN Prescription      Monitor/Evaluation        Monitor Per protocol, I&O, PO intake, Supplement intake, Pertinent labs, Weight, Skin status, GI status, Symptoms, POC/GOC       Electronically signed by:  Lyly Flores RD  02/19/24 07:14 EST

## 2024-02-19 NOTE — PLAN OF CARE
Goal Outcome Evaluation:         Patient withdraws to pain minimally at this time. Vent is at 28% and 5 PEEP. TF infusing and patient is tolerating. Good UOP. VSS. Destin, vaso, fent, and dex gtts infusing at this time. Mag and phos replaced this shift. New right chest tube placed in IR this shift. Plan to have thoracic surgery remove the old chest tube tomorrow morning.

## 2024-02-19 NOTE — PROGRESS NOTES
"    Chief Complaint: Empyema  S/P: CT-guided chest tube      Subjective:  Symptoms:  Stable.    Sedated and orally intubated.  Mechanical ventilation  Dependent on vasopressors.    Vital Signs:  Temp:  [97.4 °F (36.3 °C)-98 °F (36.7 °C)] 98 °F (36.7 °C)  Heart Rate:  [] 73  Resp:  [16-19] 16  BP: ()/(45-75) 110/53  FiO2 (%):  [28 %] 28 %    Intake & Output (last day)         02/18 0701  02/19 0700 02/19 0701  02/20 0700    P.O.      I.V. (mL/kg) 1799 (27.4) 2058 (31.4)    Blood      Other 81 30    NG/ 102    Total Intake(mL/kg) 2063 (31.4) 2190 (33.4)    Urine (mL/kg/hr) 2600 (1.7) 750 (1.5)    Stool      Chest Tube 40 74    Total Output 2640 824    Net -577 +1366                  Objective:  General Appearance:  Ill-appearing and in no acute distress.    Vital signs: (most recent): Blood pressure 110/53, pulse 74, temperature 98 °F (36.7 °C), temperature source Oral, resp. rate 16, height 152.4 cm (60\"), weight 65.6 kg (144 lb 10 oz), SpO2 100%.    Lungs:  Normal effort and normal respiratory rate.  She is not in respiratory distress.  (ETT )  Chest: Symmetric chest wall expansion.   Abdomen: Abdomen is soft and non-distended.    Neurological: Patient is alert and oriented to person, place and time.    Skin:  Warm and dry.                Large bore chest tube:   Site: Right, Clean, Dry, and Intact  Suction: -20 cm  Air Leak: negative  24 Hour Total: Scant serosanguineous    CT-guided chest tube:  Site: Right, Clean, Dry, and Intact  Suction: -20 cm  Air Leak: negative  24 Hour Total: Scant serosanguineous    Results Review:     I reviewed the patient's new clinical results.  I reviewed the patient's new imaging results and agree with the interpretation.  Discussed with nurse, and Dr. Gallagher    Imaging Results (Last 24 Hours)       Procedure Component Value Units Date/Time    CT Drain Perq Pleura Insert Cath [[id number]] Collected: 02/19/24 1156     Updated: 02/19/24 1205    Narrative:      CT DRAIN " PERQ PLEURA INSERT CATH    Date of Exam: 2/19/2024 10:46 AM EST    Indication: Loculated right pleural effusion. Existing large bore chest tube placed at the bedside by critical care is not functioning.    Comparison: 2/18/2024 CT    Fluoroscopic Time: 67 seconds. 766 mGy.    Sedation time: 0. Patient is sedated on a vent.    Description of procedure: Informed consent was obtained by telephone from patient's consenting family member. She was placed prone on the CT table and a brief noncontrasted scan performed through the region of interest using a localization grid. This   demonstrates a small posteriorly loculated pleural fluid collection measuring 1.4 cm in maximum thickness. The overlying skin was marked and the area prepped and draped sterilely. Lidocaine was used for local anesthesia. Access was obtained in over the   rib fashion using an 18-gauge Sood blunt needle. After confirming appropriate position of the needle tip within the collection, a [place] wire was coiled in the pleural space, and the tract was dilated to accommodate a 10 [place] pigtail drain   catheter. A larger tube was not possible given the small amount of fluid. The catheter was aspirated by syringe which yielded clear serosanguineous fluid. A sample was sent for the requested studies. The catheter was secured in place at the chest wall   using 0 Ethibond suture material. Vaseline gauze and sterile dressings were applied. The catheter was attached to Pleur-evac at low wall suction. Patient tolerated the procedure well.      Impression:      Impression:  CT-guided placement of additional right-sided thoracostomy tube for management of posteriorly loculated pleural fluid collection. Fluid sample was sent for all requested studies.      Electronically Signed: Nima Briggs MD    2/19/2024 12:03 PM EST    Workstation ID: [id number]    XR Chest 1 View [[id number]] Collected: 02/19/24 0044     Updated: 02/19/24 0047    Narrative:      XR CHEST 1  VW    Date of Exam: 2/19/2024 12:13 AM EST    Indication: chest tube management    Comparison: 2/18/2024.    Findings:  Enteric tube within the stomach. Endotracheal tube tip in the distal trachea. Right internal jugular CVC catheter present with the tip at the cavoatrial junction. Stable right-sided chest tube present. No definite pneumothorax identified. Small bilateral   pleural effusions are present. Patchy airspace disease is present within the lung bases bilaterally, left greater than right, improved as compared to the previous study.      Impression:      Impression:  No definite pneumothorax identified. Stable right-sided chest tube. Improving bibasilar airspace disease, left greater than right. Stable small bilateral pleural effusions are present.        Electronically Signed: Tracy Knowles MD    2/19/2024 12:45 AM EST    Workstation ID: [id number]    XR Abdomen KUB [[id number]] Collected: 02/19/24 0029     Updated: 02/19/24 0031    Narrative:      XR ABDOMEN KUB    Date of Exam: 2/19/2024 12:13 AM EST    Indication: NG placement    Comparison: None available.    Findings:  Enteric tube within the stomach.      Impression:      Impression:  Enteric tube within the stomach.        Electronically Signed: Tracy Knowles MD    2/19/2024 12:29 AM EST    Workstation ID: [id number]            Lab Results:     Lab Results (last 24 hours)       Procedure Component Value Units Date/Time    Body Fluid Culture - Body Fluid, Pleural Cavity [[id number]] Collected: 02/19/24 1124    Specimen: Body Fluid from Pleural Cavity Updated: 02/19/24 1255     Body Fluid Culture Abnormal Stain     Gram Stain Many (4+) WBCs per low power field      Few (2+) Budding yeast    POC Glucose Q6H [[id number]]  (Normal) Collected: 02/19/24 1207    Specimen: Blood Updated: 02/19/24 1209     Glucose 82 mg/dL      Comment: Serial Number: [id number]:  [id number]       Non-gynecologic Cytology [[id number]] Collected: 02/19/24 1124    Specimen: Pleural  Fluid from Pleural Cavity Updated: 02/19/24 1202    BAL Culture, Quantitative - Lavage, Bronchus [[id number]]  (Normal) Collected: 02/17/24 1709    Specimen: Lavage from Bronchus Updated: 02/19/24 1056     BAL Culture Growth present, too young to evaluate    Respiratory Culture - Lavage, Lung, Left Upper Lobe [[id number]] Collected: 02/17/24 1709    Specimen: Lavage from Lung, Left Upper Lobe Updated: 02/19/24 1042     Respiratory Culture No growth     Gram Stain Rare (1+) WBCs per low power field      Rare (1+) Epithelial cells per low power field      No organisms seen    AFB Culture - Lavage, Lung, Left Upper Lobe [[id number]] Collected: 02/17/24 1709    Specimen: Lavage from Lung, Left Upper Lobe Updated: 02/19/24 0954     AFB Stain No acid fast bacilli seen on concentrated smear    Pathology Consultation [[id number]] Collected: 02/16/24 2055    Specimen: Blood, Venous Line Updated: 02/19/24 0945     Final Diagnosis --     Left shifted granulocytes  Anemia  No blasts identified       Case Report --     Surgical Pathology Report                         Case: [id number]                                  Authorizing Provider:  Adeline Power APRN  Collected:           02/16/2024 08:55 PM          Ordering Location:     [facility]       Received:            02/19/2024 08:48 AM                                 [facility]                                                          Pathologist:           Vel Ch MD                                                            Specimen:    Blood, Venous Line                                                                         Anaerobic Culture - Pleural Fluid, Pleural Cavity [[id number]]  (Normal) Collected: 02/16/24 1556    Specimen: Pleural Fluid from Pleural Cavity Updated: 02/19/24 0940     Anaerobic Culture No anaerobes isolated at 3 days    Phosphorus [[id number]]  (Abnormal) Collected: 02/19/24 0646    Specimen: Blood Updated: 02/19/24 0916      Phosphorus 1.9 mg/dL     Magnesium [[id number]]  (Abnormal) Collected: 02/19/24 0646    Specimen: Blood Updated: 02/19/24 0856     Magnesium 1.1 mg/dL     Non-gynecologic Cytology [[id number]] Collected: 02/17/24 1729    Specimen: Body Fluid from Lung, Left Upper Lobe Updated: 02/19/24 0818    Non-gynecologic Cytology [[id number]] Collected: 02/16/24 1556    Specimen: Pleural Fluid from Pleural Cavity Updated: 02/19/24 0817    Manual Differential [[id number]]  (Abnormal) Collected: 02/16/24 2055    Specimen: Blood Updated: 02/19/24 0811     Neutrophil % 12.0 %      Lymphocyte % 23.0 %      Monocyte % 7.0 %      Bands %  45.0 %      Metamyelocyte % 9.0 %      Myelocyte % 4.0 %      Neutrophils Absolute 4.73 10*3/mm3      Lymphocytes Absolute 1.91 10*3/mm3      Monocytes Absolute 0.58 10*3/mm3      nRBC 1.0 /100 WBC      Anisocytosis Slight/1+     Polychromasia Slight/1+     WBC Morphology Normal     Platelet Morphology Normal    Path Consult Reflex [[id number]] Collected: 02/16/24 2055    Specimen: Blood Updated: 02/19/24 0811     Pathology Review Yes    Narrative:      Blasts and/or Pros >0%  Metas and/or Myelos >10%     Manual Differential [[id number]]  (Abnormal) Collected: 02/19/24 0646    Specimen: Blood Updated: 02/19/24 0727     Neutrophil % 35.0 %      Lymphocyte % 27.0 %      Monocyte % 4.0 %      Eosinophil % 3.0 %      Bands %  28.0 %      Metamyelocyte % 1.0 %      Myelocyte % 1.0 %      Atypical Lymphocyte % 1.0 %      Neutrophils Absolute 2.84 10*3/mm3      Lymphocytes Absolute 1.26 10*3/mm3      Monocytes Absolute 0.18 10*3/mm3      Eosinophils Absolute 0.14 10*3/mm3      nRBC 1.0 /100 WBC      Anisocytosis Slight/1+     Poikilocytes Slight/1+     Polychromasia Slight/1+     Toxic Granulation Slight/1+     Vacuolated Neutrophils Slight/1+     Platelet Morphology Normal    CBC & Differential [[id number]]  (Abnormal) Collected: 02/19/24 0646    Specimen: Blood Updated: 02/19/24 0727    Narrative:      The  following orders were created for panel order CBC & Differential.  Procedure                               Abnormality         Status                     ---------                               -----------         ------                     CBC Auto Differential[[id number]]        Abnormal            Final result               Scan Slide[[id number]]                                       Final result                 Please view results for these tests on the individual orders.    CBC Auto Differential [[id number]]  (Abnormal) Collected: 02/19/24 0646    Specimen: Blood Updated: 02/19/24 0727     WBC 4.50 10*3/mm3      RBC 3.97 10*6/mm3      Hemoglobin 10.6 g/dL      Comment: Result checked          Hematocrit 35.5 %      Comment: Result checked          MCV 89.5 fL      MCH 26.8 pg      MCHC 30.0 g/dL      RDW 20.7 %      RDW-SD 64.8 fl      MPV 8.5 fL      Platelets 107 10*3/mm3     Narrative:      The previously reported component NRBC is no longer being reported. Previous result was 0.9 /100 WBC (Reference Range: 0.0-0.2 /100 WBC) on 2/19/2024 at 0702 EST.    Scan Slide [[id number]] Collected: 02/19/24 0646    Specimen: Blood Updated: 02/19/24 0727     Scan Slide --     Comment: See Manual Differential Results       Vancomycin, Trough [[id number]]  (Normal) Collected: 02/19/24 0646    Specimen: Blood Updated: 02/19/24 0719     Vancomycin Trough 20.00 mcg/mL     Narrative:      Therapeutic Ranges for Vancomycin    Vancomycin Random   5.0-40.0 mcg/mL  Vancomycin Trough   5.0-20.0 mcg/mL  Vancomycin Peak     20.0-40.0 mcg/mL    Basic Metabolic Panel [[id number]]  (Abnormal) Collected: 02/19/24 0646    Specimen: Blood Updated: 02/19/24 0719     Glucose 121 mg/dL      BUN 16 mg/dL      Creatinine 0.83 mg/dL      Sodium 140 mmol/L      Potassium 4.1 mmol/L      Comment: Slight hemolysis detected by analyzer. Result may be falsely elevated.        Chloride 108 mmol/L      CO2 24.0 mmol/L      Calcium 7.6 mg/dL       BUN/Creatinine Ratio 19.3     Anion Gap 8.0 mmol/L      eGFR 75.5 mL/min/1.73     Narrative:      GFR Normal >60  Chronic Kidney Disease <60  Kidney Failure <15    The GFR formula is only valid for adults with stable renal function between ages 18 and 70.    POC Glucose Once [[id number]]  (Abnormal) Collected: 02/19/24 0516    Specimen: Blood Updated: 02/19/24 0517     Glucose 113 mg/dL      Comment: Serial Number: [id number]:  [id number]       POC Glucose Q6H [[id number]]  (Abnormal) Collected: 02/19/24 0023    Specimen: Blood Updated: 02/19/24 0025     Glucose 109 mg/dL      Comment: Serial Number: [id number]:  [id number]       POC Glucose Once [[id number]]  (Abnormal) Collected: 02/18/24 1842    Specimen: Blood Updated: 02/18/24 1844     Glucose 109 mg/dL      Comment: Serial Number: [id number]:  [id number]       POC Glucose Once [[id number]]  (Abnormal) Collected: 02/18/24 1816    Specimen: Blood Updated: 02/18/24 1818     Glucose 55 mg/dL      Comment: Serial Number: [id number]:  [id number]       Blood Culture - Blood, Hand, Right [[id number]]  (Normal) Collected: 02/16/24 1737    Specimen: Blood from Hand, Right Updated: 02/18/24 1800     Blood Culture No growth at 2 days    Blood Culture - Blood, Hand, Left [[id number]]  (Normal) Collected: 02/16/24 1736    Specimen: Blood from Hand, Left Updated: 02/18/24 1800     Blood Culture No growth at 2 days    Narrative:      Less than seven (7) mL's of blood was collected.  Insufficient quantity may yield false negative results.    Valproic Acid Level, Free [[id number]] Collected: 02/18/24 1524    Specimen: Blood Updated: 02/18/24 1526    POC Glucose Once [[id number]]  (Normal) Collected: 02/18/24 1522    Specimen: Blood Updated: 02/18/24 1524     Glucose 77 mg/dL      Comment: Serial Number: [id number]:  [id number]                Assessment & Plan       Large pleural effusion       Assessment & Plan  Patient appears comfortable on exam.  She is  orally intubated and dependent on mechanical ventilation.  Requiring vasopressor support.  A.m. chest x-ray reviewed.  No pneumothorax.  Right-sided chest tube seen in position.  Bilateral pleural effusions.  She underwent right CT-guided chest tube placement today directed to the right pleural fluid collection that the large bore chest tube does not appear to be in communication with.  Small amount of serosanguineous fluid draining from CT-guided chest tube.     Pleural effusion: Improved with chest tube.  And s/p CT-guided chest tube placement today.  Concern per IR large bore chest tube may be within the lung although no appreciable air leak on exam.  Discussed with Dr. Gallagher, plan for removal of large bore chest tube tomorrow and possible initial round of intrapleural lytics to aid in draining residual effusion.  Continue both chest tubes to -20 cm of suction and repeat a.m. chest x-ray.  Antibiotics per infectious disease recommendations.    Pulmonary abscess: Surgical intervention is not recommended with likelihood of development of bronchopleural fistula in the setting of abscess.  Recommend antibiotics per infectious disease recommendations.    Pulmonary nodules: Recommend continued surveillance in the future.    BERNA Noriega  [facility]  02/19/24  14:43 EST

## 2024-02-20 ENCOUNTER — APPOINTMENT (OUTPATIENT)
Dept: GENERAL RADIOLOGY | Facility: HOSPITAL | Age: 74
DRG: 208 | End: 2024-02-20
Payer: MEDICARE

## 2024-02-20 PROBLEM — J90 LARGE PLEURAL EFFUSION: Status: RESOLVED | Noted: 2024-02-16 | Resolved: 2024-02-20

## 2024-02-20 PROBLEM — J86.9 EMPYEMA OF LUNG: Status: ACTIVE | Noted: 2024-02-20

## 2024-02-20 LAB
ALBUMIN SERPL-MCNC: 2.3 G/DL (ref 3.5–5.2)
ALBUMIN/GLOB SERPL: 1 G/DL
ALP SERPL-CCNC: 77 U/L (ref 39–117)
ALT SERPL W P-5'-P-CCNC: 6 U/L (ref 1–33)
ANION GAP SERPL CALCULATED.3IONS-SCNC: 7 MMOL/L (ref 5–15)
AST SERPL-CCNC: 9 U/L (ref 1–32)
BILIRUB SERPL-MCNC: 0.4 MG/DL (ref 0–1.2)
BUN SERPL-MCNC: 16 MG/DL (ref 8–23)
BUN/CREAT SERPL: 21.3 (ref 7–25)
CALCIUM SPEC-SCNC: 7.3 MG/DL (ref 8.6–10.5)
CHLORIDE SERPL-SCNC: 107 MMOL/L (ref 98–107)
CO2 SERPL-SCNC: 27 MMOL/L (ref 22–29)
CREAT SERPL-MCNC: 0.75 MG/DL (ref 0.57–1)
DEPRECATED RDW RBC AUTO: 61.3 FL (ref 37–54)
EGFRCR SERPLBLD CKD-EPI 2021: 85.2 ML/MIN/1.73
EOSINOPHIL # BLD MANUAL: 0.06 10*3/MM3 (ref 0–0.4)
EOSINOPHIL NFR BLD MANUAL: 1 % (ref 0.3–6.2)
ERYTHROCYTE [DISTWIDTH] IN BLOOD BY AUTOMATED COUNT: 19.1 % (ref 12.3–15.4)
GLOBULIN UR ELPH-MCNC: 2.2 GM/DL
GLUCOSE BLDC GLUCOMTR-MCNC: 134 MG/DL (ref 70–105)
GLUCOSE BLDC GLUCOMTR-MCNC: 140 MG/DL (ref 70–105)
GLUCOSE BLDC GLUCOMTR-MCNC: 169 MG/DL (ref 70–105)
GLUCOSE SERPL-MCNC: 181 MG/DL (ref 65–99)
HCT VFR BLD AUTO: 31.4 % (ref 34–46.6)
HGB BLD-MCNC: 9.7 G/DL (ref 12–15.9)
LAB AP CASE REPORT: NORMAL
LYMPHOCYTES # BLD MANUAL: 1.34 10*3/MM3 (ref 0.7–3.1)
LYMPHOCYTES NFR BLD MANUAL: 7 % (ref 5–12)
MAGNESIUM SERPL-MCNC: 1.7 MG/DL (ref 1.6–2.4)
MAGNESIUM SERPL-MCNC: 1.9 MG/DL (ref 1.6–2.4)
MCH RBC QN AUTO: 26.8 PG (ref 26.6–33)
MCHC RBC AUTO-ENTMCNC: 30.9 G/DL (ref 31.5–35.7)
MCV RBC AUTO: 86.6 FL (ref 79–97)
METAMYELOCYTES NFR BLD MANUAL: 1 % (ref 0–0)
MONOCYTES # BLD: 0.43 10*3/MM3 (ref 0.1–0.9)
MYELOCYTES NFR BLD MANUAL: 2 % (ref 0–0)
NEUTROPHILS # BLD AUTO: 4.09 10*3/MM3 (ref 1.7–7)
NEUTROPHILS NFR BLD MANUAL: 48 % (ref 42.7–76)
NEUTS BAND NFR BLD MANUAL: 19 % (ref 0–5)
NRBC SPEC MANUAL: 2 /100 WBC (ref 0–0.2)
PATH REPORT.FINAL DX SPEC: NORMAL
PATH REPORT.GROSS SPEC: NORMAL
PHOSPHATE SERPL-MCNC: 2 MG/DL (ref 2.5–4.5)
PHOSPHATE SERPL-MCNC: 2.6 MG/DL (ref 2.5–4.5)
PLATELET # BLD AUTO: 81 10*3/MM3 (ref 140–450)
PMV BLD AUTO: 8.7 FL (ref 6–12)
POTASSIUM SERPL-SCNC: 3.4 MMOL/L (ref 3.5–5.2)
POTASSIUM SERPL-SCNC: 4.9 MMOL/L (ref 3.5–5.2)
PROT SERPL-MCNC: 4.5 G/DL (ref 6–8.5)
RBC # BLD AUTO: 3.63 10*6/MM3 (ref 3.77–5.28)
RBC MORPH BLD: NORMAL
SCAN SLIDE: NORMAL
SMALL PLATELETS BLD QL SMEAR: ABNORMAL
SODIUM SERPL-SCNC: 141 MMOL/L (ref 136–145)
TOXIC GRANULATION: ABNORMAL
VANCOMYCIN PEAK SERPL-MCNC: 24.5 MCG/ML (ref 20–40)
VARIANT LYMPHS NFR BLD MANUAL: 22 % (ref 19.6–45.3)
WBC NRBC COR # BLD AUTO: 6.1 10*3/MM3 (ref 3.4–10.8)

## 2024-02-20 PROCEDURE — 25010000002 ALTEPLASE 2 MG RECONSTITUTED SOLUTION 1 EACH VIAL: Performed by: NURSE PRACTITIONER

## 2024-02-20 PROCEDURE — 84100 ASSAY OF PHOSPHORUS: CPT | Performed by: INTERNAL MEDICINE

## 2024-02-20 PROCEDURE — 71045 X-RAY EXAM CHEST 1 VIEW: CPT

## 2024-02-20 PROCEDURE — 85025 COMPLETE CBC W/AUTO DIFF WBC: CPT | Performed by: INTERNAL MEDICINE

## 2024-02-20 PROCEDURE — 83735 ASSAY OF MAGNESIUM: CPT

## 2024-02-20 PROCEDURE — 25010000002 FUROSEMIDE PER 20 MG: Performed by: INTERNAL MEDICINE

## 2024-02-20 PROCEDURE — 99232 SBSQ HOSP IP/OBS MODERATE 35: CPT | Performed by: NURSE PRACTITIONER

## 2024-02-20 PROCEDURE — 25010000002 VANCOMYCIN 750 MG RECONSTITUTED SOLUTION 1 EACH VIAL: Performed by: INTERNAL MEDICINE

## 2024-02-20 PROCEDURE — 94761 N-INVAS EAR/PLS OXIMETRY MLT: CPT

## 2024-02-20 PROCEDURE — 94799 UNLISTED PULMONARY SVC/PX: CPT

## 2024-02-20 PROCEDURE — 25010000002 MEROPENEM PER 100 MG: Performed by: NURSE PRACTITIONER

## 2024-02-20 PROCEDURE — 25010000002 ENOXAPARIN PER 10 MG: Performed by: INTERNAL MEDICINE

## 2024-02-20 PROCEDURE — 25810000003 SODIUM CHLORIDE 0.9 % SOLUTION: Performed by: INTERNAL MEDICINE

## 2024-02-20 PROCEDURE — 25010000002 MAGNESIUM SULFATE 2 GM/50ML SOLUTION: Performed by: INTERNAL MEDICINE

## 2024-02-20 PROCEDURE — 25010000002 POTASSIUM CHLORIDE PER 2 MEQ: Performed by: INTERNAL MEDICINE

## 2024-02-20 PROCEDURE — 84132 ASSAY OF SERUM POTASSIUM: CPT | Performed by: INTERNAL MEDICINE

## 2024-02-20 PROCEDURE — 80202 ASSAY OF VANCOMYCIN: CPT | Performed by: INTERNAL MEDICINE

## 2024-02-20 PROCEDURE — 80053 COMPREHEN METABOLIC PANEL: CPT | Performed by: INTERNAL MEDICINE

## 2024-02-20 PROCEDURE — 83735 ASSAY OF MAGNESIUM: CPT | Performed by: INTERNAL MEDICINE

## 2024-02-20 PROCEDURE — 82948 REAGENT STRIP/BLOOD GLUCOSE: CPT

## 2024-02-20 PROCEDURE — 25010000002 VASOPRESSIN 0.2 UNIT/ML SOLUTION: Performed by: INTERNAL MEDICINE

## 2024-02-20 PROCEDURE — 25010000002 PHENYLEPHRINE 10 MG/ML SOLUTION: Performed by: INTERNAL MEDICINE

## 2024-02-20 PROCEDURE — 94003 VENT MGMT INPAT SUBQ DAY: CPT

## 2024-02-20 PROCEDURE — 3E0L3GC INTRODUCTION OF OTHER THERAPEUTIC SUBSTANCE INTO PLEURAL CAVITY, PERCUTANEOUS APPROACH: ICD-10-PCS | Performed by: NURSE PRACTITIONER

## 2024-02-20 PROCEDURE — 85007 BL SMEAR W/DIFF WBC COUNT: CPT | Performed by: INTERNAL MEDICINE

## 2024-02-20 PROCEDURE — 25010000002 HYDROCORTISONE SOD SUC (PF) 100 MG RECONSTITUTED SOLUTION: Performed by: INTERNAL MEDICINE

## 2024-02-20 PROCEDURE — 32561 LYSE CHEST FIBRIN INIT DAY: CPT | Performed by: NURSE PRACTITIONER

## 2024-02-20 PROCEDURE — 84100 ASSAY OF PHOSPHORUS: CPT

## 2024-02-20 PROCEDURE — 25810000003 SODIUM CHLORIDE 0.9 % SOLUTION 250 ML FLEX CONT: Performed by: INTERNAL MEDICINE

## 2024-02-20 RX ORDER — METOLAZONE 5 MG/1
10 TABLET ORAL ONCE
Status: COMPLETED | OUTPATIENT
Start: 2024-02-20 | End: 2024-02-20

## 2024-02-20 RX ORDER — AMOXICILLIN 250 MG
2 CAPSULE ORAL 2 TIMES DAILY
Status: DISCONTINUED | OUTPATIENT
Start: 2024-02-20 | End: 2024-02-22

## 2024-02-20 RX ORDER — POTASSIUM CHLORIDE 29.8 MG/ML
20 INJECTION INTRAVENOUS
Status: COMPLETED | OUTPATIENT
Start: 2024-02-20 | End: 2024-02-20

## 2024-02-20 RX ORDER — FENTANYL/ROPIVACAINE/NS/PF 2-625MCG/1
15 PLASTIC BAG, INJECTION (ML) EPIDURAL ONCE
Status: COMPLETED | OUTPATIENT
Start: 2024-02-20 | End: 2024-02-20

## 2024-02-20 RX ORDER — MAGNESIUM SULFATE HEPTAHYDRATE 40 MG/ML
2 INJECTION, SOLUTION INTRAVENOUS ONCE
Status: COMPLETED | OUTPATIENT
Start: 2024-02-20 | End: 2024-02-20

## 2024-02-20 RX ORDER — FUROSEMIDE 10 MG/ML
40 INJECTION INTRAMUSCULAR; INTRAVENOUS ONCE
Status: COMPLETED | OUTPATIENT
Start: 2024-02-20 | End: 2024-02-20

## 2024-02-20 RX ORDER — POLYETHYLENE GLYCOL 3350 17 G/17G
17 POWDER, FOR SOLUTION ORAL DAILY PRN
Status: DISCONTINUED | OUTPATIENT
Start: 2024-02-20 | End: 2024-02-25

## 2024-02-20 RX ADMIN — METOLAZONE 10 MG: 5 TABLET ORAL at 10:22

## 2024-02-20 RX ADMIN — POTASSIUM CHLORIDE 20 MEQ: 400 INJECTION, SOLUTION INTRAVENOUS at 11:10

## 2024-02-20 RX ADMIN — VASOPRESSIN 0.03 UNITS/MIN: 0.2 INJECTION INTRAVENOUS at 04:21

## 2024-02-20 RX ADMIN — MIDODRINE HYDROCHLORIDE 10 MG: 5 TABLET ORAL at 21:03

## 2024-02-20 RX ADMIN — MAGNESIUM SULFATE HEPTAHYDRATE 2 G: 40 INJECTION, SOLUTION INTRAVENOUS at 17:43

## 2024-02-20 RX ADMIN — FOLIC ACID 1 MG: 1 TABLET ORAL at 08:30

## 2024-02-20 RX ADMIN — MEROPENEM 1000 MG: 1 INJECTION, POWDER, FOR SOLUTION INTRAVENOUS at 14:48

## 2024-02-20 RX ADMIN — Medication 10 ML: at 21:01

## 2024-02-20 RX ADMIN — FUROSEMIDE 40 MG: 10 INJECTION, SOLUTION INTRAMUSCULAR; INTRAVENOUS at 04:21

## 2024-02-20 RX ADMIN — POTASSIUM CHLORIDE 20 MEQ: 1.5 POWDER, FOR SOLUTION ORAL at 08:30

## 2024-02-20 RX ADMIN — ENOXAPARIN SODIUM 40 MG: 100 INJECTION SUBCUTANEOUS at 15:59

## 2024-02-20 RX ADMIN — HYDROCORTISONE SODIUM SUCCINATE 50 MG: 100 INJECTION, POWDER, FOR SOLUTION INTRAMUSCULAR; INTRAVENOUS at 17:13

## 2024-02-20 RX ADMIN — FUROSEMIDE 10 MG/HR: 10 INJECTION, SOLUTION INTRAMUSCULAR; INTRAVENOUS at 10:23

## 2024-02-20 RX ADMIN — Medication 10 ML: at 08:30

## 2024-02-20 RX ADMIN — MIDODRINE HYDROCHLORIDE 10 MG: 5 TABLET ORAL at 13:37

## 2024-02-20 RX ADMIN — RISPERIDONE 0.25 MG: 0.25 TABLET, FILM COATED ORAL at 21:01

## 2024-02-20 RX ADMIN — POTASSIUM CHLORIDE 20 MEQ: 1.5 POWDER, FOR SOLUTION ORAL at 21:01

## 2024-02-20 RX ADMIN — VANCOMYCIN HYDROCHLORIDE 750 MG: 750 INJECTION, POWDER, LYOPHILIZED, FOR SOLUTION INTRAVENOUS at 10:22

## 2024-02-20 RX ADMIN — PHENYLEPHRINE HYDROCHLORIDE 2 MCG/KG/MIN: 10 INJECTION INTRAVENOUS at 00:45

## 2024-02-20 RX ADMIN — DIVALPROEX SODIUM 125 MG: 125 CAPSULE, COATED PELLETS ORAL at 21:01

## 2024-02-20 RX ADMIN — DOCUSATE SODIUM 50 MG AND SENNOSIDES 8.6 MG 2 TABLET: 8.6; 5 TABLET, FILM COATED ORAL at 10:23

## 2024-02-20 RX ADMIN — ALTEPLASE: 2.2 INJECTION, POWDER, LYOPHILIZED, FOR SOLUTION INTRAVENOUS at 15:08

## 2024-02-20 RX ADMIN — Medication 100 MCG/HR: at 12:06

## 2024-02-20 RX ADMIN — HYDROCORTISONE SODIUM SUCCINATE 50 MG: 100 INJECTION, POWDER, FOR SOLUTION INTRAMUSCULAR; INTRAVENOUS at 12:09

## 2024-02-20 RX ADMIN — DIVALPROEX SODIUM 125 MG: 125 CAPSULE, COATED PELLETS ORAL at 08:30

## 2024-02-20 RX ADMIN — Medication 0.02 MCG/KG/MIN: at 10:22

## 2024-02-20 RX ADMIN — MIDODRINE HYDROCHLORIDE 10 MG: 5 TABLET ORAL at 05:31

## 2024-02-20 RX ADMIN — POTASSIUM PHOSPHATE, MONOBASIC AND POTASSIUM PHOSPHATE, DIBASIC 15 MMOL: 224; 236 INJECTION, SOLUTION, CONCENTRATE INTRAVENOUS at 11:10

## 2024-02-20 RX ADMIN — FUROSEMIDE 40 MG: 10 INJECTION, SOLUTION INTRAMUSCULAR; INTRAVENOUS at 10:22

## 2024-02-20 RX ADMIN — CITALOPRAM HYDROBROMIDE 10 MG: 20 TABLET ORAL at 08:30

## 2024-02-20 RX ADMIN — LANSOPRAZOLE 30 MG: 30 TABLET, ORALLY DISINTEGRATING, DELAYED RELEASE ORAL at 05:31

## 2024-02-20 RX ADMIN — POTASSIUM CHLORIDE 20 MEQ: 400 INJECTION, SOLUTION INTRAVENOUS at 10:21

## 2024-02-20 RX ADMIN — HYDROCORTISONE SODIUM SUCCINATE 50 MG: 100 INJECTION, POWDER, FOR SOLUTION INTRAMUSCULAR; INTRAVENOUS at 23:50

## 2024-02-20 RX ADMIN — MEROPENEM 1000 MG: 1 INJECTION, POWDER, FOR SOLUTION INTRAVENOUS at 23:50

## 2024-02-20 RX ADMIN — MEROPENEM 1000 MG: 1 INJECTION, POWDER, FOR SOLUTION INTRAVENOUS at 06:53

## 2024-02-20 RX ADMIN — VASOPRESSIN 0.03 UNITS/MIN: 0.2 INJECTION INTRAVENOUS at 17:13

## 2024-02-20 RX ADMIN — DOCUSATE SODIUM 50 MG AND SENNOSIDES 8.6 MG 2 TABLET: 8.6; 5 TABLET, FILM COATED ORAL at 21:01

## 2024-02-20 NOTE — PROGRESS NOTES
Nutrition Services    Patient Name: Sandra Treadwell  YOB: 1950  MRN: [id number]  Admission date: 2/16/2024    PROGRESS NOTE      Encounter Information: Check on for tube feeding.  On vaso, kevin, fentanyl.  Patient discussed in AM rounds.  MD to D/C scheduled lasix and change to drip.  MD to change pressor to levo.  Goal of extubation 2/21.         PO Diet: NPO Diet NPO Type: Strict NPO   PO Supplements: None ordered   PO Intake:  NPO       Current nutrition support: Peptamen AF at 20mL/hr + 10mL/hr water flush    Nutrition support review: Documented as above per EMR overnight, held this AM in case surgery is needed        Labs (reviewed below): Hypokalemia - to replaced today   Hypophosphatemia - to replace today        GI Function:  No BM since admission (x 4 days ago) - MD to add laxatives   Residuals previously WNL       Nutrition Intervention Updates: Continue current EN prescription when able to restart        Results from last 7 days   Lab Units 02/20/24 0523 02/19/24  0646 02/18/24  0520 02/17/24  0510 02/16/24 2017   SODIUM mmol/L 141 140 137   < > 135*   POTASSIUM mmol/L 3.4* 4.1 4.1   < > 3.9   CHLORIDE mmol/L 107 108* 106   < > 102   CO2 mmol/L 27.0 24.0 18.0*   < > 23.0   BUN mg/dL 16 16 17   < > 18   CREATININE mg/dL 0.75 0.83 0.88   < > 0.87   CALCIUM mg/dL 7.3* 7.6* 8.2*   < > 9.4   BILIRUBIN mg/dL 0.4  --  0.7  --  0.4   ALK PHOS U/L 77  --  26*  --  57   ALT (SGPT) U/L 6  --  <5  --  6   AST (SGOT) U/L 9  --  7  --  9   GLUCOSE mg/dL 181* 121* 83   < > 119*    < > = values in this interval not displayed.     Results from last 7 days   Lab Units 02/20/24  0523 02/19/24 2125 02/19/24  0646 02/17/24  1408 02/17/24  0510   MAGNESIUM mg/dL 1.9  --  1.1*  --  1.2*   PHOSPHORUS mg/dL 2.0*   < > 1.9*  --  3.0   HEMOGLOBIN g/dL 9.7*  --  10.6*   < > 6.9*   HEMATOCRIT % 31.4*  --  35.5   < > 23.4*    < > = values in this interval not displayed.     COVID19   Date Value Ref Range Status    02/17/2024 Not Detected Not Detected - Ref. Range Final     Lab Results   Component Value Date    HGBA1C 5.60 11/15/2023       RD to follow up per protocol.    Electronically signed by:  Lyly Flores RD  02/20/24 08:08 EST

## 2024-02-20 NOTE — CONSULTS
Picc team consult:    Previous failed picc line attempt RUE on 02/16/2024 r/t inability to completely thread line into the SVC. See previous note.  LUE noted to have infiltrated midline catheter.  Patient is not a bedside candidate for picc line placement and will require an IR consult or IJ placement if central access is desired.  Desiree SMILEY notified.

## 2024-02-20 NOTE — PROCEDURES
Pre-op Diagnosis: Loculated Pleural Effusion, right     Post-Op Diagnosis: Loculated Pleural Effusion, right     Procedure performed: Irrigation pleural cavity with TPA and dornase    Anesthesia: None    Summary of procedure: The  pleural tube was accessed. 10 mg of TPA (reconstituted in 30cc of sterile water) was instilled into the pleural cavity. The pleural tube then was clamped.      The patient's position is to be changed to every 15 minutes for 2 hours.  The tube will then be unclamped and placed back to suction.  Patient tolerated the procedure well. We will re-evaluate with a CXR in the morning.      Appreciate assistance from SHERICE Martin.    Nohemi Mckeon, DNP, APRN     Thoracic surgery is aware that patient has had significant drainage from former chest tube site. Please keep area clean and dry. Reinforce dressing over the next 24h.

## 2024-02-20 NOTE — NURSING NOTE
Pt remains on vent 28% 5 PEEP. 2200 ml UOP, no bowel movements. Vaso, fentanyl and kevin gtts infusing, precedex off at this time. Pt remains lightly sedated, does not follow commands.

## 2024-02-20 NOTE — CASE MANAGEMENT/SOCIAL WORK
Continued Stay Note   [place]     Patient Name: Sandra Treadwell  MRN: [id number]  Today's Date: 2/20/2024    Admit Date: 2/16/2024    Plan: Return to [facility]. No precert or PASRR required.   Discharge Plan       Row Name 02/20/24 0921       Plan    Plan Return to [facility]. No precert or PASRR required.    Plan Comments DC Barriers: vent 28/5, Vaso/[place] gtt, Fentanyl gtt, NG TF, C-line, A-line, FC, R chest tube, IV Abxs, MRI L foot pending, Throacic Sx/ID/WC following.                 Expected Discharge Date and Time       Expected Discharge Date Expected Discharge Time    Feb 23, 2024               SANJUANA Gonzales RN  SIPS/ICU   O: [phone]  C: [phone]  [email or web address]

## 2024-02-20 NOTE — PROGRESS NOTES
Critical Care Progress Note   Sandra Treadwell : 1950 MRN:[id number] LOS:4     Principal Problem: Empyema of lung     Reason for follow up: All the medical problems listed below    Summary     A 73 y.o. female with PMH of hiatal hernia, anemia presented to the hospital for shortness of breath and was admitted with a principal diagnosis of Empyema of lung.  Found to be hypoxic and hypotensive, CTA showed large right pleural effusion with a small right pneumothorax and left upper lobe abscess. S/p right-sided chest tube placement.  Subsequently, developed profound hypotension, treated with broad-spectrum antibiotics and vasopressors.  ID and thoracic surgery was consulted.  Subsequently, patient was intubated on  and had bronchoscopy done afterwards.  Cultures grew ESBL E. coli and treated with meropenem.     Significant events     24 :     Assessment / Plan     Acute respiratory failure with hypoxia and hypercapnia:   Likely due to right-sided empyema and left lung abscess  Ventilator settings noted and adjusted as needed.   Currently on aggressive diuresis to optimize volume status.  Minimize sedation/analgesia to keep RASS of 0 to -1.    Right-sided empyema/left upper lobe lung abscess  S/p chest tube placement.  Thoracic surgery and ID following.    Pleural fluid grew ESBL E. coli and BAL cultures positive for MRSA.  Currently on meropenem and vancomycin.    Vasodilatory shock  Due to empyema and CHF.  Does not meet SIRS criteria.  Switch phenylephrine/vasopressin to norepinephrine for inotropy.  Some low sugars noted, add stress dose steroids.    Acute thrombocytopenia  Likely from infection, monitor closely.  Check coagulation profile in a.m. to rule out DIC.    Acute Combined Systolic and Diastolic heart failure /pulmonary hypertension:   Currently decompensated.  Last ECHO showed an EF of 40 to 45%, LV diastolic dysfunction, RVSP of 40.   Started on Lasix drip to optimize volume status.  Will  eventually add goal-directed medical therapy when blood pressure permits.  Monitor Input/Output very closely. Follow daily weights.   Net IO Since Admission: 5,339 mL [02/20/24 1114]    Left calcaneal wound: Will get an eventual MRI.    Anxiety/depression: On Celexa and Risperdal.    Cachexia  History of breast cancer  History of right knee replacement    Code status:   Medical Intervention Limits: NO intubation (DNI)  Code Status (Patient has no pulse and is not breathing): No CPR (Do Not Attempt to Resuscitate)  Medical Interventions (Patient has pulse or is breathing): Limited Support       Nutrition: NPO Diet NPO Type: Strict NPO   Diet, Tube Feeding Tube Feeding Formula: Peptamen AF (Vital AF 1.2); Tube Feeding Type: Continuous; Continuous Tube Feeding Start Rate (mL/hr): 20; Then Advance Rate By (mL/hr): Do Not Advance; Every __ Hours: Patient at Goal Rate; To Goal Rate of...    DVT prophylaxis:  Medical DVT prophylaxis orders are present.         Subjective / Review of systems     Review of Systems   Intubated and sedated.  Objective / Physical Exam   Vital signs:  Temp: 97.2 °F (36.2 °C)  BP: 107/45  Heart Rate: 86  Resp: 16  SpO2: 97 %  Weight: 64.5 kg (142 lb 3.2 oz)    Admission Weight: Weight: 67.1 kg (148 lb)  Current Weight: Weight: 64.5 kg (142 lb 3.2 oz)    Input/Output in last 24 hours:    Intake/Output Summary (Last 24 hours) at 2/20/2024 1114  Last data filed at 2/20/2024 0742  Gross per 24 hour   Intake 1088 ml   Output 2817 ml   Net -1729 ml      Physical Exam  Vitals and nursing note reviewed.   Constitutional:       Appearance: She is underweight.      Interventions: She is sedated and intubated.   HENT:      Head: Normocephalic and atraumatic.      Mouth/Throat:      Mouth: Mucous membranes are moist.      Comments: ET tube in place  Eyes:      General: No scleral icterus.     Conjunctiva/sclera: Conjunctivae normal.   Cardiovascular:      Rate and Rhythm: Normal rate.      Pulses: Normal  pulses.      Heart sounds: No murmur heard.     No gallop.   Pulmonary:      Effort: Pulmonary effort is normal. She is intubated.      Breath sounds: Normal breath sounds. No wheezing or rales.      Comments: Right-sided chest tube in place  Abdominal:      General: Bowel sounds are normal.      Palpations: Abdomen is soft.      Tenderness: There is no abdominal tenderness.   Musculoskeletal:      Right lower leg: Edema present.      Left lower leg: Edema present.      Comments: Left heel wound   Neurological:      Comments: Withdraws to [facility]        Radiology and Labs     Results from last 7 days   Lab Units 02/20/24  0523 02/19/24  0646 02/18/24  0520 02/17/24  1408 02/17/24  0510 02/16/24  2055   WBC 10*3/mm3 6.10 4.50 3.70  --  7.20 8.30   HEMATOCRIT % 31.4* 35.5 27.9* 29.9* 23.4* 34.1   PLATELETS 10*3/mm3 81* 107* 109*  --  224 393      Results from last 7 days   Lab Units 02/20/24  0523 02/19/24  0646 02/18/24  0520 02/17/24  0510 02/16/24 2017   SODIUM mmol/L 141 140 137 136 135*   POTASSIUM mmol/L 3.4* 4.1 4.1 4.4 3.9   CHLORIDE mmol/L 107 108* 106 103 102   CO2 mmol/L 27.0 24.0 18.0* 25.0 23.0   BUN mg/dL 16 16 17 16 18   CREATININE mg/dL 0.75 0.83 0.88 0.83 0.87      Current medications   Scheduled Meds: citalopram, 10 mg, Nasogastric, Daily  Divalproex Sodium, 125 mg, Nasogastric, Q12H  enoxaparin, 40 mg, Subcutaneous, Daily  folic acid, 1 mg, Nasogastric, Daily  hydrocortisone sodium succinate, 50 mg, Intravenous, Q6H  lansoprazole, 30 mg, Nasogastric, Q AM  meropenem, 1,000 mg, Intravenous, Q8H  midodrine, 10 mg, Nasogastric, Q8H  potassium chloride, 20 mEq, Nasogastric, BID  potassium chloride, 20 mEq, Intravenous, Q1H  potassium phosphate, 15 mmol, Intravenous, Once  risperiDONE, 0.25 mg, Nasogastric, Nightly  senna-docusate sodium, 2 tablet, Nasogastric, BID  sodium chloride, 250 mL, Intravenous, Once  sodium chloride, 10 mL, Intravenous, Q12H  vancomycin, 750 mg, Intravenous,  Q24H      Continuous Infusions: fentanyl 10 mcg/mL,  mcg/hr, Last Rate: 100 mcg/hr (02/20/24 1059)  furosemide, 10 mg/hr, Last Rate: 10 mg/hr (02/20/24 1023)  norepinephrine, 0.02-0.3 mcg/kg/min, Last Rate: 0.06 mcg/kg/min (02/20/24 1114)  Pharmacy to dose vancomycin,   phenylephrine, 0.5-6 mcg/kg/min, Last Rate: 0.8 mcg/kg/min (02/20/24 1059)  vasopressin, 0.03 Units/min, Last Rate: 0.03 Units/min (02/20/24 0421)      Patient continues to be critically ill, needed high complexity decision making and remains at high risk of clinical deterioration or death. I have spent a total of 31 minutes providing critical care services to this patient including but not limited to : review of labs/ microbiology/imaging/medications, serial monitoring of vital signs, adjusting ventilator settings as needed, review of other consultant's notes, monitoring input/output, review of treatment plan with bedside nurse and other treatment team, management of life support and nutrition needs.    Nabil Queen MD   Critical Care  02/20/24   11:14 EST

## 2024-02-20 NOTE — PROGRESS NOTES
Infectious Diseases Progress Note      LOS: 4 days   Patient Care Team:  Wm Aguirre MD as PCP - General (Sports Medicine)    Chief Complaint: Intubated on the ventilator     Subjective       The patient had no fever during the last 24 hours.  The patient is currently on [place]in-Synephrine and vasopressin drips.  She is currently on the ventilator on 28% FiO2.        Review of Systems:   Review of Systems   Unable to perform ROS: Intubated        Objective     Vital Signs  Temp:  [97 °F (36.1 °C)-97.9 °F (36.6 °C)] 97.2 °F (36.2 °C)  Heart Rate:  [64-86] 86  Resp:  [16-18] 16  BP: (100-111)/(43-53) 107/45  FiO2 (%):  [28 %] 28 %    Physical Exam:  Physical Exam  Vitals and nursing note reviewed.   Constitutional:       General: She is not in acute distress.     Appearance: She is well-developed and normal weight. She is ill-appearing. She is not diaphoretic.   HENT:      Head: Normocephalic and atraumatic.   Eyes:      General: No scleral icterus.     Extraocular Movements: Extraocular movements intact.      Conjunctiva/sclera: Conjunctivae normal.      Pupils: Pupils are equal, round, and reactive to light.   Cardiovascular:      Rate and Rhythm: Normal rate and regular rhythm.      Heart sounds: Normal heart sounds, S1 normal and S2 normal. No murmur heard.  Pulmonary:      Effort: Pulmonary effort is normal. No respiratory distress.      Breath sounds: Normal breath sounds. No stridor. No wheezing or rales.      Comments: Intubated    Right chest tube    Very diminished in the right lobe  Chest:      Chest wall: No tenderness.   Abdominal:      General: Bowel sounds are normal. There is no distension.      Palpations: Abdomen is soft. There is no mass.      Tenderness: There is no abdominal tenderness. There is no guarding.   Genitourinary:     Comments:  Ribera catheter  Musculoskeletal:         General: No swelling, tenderness or deformity.   Skin:     General: Skin is warm and dry.      Coloration: Skin  is not pale.      Findings: No bruising, erythema or rash.      Comments: Left calcaneus unstageable wound          Results Review:    I have reviewed all clinical data, test, lab, and imaging results.     Radiology  XR Chest 1 View    Result Date: 2/20/2024  XR CHEST 1 VW Date of Exam: 2/20/2024 5:38 AM EST Indication: chest tube management Comparison: 2/19/2024 Findings: ET tube is present with tip 1.5 cm from the tonie. Esophagogastric tube courses through midline below the diaphragm, tip not seen. There is a right IJ central line with the tip projecting over the base of the right atrium. There is a new pigtail thoracostomy tube present over the medial right lung base. Interval slight further decrease in right pleural fluid. No pneumothorax identified on today's exam. Large bore right thoracostomy again noted projecting over the medial right base. Small to moderate left pleural effusion is noted. Probable loculated fluid in the fissure. Heart size and mediastinal contour appear unchanged. Aortic vascular calcification is noted.     Impression: 1. Interval placement of right basilar pigtail thoracostomy tube. No pneumothorax. Slight further interval decrease in right pleural fluid. 2. Small left pleural effusion with overlying consolidation. Probable loculated fluid in the left major fissure. Electronically Signed: Nima Briggs MD  2/20/2024 8:18 AM EST  Workstation ID: [id number]     Cardiology    Laboratory    Results from last 7 days   Lab Units 02/20/24  0523 02/19/24  0646 02/18/24  0520 02/17/24  1408 02/17/24  0510 02/16/24 2055 02/16/24  1228   WBC 10*3/mm3 6.10 4.50 3.70  --  7.20 8.30 2.80*   HEMOGLOBIN g/dL 9.7* 10.6* 8.6* 9.4* 6.9* 10.3* 9.4*   HEMATOCRIT % 31.4* 35.5 27.9* 29.9* 23.4* 34.1 29.1*   PLATELETS 10*3/mm3 81* 107* 109*  --  224 393 293     Results from last 7 days   Lab Units 02/20/24  0523 02/19/24  0646 02/18/24  0520 02/17/24  0510 02/16/24 2017 02/16/24  1228   SODIUM mmol/L 141 140  137 136 135* 138   POTASSIUM mmol/L 3.4* 4.1 4.1 4.4 3.9 4.1   CHLORIDE mmol/L 107 108* 106 103 102 100   CO2 mmol/L 27.0 24.0 18.0* 25.0 23.0 28.0   BUN mg/dL 16 16 17 16 18 15   CREATININE mg/dL 0.75 0.83 0.88 0.83 0.87 0.75   GLUCOSE mg/dL 181* 121* 83 96 119* 104*   ALBUMIN g/dL 2.3*  --  3.5  --  2.1* 2.2*   BILIRUBIN mg/dL 0.4  --  0.7  --  0.4 0.4   ALK PHOS U/L 77  --  26*  --  57 61   AST (SGOT) U/L 9  --  7  --  9 8   ALT (SGPT) U/L 6  --  <5  --  6 5   CALCIUM mg/dL 7.3* 7.6* 8.2* 9.3 9.4 9.8         Results from last 7 days   Lab Units 02/18/24  0520   SED RATE mm/hr <1         Microbiology   Microbiology Results (last 10 days)       Procedure Component Value - Date/Time    Body Fluid Culture - Body Fluid, Pleural Cavity [[id number]] Collected: 02/19/24 1124    Lab Status: Preliminary result Specimen: Body Fluid from Pleural Cavity Updated: 02/20/24 0842     Body Fluid Culture Culture in progress     Gram Stain Many (4+) WBCs per low power field      Few (2+) Budding yeast    BAL Culture, Quantitative - Lavage, Bronchus [[id number]]  (Abnormal) Collected: 02/17/24 1709    Lab Status: Preliminary result Specimen: Lavage from Bronchus Updated: 02/20/24 0901     BAL Culture <10,000 CFU/mL Staphylococcus aureus, MRSA     Comment:   Methicillin resistant Staphylococcus aureus, Patient may be an isolation risk.         No Normal Respiratory Maday    Respiratory Culture - Lavage, Lung, Left Upper Lobe [[id number]]  (Abnormal) Collected: 02/17/24 1709    Lab Status: Preliminary result Specimen: Lavage from Lung, Left Upper Lobe Updated: 02/20/24 0903     Respiratory Culture Rare Staphylococcus aureus, MRSA     Comment:   Methicillin resistant Staphylococcus aureus, Patient may be an isolation risk.         No Normal Respiratory Maday     Gram Stain Rare (1+) WBCs per low power field      Rare (1+) Epithelial cells per low power field      No organisms seen    AFB Culture - Lavage, Lung, Left Upper Lobe [[id number]]  Collected: 02/17/24 1709    Lab Status: Preliminary result Specimen: Lavage from Lung, Left Upper Lobe Updated: 02/19/24 0954     AFB Stain No acid fast bacilli seen on concentrated smear    MRSA Screen, PCR (Inpatient) - Swab, Nares [[id number]]  (Abnormal) Collected: 02/17/24 0854    Lab Status: Final result Specimen: Swab from Nares Updated: 02/17/24 1015     MRSA PCR MRSA Detected    Narrative:      The negative predictive value of this diagnostic test is high and should only be used to consider de-escalating anti-MRSA therapy. A positive result may indicate colonization with MRSA and must be correlated clinically.    Respiratory Panel PCR w/COVID-19(SARS-CoV-2) NAYELI/CAMILLE/HUSAM/PAD/COR/SUMAN In-House, NP Swab in UTM/VTM, 2 HR TAT - Swab, Nasopharynx [[id number]]  (Normal) Collected: 02/17/24 0854    Lab Status: Final result Specimen: Swab from Nasopharynx Updated: 02/17/24 0953     ADENOVIRUS, PCR Not Detected     Coronavirus 229E Not Detected     Coronavirus HKU1 Not Detected     Coronavirus NL63 Not Detected     Coronavirus OC43 Not Detected     COVID19 Not Detected     Human Metapneumovirus Not Detected     Human Rhinovirus/Enterovirus Not Detected     Influenza A PCR Not Detected     Influenza B PCR Not Detected     Parainfluenza Virus 1 Not Detected     Parainfluenza Virus 2 Not Detected     Parainfluenza Virus 3 Not Detected     Parainfluenza Virus 4 Not Detected     RSV, PCR Not Detected     Bordetella pertussis pcr Not Detected     Bordetella parapertussis PCR Not Detected     Chlamydophila pneumoniae PCR Not Detected     Mycoplasma pneumo by PCR Not Detected    Narrative:      In the setting of a positive respiratory panel with a viral infection PLUS a negative procalcitonin without other underlying concern for bacterial infection, consider observing off antibiotics or discontinuation of antibiotics and continue supportive care. If the respiratory panel is positive for atypical bacterial infection (Bordetella  pertussis, Chlamydophila pneumoniae, or Mycoplasma pneumoniae), consider antibiotic de-escalation to target atypical bacterial infection.    Legionella Antigen, Urine - Urine, Urine, Clean Catch [[id number]]  (Normal) Collected: 02/17/24 0852    Lab Status: Final result Specimen: Urine, Clean Catch Updated: 02/17/24 0927     LEGIONELLA ANTIGEN, URINE Negative    S. Pneumo Ag Urine or CSF - Urine, Urine, Clean Catch [[id number]]  (Normal) Collected: 02/17/24 0852    Lab Status: Final result Specimen: Urine, Clean Catch Updated: 02/17/24 0927     Strep Pneumo Ag Negative    Blood Culture - Blood, Hand, Right [[id number]]  (Normal) Collected: 02/16/24 1737    Lab Status: Preliminary result Specimen: Blood from Hand, Right Updated: 02/19/24 1801     Blood Culture No growth at 3 days    Blood Culture - Blood, Hand, Left [[id number]]  (Normal) Collected: 02/16/24 1736    Lab Status: Preliminary result Specimen: Blood from Hand, Left Updated: 02/19/24 1801     Blood Culture No growth at 3 days    Narrative:      Less than seven (7) mL's of blood was collected.  Insufficient quantity may yield false negative results.    AFB Culture - Body Fluid, Pleural Cavity [[id number]] Collected: 02/16/24 1556    Lab Status: Preliminary result Specimen: Body Fluid from Pleural Cavity Updated: 02/17/24 1142     AFB Stain No acid fast bacilli seen on concentrated smear    Body Fluid Culture - Body Fluid, Pleural Cavity [[id number]]  (Abnormal)  (Susceptibility) Collected: 02/16/24 1556    Lab Status: Final result Specimen: Body Fluid from Pleural Cavity Updated: 02/18/24 1143     Body Fluid Culture Scant growth (1+) Escherichia coli ESBL     Comment:   Consider infectious disease consult.  Susceptibility results may not correlate to clinical outcomes.         Rare Normal Skin Maday     Gram Stain Moderate (3+) WBCs per low power field      No organisms seen    Susceptibility        Escherichia coli ESBL      TARYN      Ertapenem Susceptible       Levofloxacin Susceptible      Meropenem Susceptible      Trimethoprim + Sulfamethoxazole Susceptible                       Susceptibility Comments       Escherichia coli ESBL    Cefazolin sensitivity will not be reported for Enterobacteriaceae in non-urine isolates. If cefazolin is preferred, please call the microbiology lab to request an E-test.  With the exception of urinary-sourced infections, aminoglycosides should not be used as monotherapy.               Anaerobic Culture - Pleural Fluid, Pleural Cavity [[id number]]  (Normal) Collected: 02/16/24 1556    Lab Status: Preliminary result Specimen: Pleural Fluid from Pleural Cavity Updated: 02/19/24 0940     Anaerobic Culture No anaerobes isolated at 3 days    Fungus Smear - Body Fluid, Pleural Cavity [[id number]] Collected: 02/16/24 1556    Lab Status: Final result Specimen: Body Fluid from Pleural Cavity Updated: 02/17/24 1205     Fungal Stain No fungal elements seen            Medication Review:       Schedule Meds  citalopram, 10 mg, Nasogastric, Daily  Divalproex Sodium, 125 mg, Nasogastric, Q12H  enoxaparin, 40 mg, Subcutaneous, Daily  folic acid, 1 mg, Nasogastric, Daily  hydrocortisone sodium succinate, 50 mg, Intravenous, Q6H  lansoprazole, 30 mg, Nasogastric, Q AM  meropenem, 1,000 mg, Intravenous, Q8H  midodrine, 10 mg, Nasogastric, Q8H  potassium chloride, 20 mEq, Nasogastric, BID  potassium phosphate, 15 mmol, Intravenous, Once  risperiDONE, 0.25 mg, Nasogastric, Nightly  senna-docusate sodium, 2 tablet, Nasogastric, BID  sodium chloride, 250 mL, Intravenous, Once  sodium chloride, 10 mL, Intravenous, Q12H  vancomycin, 750 mg, Intravenous, Q24H        Infusion Meds  fentanyl 10 mcg/mL,  mcg/hr, Last Rate: 100 mcg/hr (02/20/24 1206)  furosemide, 10 mg/hr, Last Rate: 10 mg/hr (02/20/24 1023)  norepinephrine, 0.02-0.3 mcg/kg/min, Last Rate: 0.06 mcg/kg/min (02/20/24 1114)  Pharmacy to dose vancomycin,   phenylephrine, 0.5-6 mcg/kg/min, Last Rate: 0.6  mcg/kg/min (02/20/24 1114)  vasopressin, 0.03 Units/min, Last Rate: 0.03 Units/min (02/20/24 0501)        PRN Meds    acetaminophen **OR** acetaminophen    dextrose    dextrose    fentaNYL citrate (PF)    glucagon (human recombinant)    lidocaine    Magnesium Standard Dose Replacement - Follow Nurse / BPA Driven Protocol    ondansetron    ondansetron ODT    Pharmacy to dose vancomycin    Phosphorus Replacement - Follow Nurse / BPA Driven Protocol    polyethylene glycol    Potassium Replacement - Follow Nurse / BPA Driven Protocol    [COMPLETED] Insert Peripheral IV **AND** sodium chloride    sodium chloride    sodium chloride        Assessment & Plan       Antimicrobial Therapy   1.  IV vancomycin        2.  IV meropenem            Assessment     Right thoracic empyema.  Fluid analysis showed findings consistent with empyema with cultures growing ESBL  E. coli.  The presentation is concerning for micro fistula between the abdomen and thoracic cavity.  Patient s/p chest tube placement by IR which was replaced on February 19, 2024.     Left upper lobe cavitary lesion most likely consistent with left lung abscess.  Suspecting the same pathogen involving the right lung causing this infection.   BAL culture from February 17 is  growing MRSA    Acute hypoxic respiratory failure-likely related to the above.  Patient is now intubated on 28% FiO2    Septic shock-patient is currently on [place]-Synephrine and vasopressin     Left calcaneus wound, unstageable with black eschar.  I doubt this is a peripheral vascular disease patient has strong pulse.  Most likely this is a pressure ulcer     Probable underlying dementia based on my assessment.  Patient symptoms might get worse and with cefepime.          Plan     Continue IV vancomycin for now, pharmacy currently following and dosing and keep trough between 15-20.  Recommend at least 2 weeks of IV vancomycin  Continue IV meropenem 1 g every 8 hours for 4 weeks  Repeat CT  scan of the chest in 2 to 3 days  Consider MRI of the left foot when patient is more stable, can be done later during this admission  Continue supportive care  A.m. labs   The case was discussed with ICU service  Recommend to replace the current central line with PICC line      Himanshu Spaulding MD  02/20/24  12:15 EST    Note is dictated utilizing voice recognition software/Dragon

## 2024-02-20 NOTE — PROGRESS NOTES
"    Chief Complaint: Right Empyema  S/P: CT-guided chest tube      Subjective:  Symptoms:  Stable.    Remains intubated and sedated in the ICU.   Dependent on vasopressors.    Vital Signs:  Temp:  [97 °F (36.1 °C)-98 °F (36.7 °C)] 97.2 °F (36.2 °C)  Heart Rate:  [64-86] 83  Resp:  [16-18] 18  BP: (100-111)/(43-75) 111/47  FiO2 (%):  [28 %] 28 %    Intake & Output (last day)         02/19 0701  02/20 0700 02/20 0701  02/21 0700    I.V. (mL/kg) 2250 (34.9)     Other 172     NG/     Total Intake(mL/kg) 2822 (43.8)     Urine (mL/kg/hr) 3020 (2)     Chest Tube 225 16    Total Output 3245 16    Net -423 -16                  Objective:  General Appearance:  Ill-appearing and in no acute distress.    Vital signs: (most recent): Blood pressure 107/45, pulse 86, temperature 97.2 °F (36.2 °C), resp. rate 16, height 152.4 cm (60\"), weight 64.5 kg (142 lb 3.2 oz), SpO2 97%.    HEENT: (ET tube)    Lungs:  Normal effort and normal respiratory rate.  She is not in respiratory distress.  There are decreased breath sounds.    Chest: Symmetric chest wall expansion.   Abdomen: Abdomen is soft and non-distended.    Extremities: Decreased range of motion.    Neurological: (Sedated).    Skin:  Warm and dry.                Large bore chest tube:   Site: Right, Clean, Dry, and Intact  Suction: -20 cm  Air Leak: negative  24 Hour Total: 57ml    CT-guided chest tube:  Site: Right, Clean, Dry, and Intact  Suction: -20 cm  Air Leak: negative  24 Hour Total: 168ml    Results Review:     I reviewed the patient's new clinical results.  I reviewed the patient's new imaging results and agree with the interpretation.  Discussed with nurse, and Dr. Gallagher    Imaging reviewed independently and agree with interpretation.     Imaging Results (Last 24 Hours)       Procedure Component Value Units Date/Time    XR Chest 1 View [[id number]] Collected: 02/20/24 0814     Updated: 02/20/24 0820    Narrative:      XR CHEST 1 VW    Date of Exam: 2/20/2024 5:38 " AM EST    Indication: chest tube management    Comparison: 2/19/2024    Findings:  ET tube is present with tip 1.5 cm from the tonie. Esophagogastric tube courses through midline below the diaphragm, tip not seen. There is a right IJ central line with the tip projecting over the base of the right atrium. There is a new pigtail   thoracostomy tube present over the medial right lung base. Interval slight further decrease in right pleural fluid. No pneumothorax identified on today's exam. Large bore right thoracostomy again noted projecting over the medial right base. Small to   moderate left pleural effusion is noted. Probable loculated fluid in the fissure. Heart size and mediastinal contour appear unchanged. Aortic vascular calcification is noted.      Impression:      Impression:    1. Interval placement of right basilar pigtail thoracostomy tube. No pneumothorax. Slight further interval decrease in right pleural fluid.  2. Small left pleural effusion with overlying consolidation. Probable loculated fluid in the left major fissure.      Electronically Signed: Nima Briggs MD    2/20/2024 8:18 AM EST    Workstation ID: [id number]    CT Drain Perq Pleura Insert Cath [[id number]] Collected: 02/19/24 1156     Updated: 02/19/24 1205    Narrative:      CT DRAIN PERQ PLEURA INSERT CATH    Date of Exam: 2/19/2024 10:46 AM EST    Indication: Loculated right pleural effusion. Existing large bore chest tube placed at the bedside by critical care is not functioning.    Comparison: 2/18/2024 CT    Fluoroscopic Time: 67 seconds. 766 mGy.    Sedation time: 0. Patient is sedated on a vent.    Description of procedure: Informed consent was obtained by telephone from patient's consenting family member. She was placed prone on the CT table and a brief noncontrasted scan performed through the region of interest using a localization grid. This   demonstrates a small posteriorly loculated pleural fluid collection measuring 1.4 cm in  maximum thickness. The overlying skin was marked and the area prepped and draped sterilely. Lidocaine was used for local anesthesia. Access was obtained in over the   rib fashion using an 18-gauge Sood blunt needle. After confirming appropriate position of the needle tip within the collection, a Orca Systemsson wire was coiled in the pleural space, and the tract was dilated to accommodate a 10 [place] pigtail drain   catheter. A larger tube was not possible given the small amount of fluid. The catheter was aspirated by syringe which yielded clear serosanguineous fluid. A sample was sent for the requested studies. The catheter was secured in place at the chest wall   using 0 Ethibond suture material. Vaseline gauze and sterile dressings were applied. The catheter was attached to Pleur-evac at low wall suction. Patient tolerated the procedure well.      Impression:      Impression:  CT-guided placement of additional right-sided thoracostomy tube for management of posteriorly loculated pleural fluid collection. Fluid sample was sent for all requested studies.      Electronically Signed: Nima Briggs MD    2/19/2024 12:03 PM EST    Workstation ID: [id number]            Lab Results:     Lab Results (last 24 hours)       Procedure Component Value Units Date/Time    Respiratory Culture - Lavage, Lung, Left Upper Lobe [[id number]]  (Abnormal) Collected: 02/17/24 1709    Specimen: Lavage from Lung, Left Upper Lobe Updated: 02/20/24 0903     Respiratory Culture Rare Staphylococcus aureus, MRSA     Comment:   Methicillin resistant Staphylococcus aureus, Patient may be an isolation risk.         No Normal Respiratory Maday     Gram Stain Rare (1+) WBCs per low power field      Rare (1+) Epithelial cells per low power field      No organisms seen    BAL Culture, Quantitative - Lavage, Bronchus [[id number]]  (Abnormal) Collected: 02/17/24 1709    Specimen: Lavage from Bronchus Updated: 02/20/24 0901     BAL Culture <10,000 CFU/mL  Staphylococcus aureus, MRSA     Comment:   Methicillin resistant Staphylococcus aureus, Patient may be an isolation risk.         No Normal Respiratory Maday    Body Fluid Culture - Body Fluid, Pleural Cavity [[id number]] Collected: 02/19/24 1124    Specimen: Body Fluid from Pleural Cavity Updated: 02/20/24 0842     Body Fluid Culture Culture in progress     Gram Stain Many (4+) WBCs per low power field      Few (2+) Budding yeast    Magnesium [[id number]]  (Normal) Collected: 02/20/24 0523    Specimen: Blood Updated: 02/20/24 0839     Magnesium 1.9 mg/dL     Phosphorus [[id number]]  (Abnormal) Collected: 02/20/24 0523    Specimen: Blood Updated: 02/20/24 0839     Phosphorus 2.0 mg/dL     CBC & Differential [[id number]]  (Abnormal) Collected: 02/20/24 0523    Specimen: Blood Updated: 02/20/24 0645    Narrative:      The following orders were created for panel order CBC & Differential.  Procedure                               Abnormality         Status                     ---------                               -----------         ------                     CBC Auto Differential[[id number]]        Abnormal            Final result               Scan Slide[[id number]]                                       Final result                 Please view results for these tests on the individual orders.    CBC Auto Differential [[id number]]  (Abnormal) Collected: 02/20/24 0523    Specimen: Blood Updated: 02/20/24 0645     WBC 6.10 10*3/mm3      RBC 3.63 10*6/mm3      Hemoglobin 9.7 g/dL      Hematocrit 31.4 %      MCV 86.6 fL      MCH 26.8 pg      MCHC 30.9 g/dL      RDW 19.1 %      RDW-SD 61.3 fl      MPV 8.7 fL      Platelets 81 10*3/mm3     Narrative:      The previously reported component NRBC is no longer being reported. Previous result was 1.1 /100 WBC (Reference Range: 0.0-0.2 /100 WBC) on 2/20/2024 at 0559 EST.    Scan Slide [[id number]] Collected: 02/20/24 0523    Specimen: Blood Updated: 02/20/24 0645     Scan Slide --      Comment: See Manual Differential Results       Manual Differential [[id number]]  (Abnormal) Collected: 02/20/24 0523    Specimen: Blood Updated: 02/20/24 0645     Neutrophil % 48.0 %      Lymphocyte % 22.0 %      Monocyte % 7.0 %      Eosinophil % 1.0 %      Bands %  19.0 %      Metamyelocyte % 1.0 %      Myelocyte % 2.0 %      Neutrophils Absolute 4.09 10*3/mm3      Lymphocytes Absolute 1.34 10*3/mm3      Monocytes Absolute 0.43 10*3/mm3      Eosinophils Absolute 0.06 10*3/mm3      nRBC 2.0 /100 WBC      RBC Morphology Normal     Toxic Granulation Slight/1+     Platelet Estimate Decreased    Comprehensive Metabolic Panel [[id number]]  (Abnormal) Collected: 02/20/24 0523    Specimen: Blood Updated: 02/20/24 0611     Glucose 181 mg/dL      BUN 16 mg/dL      Creatinine 0.75 mg/dL      Sodium 141 mmol/L      Potassium 3.4 mmol/L      Chloride 107 mmol/L      CO2 27.0 mmol/L      Calcium 7.3 mg/dL      Total Protein 4.5 g/dL      Albumin 2.3 g/dL      ALT (SGPT) 6 U/L      AST (SGOT) 9 U/L      Alkaline Phosphatase 77 U/L      Total Bilirubin 0.4 mg/dL      Globulin 2.2 gm/dL      A/G Ratio 1.0 g/dL      BUN/Creatinine Ratio 21.3     Anion Gap 7.0 mmol/L      eGFR 85.2 mL/min/1.73     Narrative:      GFR Normal >60  Chronic Kidney Disease <60  Kidney Failure <15    The GFR formula is only valid for adults with stable renal function between ages 18 and 70.    POC Glucose Once [[id number]]  (Abnormal) Collected: 02/20/24 0527    Specimen: Blood Updated: 02/20/24 0528     Glucose 169 mg/dL      Comment: Serial Number: [id number]:  [id number]       POC Glucose Once [[id number]]  (Abnormal) Collected: 02/19/24 2340    Specimen: Blood Updated: 02/19/24 2341     Glucose 151 mg/dL      Comment: Serial Number: [id number]:  [id number]       Phosphorus [[id number]]  (Normal) Collected: 02/19/24 2125    Specimen: Blood Updated: 02/19/24 2148     Phosphorus 2.7 mg/dL     Blood Culture - Blood, Hand, Right [[id number]]   (Normal) Collected: 02/16/24 1737    Specimen: Blood from Hand, Right Updated: 02/19/24 1801     Blood Culture No growth at 3 days    Blood Culture - Blood, Hand, Left [[id number]]  (Normal) Collected: 02/16/24 1736    Specimen: Blood from Hand, Left Updated: 02/19/24 1801     Blood Culture No growth at 3 days    Narrative:      Less than seven (7) mL's of blood was collected.  Insufficient quantity may yield false negative results.    POC Glucose Once [[id number]]  (Abnormal) Collected: 02/19/24 1708    Specimen: Blood Updated: 02/19/24 1709     Glucose 106 mg/dL      Comment: Serial Number: [id number]:  [id number]       POC Glucose Q6H [[id number]]  (Normal) Collected: 02/19/24 1207    Specimen: Blood Updated: 02/19/24 1209     Glucose 82 mg/dL      Comment: Serial Number: [id number]:  [id number]       Non-gynecologic Cytology [[id number]] Collected: 02/19/24 1124    Specimen: Pleural Fluid from Pleural Cavity Updated: 02/19/24 1202    AFB Culture - Lavage, Lung, Left Upper Lobe [[id number]] Collected: 02/17/24 1709    Specimen: Lavage from Lung, Left Upper Lobe Updated: 02/19/24 0954     AFB Stain No acid fast bacilli seen on concentrated smear    Pathology Consultation [[id number]] Collected: 02/16/24 2055    Specimen: Blood, Venous Line Updated: 02/19/24 0945     Final Diagnosis --     Left shifted granulocytes  Anemia  No blasts identified       Case Report --     Surgical Pathology Report                         Case: [id number]                                  Authorizing Provider:  Adeline Power APRN  Collected:           02/16/2024 08:55 PM          Ordering Location:     [facility]       Received:            02/19/2024 08:48 AM                                 [facility]                                                          Pathologist:           Vel Ch MD                                                            Specimen:    Blood, Venous Line                                                                          Anaerobic Culture - Pleural Fluid, Pleural Cavity [[id number]]  (Normal) Collected: 02/16/24 1556    Specimen: Pleural Fluid from Pleural Cavity Updated: 02/19/24 0940     Anaerobic Culture No anaerobes isolated at 3 days    Phosphorus [[id number]]  (Abnormal) Collected: 02/19/24 0646    Specimen: Blood Updated: 02/19/24 0916     Phosphorus 1.9 mg/dL              Assessment & Plan       Large pleural effusion       Assessment & Plan    Right Pleural effusion: Concerning for empyema as pleural fluid cultures grew ESBL and E. coli.  S/p CT-guided chest tube placement on 2/19/2024.  Large bore chest tube removed today at the bedside. We will plan to check a CXR in 2h. Possible intrapleural TPA this afternoon pending stable imaging.     Pulmonary abscess: Surgical intervention is not recommended with increased risk of development of bronchopleural fistula in the setting of abscess.  Antibiotics per infectious disease recommendations.    Respiratory failure: ventilator management per ICU service    Pulmonary nodules: Recommend continued surveillance in the future.    Nohemi Mckeon, JASPREET, APRN  [facility]  02/20/24  09:11 EST      Greater than 32 minutes was spent reviewing the patient's chart, radiographic imaging, assessing the patient and developing a plan of care which was discussed with the patient and RN.  This excludes any other billable procedures which will be dictated separately.

## 2024-02-21 ENCOUNTER — APPOINTMENT (OUTPATIENT)
Dept: GENERAL RADIOLOGY | Facility: HOSPITAL | Age: 74
DRG: 208 | End: 2024-02-21
Payer: MEDICARE

## 2024-02-21 PROBLEM — Z85.3 HISTORY OF MALIGNANT NEOPLASM OF BREAST: Status: ACTIVE | Noted: 2024-02-21

## 2024-02-21 PROBLEM — N32.81 OVERACTIVE BLADDER: Status: ACTIVE | Noted: 2024-02-21

## 2024-02-21 LAB
ALBUMIN SERPL-MCNC: 2.6 G/DL (ref 3.5–5.2)
ALBUMIN/GLOB SERPL: 0.9 G/DL
ALP SERPL-CCNC: 102 U/L (ref 39–117)
ALT SERPL W P-5'-P-CCNC: 7 U/L (ref 1–33)
ANION GAP SERPL CALCULATED.3IONS-SCNC: 8 MMOL/L (ref 5–15)
APTT PPP: 37.4 SECONDS (ref 24–31)
ARTERIAL PATENCY WRIST A: ABNORMAL
AST SERPL-CCNC: 12 U/L (ref 1–32)
ATMOSPHERIC PRESS: ABNORMAL MM[HG]
BACTERIA SPEC AEROBE CULT: ABNORMAL
BACTERIA SPEC AEROBE CULT: ABNORMAL
BACTERIA SPEC AEROBE CULT: NORMAL
BACTERIA SPEC AEROBE CULT: NORMAL
BACTERIA SPEC ANAEROBE CULT: NORMAL
BACTERIA SPEC RESP CULT: ABNORMAL
BACTERIA SPEC RESP CULT: ABNORMAL
BASE EXCESS BLDA CALC-SCNC: 6.3 MMOL/L (ref 0–3)
BDY SITE: ABNORMAL
BILIRUB SERPL-MCNC: 0.5 MG/DL (ref 0–1.2)
BUN SERPL-MCNC: 17 MG/DL (ref 8–23)
BUN/CREAT SERPL: 22.1 (ref 7–25)
CALCIUM SPEC-SCNC: 8.1 MG/DL (ref 8.6–10.5)
CHLORIDE SERPL-SCNC: 101 MMOL/L (ref 98–107)
CO2 BLDA-SCNC: 31.2 MMOL/L (ref 22–29)
CO2 SERPL-SCNC: 31 MMOL/L (ref 22–29)
CREAT SERPL-MCNC: 0.77 MG/DL (ref 0.57–1)
DACRYOCYTES BLD QL SMEAR: ABNORMAL
DEPRECATED RDW RBC AUTO: 62.6 FL (ref 37–54)
EGFRCR SERPLBLD CKD-EPI 2021: 82.6 ML/MIN/1.73
ERYTHROCYTE [DISTWIDTH] IN BLOOD BY AUTOMATED COUNT: 19.6 % (ref 12.3–15.4)
FIBRINOGEN PPP-MCNC: 444 MG/DL (ref 210–450)
GLOBULIN UR ELPH-MCNC: 2.8 GM/DL
GLUCOSE BLDC GLUCOMTR-MCNC: 122 MG/DL (ref 70–105)
GLUCOSE BLDC GLUCOMTR-MCNC: 122 MG/DL (ref 70–105)
GLUCOSE BLDC GLUCOMTR-MCNC: 151 MG/DL (ref 70–105)
GLUCOSE BLDC GLUCOMTR-MCNC: 152 MG/DL (ref 70–105)
GLUCOSE SERPL-MCNC: 146 MG/DL (ref 65–99)
GRAM STN SPEC: ABNORMAL
HCO3 BLDA-SCNC: 30 MMOL/L (ref 21–28)
HCT VFR BLD AUTO: 31.7 % (ref 34–46.6)
HEMODILUTION: NO
HGB BLD-MCNC: 10 G/DL (ref 12–15.9)
INHALED O2 CONCENTRATION: 28 %
INR PPP: 1.37 (ref 0.93–1.1)
LARGE PLATELETS: ABNORMAL
LYMPHOCYTES # BLD MANUAL: 1.79 10*3/MM3 (ref 0.7–3.1)
LYMPHOCYTES NFR BLD MANUAL: 16 % (ref 5–12)
MAGNESIUM SERPL-MCNC: 1.8 MG/DL (ref 1.6–2.4)
MAGNESIUM SERPL-MCNC: 1.9 MG/DL (ref 1.6–2.4)
MCH RBC QN AUTO: 27 PG (ref 26.6–33)
MCHC RBC AUTO-ENTMCNC: 31.6 G/DL (ref 31.5–35.7)
MCV RBC AUTO: 85.4 FL (ref 79–97)
METAMYELOCYTES NFR BLD MANUAL: 5 % (ref 0–0)
MODALITY: ABNORMAL
MONOCYTES # BLD: 1.25 10*3/MM3 (ref 0.1–0.9)
MYELOCYTES NFR BLD MANUAL: 1 % (ref 0–0)
NEUTROPHILS # BLD AUTO: 4.29 10*3/MM3 (ref 1.7–7)
NEUTROPHILS NFR BLD MANUAL: 40 % (ref 42.7–76)
NEUTS BAND NFR BLD MANUAL: 15 % (ref 0–5)
NRBC SPEC MANUAL: 1 /100 WBC (ref 0–0.2)
PCO2 BLDA: 38.8 MM HG (ref 35–48)
PEEP RESPIRATORY: 5 CM[H2O]
PH BLDA: 7.5 PH UNITS (ref 7.35–7.45)
PHOSPHATE SERPL-MCNC: 2.2 MG/DL (ref 2.5–4.5)
PHOSPHATE SERPL-MCNC: 2.2 MG/DL (ref 2.5–4.5)
PLATELET # BLD AUTO: 79 10*3/MM3 (ref 140–450)
PMV BLD AUTO: 9.2 FL (ref 6–12)
PO2 BLDA: 101.3 MM HG (ref 83–108)
POIKILOCYTOSIS BLD QL SMEAR: ABNORMAL
POLYCHROMASIA BLD QL SMEAR: ABNORMAL
POTASSIUM SERPL-SCNC: 3.7 MMOL/L (ref 3.5–5.2)
POTASSIUM SERPL-SCNC: 4.5 MMOL/L (ref 3.5–5.2)
PROT SERPL-MCNC: 5.4 G/DL (ref 6–8.5)
PROTHROMBIN TIME: 14.6 SECONDS (ref 9.6–11.7)
PSV: 10 CMH2O
QT INTERVAL: 386 MS
QTC INTERVAL: 518 MS
RBC # BLD AUTO: 3.71 10*6/MM3 (ref 3.77–5.28)
SAO2 % BLDCOA: 98.3 % (ref 94–98)
SCAN SLIDE: NORMAL
SMALL PLATELETS BLD QL SMEAR: ABNORMAL
SODIUM SERPL-SCNC: 140 MMOL/L (ref 136–145)
TOXIC GRANULATION: ABNORMAL
VALPROATE FREE SERPL-MCNC: 2.6 UG/ML (ref 6–22)
VANCOMYCIN TROUGH SERPL-MCNC: 18.8 MCG/ML (ref 5–20)
VARIANT LYMPHS NFR BLD MANUAL: 20 % (ref 19.6–45.3)
VARIANT LYMPHS NFR BLD MANUAL: 3 % (ref 0–5)
VENTILATOR MODE: ABNORMAL
WBC NRBC COR # BLD AUTO: 7.8 10*3/MM3 (ref 3.4–10.8)

## 2024-02-21 PROCEDURE — 84100 ASSAY OF PHOSPHORUS: CPT | Performed by: INTERNAL MEDICINE

## 2024-02-21 PROCEDURE — 25010000002 MAGNESIUM SULFATE 2 GM/50ML SOLUTION: Performed by: INTERNAL MEDICINE

## 2024-02-21 PROCEDURE — 25010000002 VANCOMYCIN 750 MG RECONSTITUTED SOLUTION 1 EACH VIAL: Performed by: INTERNAL MEDICINE

## 2024-02-21 PROCEDURE — 94799 UNLISTED PULMONARY SVC/PX: CPT

## 2024-02-21 PROCEDURE — 25010000002 HYDROCORTISONE SOD SUC (PF) 100 MG RECONSTITUTED SOLUTION: Performed by: NURSE PRACTITIONER

## 2024-02-21 PROCEDURE — 85384 FIBRINOGEN ACTIVITY: CPT | Performed by: INTERNAL MEDICINE

## 2024-02-21 PROCEDURE — 85730 THROMBOPLASTIN TIME PARTIAL: CPT | Performed by: INTERNAL MEDICINE

## 2024-02-21 PROCEDURE — 80202 ASSAY OF VANCOMYCIN: CPT | Performed by: INTERNAL MEDICINE

## 2024-02-21 PROCEDURE — 82948 REAGENT STRIP/BLOOD GLUCOSE: CPT

## 2024-02-21 PROCEDURE — 25010000002 MEROPENEM PER 100 MG: Performed by: NURSE PRACTITIONER

## 2024-02-21 PROCEDURE — 99232 SBSQ HOSP IP/OBS MODERATE 35: CPT | Performed by: NURSE PRACTITIONER

## 2024-02-21 PROCEDURE — 71045 X-RAY EXAM CHEST 1 VIEW: CPT

## 2024-02-21 PROCEDURE — 85025 COMPLETE CBC W/AUTO DIFF WBC: CPT | Performed by: INTERNAL MEDICINE

## 2024-02-21 PROCEDURE — 25810000003 SODIUM CHLORIDE 0.9 % SOLUTION: Performed by: INTERNAL MEDICINE

## 2024-02-21 PROCEDURE — 32562 LYSE CHEST FIBRIN SUBQ DAY: CPT | Performed by: NURSE PRACTITIONER

## 2024-02-21 PROCEDURE — 25010000002 VASOPRESSIN 0.2 UNIT/ML SOLUTION: Performed by: INTERNAL MEDICINE

## 2024-02-21 PROCEDURE — 25010000002 MEROPENEM PER 100 MG: Performed by: INTERNAL MEDICINE

## 2024-02-21 PROCEDURE — 85007 BL SMEAR W/DIFF WBC COUNT: CPT | Performed by: INTERNAL MEDICINE

## 2024-02-21 PROCEDURE — 25010000002 MICAFUNGIN SODIUM 100 MG RECONSTITUTED SOLUTION 1 EACH VIAL: Performed by: INTERNAL MEDICINE

## 2024-02-21 PROCEDURE — 25010000002 FUROSEMIDE PER 20 MG: Performed by: INTERNAL MEDICINE

## 2024-02-21 PROCEDURE — 25010000002 POTASSIUM CHLORIDE 10 MEQ/100ML SOLUTION: Performed by: NURSE PRACTITIONER

## 2024-02-21 PROCEDURE — 94761 N-INVAS EAR/PLS OXIMETRY MLT: CPT

## 2024-02-21 PROCEDURE — 80053 COMPREHEN METABOLIC PANEL: CPT | Performed by: INTERNAL MEDICINE

## 2024-02-21 PROCEDURE — 25810000003 SODIUM CHLORIDE 0.9 % SOLUTION 250 ML FLEX CONT: Performed by: INTERNAL MEDICINE

## 2024-02-21 PROCEDURE — 94003 VENT MGMT INPAT SUBQ DAY: CPT

## 2024-02-21 PROCEDURE — 93010 ELECTROCARDIOGRAM REPORT: CPT | Performed by: STUDENT IN AN ORGANIZED HEALTH CARE EDUCATION/TRAINING PROGRAM

## 2024-02-21 PROCEDURE — 25010000002 HYDROCORTISONE SOD SUC (PF) 100 MG RECONSTITUTED SOLUTION: Performed by: INTERNAL MEDICINE

## 2024-02-21 PROCEDURE — 82803 BLOOD GASES ANY COMBINATION: CPT | Performed by: INTERNAL MEDICINE

## 2024-02-21 PROCEDURE — 3E0L3GC INTRODUCTION OF OTHER THERAPEUTIC SUBSTANCE INTO PLEURAL CAVITY, PERCUTANEOUS APPROACH: ICD-10-PCS | Performed by: NURSE PRACTITIONER

## 2024-02-21 PROCEDURE — 83735 ASSAY OF MAGNESIUM: CPT | Performed by: INTERNAL MEDICINE

## 2024-02-21 PROCEDURE — 93005 ELECTROCARDIOGRAM TRACING: CPT | Performed by: NURSE PRACTITIONER

## 2024-02-21 PROCEDURE — 85610 PROTHROMBIN TIME: CPT | Performed by: INTERNAL MEDICINE

## 2024-02-21 PROCEDURE — 25010000002 ALTEPLASE 2 MG RECONSTITUTED SOLUTION 1 EACH VIAL: Performed by: NURSE PRACTITIONER

## 2024-02-21 PROCEDURE — 83735 ASSAY OF MAGNESIUM: CPT

## 2024-02-21 PROCEDURE — 84132 ASSAY OF SERUM POTASSIUM: CPT | Performed by: INTERNAL MEDICINE

## 2024-02-21 RX ORDER — LACTULOSE 10 G/15ML
10 SOLUTION ORAL 3 TIMES DAILY PRN
Status: DISCONTINUED | OUTPATIENT
Start: 2024-02-21 | End: 2024-02-21

## 2024-02-21 RX ORDER — FENTANYL/ROPIVACAINE/NS/PF 2-625MCG/1
15 PLASTIC BAG, INJECTION (ML) EPIDURAL ONCE
Status: COMPLETED | OUTPATIENT
Start: 2024-02-21 | End: 2024-02-21

## 2024-02-21 RX ORDER — FUROSEMIDE 10 MG/ML
40 INJECTION INTRAMUSCULAR; INTRAVENOUS EVERY 8 HOURS
Status: COMPLETED | OUTPATIENT
Start: 2024-02-21 | End: 2024-02-21

## 2024-02-21 RX ORDER — POTASSIUM CHLORIDE 7.45 MG/ML
10 INJECTION INTRAVENOUS
Status: DISPENSED | OUTPATIENT
Start: 2024-02-21 | End: 2024-02-21

## 2024-02-21 RX ORDER — ACETAMINOPHEN 650 MG
TABLET, EXTENDED RELEASE ORAL DAILY
Status: DISCONTINUED | OUTPATIENT
Start: 2024-02-21 | End: 2024-02-25

## 2024-02-21 RX ORDER — MAGNESIUM SULFATE HEPTAHYDRATE 40 MG/ML
2 INJECTION, SOLUTION INTRAVENOUS ONCE
Status: COMPLETED | OUTPATIENT
Start: 2024-02-21 | End: 2024-02-21

## 2024-02-21 RX ORDER — LACTULOSE 10 G/15ML
10 SOLUTION ORAL 3 TIMES DAILY PRN
Status: DISCONTINUED | OUTPATIENT
Start: 2024-02-21 | End: 2024-02-25

## 2024-02-21 RX ADMIN — CITALOPRAM HYDROBROMIDE 10 MG: 20 TABLET ORAL at 08:53

## 2024-02-21 RX ADMIN — MEROPENEM 1000 MG: 1 INJECTION, POWDER, FOR SOLUTION INTRAVENOUS at 14:32

## 2024-02-21 RX ADMIN — VANCOMYCIN HYDROCHLORIDE 750 MG: 750 INJECTION, POWDER, LYOPHILIZED, FOR SOLUTION INTRAVENOUS at 09:40

## 2024-02-21 RX ADMIN — HYDROCORTISONE SODIUM SUCCINATE 50 MG: 100 INJECTION, POWDER, FOR SOLUTION INTRAMUSCULAR; INTRAVENOUS at 05:15

## 2024-02-21 RX ADMIN — POTASSIUM CHLORIDE 20 MEQ: 1.5 POWDER, FOR SOLUTION ORAL at 08:52

## 2024-02-21 RX ADMIN — MIDODRINE HYDROCHLORIDE 10 MG: 5 TABLET ORAL at 13:10

## 2024-02-21 RX ADMIN — Medication 0.08 MCG/KG/MIN: at 14:50

## 2024-02-21 RX ADMIN — HYDROCORTISONE SODIUM SUCCINATE 50 MG: 100 INJECTION, POWDER, FOR SOLUTION INTRAMUSCULAR; INTRAVENOUS at 17:01

## 2024-02-21 RX ADMIN — MIDODRINE HYDROCHLORIDE 10 MG: 5 TABLET ORAL at 20:07

## 2024-02-21 RX ADMIN — FOLIC ACID 1 MG: 1 TABLET ORAL at 08:53

## 2024-02-21 RX ADMIN — Medication 10 ML: at 08:53

## 2024-02-21 RX ADMIN — FUROSEMIDE 40 MG: 10 INJECTION, SOLUTION INTRAMUSCULAR; INTRAVENOUS at 21:40

## 2024-02-21 RX ADMIN — MAGNESIUM SULFATE HEPTAHYDRATE 2 G: 40 INJECTION, SOLUTION INTRAVENOUS at 17:11

## 2024-02-21 RX ADMIN — HYDROCORTISONE SODIUM SUCCINATE 50 MG: 100 INJECTION, POWDER, FOR SOLUTION INTRAMUSCULAR; INTRAVENOUS at 11:51

## 2024-02-21 RX ADMIN — MEROPENEM 1000 MG: 1 INJECTION, POWDER, FOR SOLUTION INTRAVENOUS at 06:38

## 2024-02-21 RX ADMIN — FUROSEMIDE 40 MG: 10 INJECTION, SOLUTION INTRAMUSCULAR; INTRAVENOUS at 13:09

## 2024-02-21 RX ADMIN — DIVALPROEX SODIUM 125 MG: 125 CAPSULE, COATED PELLETS ORAL at 20:08

## 2024-02-21 RX ADMIN — VASOPRESSIN 0.03 UNITS/MIN: 0.2 INJECTION INTRAVENOUS at 05:21

## 2024-02-21 RX ADMIN — POLYETHYLENE GLYCOL 3350 17 G: 17 POWDER, FOR SOLUTION ORAL at 08:52

## 2024-02-21 RX ADMIN — MEROPENEM 1000 MG: 1 INJECTION, POWDER, FOR SOLUTION INTRAVENOUS at 23:20

## 2024-02-21 RX ADMIN — DOCUSATE SODIUM 50 MG AND SENNOSIDES 8.6 MG 2 TABLET: 8.6; 5 TABLET, FILM COATED ORAL at 08:52

## 2024-02-21 RX ADMIN — HYDROCORTISONE SODIUM SUCCINATE 50 MG: 100 INJECTION, POWDER, FOR SOLUTION INTRAMUSCULAR; INTRAVENOUS at 23:20

## 2024-02-21 RX ADMIN — MIDODRINE HYDROCHLORIDE 10 MG: 5 TABLET ORAL at 05:15

## 2024-02-21 RX ADMIN — ALTEPLASE: 2.2 INJECTION, POWDER, LYOPHILIZED, FOR SOLUTION INTRAVENOUS at 15:41

## 2024-02-21 RX ADMIN — LACTULOSE 10 G: 20 SOLUTION ORAL at 17:01

## 2024-02-21 RX ADMIN — RISPERIDONE 0.25 MG: 0.25 TABLET, FILM COATED ORAL at 20:07

## 2024-02-21 RX ADMIN — POTASSIUM CHLORIDE 20 MEQ: 1.5 POWDER, FOR SOLUTION ORAL at 20:07

## 2024-02-21 RX ADMIN — POTASSIUM CHLORIDE 10 MEQ: 7.46 INJECTION, SOLUTION INTRAVENOUS at 17:11

## 2024-02-21 RX ADMIN — SODIUM PHOSPHATE, MONOBASIC, MONOHYDRATE AND SODIUM PHOSPHATE, DIBASIC, ANHYDROUS 15 MMOL: 142; 276 INJECTION, SOLUTION INTRAVENOUS at 07:44

## 2024-02-21 RX ADMIN — LANSOPRAZOLE 30 MG: 30 TABLET, ORALLY DISINTEGRATING, DELAYED RELEASE ORAL at 05:15

## 2024-02-21 RX ADMIN — MICAFUNGIN 100 MG: 20 INJECTION, POWDER, LYOPHILIZED, FOR SOLUTION INTRAVENOUS at 13:16

## 2024-02-21 RX ADMIN — POVIDONE-IODINE: 10 SOLUTION TOPICAL at 15:56

## 2024-02-21 RX ADMIN — POTASSIUM PHOSPHATE, MONOBASIC POTASSIUM PHOSPHATE, DIBASIC 15 MMOL: 224; 236 INJECTION, SOLUTION, CONCENTRATE INTRAVENOUS at 20:45

## 2024-02-21 RX ADMIN — DOCUSATE SODIUM 50 MG AND SENNOSIDES 8.6 MG 2 TABLET: 8.6; 5 TABLET, FILM COATED ORAL at 20:07

## 2024-02-21 RX ADMIN — DIVALPROEX SODIUM 125 MG: 125 CAPSULE, COATED PELLETS ORAL at 08:52

## 2024-02-21 NOTE — PROGRESS NOTES
"    Chief Complaint: Right Empyema  S/P: CT-guided chest tube      Subjective:  Symptoms:  Stable.    Extubated. On levo.    Vital Signs:  Temp:  [97 °F (36.1 °C)-99 °F (37.2 °C)] 97.1 °F (36.2 °C)  Heart Rate:  [] 113  Resp:  [16-24] 18  BP: ()/(45-82) 77/49  FiO2 (%):  [28 %] 28 %    Intake & Output (last day)         02/20 0701 02/21 0700 02/21 0701 02/22 0700    I.V. (mL/kg) 1408 (22.9) 1349 (21.9)    Other 164 61    NG/ 185    Total Intake(mL/kg) 1901 (30.9) 1595 (25.9)    Urine (mL/kg/hr) 5500 (3.7) 2200 (3.7)    Chest Tube 394 150    Total Output 5894 2350    Net -0686 -347                  Objective:  General Appearance:  Ill-appearing and in no acute distress.    Vital signs: (most recent): Blood pressure (!) 77/49, pulse 113, temperature 97.1 °F (36.2 °C), temperature source Axillary, resp. rate 18, height 152.4 cm (60\"), weight 61.5 kg (135 lb 9.3 oz), SpO2 97%.    HEENT: (Nasal cannula)    Lungs:  Normal effort and normal respiratory rate.  She is not in respiratory distress.  There are decreased breath sounds.    Chest: Symmetric chest wall expansion. Chest wall tenderness present.    Abdomen: Abdomen is soft and non-distended.    Extremities: Decreased range of motion.    Neurological: Patient is alert.    Skin:  Warm and dry.                CT-guided chest tube:  Site: Right, Clean, Dry, and Intact  Suction: -20 cm  Air Leak: negative  24 Hour Total: 394ml    Results Review:     I reviewed the patient's new clinical results.  I reviewed the patient's new imaging results and agree with the interpretation.  Discussed with nurse, and Dr. Gallagher    Imaging reviewed independently and agree with interpretation.     Imaging Results (Last 24 Hours)       Procedure Component Value Units Date/Time    XR Chest 1 View [[id number]] Collected: 02/21/24 0800     Updated: 02/21/24 0807    Narrative:      XR CHEST 1 VW    Date of Exam: 2/21/2024 5:28 AM EST    Indication: chest tube " management    Comparison: Chest radiograph 2/20/2024    Findings:  ET tube terminates within the mid thoracic trachea. Right IJ central venous catheter tip within mid to low right atrium similar to prior. There is a right pigtail thoracotomy catheter which appears in similar position however there is suspected   redundancy and focal kinking of the catheter. Gastric tube below diaphragm. There are small bilateral pleural effusions with presumed underlying atelectasis versus infiltrate. 3.1 cm cavitary lesion versus collection along the left pleura not   significantly changed. Increasing small volume pleural-based gas along inferolateral aspect of right lower lobe. No worsening infiltrate or edema. No visualized left pneumothorax. Heart size unchanged. Aortic atherosclerotic disease. Degenerative related   osseous changes.      Impression:      Impression:  1. Right hydropneumothorax, with increasing pleural gas and similar pleural fluid.  2. Redundant possibly kinked right pigtail drainage catheter. Correlate with catheter function.  3. Other findings are radiographically similar including small left effusion, left cavitary lesion versus collection and presumed underlying bibasilar atelectasis. Infection may have a similar appearance.      Electronically Signed: Dwight Mckenzie MD    2/21/2024 8:05 AM EST    Workstation ID: [id number]            Lab Results:     Lab Results (last 24 hours)       Procedure Component Value Units Date/Time    AFB Culture - Body Fluid, Pleural Cavity [[id number]] Collected: 02/16/24 1556    Specimen: Body Fluid from Pleural Cavity Updated: 02/21/24 1617     AFB Culture No AFB isolated at less than 1 week     AFB Stain No acid fast bacilli seen on concentrated smear    Fungus Culture - Body Fluid, Pleural Cavity [[id number]] Collected: 02/16/24 1556    Specimen: Body Fluid from Pleural Cavity Updated: 02/21/24 1617     Fungus Culture No fungus isolated at less than 1 week    Magnesium  [[id number]]  (Normal) Collected: 02/21/24 1541    Specimen: Blood Updated: 02/21/24 1604     Magnesium 1.8 mg/dL     Phosphorus [[id number]]  (Abnormal) Collected: 02/21/24 1541    Specimen: Blood Updated: 02/21/24 1604     Phosphorus 2.2 mg/dL     Potassium [[id number]]  (Normal) Collected: 02/21/24 1541    Specimen: Blood Updated: 02/21/24 1604     Potassium 3.7 mmol/L     POC Glucose Once [[id number]]  (Abnormal) Collected: 02/21/24 1200    Specimen: Blood Updated: 02/21/24 1202     Glucose 122 mg/dL      Comment: Serial Number: [id number]:  [id number]       Vancomycin, Trough [[id number]]  (Normal) Collected: 02/21/24 0901    Specimen: Blood Updated: 02/21/24 0937     Vancomycin Trough 18.80 mcg/mL     Narrative:      Therapeutic Ranges for Vancomycin    Vancomycin Random   5.0-40.0 mcg/mL  Vancomycin Trough   5.0-20.0 mcg/mL  Vancomycin Peak     20.0-40.0 mcg/mL    Protime-INR [[id number]]  (Abnormal) Collected: 02/21/24 0901    Specimen: Blood Updated: 02/21/24 0936     Protime 14.6 Seconds      INR 1.37    aPTT [[id number]]  (Abnormal) Collected: 02/21/24 0901    Specimen: Blood Updated: 02/21/24 0936     PTT 37.4 seconds     Fibrinogen [[id number]]  (Normal) Collected: 02/21/24 0901    Specimen: Blood Updated: 02/21/24 0936     Fibrinogen 444 mg/dL     Blood Gas, Arterial - [[id number]]  (Abnormal) Collected: 02/21/24 0901    Specimen: Arterial Blood Updated: 02/21/24 0904     Site Arterial Line     Antoni's Test N/A     pH, Arterial 7.496 pH units      pCO2, Arterial 38.8 mm Hg      pO2, Arterial 101.3 mm Hg      HCO3, Arterial 30.0 mmol/L      Base Excess, Arterial 6.3 mmol/L      Comment: Serial Number: [id number]:  [id number]        O2 Saturation, Arterial 98.3 %      CO2 Content 31.2 mmol/L      Barometric Pressure for Blood Gas --     Comment: N/A        Modality Adult Vent     FIO2 28 %      Ventilator Mode CPAP/PS     PEEP 5     PSV 10 cmH2O      Hemodilution No    Body Fluid Culture - Body Fluid,  Pleural Cavity [[id number]]  (Abnormal) Collected: 02/19/24 1124    Specimen: Body Fluid from Pleural Cavity Updated: 02/21/24 0822     Body Fluid Culture Light growth (2+) Gram Negative Bacilli      Moderate growth (3+) Candida albicans     Gram Stain Many (4+) WBCs per low power field      Few (2+) Budding yeast    Magnesium [[id number]]  (Normal) Collected: 02/21/24 0519    Specimen: Blood Updated: 02/21/24 0820     Magnesium 1.9 mg/dL     Respiratory Culture - Lavage, Lung, Left Upper Lobe [[id number]]  (Abnormal) Collected: 02/17/24 1709    Specimen: Lavage from Lung, Left Upper Lobe Updated: 02/21/24 0807     Respiratory Culture Rare Staphylococcus aureus, MRSA     Comment:   Methicillin resistant Staphylococcus aureus, Patient may be an isolation risk.         No Normal Respiratory Maday     Gram Stain Rare (1+) WBCs per low power field      Rare (1+) Epithelial cells per low power field      No organisms seen    Narrative:      Refer to other Resp culture from same day for MICS    BAL Culture, Quantitative - Lavage, Bronchus [[id number]]  (Abnormal)  (Susceptibility) Collected: 02/17/24 1709    Specimen: Lavage from Bronchus Updated: 02/21/24 0807     BAL Culture <10,000 CFU/mL Staphylococcus aureus, MRSA     Comment:   Methicillin resistant Staphylococcus aureus, Patient may be an isolation risk.         No Normal Respiratory Maday    Susceptibility        Staphylococcus aureus, MRSA      TARYN      Clindamycin Susceptible      Linezolid Susceptible      Oxacillin Resistant      Tetracycline Susceptible      Trimethoprim + Sulfamethoxazole Resistant      Vancomycin Susceptible                           Anaerobic Culture - Pleural Fluid, Pleural Cavity [[id number]]  (Normal) Collected: 02/16/24 1556    Specimen: Pleural Fluid from Pleural Cavity Updated: 02/21/24 0646     Anaerobic Culture No anaerobes isolated at 5 days    CBC & Differential [[id number]]  (Abnormal) Collected: 02/21/24 0519    Specimen: Blood  Updated: 02/21/24 0635    Narrative:      The following orders were created for panel order CBC & Differential.  Procedure                               Abnormality         Status                     ---------                               -----------         ------                     CBC Auto Differential[[id number]]        Abnormal            Final result               Scan Slide[[id number]]                                       Final result                 Please view results for these tests on the individual orders.    Scan Slide [[id number]] Collected: 02/21/24 0519    Specimen: Blood Updated: 02/21/24 0635     Scan Slide --     Comment: See Manual Differential Results       Manual Differential [[id number]]  (Abnormal) Collected: 02/21/24 0519    Specimen: Blood Updated: 02/21/24 0635     Neutrophil % 40.0 %      Lymphocyte % 20.0 %      Monocyte % 16.0 %      Bands %  15.0 %      Metamyelocyte % 5.0 %      Myelocyte % 1.0 %      Atypical Lymphocyte % 3.0 %      Neutrophils Absolute 4.29 10*3/mm3      Lymphocytes Absolute 1.79 10*3/mm3      Monocytes Absolute 1.25 10*3/mm3      nRBC 1.0 /100 WBC      Dacrocytes Slight/1+     Poikilocytes Slight/1+     Polychromasia Slight/1+     Toxic Granulation Mod/2+     Platelet Estimate Decreased     Large Platelets Slight/1+    CBC Auto Differential [[id number]]  (Abnormal) Collected: 02/21/24 0519    Specimen: Blood Updated: 02/21/24 0635     WBC 7.80 10*3/mm3      RBC 3.71 10*6/mm3      Hemoglobin 10.0 g/dL      Hematocrit 31.7 %      MCV 85.4 fL      MCH 27.0 pg      MCHC 31.6 g/dL      RDW 19.6 %      RDW-SD 62.6 fl      MPV 9.2 fL      Platelets 79 10*3/mm3     Narrative:      The previously reported component NRBC is no longer being reported. Previous result was 0.4 /100 WBC (Reference Range: 0.0-0.2 /100 WBC) on 2/21/2024 at 0535 EST.    Comprehensive Metabolic Panel [[id number]]  (Abnormal) Collected: 02/21/24 0519    Specimen: Blood Updated: 02/21/24 0543     Glucose  146 mg/dL      BUN 17 mg/dL      Creatinine 0.77 mg/dL      Sodium 140 mmol/L      Potassium 4.5 mmol/L      Comment: Slight hemolysis detected by analyzer. Result may be falsely elevated.        Chloride 101 mmol/L      CO2 31.0 mmol/L      Calcium 8.1 mg/dL      Total Protein 5.4 g/dL      Albumin 2.6 g/dL      ALT (SGPT) 7 U/L      AST (SGOT) 12 U/L      Alkaline Phosphatase 102 U/L      Total Bilirubin 0.5 mg/dL      Globulin 2.8 gm/dL      A/G Ratio 0.9 g/dL      BUN/Creatinine Ratio 22.1     Anion Gap 8.0 mmol/L      eGFR 82.6 mL/min/1.73     Narrative:      GFR Normal >60  Chronic Kidney Disease <60  Kidney Failure <15    The GFR formula is only valid for adults with stable renal function between ages 18 and 70.    Phosphorus [[id number]]  (Abnormal) Collected: 02/21/24 0519    Specimen: Blood Updated: 02/21/24 0543     Phosphorus 2.2 mg/dL     POC Glucose Once [[id number]]  (Abnormal) Collected: 02/21/24 0514    Specimen: Blood Updated: 02/21/24 0516     Glucose 151 mg/dL      Comment: Serial Number: [id number]:  [id number]       POC Glucose Once [[id number]]  (Abnormal) Collected: 02/20/24 2359    Specimen: Blood Updated: 02/21/24 0000     Glucose 152 mg/dL      Comment: Serial Number: [id number]:  [id number]       Blood Culture - Blood, Hand, Right [[id number]]  (Normal) Collected: 02/16/24 1737    Specimen: Blood from Hand, Right Updated: 02/20/24 1800     Blood Culture No growth at 4 days    Blood Culture - Blood, Hand, Left [[id number]]  (Normal) Collected: 02/16/24 1736    Specimen: Blood from Hand, Left Updated: 02/20/24 1800     Blood Culture No growth at 4 days    Narrative:      Less than seven (7) mL's of blood was collected.  Insufficient quantity may yield false negative results.    POC Glucose Once [[id number]]  (Abnormal) Collected: 02/20/24 1714    Specimen: Blood Updated: 02/20/24 1716     Glucose 140 mg/dL      Comment: Serial Number: [id number]:  [id number]                 Assessment & Plan       Empyema of lung       Assessment & Plan    Right Pleural effusion: Concerning for empyema as pleural fluid cultures grew ESBL/E. coli.  S/p CT-guided chest tube placement on 2/19/2024.  Large bore chest tube removed yesterday, 2/20/2024. Continue non-operative management with intrapleural TPA. Has received one round and will plan to repeat today and tomorrow with CT chest on Friday. Re-check CXR in AM.    Pulmonary abscess: Surgical intervention is not recommended with increased risk of development of bronchopleural fistula in the setting of abscess.  Antibiotics per infectious disease recommendations.    Respiratory failure: ventilator management per ICU service    Pulmonary nodules: Recommend continued surveillance in the future.    Nohemi Mckeon, JASPREET, APRN  [facility]  02/21/24  16:39 EST    Greater than 32 minutes was spent reviewing the patient's chart, radiographic imaging, assessing the patient and developing a plan of care which was discussed with the patient and RN.  This excludes any other billable procedures which will be dictated separately.

## 2024-02-21 NOTE — PROGRESS NOTES
Critical Care Progress Note   Sandra Treadwell : 1950 MRN:[id number] LOS:5     Principal Problem: Empyema of lung     Reason for follow up: All the medical problems listed below    Summary     A 73 y.o. female with PMH of hiatal hernia, anemia presented to the hospital for shortness of breath and was admitted with a principal diagnosis of Empyema of lung.  Found to be hypoxic and hypotensive, CTA showed large right pleural effusion with a small right pneumothorax and left upper lobe abscess. S/p right-sided chest tube placement.  Subsequently, developed profound hypotension, treated with broad-spectrum antibiotics and vasopressors.  ID and thoracic surgery was consulted.  Subsequently, patient was intubated on  and had bronchoscopy done afterwards.  Cultures grew ESBL E. coli and treated with meropenem.  Extubated on .    Significant events     24 : Planned extubation.  Lasix gtt transitioning to IVP Lasix.  Net IO Since Admission: 2,286 mL [24 1054] with UOP of 5500 over past 24 hours.  CT flushed with sterile normal saline, as per directive of Thoracic Surgery when tube is becoming slow to drainage.  Drainage of thicky, yellow drainage.  Remains on vasopressors with Levophed/Vasopressin.  PT Eval when appropriate.  Discontinue arterial line.  Continue with tube feedings until able to further evaluate swallow.      Assessment / Plan     Acute respiratory failure with hypoxia and hypercapnia:   Likely due to right-sided empyema and left lung abscess  Did well on a spontaneous breathing trial and extubated.  On scheduled Lasix x 2 doses.  Oxygen supplementation as tolerated, titrate for oxygen saturation of 90% or greater.    Right-sided empyema / Left upper lobe lung abscess  S/p chest tube placement.  Thoracic surgery and ID following.  Surgical intervention not recommended due to increased risk of bronchopleural fistula development.    Pleural fluid grew ESBL E. coli and BAL cultures  positive for MRSA.  Currently on meropenem and vancomycin.  TPA/dornase irrigation by thoracic surgery on 2/20.  Verbal recommendation to flush chest tube with normal saline if becoming sluggish to keep patent.  Flushed with 10 ml of sterile saline on 2/21.    Vasodilatory shock  Due to empyema and CHF.  Does not meet SIRS criteria.  Continues on Levophed/Vasopressin, wean for MAP > 65 mm Hg.  Some low sugars noted, add stress dose steroids for 3 days total, glucoses stable.    Acute thrombocytopenia  Likely from infection, monitor closely.  Normal INR and PTT, normal fibrinogen.  DIC ruled out.    Acute Combined Systolic and Diastolic heart failure /pulmonary hypertension:   Currently decompensated.  Last ECHO showed an EF of 40 to 45%, LV diastolic dysfunction, RVSP of 40.   DC Lasix drip, will give 2 more doses of IV Lasix today and reassess in a.m.  Will eventually add goal-directed medical therapy when blood pressure permits.  Monitor Input/Output very closely. Follow daily weights.   Net IO Since Admission: 2,286 mL [02/21/24 1046]    Left calcaneal wound: Will get an eventual MRI.    Anxiety/depression: On Celexa and Risperdal.    Cachexia: Body mass index is 26.48 kg/m².  Nutrition following with tube feeding recommendations.  History of breast cancer  History of right knee replacement    Code status:   Medical Intervention Limits: NO intubation (DNI)  Code Status (Patient has no pulse and is not breathing): No CPR (Do Not Attempt to Resuscitate)  Medical Interventions (Patient has pulse or is breathing): Limited Support       Nutrition: NPO Diet NPO Type: Strict NPO   Diet, Tube Feeding Tube Feeding Formula: Peptamen AF (Vital AF 1.2); Tube Feeding Type: Continuous; Continuous Tube Feeding Start Rate (mL/hr): 45; Then Advance Rate By (mL/hr): Do Not Advance; Every __ Hours: Patient at Goal Rate; To Goal Rate of...    DVT prophylaxis:  Medical DVT prophylaxis orders are present.       Subjective / Review of  systems     Review of Systems   Unable to perform ROS: Intubated (Baseline of dementia.)      Objective / Physical Exam     Vital signs:  Temp: 99 °F (37.2 °C)  BP: 124/59  Heart Rate: 120  Resp: 22  SpO2: 99 %  Weight: 61.5 kg (135 lb 9.3 oz)    Admission Weight: Weight: 67.1 kg (148 lb)  Current Weight: Weight: 61.5 kg (135 lb 9.3 oz)    Input/Output in last 24 hours:    Intake/Output Summary (Last 24 hours) at 2/21/2024 1046  Last data filed at 2/21/2024 0942  Gross per 24 hour   Intake 2825 ml   Output 5308 ml   Net -2483 ml      Physical Exam  Vitals and nursing note reviewed.   Constitutional:       General: She is not in acute distress.     Appearance: She is underweight. She is ill-appearing. She is not toxic-appearing or diaphoretic.      Interventions: She is intubated.   HENT:      Head: Normocephalic and atraumatic.      Nose: Nose normal. No congestion.      Comments: NGT in place.     Mouth/Throat:      Mouth: Mucous membranes are moist.      Comments: ET tube in place  Eyes:      General: No scleral icterus.     Conjunctiva/sclera: Conjunctivae normal.   Cardiovascular:      Rate and Rhythm: Regular rhythm. Tachycardia present.      Pulses: Normal pulses.      Heart sounds: No murmur heard.     No gallop.   Pulmonary:      Effort: Pulmonary effort is normal. She is intubated.      Breath sounds: Normal breath sounds. No wheezing or rales.      Comments: Right-sided chest tube in place with thick, yellow drainage.  Posterior chest dressing from prior chest tube with copious drainage, saturated bed and pad.  Abdominal:      General: Bowel sounds are normal. There is no distension.      Palpations: Abdomen is soft.      Tenderness: There is no abdominal tenderness.   Musculoskeletal:      Cervical back: Neck supple.      Right lower leg: Edema present.      Left lower leg: Edema present.      Comments: Left heel wound   Skin:     General: Skin is warm and dry.   Neurological:      Comments: Intubated,  moving all extremities, following simple commands, no obvious focal deficits noted.   Psychiatric:      Comments: Easily agitated/restless.        Radiology and Labs     Results from last 7 days   Lab Units 02/21/24  0519 02/20/24  0523 02/19/24  0646 02/18/24  0520 02/17/24  1408 02/17/24  0510   WBC 10*3/mm3 7.80 6.10 4.50 3.70  --  7.20   HEMATOCRIT % 31.7* 31.4* 35.5 27.9* 29.9* 23.4*   PLATELETS 10*3/mm3 79* 81* 107* 109*  --  224      Results from last 7 days   Lab Units 02/21/24  0519 02/20/24  1515 02/20/24  0523 02/19/24  0646 02/18/24  0520 02/17/24  0510   SODIUM mmol/L 140  --  141 140 137 136   POTASSIUM mmol/L 4.5 4.9 3.4* 4.1 4.1 4.4   CHLORIDE mmol/L 101  --  107 108* 106 103   CO2 mmol/L 31.0*  --  27.0 24.0 18.0* 25.0   BUN mg/dL 17  --  16 16 17 16   CREATININE mg/dL 0.77  --  0.75 0.83 0.88 0.83      Current medications     Scheduled Meds:   citalopram, 10 mg, Nasogastric, Daily  Divalproex Sodium, 125 mg, Nasogastric, Q12H  [Held by provider] enoxaparin, 40 mg, Subcutaneous, Daily  folic acid, 1 mg, Nasogastric, Daily  furosemide, 40 mg, Intravenous, Q8H  hydrocortisone sodium succinate, 50 mg, Intravenous, Q6H  lansoprazole, 30 mg, Nasogastric, Q AM  meropenem, 1,000 mg, Intravenous, Q8H  midodrine, 10 mg, Nasogastric, Q8H  potassium chloride, 20 mEq, Nasogastric, BID  risperiDONE, 0.25 mg, Nasogastric, Nightly  senna-docusate sodium, 2 tablet, Nasogastric, BID  sodium chloride, 250 mL, Intravenous, Once  sodium chloride, 10 mL, Intravenous, Q12H  vancomycin, 750 mg, Intravenous, Q24H      Continuous Infusions:   norepinephrine, 0.02-0.3 mcg/kg/min, Last Rate: 0.1 mcg/kg/min (02/21/24 0932)  Pharmacy to dose vancomycin,   vasopressin, 0.03 Units/min, Last Rate: 0.03 Units/min (02/21/24 0821)      Patient continues to be critically ill, needed high complexity decision making and remains at high risk of clinical deterioration or death. I have spent a total of 37 minutes providing critical care  services to this patient including but not limited to : review of labs/ microbiology/imaging/medications, serial monitoring of vital signs, adjusting ventilator settings as needed, review of other consultant's notes, monitoring input/output, review of treatment plan with bedside nurse and other treatment team, management of life support and nutrition needs.    BERNA Clark   Critical Care  02/21/24   10:46 EST     Attending Addendum     Subjective: Saw her while she was on the ventilator.  Follows commands.  Eager to come off the ventilator.  Very anxious appearance.    Exam: Alert and awake, anxious, thrashing around.    Assessment / Plan:   Acute respiratory failure with hypoxia and hypercapnia: Extubated and doing well.  Wean off oxygen as tolerated.  Right-sided empyema/left upper lobe lung abscess: Still requiring vasopressors, add midodrine.  Continue with stress dose steroids.  Continue with broad-spectrum antibiotics.    I have personally reviewed all labs and other data, reviewed all other pertinent notes, interviewed and examined this patient today. I have also reviewed the note by APRN, discussed the plan and made relevant changes in the note.     Dr. Nabil Queen MD MPH  Staff Intensivist  02/21/24   12:01 EST

## 2024-02-21 NOTE — SIGNIFICANT NOTE
02/21/24 1020   Wound 02/16/24 1645 Left posterior ankle   Placement Date/Time: 02/16/24 1645   Present on Original Admission: Yes  Side: Left  Orientation: posterior  Location: ankle   Wound Image Images linked   Pressure Injury Stage DTPI   Dressing Appearance moist drainage   Closure None   Base black eschar   Dressing Care dressing changed

## 2024-02-21 NOTE — PROGRESS NOTES
Nutrition Services    Patient Name: Sandra Treadwell  YOB: 1950  MRN: [id number]  Admission date: 2/16/2024    PROGRESS NOTE      Encounter Information: Check on for tube feeding.  Remains intubated.  On vaso, levo, fentanyl.  Patient discussed in AM rounds.  Noted with plans to extubate today.  Patient should be able to keep NG tube access until diet advanced.         PO Diet: NPO Diet NPO Type: Strict NPO   PO Supplements: None ordered   PO Intake:  NPO       Current nutrition support: Peptamen AF at 20mL/hr + 10mL/hr water flush    Nutrition support review: Documented as above per EMR       Labs (reviewed below): Hypokalemia - resolved    Hypophosphatemia - replaced again today        GI Function:  No BM since admission (x 5 days ago) - MD PRN lactulose today   Residuals WNL       Nutrition Intervention Updates: Gradual advancement in tube feeding to goal of 45 mL/hour        Results from last 7 days   Lab Units 02/21/24 0519 02/20/24  1515 02/20/24  0523 02/19/24  0646 02/18/24  0520   SODIUM mmol/L 140  --  141 140 137   POTASSIUM mmol/L 4.5 4.9 3.4* 4.1 4.1   CHLORIDE mmol/L 101  --  107 108* 106   CO2 mmol/L 31.0*  --  27.0 24.0 18.0*   BUN mg/dL 17  --  16 16 17   CREATININE mg/dL 0.77  --  0.75 0.83 0.88   CALCIUM mg/dL 8.1*  --  7.3* 7.6* 8.2*   BILIRUBIN mg/dL 0.5  --  0.4  --  0.7   ALK PHOS U/L 102  --  77  --  26*   ALT (SGPT) U/L 7  --  6  --  <5   AST (SGOT) U/L 12  --  9  --  7   GLUCOSE mg/dL 146*  --  181* 121* 83     Results from last 7 days   Lab Units 02/21/24  0519 02/20/24  1515 02/20/24  0523   MAGNESIUM mg/dL 1.9 1.7 1.9   PHOSPHORUS mg/dL 2.2* 2.6 2.0*   HEMOGLOBIN g/dL 10.0*  --  9.7*   HEMATOCRIT % 31.7*  --  31.4*     COVID19   Date Value Ref Range Status   02/17/2024 Not Detected Not Detected - Ref. Range Final     Lab Results   Component Value Date    HGBA1C 5.60 11/15/2023       RD to follow up per protocol.    Electronically signed by:  Lyly Flores RD  02/21/24  08:00 EST

## 2024-02-21 NOTE — NURSING NOTE
WOCN note:    73 yr old female admitted 2/16/24 with reports of chest pain and shortness of breath. WOCN consult received for possible hospital acquired pressure injury and orders for a heel wound.     Patient presents with a black stable eschar to her left posterior heel measuring approximately 2x2cm which was present on admission. The wound looks to have been part of a larger deep tissue injury that is healing. There is a larger area of darkened de-roofed blister with pink newly epithelialized skin surrounding the eschar. The black eschar is hard and stable with no exudate or fluctuance noted. Recommend to paint with Betadine and leave open to air. Continue the foam off-loading boots.     Patient is also noted to have a crescent shaped area of ecchymosis to her right forehead consistent with a deep tissue pressure injury possibly from an O2 sensor. She also has a small 0.3x0.1cm deep tissue injury to her low mid sacrum. Patient is on a critical care bed surface with pressure injury prevention measures in place. Patient has a rather deep gluteal fold so Calazime zinc barrier paste may be a better choice over the foam dressing. We will continue to follow.

## 2024-02-21 NOTE — PROCEDURES
Pre-op Diagnosis: Loculated Pleural Effusion, right     Post-Op Diagnosis: Loculated Pleural Effusion, right     Procedure performed: Irrigation pleural cavity with TPA and dornase    Anesthesia: None    Summary of procedure: The  pleural tube was accessed. 10 mg of TPA (reconstituted in 30cc of sterile water) was instilled into the pleural cavity. The pleural tube then was clamped.      The patient's position is to be changed to every 15 minutes for 2 hours.  The tube will then be unclamped and placed back to suction.  Patient tolerated the procedure well. We will re-evaluate with a CXR in the morning.      Appreciate assistance from SHERICE Martin.    Nohemi Mckeon, DNP, APRN

## 2024-02-21 NOTE — SIGNIFICANT NOTE
02/21/24 1004   OTHER   Discipline physical therapist   Rehab Time/Intention   Session Not Performed unable to evaluate, medical status change;other (see comments)  (not yet stable for out of bed activity per RN; on 2 pressors trying to keep BP stable. Will hold)   Recommendation   PT - Next Appointment 02/22/24

## 2024-02-21 NOTE — PROGRESS NOTES
"Pharmacy Antimicrobial Dosing Service    Subjective:  Sandra Treadwell is a 73 y.o.female admitted with large pleural effusion. Pharmacy has been consulted to dose Vancomycin and Cefepime for possible PNA.    HPI: s/p IR placed chest tube/pleural drain with 2.3 L out. Pleural cultures pending.   PMH:  bresinan cancer      Assessment/Plan     1. Day #5 Vancomycin: Goal -600 mcg*h/mL.   Scr remains stable, but unclear if CrCl is reliable measure of function given poor UOP when on push diuretics. Good UOP (>5L) with lasix drip yesterday.   Current regimen: Vancomycin 750 mg IV q24 hours.   Vancomycin peak 24.5 mcg/mL (4.5 hr post dose) & trough 18.8 mcg/mL (22.5 hr post dose)     Bayesian kinetics calculates steady state  mg/L/hr & trough 15.4 mcg/mL. Will continue current regimen. No need for levels unless clinical status changes or duration extended beyond 2/23.      2. Day #4 Meropenem: 1g IV q8h for estCrCl >50 mL/min.     Will continue to monitor drug levels, renal function, culture and sensitivities, and patient clinical status.       Objective:  Relevant clinical data and objective history reviewed:  152.4 cm (60\")   61.5 kg (135 lb 9.3 oz)   Ideal body weight: 45.5 kg (100 lb 4.9 oz)  Adjusted ideal body weight: 51.9 kg (114 lb 6.7 oz)  Body mass index is 26.48 kg/m².    Results from last 7 days   Lab Units 02/21/24  0901 02/20/24  1446 02/19/24  0646   VANCOMYCIN PK mcg/mL  --  24.50  --    VANCOMYCIN TR mcg/mL 18.80  --  20.00     Results from last 7 days   Lab Units 02/21/24  0519 02/20/24  0523 02/19/24  0646   CREATININE mg/dL 0.77 0.75 0.83     Estimated Creatinine Clearance: 54.9 mL/min (by C-G formula based on SCr of 0.77 mg/dL).  I/O last 3 completed shifts:  In: 2533 [I.V.:1600; Other:306; NG/GT:627]  Out: 8315 [Urine:7770; Chest Tube:545]    Results from last 7 days   Lab Units 02/21/24  0519 02/20/24  0523 02/19/24  0646   WBC 10*3/mm3 7.80 6.10 4.50     Temperature    02/21/24 0000 " 02/21/24 0357 02/21/24 0800   Temp: 98 °F (36.7 °C) 98.5 °F (36.9 °C) 99 °F (37.2 °C)     Baseline culture/source/susceptibility:  Microbiology Results (last 10 days)       Procedure Component Value - Date/Time    Body Fluid Culture - Body Fluid, Pleural Cavity [[id number]]  (Abnormal) Collected: 02/19/24 1124    Lab Status: Preliminary result Specimen: Body Fluid from Pleural Cavity Updated: 02/21/24 0822     Body Fluid Culture Light growth (2+) Gram Negative Bacilli      Moderate growth (3+) Candida albicans     Gram Stain Many (4+) WBCs per low power field      Few (2+) Budding yeast    BAL Culture, Quantitative - Lavage, Bronchus [[id number]]  (Abnormal)  (Susceptibility) Collected: 02/17/24 1709    Lab Status: Final result Specimen: Lavage from Bronchus Updated: 02/21/24 0807     BAL Culture <10,000 CFU/mL Staphylococcus aureus, MRSA     Comment:   Methicillin resistant Staphylococcus aureus, Patient may be an isolation risk.         No Normal Respiratory Maday    Susceptibility        Staphylococcus aureus, MRSA      TARYN      Clindamycin 0.25 ug/ml Susceptible      Linezolid 2 ug/ml Susceptible      Oxacillin >=4 ug/ml Resistant      Tetracycline 2 ug/ml Susceptible      Trimethoprim + Sulfamethoxazole >=320 ug/ml Resistant      Vancomycin 1 ug/ml Susceptible                           Respiratory Culture - Lavage, Lung, Left Upper Lobe [[id number]]  (Abnormal) Collected: 02/17/24 1709    Lab Status: Final result Specimen: Lavage from Lung, Left Upper Lobe Updated: 02/21/24 0807     Respiratory Culture Rare Staphylococcus aureus, MRSA     Comment:   Methicillin resistant Staphylococcus aureus, Patient may be an isolation risk.         No Normal Respiratory Maday     Gram Stain Rare (1+) WBCs per low power field      Rare (1+) Epithelial cells per low power field      No organisms seen    Narrative:      Refer to other Resp culture from same day for MICS    AFB Culture - Lavage, Lung, Left Upper Lobe [[id number]]  Collected: 02/17/24 1709    Lab Status: Preliminary result Specimen: Lavage from Lung, Left Upper Lobe Updated: 02/19/24 0954     AFB Stain No acid fast bacilli seen on concentrated smear    MRSA Screen, PCR (Inpatient) - Swab, Nares [[id number]]  (Abnormal) Collected: 02/17/24 0854    Lab Status: Final result Specimen: Swab from Nares Updated: 02/17/24 1015     MRSA PCR MRSA Detected    Narrative:      The negative predictive value of this diagnostic test is high and should only be used to consider de-escalating anti-MRSA therapy. A positive result may indicate colonization with MRSA and must be correlated clinically.    Respiratory Panel PCR w/COVID-19(SARS-CoV-2) NAYELI/CAMILLE/HUSAM/PAD/COR/SUMAN In-House, NP Swab in UTM/VTM, 2 HR TAT - Swab, Nasopharynx [[id number]]  (Normal) Collected: 02/17/24 0854    Lab Status: Final result Specimen: Swab from Nasopharynx Updated: 02/17/24 0953     ADENOVIRUS, PCR Not Detected     Coronavirus 229E Not Detected     Coronavirus HKU1 Not Detected     Coronavirus NL63 Not Detected     Coronavirus OC43 Not Detected     COVID19 Not Detected     Human Metapneumovirus Not Detected     Human Rhinovirus/Enterovirus Not Detected     Influenza A PCR Not Detected     Influenza B PCR Not Detected     Parainfluenza Virus 1 Not Detected     Parainfluenza Virus 2 Not Detected     Parainfluenza Virus 3 Not Detected     Parainfluenza Virus 4 Not Detected     RSV, PCR Not Detected     Bordetella pertussis pcr Not Detected     Bordetella parapertussis PCR Not Detected     Chlamydophila pneumoniae PCR Not Detected     Mycoplasma pneumo by PCR Not Detected    Narrative:      In the setting of a positive respiratory panel with a viral infection PLUS a negative procalcitonin without other underlying concern for bacterial infection, consider observing off antibiotics or discontinuation of antibiotics and continue supportive care. If the respiratory panel is positive for atypical bacterial infection (Bordetella  pertussis, Chlamydophila pneumoniae, or Mycoplasma pneumoniae), consider antibiotic de-escalation to target atypical bacterial infection.    Legionella Antigen, Urine - Urine, Urine, Clean Catch [[id number]]  (Normal) Collected: 02/17/24 0852    Lab Status: Final result Specimen: Urine, Clean Catch Updated: 02/17/24 0927     LEGIONELLA ANTIGEN, URINE Negative    S. Pneumo Ag Urine or CSF - Urine, Urine, Clean Catch [[id number]]  (Normal) Collected: 02/17/24 0852    Lab Status: Final result Specimen: Urine, Clean Catch Updated: 02/17/24 0927     Strep Pneumo Ag Negative    Blood Culture - Blood, Hand, Right [[id number]]  (Normal) Collected: 02/16/24 1737    Lab Status: Preliminary result Specimen: Blood from Hand, Right Updated: 02/20/24 1800     Blood Culture No growth at 4 days    Blood Culture - Blood, Hand, Left [[id number]]  (Normal) Collected: 02/16/24 1736    Lab Status: Preliminary result Specimen: Blood from Hand, Left Updated: 02/20/24 1800     Blood Culture No growth at 4 days    Narrative:      Less than seven (7) mL's of blood was collected.  Insufficient quantity may yield false negative results.    AFB Culture - Body Fluid, Pleural Cavity [[id number]] Collected: 02/16/24 1556    Lab Status: Preliminary result Specimen: Body Fluid from Pleural Cavity Updated: 02/17/24 1142     AFB Stain No acid fast bacilli seen on concentrated smear    Body Fluid Culture - Body Fluid, Pleural Cavity [[id number]]  (Abnormal)  (Susceptibility) Collected: 02/16/24 1556    Lab Status: Final result Specimen: Body Fluid from Pleural Cavity Updated: 02/18/24 1143     Body Fluid Culture Scant growth (1+) Escherichia coli ESBL     Comment:   Consider infectious disease consult.  Susceptibility results may not correlate to clinical outcomes.         Rare Normal Skin Maday     Gram Stain Moderate (3+) WBCs per low power field      No organisms seen    Susceptibility        Escherichia coli ESBL      TARYN      Ertapenem <=0.5 ug/ml  Susceptible      Levofloxacin <=0.12 ug/ml Susceptible      Meropenem <=0.25 ug/ml Susceptible      Trimethoprim + Sulfamethoxazole <=20 ug/ml Susceptible                       Susceptibility Comments       Escherichia coli ESBL    Cefazolin sensitivity will not be reported for Enterobacteriaceae in non-urine isolates. If cefazolin is preferred, please call the microbiology lab to request an E-test.  With the exception of urinary-sourced infections, aminoglycosides should not be used as monotherapy.               Anaerobic Culture - Pleural Fluid, Pleural Cavity [[id number]]  (Normal) Collected: 02/16/24 1556    Lab Status: Final result Specimen: Pleural Fluid from Pleural Cavity Updated: 02/21/24 0646     Anaerobic Culture No anaerobes isolated at 5 days    Fungus Smear - Body Fluid, Pleural Cavity [[id number]] Collected: 02/16/24 1556    Lab Status: Final result Specimen: Body Fluid from Pleural Cavity Updated: 02/17/24 1205     Fungal Stain No fungal elements seen            Sendy Bustos PharmD  02/21/24 10:50 EST

## 2024-02-21 NOTE — CASE MANAGEMENT/SOCIAL WORK
Continued Stay Note   [place]     Patient Name: Sandra Treadwell  MRN: [id number]  Today's Date: 2/21/2024    Admit Date: 2/16/2024    Plan: Return to [facility]. No precert or PASRR required.   Discharge Plan       Row Name 02/21/24 0847       Plan    Plan Return to [facility]. No precert or PASRR required.    Plan Comments DC Barriers: vent 28/5, Vaso/Levo gtt, Fentanyl gtt, NG TF, C-line, A-line, FC, R chest tube, IV Abxs, MRI L foot pending, Throacic Sx/ID/WC following.                  Expected Discharge Date and Time       Expected Discharge Date Expected Discharge Time    Feb 23, 2024               SANJUANA Gonzales RN  SIPS/ICU   O: [phone]  C: [phone]  [email or web address]

## 2024-02-21 NOTE — PROGRESS NOTES
Infectious Diseases Progress Note      LOS: 5 days   Patient Care Team:  Wm Aguirre MD as PCP - General (Sports Medicine)    Chief Complaint: Confused    Subjective       The patient had no fever during the last 24 hours.  The patient was extubated and currently on 3 L of oxygen.  She remained on vasopressin and norepinephrine drips.  She is confused secondary to her underlying dementia      Review of Systems:   Review of Systems   Unable to perform ROS: Dementia        Objective     Vital Signs  Temp:  [97 °F (36.1 °C)-99 °F (37.2 °C)] 99 °F (37.2 °C)  Heart Rate:  [] 130  Resp:  [16-24] 20  BP: ()/(45-82) 101/55  FiO2 (%):  [28 %] 28 %    Physical Exam:  Physical Exam  Vitals and nursing note reviewed.   Constitutional:       General: She is not in acute distress.     Appearance: She is well-developed and normal weight. She is ill-appearing. She is not diaphoretic.   HENT:      Head: Normocephalic and atraumatic.   Eyes:      General: No scleral icterus.     Extraocular Movements: Extraocular movements intact.      Conjunctiva/sclera: Conjunctivae normal.      Pupils: Pupils are equal, round, and reactive to light.   Cardiovascular:      Rate and Rhythm: Normal rate and regular rhythm.      Heart sounds: Normal heart sounds, S1 normal and S2 normal. No murmur heard.  Pulmonary:      Effort: Pulmonary effort is normal. No respiratory distress.      Breath sounds: Normal breath sounds. No stridor. No wheezing or rales.      Comments:   Right chest tube    Very diminished in the right lobe  Chest:      Chest wall: No tenderness.   Abdominal:      General: Bowel sounds are normal. There is no distension.      Palpations: Abdomen is soft. There is no mass.      Tenderness: There is no abdominal tenderness. There is no guarding.   Genitourinary:     Comments:  Ribera catheter  Musculoskeletal:         General: No swelling, tenderness or deformity.   Skin:     General: Skin is warm and dry.       Coloration: Skin is not pale.      Findings: No bruising, erythema or rash.      Comments: Left calcaneus unstageable wound          Results Review:    I have reviewed all clinical data, test, lab, and imaging results.     Radiology  XR Chest 1 View    Result Date: 2/21/2024  XR CHEST 1 VW Date of Exam: 2/21/2024 5:28 AM EST Indication: chest tube management Comparison: Chest radiograph 2/20/2024 Findings: ET tube terminates within the mid thoracic trachea. Right IJ central venous catheter tip within mid to low right atrium similar to prior. There is a right pigtail thoracotomy catheter which appears in similar position however there is suspected redundancy and focal kinking of the catheter. Gastric tube below diaphragm. There are small bilateral pleural effusions with presumed underlying atelectasis versus infiltrate. 3.1 cm cavitary lesion versus collection along the left pleura not significantly changed. Increasing small volume pleural-based gas along inferolateral aspect of right lower lobe. No worsening infiltrate or edema. No visualized left pneumothorax. Heart size unchanged. Aortic atherosclerotic disease. Degenerative related  osseous changes.     Impression: 1. Right hydropneumothorax, with increasing pleural gas and similar pleural fluid. 2. Redundant possibly kinked right pigtail drainage catheter. Correlate with catheter function. 3. Other findings are radiographically similar including small left effusion, left cavitary lesion versus collection and presumed underlying bibasilar atelectasis. Infection may have a similar appearance. Electronically Signed: Dwight Mckenzie MD  2/21/2024 8:05 AM EST  Workstation ID: [id number]     Cardiology    Laboratory    Results from last 7 days   Lab Units 02/21/24  0519 02/20/24  0523 02/19/24  0646 02/18/24  0520 02/17/24  1408 02/17/24  0510 02/16/24  2055 02/16/24  1228   WBC 10*3/mm3 7.80 6.10 4.50 3.70  --  7.20 8.30 2.80*   HEMOGLOBIN g/dL 10.0* 9.7* 10.6* 8.6*  9.4* 6.9* 10.3* 9.4*   HEMATOCRIT % 31.7* 31.4* 35.5 27.9* 29.9* 23.4* 34.1 29.1*   PLATELETS 10*3/mm3 79* 81* 107* 109*  --  224 393 293     Results from last 7 days   Lab Units 02/21/24  0519 02/20/24  1515 02/20/24  0523 02/19/24  0646 02/18/24 0520 02/17/24  0510 02/16/24  2017 02/16/24  1228   SODIUM mmol/L 140  --  141 140 137 136 135* 138   POTASSIUM mmol/L 4.5 4.9 3.4* 4.1 4.1 4.4 3.9 4.1   CHLORIDE mmol/L 101  --  107 108* 106 103 102 100   CO2 mmol/L 31.0*  --  27.0 24.0 18.0* 25.0 23.0 28.0   BUN mg/dL 17  --  16 16 17 16 18 15   CREATININE mg/dL 0.77  --  0.75 0.83 0.88 0.83 0.87 0.75   GLUCOSE mg/dL 146*  --  181* 121* 83 96 119* 104*   ALBUMIN g/dL 2.6*  --  2.3*  --  3.5  --  2.1* 2.2*   BILIRUBIN mg/dL 0.5  --  0.4  --  0.7  --  0.4 0.4   ALK PHOS U/L 102  --  77  --  26*  --  57 61   AST (SGOT) U/L 12  --  9  --  7  --  9 8   ALT (SGPT) U/L 7  --  6  --  <5  --  6 5   CALCIUM mg/dL 8.1*  --  7.3* 7.6* 8.2* 9.3 9.4 9.8         Results from last 7 days   Lab Units 02/18/24  0520   SED RATE mm/hr <1         Microbiology   Microbiology Results (last 10 days)       Procedure Component Value - Date/Time    Body Fluid Culture - Body Fluid, Pleural Cavity [[id number]]  (Abnormal) Collected: 02/19/24 1124    Lab Status: Preliminary result Specimen: Body Fluid from Pleural Cavity Updated: 02/21/24 0822     Body Fluid Culture Light growth (2+) Gram Negative Bacilli      Moderate growth (3+) Candida albicans     Gram Stain Many (4+) WBCs per low power field      Few (2+) Budding yeast    BAL Culture, Quantitative - Lavage, Bronchus [[id number]]  (Abnormal)  (Susceptibility) Collected: 02/17/24 1709    Lab Status: Final result Specimen: Lavage from Bronchus Updated: 02/21/24 0807     BAL Culture <10,000 CFU/mL Staphylococcus aureus, MRSA     Comment:   Methicillin resistant Staphylococcus aureus, Patient may be an isolation risk.         No Normal Respiratory Maday    Susceptibility        Staphylococcus aureus,  MRSA      TARYN      Clindamycin Susceptible      Linezolid Susceptible      Oxacillin Resistant      Tetracycline Susceptible      Trimethoprim + Sulfamethoxazole Resistant      Vancomycin Susceptible                           Respiratory Culture - Lavage, Lung, Left Upper Lobe [[id number]]  (Abnormal) Collected: 02/17/24 1709    Lab Status: Final result Specimen: Lavage from Lung, Left Upper Lobe Updated: 02/21/24 0807     Respiratory Culture Rare Staphylococcus aureus, MRSA     Comment:   Methicillin resistant Staphylococcus aureus, Patient may be an isolation risk.         No Normal Respiratory Maday     Gram Stain Rare (1+) WBCs per low power field      Rare (1+) Epithelial cells per low power field      No organisms seen    Narrative:      Refer to other Resp culture from same day for MICS    AFB Culture - Lavage, Lung, Left Upper Lobe [[id number]] Collected: 02/17/24 1709    Lab Status: Preliminary result Specimen: Lavage from Lung, Left Upper Lobe Updated: 02/19/24 0954     AFB Stain No acid fast bacilli seen on concentrated smear    MRSA Screen, PCR (Inpatient) - Swab, Nares [[id number]]  (Abnormal) Collected: 02/17/24 0854    Lab Status: Final result Specimen: Swab from Nares Updated: 02/17/24 1015     MRSA PCR MRSA Detected    Narrative:      The negative predictive value of this diagnostic test is high and should only be used to consider de-escalating anti-MRSA therapy. A positive result may indicate colonization with MRSA and must be correlated clinically.    Respiratory Panel PCR w/COVID-19(SARS-CoV-2) NAYELI/[facility]ILLE/HUSAM/PAD/COR/SUMAN In-House, NP Swab in UTM/VTM, 2 HR TAT - Swab, Nasopharynx [[id number]]  (Normal) Collected: 02/17/24 0854    Lab Status: Final result Specimen: Swab from Nasopharynx Updated: 02/17/24 0953     ADENOVIRUS, PCR Not Detected     Coronavirus 229E Not Detected     Coronavirus HKU1 Not Detected     Coronavirus NL63 Not Detected     Coronavirus OC43 Not Detected     COVID19 Not Detected      Human Metapneumovirus Not Detected     Human Rhinovirus/Enterovirus Not Detected     Influenza A PCR Not Detected     Influenza B PCR Not Detected     Parainfluenza Virus 1 Not Detected     Parainfluenza Virus 2 Not Detected     Parainfluenza Virus 3 Not Detected     Parainfluenza Virus 4 Not Detected     RSV, PCR Not Detected     Bordetella pertussis pcr Not Detected     Bordetella parapertussis PCR Not Detected     Chlamydophila pneumoniae PCR Not Detected     Mycoplasma pneumo by PCR Not Detected    Narrative:      In the setting of a positive respiratory panel with a viral infection PLUS a negative procalcitonin without other underlying concern for bacterial infection, consider observing off antibiotics or discontinuation of antibiotics and continue supportive care. If the respiratory panel is positive for atypical bacterial infection (Bordetella pertussis, Chlamydophila pneumoniae, or Mycoplasma pneumoniae), consider antibiotic de-escalation to target atypical bacterial infection.    Legionella Antigen, Urine - Urine, Urine, Clean Catch [[id number]]  (Normal) Collected: 02/17/24 0852    Lab Status: Final result Specimen: Urine, Clean Catch Updated: 02/17/24 0927     LEGIONELLA ANTIGEN, URINE Negative    S. Pneumo Ag Urine or CSF - Urine, Urine, Clean Catch [[id number]]  (Normal) Collected: 02/17/24 0852    Lab Status: Final result Specimen: Urine, Clean Catch Updated: 02/17/24 0927     Strep Pneumo Ag Negative    Blood Culture - Blood, Hand, Right [[id number]]  (Normal) Collected: 02/16/24 1737    Lab Status: Preliminary result Specimen: Blood from Hand, Right Updated: 02/20/24 1800     Blood Culture No growth at 4 days    Blood Culture - Blood, Hand, Left [[id number]]  (Normal) Collected: 02/16/24 1736    Lab Status: Preliminary result Specimen: Blood from Hand, Left Updated: 02/20/24 1800     Blood Culture No growth at 4 days    Narrative:      Less than seven (7) mL's of blood was collected.  Insufficient  quantity may yield false negative results.    AFB Culture - Body Fluid, Pleural Cavity [[id number]] Collected: 02/16/24 1556    Lab Status: Preliminary result Specimen: Body Fluid from Pleural Cavity Updated: 02/17/24 1142     AFB Stain No acid fast bacilli seen on concentrated smear    Body Fluid Culture - Body Fluid, Pleural Cavity [[id number]]  (Abnormal)  (Susceptibility) Collected: 02/16/24 1556    Lab Status: Final result Specimen: Body Fluid from Pleural Cavity Updated: 02/18/24 1143     Body Fluid Culture Scant growth (1+) Escherichia coli ESBL     Comment:   Consider infectious disease consult.  Susceptibility results may not correlate to clinical outcomes.         Rare Normal Skin Maday     Gram Stain Moderate (3+) WBCs per low power field      No organisms seen    Susceptibility        Escherichia coli ESBL      TARYN      Ertapenem Susceptible      Levofloxacin Susceptible      Meropenem Susceptible      Trimethoprim + Sulfamethoxazole Susceptible                       Susceptibility Comments       Escherichia coli ESBL    Cefazolin sensitivity will not be reported for Enterobacteriaceae in non-urine isolates. If cefazolin is preferred, please call the microbiology lab to request an E-test.  With the exception of urinary-sourced infections, aminoglycosides should not be used as monotherapy.               Anaerobic Culture - Pleural Fluid, Pleural Cavity [[id number]]  (Normal) Collected: 02/16/24 1556    Lab Status: Final result Specimen: Pleural Fluid from Pleural Cavity Updated: 02/21/24 0646     Anaerobic Culture No anaerobes isolated at 5 days    Fungus Smear - Body Fluid, Pleural Cavity [[id number]] Collected: 02/16/24 1556    Lab Status: Final result Specimen: Body Fluid from Pleural Cavity Updated: 02/17/24 1205     Fungal Stain No fungal elements seen            Medication Review:       Schedule Meds  citalopram, 10 mg, Nasogastric, Daily  Divalproex Sodium, 125 mg, Nasogastric, Q12H  [Held by provider]  enoxaparin, 40 mg, Subcutaneous, Daily  folic acid, 1 mg, Nasogastric, Daily  furosemide, 40 mg, Intravenous, Q8H  hydrocortisone sodium succinate, 50 mg, Intravenous, Q6H  lansoprazole, 30 mg, Nasogastric, Q AM  meropenem, 1,000 mg, Intravenous, Q8H  micafungin (MYCAMINE) IV, 100 mg, Intravenous, Q24H  midodrine, 10 mg, Nasogastric, Q8H  potassium chloride, 20 mEq, Nasogastric, BID  risperiDONE, 0.25 mg, Nasogastric, Nightly  senna-docusate sodium, 2 tablet, Nasogastric, BID  sodium chloride, 250 mL, Intravenous, Once  sodium chloride, 10 mL, Intravenous, Q12H  [START ON 2/22/2024] vancomycin, 750 mg, Intravenous, Q24H        Infusion Meds  norepinephrine, 0.02-0.3 mcg/kg/min, Last Rate: 0.06 mcg/kg/min (02/21/24 1151)  Pharmacy to dose vancomycin,   vasopressin, 0.03 Units/min, Last Rate: 0.03 Units/min (02/21/24 0521)        PRN Meds    acetaminophen **OR** acetaminophen    dextrose    dextrose    glucagon (human recombinant)    lactulose    lidocaine    Magnesium Standard Dose Replacement - Follow Nurse / BPA Driven Protocol    ondansetron    ondansetron ODT    Pharmacy to dose vancomycin    Phosphorus Replacement - Follow Nurse / BPA Driven Protocol    polyethylene glycol    Potassium Replacement - Follow Nurse / BPA Driven Protocol    [COMPLETED] Insert Peripheral IV **AND** sodium chloride    sodium chloride    sodium chloride        Assessment & Plan       Antimicrobial Therapy   1.  IV vancomycin        2.  IV meropenem            Assessment     Right thoracic empyema.  Fluid analysis showed findings consistent with empyema with cultures growing ESBL  E. coli.  The presentation is concerning for micro fistula between the abdomen and thoracic cavity.  Patient s/p chest tube placement by IR which was replaced on February 19, 2024.  Right-sided pleural fluid culture from February 19 is growing gram-negative bacilli and Candida species     Left upper lobe cavitary lesion most likely consistent with left  lung abscess.  Suspecting the same pathogen involving the right lung causing this infection.   BAL culture from February 17 is  growing MRSA    Acute hypoxic respiratory failure-likely related to the above.  Extubated on February 28 and currently on 3 L of oxygen    Septic shock-patient is currently on norepinephrine and vasopressin drip     Left calcaneus wound, unstageable with black eschar.  I doubt this is a peripheral vascular disease patient has strong pulse.  Most likely this is a pressure ulcer    Dementia.  Patient is severely confused          Plan     Continue IV vancomycin for now, pharmacy currently following and dosing and keep trough between 15-20.  Recommend 4 weeks of IV vancomycin  Continue IV meropenem 1 g every 8 hours for 4 weeks  Start IV micafungin 100 mg daily  Repeat CT scan of the chest in 1 to 2 days  Consider MRI of the left foot when patient is more stable, can be done later during this admission  Continue supportive care  A.m. labs   The case was discussed with ICU service        Himanshu Spaulding MD  02/21/24  12:37 EST    Note is dictated utilizing voice recognition software/Dragon

## 2024-02-21 NOTE — NURSING NOTE
Pt remains on vent 28% 5 PEEP. Vaso, levo, fent, lasix gtts infusing. Pt has had 278 ml chest tube output, 2800 ml UOP and no bowel movements. Pt has been converting between afib, normal sinus and sinus tach through out the shift NP made aware. VSS at this time, continuing to monitor.

## 2024-02-22 ENCOUNTER — APPOINTMENT (OUTPATIENT)
Dept: GENERAL RADIOLOGY | Facility: HOSPITAL | Age: 74
DRG: 208 | End: 2024-02-22
Payer: MEDICARE

## 2024-02-22 LAB
ALBUMIN SERPL-MCNC: 2.4 G/DL (ref 3.5–5.2)
ALBUMIN/GLOB SERPL: 0.8 G/DL
ALP SERPL-CCNC: 100 U/L (ref 39–117)
ALT SERPL W P-5'-P-CCNC: 5 U/L (ref 1–33)
ANION GAP SERPL CALCULATED.3IONS-SCNC: 10 MMOL/L (ref 5–15)
AST SERPL-CCNC: 9 U/L (ref 1–32)
BILIRUB SERPL-MCNC: 0.4 MG/DL (ref 0–1.2)
BUN SERPL-MCNC: 21 MG/DL (ref 8–23)
BUN/CREAT SERPL: 28 (ref 7–25)
CALCIUM SPEC-SCNC: 8.1 MG/DL (ref 8.6–10.5)
CHLORIDE SERPL-SCNC: 98 MMOL/L (ref 98–107)
CO2 SERPL-SCNC: 36 MMOL/L (ref 22–29)
CREAT SERPL-MCNC: 0.75 MG/DL (ref 0.57–1)
DACRYOCYTES BLD QL SMEAR: ABNORMAL
DEPRECATED RDW RBC AUTO: 60.4 FL (ref 37–54)
EGFRCR SERPLBLD CKD-EPI 2021: 85.2 ML/MIN/1.73
ERYTHROCYTE [DISTWIDTH] IN BLOOD BY AUTOMATED COUNT: 19.1 % (ref 12.3–15.4)
FUNGUS WND CULT: ABNORMAL
GLOBULIN UR ELPH-MCNC: 3.1 GM/DL
GLUCOSE BLDC GLUCOMTR-MCNC: 189 MG/DL (ref 70–105)
GLUCOSE BLDC GLUCOMTR-MCNC: 205 MG/DL (ref 70–105)
GLUCOSE SERPL-MCNC: 153 MG/DL (ref 65–99)
HCT VFR BLD AUTO: 29.1 % (ref 34–46.6)
HGB BLD-MCNC: 9.2 G/DL (ref 12–15.9)
LYMPHOCYTES # BLD MANUAL: 1.04 10*3/MM3 (ref 0.7–3.1)
LYMPHOCYTES NFR BLD MANUAL: 13 % (ref 5–12)
MAGNESIUM SERPL-MCNC: 2 MG/DL (ref 1.6–2.4)
MCH RBC QN AUTO: 26.6 PG (ref 26.6–33)
MCHC RBC AUTO-ENTMCNC: 31.5 G/DL (ref 31.5–35.7)
MCV RBC AUTO: 84.4 FL (ref 79–97)
MONOCYTES # BLD: 1.04 10*3/MM3 (ref 0.1–0.9)
MYELOCYTES NFR BLD MANUAL: 1 % (ref 0–0)
NEUTROPHILS # BLD AUTO: 5.84 10*3/MM3 (ref 1.7–7)
NEUTROPHILS NFR BLD MANUAL: 59 % (ref 42.7–76)
NEUTS BAND NFR BLD MANUAL: 14 % (ref 0–5)
NRBC SPEC MANUAL: 2 /100 WBC (ref 0–0.2)
PHOSPHATE SERPL-MCNC: 2.9 MG/DL (ref 2.5–4.5)
PLATELET # BLD AUTO: 88 10*3/MM3 (ref 140–450)
PMV BLD AUTO: 9.1 FL (ref 6–12)
POIKILOCYTOSIS BLD QL SMEAR: ABNORMAL
POLYCHROMASIA BLD QL SMEAR: ABNORMAL
POTASSIUM SERPL-SCNC: 3 MMOL/L (ref 3.5–5.2)
POTASSIUM SERPL-SCNC: 3.4 MMOL/L (ref 3.5–5.2)
PROT SERPL-MCNC: 5.5 G/DL (ref 6–8.5)
QT INTERVAL: 312 MS
QTC INTERVAL: 447 MS
RBC # BLD AUTO: 3.44 10*6/MM3 (ref 3.77–5.28)
SCAN SLIDE: NORMAL
SMALL PLATELETS BLD QL SMEAR: ABNORMAL
SODIUM SERPL-SCNC: 144 MMOL/L (ref 136–145)
TOXIC GRANULATION: ABNORMAL
TRIGL FLD-MCNC: 1057 MG/DL
VARIANT LYMPHS NFR BLD MANUAL: 1 % (ref 0–5)
VARIANT LYMPHS NFR BLD MANUAL: 12 % (ref 19.6–45.3)
WBC NRBC COR # BLD AUTO: 8 10*3/MM3 (ref 3.4–10.8)

## 2024-02-22 PROCEDURE — 85025 COMPLETE CBC W/AUTO DIFF WBC: CPT | Performed by: INTERNAL MEDICINE

## 2024-02-22 PROCEDURE — 82948 REAGENT STRIP/BLOOD GLUCOSE: CPT | Performed by: NURSE PRACTITIONER

## 2024-02-22 PROCEDURE — 83615 LACTATE (LD) (LDH) ENZYME: CPT | Performed by: NURSE PRACTITIONER

## 2024-02-22 PROCEDURE — 25010000002 ALTEPLASE 2 MG RECONSTITUTED SOLUTION 1 EACH VIAL: Performed by: NURSE PRACTITIONER

## 2024-02-22 PROCEDURE — 84132 ASSAY OF SERUM POTASSIUM: CPT | Performed by: INTERNAL MEDICINE

## 2024-02-22 PROCEDURE — 32562 LYSE CHEST FIBRIN SUBQ DAY: CPT | Performed by: NURSE PRACTITIONER

## 2024-02-22 PROCEDURE — 80053 COMPREHEN METABOLIC PANEL: CPT | Performed by: INTERNAL MEDICINE

## 2024-02-22 PROCEDURE — 25010000002 MEROPENEM PER 100 MG: Performed by: INTERNAL MEDICINE

## 2024-02-22 PROCEDURE — 25810000003 SODIUM CHLORIDE 0.9 % SOLUTION 250 ML FLEX CONT: Performed by: INTERNAL MEDICINE

## 2024-02-22 PROCEDURE — 25010000002 VASOPRESSIN 0.2 UNIT/ML SOLUTION: Performed by: INTERNAL MEDICINE

## 2024-02-22 PROCEDURE — 93005 ELECTROCARDIOGRAM TRACING: CPT | Performed by: INTERNAL MEDICINE

## 2024-02-22 PROCEDURE — 99232 SBSQ HOSP IP/OBS MODERATE 35: CPT | Performed by: NURSE PRACTITIONER

## 2024-02-22 PROCEDURE — 25010000002 VANCOMYCIN 750 MG RECONSTITUTED SOLUTION 1 EACH VIAL: Performed by: INTERNAL MEDICINE

## 2024-02-22 PROCEDURE — 25010000002 HYDROCORTISONE SOD SUC (PF) 100 MG RECONSTITUTED SOLUTION: Performed by: NURSE PRACTITIONER

## 2024-02-22 PROCEDURE — 25010000002 MICAFUNGIN SODIUM 100 MG RECONSTITUTED SOLUTION 1 EACH VIAL: Performed by: INTERNAL MEDICINE

## 2024-02-22 PROCEDURE — 85007 BL SMEAR W/DIFF WBC COUNT: CPT | Performed by: INTERNAL MEDICINE

## 2024-02-22 PROCEDURE — 3E0L3GC INTRODUCTION OF OTHER THERAPEUTIC SUBSTANCE INTO PLEURAL CAVITY, PERCUTANEOUS APPROACH: ICD-10-PCS | Performed by: NURSE PRACTITIONER

## 2024-02-22 PROCEDURE — 93010 ELECTROCARDIOGRAM REPORT: CPT | Performed by: INTERNAL MEDICINE

## 2024-02-22 PROCEDURE — 83735 ASSAY OF MAGNESIUM: CPT | Performed by: INTERNAL MEDICINE

## 2024-02-22 PROCEDURE — 84100 ASSAY OF PHOSPHORUS: CPT | Performed by: INTERNAL MEDICINE

## 2024-02-22 PROCEDURE — 82948 REAGENT STRIP/BLOOD GLUCOSE: CPT

## 2024-02-22 PROCEDURE — 84478 ASSAY OF TRIGLYCERIDES: CPT | Performed by: NURSE PRACTITIONER

## 2024-02-22 PROCEDURE — 71045 X-RAY EXAM CHEST 1 VIEW: CPT

## 2024-02-22 RX ORDER — POTASSIUM CHLORIDE 1.5 G/1.58G
40 POWDER, FOR SOLUTION ORAL EVERY 4 HOURS
Qty: 6 PACKET | Refills: 0 | Status: COMPLETED | OUTPATIENT
Start: 2024-02-22 | End: 2024-02-23

## 2024-02-22 RX ORDER — POTASSIUM CHLORIDE 1.5 G/1.58G
40 POWDER, FOR SOLUTION ORAL EVERY 4 HOURS
Status: COMPLETED | OUTPATIENT
Start: 2024-02-22 | End: 2024-02-22

## 2024-02-22 RX ADMIN — POTASSIUM CHLORIDE 40 MEQ: 1.5 POWDER, FOR SOLUTION ORAL at 12:16

## 2024-02-22 RX ADMIN — DIVALPROEX SODIUM 125 MG: 125 CAPSULE, COATED PELLETS ORAL at 20:39

## 2024-02-22 RX ADMIN — MIDODRINE HYDROCHLORIDE 10 MG: 5 TABLET ORAL at 22:14

## 2024-02-22 RX ADMIN — VASOPRESSIN 0.03 UNITS/MIN: 0.2 INJECTION INTRAVENOUS at 17:10

## 2024-02-22 RX ADMIN — VANCOMYCIN HYDROCHLORIDE 750 MG: 750 INJECTION, POWDER, LYOPHILIZED, FOR SOLUTION INTRAVENOUS at 10:25

## 2024-02-22 RX ADMIN — VASOPRESSIN 0.03 UNITS/MIN: 0.2 INJECTION INTRAVENOUS at 08:30

## 2024-02-22 RX ADMIN — MEROPENEM 1000 MG: 1 INJECTION, POWDER, FOR SOLUTION INTRAVENOUS at 07:32

## 2024-02-22 RX ADMIN — POTASSIUM CHLORIDE 40 MEQ: 1.5 POWDER, FOR SOLUTION ORAL at 22:46

## 2024-02-22 RX ADMIN — MEROPENEM 1000 MG: 1 INJECTION, POWDER, FOR SOLUTION INTRAVENOUS at 22:46

## 2024-02-22 RX ADMIN — CITALOPRAM HYDROBROMIDE 10 MG: 20 TABLET ORAL at 08:28

## 2024-02-22 RX ADMIN — MICAFUNGIN 100 MG: 20 INJECTION, POWDER, LYOPHILIZED, FOR SOLUTION INTRAVENOUS at 13:26

## 2024-02-22 RX ADMIN — POVIDONE-IODINE: 10 SOLUTION TOPICAL at 09:53

## 2024-02-22 RX ADMIN — MIDODRINE HYDROCHLORIDE 10 MG: 5 TABLET ORAL at 13:25

## 2024-02-22 RX ADMIN — POTASSIUM CHLORIDE 20 MEQ: 1.5 POWDER, FOR SOLUTION ORAL at 20:39

## 2024-02-22 RX ADMIN — FOLIC ACID 1 MG: 1 TABLET ORAL at 08:28

## 2024-02-22 RX ADMIN — RISPERIDONE 0.25 MG: 0.25 TABLET, FILM COATED ORAL at 20:39

## 2024-02-22 RX ADMIN — MIDODRINE HYDROCHLORIDE 10 MG: 5 TABLET ORAL at 04:43

## 2024-02-22 RX ADMIN — MEROPENEM 1000 MG: 1 INJECTION, POWDER, FOR SOLUTION INTRAVENOUS at 14:43

## 2024-02-22 RX ADMIN — HYDROCORTISONE SODIUM SUCCINATE 50 MG: 100 INJECTION, POWDER, FOR SOLUTION INTRAMUSCULAR; INTRAVENOUS at 12:16

## 2024-02-22 RX ADMIN — ALTEPLASE: 2.2 INJECTION, POWDER, LYOPHILIZED, FOR SOLUTION INTRAVENOUS at 14:35

## 2024-02-22 RX ADMIN — Medication 10 ML: at 08:30

## 2024-02-22 RX ADMIN — Medication 10 ML: at 20:39

## 2024-02-22 RX ADMIN — DIVALPROEX SODIUM 125 MG: 125 CAPSULE, COATED PELLETS ORAL at 08:28

## 2024-02-22 RX ADMIN — POTASSIUM CHLORIDE 40 MEQ: 1.5 POWDER, FOR SOLUTION ORAL at 07:33

## 2024-02-22 RX ADMIN — HYDROCORTISONE SODIUM SUCCINATE 50 MG: 100 INJECTION, POWDER, FOR SOLUTION INTRAMUSCULAR; INTRAVENOUS at 04:43

## 2024-02-22 RX ADMIN — HYDROCORTISONE SODIUM SUCCINATE 50 MG: 100 INJECTION, POWDER, FOR SOLUTION INTRAMUSCULAR; INTRAVENOUS at 17:42

## 2024-02-22 RX ADMIN — LANSOPRAZOLE 30 MG: 30 TABLET, ORALLY DISINTEGRATING, DELAYED RELEASE ORAL at 04:43

## 2024-02-22 RX ADMIN — DOCUSATE SODIUM 50 MG AND SENNOSIDES 8.6 MG 2 TABLET: 8.6; 5 TABLET, FILM COATED ORAL at 08:28

## 2024-02-22 NOTE — PROGRESS NOTES
Critical Care Progress Note   Sandra Treadwell : 1950 MRN:[id number] LOS:6     Principal Problem: Empyema of lung     Reason for follow up: All the medical problems listed below    Summary     A 73 y.o. female with PMH of hiatal hernia, anemia presented to the hospital for shortness of breath and was admitted with a principal diagnosis of Empyema of lung.  Found to be hypoxic and hypotensive, CTA showed large right pleural effusion with a small right pneumothorax and left upper lobe abscess. S/p right-sided chest tube placement and cultures grew ESBL E. coli along with Candida and MRSA.  Subsequently, developed profound hypotension, treated with broad-spectrum antibiotics and vasopressors.  ID and thoracic surgery was consulted.  Subsequently, patient was intubated on  and had bronchoscopy with BAL done and left upper lobe.  Cultures grew MRSA and treated with vancomycin.  Extubated on .    Due to inadequate drainage from her right chest tube, patient also received intrapleural tPA per thoracic surgery.    Significant events     24 : Switch norepinephrine to vasopressin for persistent tachycardia.  If heart rate not any better, can start amiodarone drip for paroxysmal A-fib.  Hold Lasix today.    Assessment / Plan     Acute respiratory failure with hypoxia:   Likely due to right-sided empyema and left lung abscess  Wean off oxygen as tolerated.    Right-sided empyema / Left upper lobe lung abscess  S/p chest tube placement.    Currently getting intrapleural tPA per thoracic surgery.  Plan for CT chest in AM.  Thoracic surgery and ID following.    Pleural fluid grew ESBL E. coli with MRSA and BAL cultures positive for MRSA.  Currently on meropenem and vancomycin.    Vasodilatory shock  Due to empyema and CHF.  Does not meet SIRS criteria.  Currently on vasopressin for MAP target of 65.  Continue with midodrine and stress dose steroids.    Paroxysmal atrial fibrillation :   EKG with sinus rhythm and  some PACs.  Telemetry with episodes of paroxysmal A-fib.  Not on any rate control agents at this time due to shock state.  If persistently tachycardic, can start amiodarone drip for about rate and rhythm control.  [id number] Score of atleast 3.  Not on any anticoagulation due to thrombocytopenia.    Acute thrombocytopenia  Likely from infection, improving.  Normal INR and PTT, normal fibrinogen.  DIC ruled out.    Acute Combined Systolic and Diastolic heart failure /pulmonary hypertension:   Currently decompensated.  Last ECHO showed an EF of 40 to 45%, LV diastolic dysfunction, RVSP of 40.   Lasix on hold for today, reassess in a.m.  Will eventually add goal-directed medical therapy when blood pressure permits.  Might not be a candidate for any ischemic evaluation, given advanced dementia and poor quality of life.  Monitor Input/Output very closely. Follow daily weights.   Net IO Since Admission: -434 mL [02/22/24 1222]    Left calcaneal wound: Will get an eventual MRI.    Anxiety/depression: On Celexa and Risperdal.    Cachexia: Body mass index is 26.48 kg/m².  Nutrition following   History of breast cancer  History of right knee replacement    Code status:   Medical Intervention Limits: NO intubation (DNI)  Code Status (Patient has no pulse and is not breathing): No CPR (Do Not Attempt to Resuscitate)  Medical Interventions (Patient has pulse or is breathing): Limited Support       Nutrition: NPO Diet NPO Type: Strict NPO   Diet, Tube Feeding Tube Feeding Formula: Peptamen AF (Vital AF 1.2); Tube Feeding Type: Continuous; Continuous Tube Feeding Start Rate (mL/hr): 45; Then Advance Rate By (mL/hr): Do Not Advance; Every __ Hours: Patient at Goal Rate; To Goal Rate of...    DVT prophylaxis:  Medical DVT prophylaxis orders are present.       Subjective / Review of systems     Review of Systems   Poor historian, able to tell her name, thinks that she is at home.  Spoke with sister-in-law at the bedside who had  many questions about her heel ulcer, lung abscess and all of them were answered.  Objective / Physical Exam     Vital signs:  Temp: 98.8 °F (37.1 °C)  BP: 124/81  Heart Rate: (!) 128  Resp: 20  SpO2: 94 %  Weight: 61.5 kg (135 lb 9.3 oz)    Admission Weight: Weight: 67.1 kg (148 lb)  Current Weight: Weight: 61.5 kg (135 lb 9.3 oz)    Input/Output in last 24 hours:    Intake/Output Summary (Last 24 hours) at 2/22/2024 1222  Last data filed at 2/22/2024 0830  Gross per 24 hour   Intake 1354 ml   Output 2400 ml   Net -1046 ml      Physical Exam  Vitals and nursing note reviewed.   Constitutional:       General: She is awake. She is not in acute distress.     Appearance: She is underweight. She is ill-appearing.   HENT:      Head: Normocephalic and atraumatic.      Nose: Nose normal. No congestion.      Comments: NGT in place.     Mouth/Throat:      Mouth: Mucous membranes are moist.      Comments: ET tube in place  Eyes:      General: No scleral icterus.     Conjunctiva/sclera: Conjunctivae normal.   Cardiovascular:      Rate and Rhythm: Regular rhythm. Tachycardia present.      Pulses: Normal pulses.      Heart sounds: No murmur heard.     No gallop.   Pulmonary:      Effort: Pulmonary effort is normal.      Breath sounds: Normal breath sounds. No wheezing or rales.      Comments: Right-sided chest tube in place   Abdominal:      General: Bowel sounds are normal. There is no distension.      Palpations: Abdomen is soft.      Tenderness: There is no abdominal tenderness.   Musculoskeletal:      Cervical back: Neck supple.      Right lower leg: Edema (++) present.      Left lower leg: Edema (++) present.      Comments: Left heel wound with dressing in place   Skin:     General: Skin is warm and dry.   Neurological:      Mental Status: She is alert.      Comments: Alert, awake, oriented only to her name.  Motor and sensory are grossly intact.   Psychiatric:         Behavior: Behavior is cooperative.        Radiology and  Labs     Results from last 7 days   Lab Units 02/22/24  0438 02/21/24  0519 02/20/24  0523 02/19/24  0646 02/18/24  0520   WBC 10*3/mm3 8.00 7.80 6.10 4.50 3.70   HEMATOCRIT % 29.1* 31.7* 31.4* 35.5 27.9*   PLATELETS 10*3/mm3 88* 79* 81* 107* 109*      Results from last 7 days   Lab Units 02/22/24  0438 02/21/24  1541 02/21/24  0519 02/20/24  1515 02/20/24  0523 02/19/24  0646 02/18/24  0520   SODIUM mmol/L 144  --  140  --  141 140 137   POTASSIUM mmol/L 3.4* 3.7 4.5 4.9 3.4* 4.1 4.1   CHLORIDE mmol/L 98  --  101  --  107 108* 106   CO2 mmol/L 36.0*  --  31.0*  --  27.0 24.0 18.0*   BUN mg/dL 21  --  17  --  16 16 17   CREATININE mg/dL 0.75  --  0.77  --  0.75 0.83 0.88      Current medications     Scheduled Meds:   citalopram, 10 mg, Nasogastric, Daily  Divalproex Sodium, 125 mg, Nasogastric, Q12H  [Held by provider] enoxaparin, 40 mg, Subcutaneous, Daily  folic acid, 1 mg, Nasogastric, Daily  hydrocortisone sodium succinate, 50 mg, Intravenous, Q6H  lansoprazole, 30 mg, Nasogastric, Q AM  meropenem, 1,000 mg, Intravenous, Q8H  micafungin (MYCAMINE) IV, 100 mg, Intravenous, Q24H  midodrine, 10 mg, Nasogastric, Q8H  potassium chloride, 20 mEq, Nasogastric, BID  povidone-iodine, , Topical, Daily  risperiDONE, 0.25 mg, Nasogastric, Nightly  senna-docusate sodium, 2 tablet, Nasogastric, BID  sodium chloride, 250 mL, Intravenous, Once  sodium chloride, 10 mL, Intravenous, Q12H  vancomycin, 750 mg, Intravenous, Q24H      Continuous Infusions:   norepinephrine, 0.02-0.3 mcg/kg/min, Last Rate: Stopped (02/22/24 1107)  Pharmacy to dose vancomycin,   vasopressin, 0.03-0.1 Units/min, Last Rate: 0.03 Units/min (02/22/24 0830)      Plan discussed with RN. Reviewed all other data in the last 24 hours, including but not limited to vitals, labs, microbiology, imaging and pertinent notes from other providers. High complexity decision making and high risk of deterioration.    Nabil Queen MD   Critical Care  02/22/24   12:22  EST

## 2024-02-22 NOTE — PROGRESS NOTES
Nutrition Services    Patient Name: Sandra Treadwell  YOB: 1950  MRN: [id number]  Admission date: 2/16/2024    PROGRESS NOTE      Encounter Information: Check on for tube feeding.  Pt has been extubated. Remains on one pressor. NG remains in place for nutrition support, with Peptamen AF infusing at 45mL/hour, with minimal residual volumes.       PO Diet: NPO Diet NPO Type: Strict NPO   PO Supplements: None ordered   PO Intake:  NPO       Current nutrition support: Peptamen AF at 45mL/hr + 10mL/hr water flush    Nutrition support review: Documented as above per EMR       Labs (reviewed below): Upward sodium trend - will increase free water   Hypokalemia - being replaced   Phosphorus WNL today       GI Function:  BM today 2/22  Residuals WNL       Nutrition Intervention Updates: Continue TF at 45mL/hour.     Increase water flush to 30mL/hour.       Results from last 7 days   Lab Units 02/22/24  1556 02/22/24  0438 02/21/24  1541 02/21/24  0519 02/20/24  1515 02/20/24  0523   SODIUM mmol/L  --  144  --  140  --  141   POTASSIUM mmol/L 3.0* 3.4* 3.7 4.5   < > 3.4*   CHLORIDE mmol/L  --  98  --  101  --  107   CO2 mmol/L  --  36.0*  --  31.0*  --  27.0   BUN mg/dL  --  21  --  17  --  16   CREATININE mg/dL  --  0.75  --  0.77  --  0.75   CALCIUM mg/dL  --  8.1*  --  8.1*  --  7.3*   BILIRUBIN mg/dL  --  0.4  --  0.5  --  0.4   ALK PHOS U/L  --  100  --  102  --  77   ALT (SGPT) U/L  --  5  --  7  --  6   AST (SGOT) U/L  --  9  --  12  --  9   GLUCOSE mg/dL  --  153*  --  146*  --  181*    < > = values in this interval not displayed.     Results from last 7 days   Lab Units 02/22/24  0438 02/21/24  1541 02/21/24  0519   MAGNESIUM mg/dL 2.0 1.8 1.9   PHOSPHORUS mg/dL 2.9 2.2* 2.2*   HEMOGLOBIN g/dL 9.2*  --  10.0*   HEMATOCRIT % 29.1*  --  31.7*     COVID19   Date Value Ref Range Status   02/17/2024 Not Detected Not Detected - Ref. Range Final     Lab Results   Component Value Date    HGBA1C 5.60 11/15/2023        RD to follow up per protocol.    Electronically signed by:  Priya Sanders RD  02/22/24 17:29 EST

## 2024-02-22 NOTE — SIGNIFICANT NOTE
02/22/24 0850   OTHER   Discipline physical therapist   Rehab Time/Intention   Session Not Performed unable to evaluate, medical status change  (Pt remains on pressors for poorly controlled BP issues unable to follow commands.  Will hold until medically appropriate.)   Recommendation   PT - Next Appointment 02/23/24

## 2024-02-22 NOTE — PROGRESS NOTES
"    Chief Complaint: Right Empyema  S/P: CT-guided chest tube, lytics      Subjective:  Symptoms:  Stable.  She reports weakness.    Diet:  NPO.    Activity level: Impaired due to weakness.    Pain:  She reports no pain.    Extubated. On vaso. Alert and improved, remains intermittently confused.    Vital Signs:  Temp:  [97.1 °F (36.2 °C)-99.8 °F (37.7 °C)] 98.8 °F (37.1 °C)  Heart Rate:  [] 128  Resp:  [16-23] 20  BP: ()/(34-84) 124/81    Intake & Output (last day)         02/21 0701  02/22 0700 02/22 0701  02/23 0700    P.O.  0    I.V. (mL/kg) 1349 (21.9)     Other 214     NG/     Total Intake(mL/kg) 2404 (39.1) 0 (0)    Urine (mL/kg/hr) 3950 (2.7)     Chest Tube 250     Total Output 4200     Net -1796 0                  Objective:  General Appearance:  Ill-appearing and in no acute distress.    Vital signs: (most recent): Blood pressure (!) 88/51, pulse 97, temperature 98.8 °F (37.1 °C), temperature source Oral, resp. rate 20, height 152.4 cm (60\"), weight 61.5 kg (135 lb 9.3 oz), SpO2 97%.    HEENT: (Nasal cannula)    Lungs:  Normal effort and normal respiratory rate.  She is not in respiratory distress.  There are decreased breath sounds.    Chest: Symmetric chest wall expansion. Chest wall tenderness present.    Abdomen: Abdomen is soft and non-distended.    Extremities: Decreased range of motion.    Neurological: Patient is alert.    Skin:  Warm and dry.                CT-guided chest tube:  Site: Right, Clean, Dry, and Intact  Suction: -20 cm  Air Leak: negative  24 Hour Total: 250ml, purulent    Results Review:     I reviewed the patient's new clinical results.  I reviewed the patient's new imaging results and agree with the interpretation.  Discussed with nurse, and Dr. Gallagher    Imaging reviewed independently and agree with interpretation.     Imaging Results (Last 24 Hours)       Procedure Component Value Units Date/Time    XR Chest 1 View [[id number]] Collected: 02/22/24 0603     Updated: " 02/22/24 0610    Narrative:      XR CHEST 1 VW    Date of Exam: 2/22/2024 1:32 AM EST    Indication: chest tube management    Comparison: Chest radiograph 2/21/2024.    Findings:  Extubation. Unchanged position of smallbore chest tube overlying the medial right lung base, right internal jugular approach central venous catheter overlying the inferior cavoatrial junction, and nasogastric tube coursing over the stomach.   Cardiomediastinal silhouette is unchanged. Similar scattered subsegmental atelectasis. Focal consolidation in the left lateral midlung, unchanged and corresponds to the cavitary lesion seen on prior CT. Similar small right pneumothorax similar small   right pleural effusion. Osseous structures are unchanged.      Impression:      Impression:  Extubation without significant change otherwise, including similar small right hydropneumothorax with chest tube in place.      Electronically Signed: Chavez Martinez MD    2/22/2024 6:08 AM EST    Workstation ID: [id number]            Lab Results:     Lab Results (last 24 hours)       Procedure Component Value Units Date/Time    POC Glucose Once [[id number]]  (Abnormal) Collected: 02/22/24 1142    Specimen: Blood Updated: 02/22/24 1144     Glucose 189 mg/dL      Comment: Serial Number: [id number]:  [id number]       Fungus Culture - Body Fluid, Pleural Cavity [[id number]]  (Abnormal) Collected: 02/16/24 1556    Specimen: Body Fluid from Pleural Cavity Updated: 02/22/24 1039     Fungus Culture Candida species    Body Fluid Culture - Body Fluid, Pleural Cavity [[id number]]  (Abnormal)  (Susceptibility) Collected: 02/19/24 1124    Specimen: Body Fluid from Pleural Cavity Updated: 02/22/24 1020     Body Fluid Culture Light growth (2+) Escherichia coli ESBL     Comment:   Consider infectious disease consult.  Susceptibility results may not correlate to clinical outcomes.         Moderate growth (3+) Candida albicans      Rare Staphylococcus aureus, MRSA     Comment:    Methicillin resistant Staphylococcus aureus, Patient may be an isolation risk.        Gram Stain Many (4+) WBCs per low power field      Few (2+) Budding yeast    Susceptibility        Escherichia coli ESBL      TARYN      Ertapenem Susceptible      Levofloxacin Susceptible      Meropenem Susceptible      Trimethoprim + Sulfamethoxazole Susceptible                       Susceptibility        Candida albicans      TARYN      Fluconazole Susceptible      Micafungin Susceptible                       Susceptibility Comments       Escherichia coli ESBL    Cefazolin sensitivity will not be reported for Enterobacteriaceae in non-urine isolates. If cefazolin is preferred, please call the microbiology lab to request an E-test.  With the exception of urinary-sourced infections, aminoglycosides should not be used as monotherapy.               Anaerobic Culture - Body Fluid, Pleural Cavity [[id number]]  (Normal) Collected: 02/17/24 1709    Specimen: Body Fluid from Pleural Cavity Updated: 02/22/24 0710     Anaerobic Culture No anaerobes isolated at 3 days    CBC & Differential [[id number]]  (Abnormal) Collected: 02/22/24 0438    Specimen: Blood Updated: 02/22/24 0703    Narrative:      The following orders were created for panel order CBC & Differential.  Procedure                               Abnormality         Status                     ---------                               -----------         ------                     CBC Auto Differential[[id number]]        Abnormal            Final result               Scan Slide[[id number]]                                       Final result                 Please view results for these tests on the individual orders.    CBC Auto Differential [[id number]]  (Abnormal) Collected: 02/22/24 0438    Specimen: Blood Updated: 02/22/24 0703     WBC 8.00 10*3/mm3      RBC 3.44 10*6/mm3      Hemoglobin 9.2 g/dL      Hematocrit 29.1 %      MCV 84.4 fL      MCH 26.6 pg      MCHC 31.5 g/dL      RDW 19.1  %      RDW-SD 60.4 fl      MPV 9.1 fL      Platelets 88 10*3/mm3     Narrative:      The previously reported component NRBC is no longer being reported. Previous result was 0.6 /100 WBC (Reference Range: 0.0-0.2 /100 WBC) on 2/22/2024 at 0457 EST.    Scan Slide [[id number]] Collected: 02/22/24 0438    Specimen: Blood Updated: 02/22/24 0703     Scan Slide --     Comment: See Manual Differential Results       Manual Differential [[id number]]  (Abnormal) Collected: 02/22/24 0438    Specimen: Blood Updated: 02/22/24 0703     Neutrophil % 59.0 %      Lymphocyte % 12.0 %      Monocyte % 13.0 %      Bands %  14.0 %      Myelocyte % 1.0 %      Atypical Lymphocyte % 1.0 %      Neutrophils Absolute 5.84 10*3/mm3      Lymphocytes Absolute 1.04 10*3/mm3      Monocytes Absolute 1.04 10*3/mm3      nRBC 2.0 /100 WBC      Dacrocytes Slight/1+     Poikilocytes Slight/1+     Polychromasia Slight/1+     Toxic Granulation Mod/2+     Platelet Estimate Decreased    Phosphorus [[id number]]  (Normal) Collected: 02/22/24 0438    Specimen: Blood Updated: 02/22/24 0533     Phosphorus 2.9 mg/dL     Magnesium [[id number]]  (Normal) Collected: 02/22/24 0438    Specimen: Blood Updated: 02/22/24 0526     Magnesium 2.0 mg/dL     Comprehensive Metabolic Panel [[id number]]  (Abnormal) Collected: 02/22/24 0438    Specimen: Blood Updated: 02/22/24 0526     Glucose 153 mg/dL      BUN 21 mg/dL      Creatinine 0.75 mg/dL      Sodium 144 mmol/L      Potassium 3.4 mmol/L      Chloride 98 mmol/L      CO2 36.0 mmol/L      Calcium 8.1 mg/dL      Total Protein 5.5 g/dL      Albumin 2.4 g/dL      ALT (SGPT) 5 U/L      AST (SGOT) 9 U/L      Alkaline Phosphatase 100 U/L      Total Bilirubin 0.4 mg/dL      Globulin 3.1 gm/dL      A/G Ratio 0.8 g/dL      BUN/Creatinine Ratio 28.0     Anion Gap 10.0 mmol/L      eGFR 85.2 mL/min/1.73     Narrative:      GFR Normal >60  Chronic Kidney Disease <60  Kidney Failure <15    The GFR formula is only valid for adults with stable  renal function between ages 18 and 70.    Valproic Acid Level, Free [[id number]]  (Abnormal) Collected: 02/18/24 1524    Specimen: Blood Updated: 02/21/24 1808     Valproic Acid, Free 2.6 ug/mL      Comment:                                 Detection Limit = 0.5       Narrative:      Performed at:  [facility]  [id number]  : Cristian Orta MD, Phone:  [phone]    Blood Culture - Blood, Hand, Right [[id number]]  (Normal) Collected: 02/16/24 1737    Specimen: Blood from Hand, Right Updated: 02/21/24 1800     Blood Culture No growth at 5 days    Blood Culture - Blood, Hand, Left [[id number]]  (Normal) Collected: 02/16/24 1736    Specimen: Blood from Hand, Left Updated: 02/21/24 1800     Blood Culture No growth at 5 days    Narrative:      Less than seven (7) mL's of blood was collected.  Insufficient quantity may yield false negative results.    POC Glucose Once [[id number]]  (Abnormal) Collected: 02/21/24 1711    Specimen: Blood Updated: 02/21/24 1713     Glucose 122 mg/dL      Comment: Serial Number: [id number]:  [id number]       AFB Culture - Body Fluid, Pleural Cavity [[id number]] Collected: 02/16/24 1556    Specimen: Body Fluid from Pleural Cavity Updated: 02/21/24 1617     AFB Culture No AFB isolated at less than 1 week     AFB Stain No acid fast bacilli seen on concentrated smear    Magnesium [[id number]]  (Normal) Collected: 02/21/24 1541    Specimen: Blood Updated: 02/21/24 1604     Magnesium 1.8 mg/dL     Phosphorus [[id number]]  (Abnormal) Collected: 02/21/24 1541    Specimen: Blood Updated: 02/21/24 1604     Phosphorus 2.2 mg/dL     Potassium [[id number]]  (Normal) Collected: 02/21/24 1541    Specimen: Blood Updated: 02/21/24 1604     Potassium 3.7 mmol/L              Assessment & Plan       Empyema of lung       Assessment & Plan    Right Pleural effusion: Concerning for empyema as pleural fluid cultures grew ESBL/E. Col, MRSA and kate albicans.   S/p CT-guided chest tube placement on 2/19/2024.  Large bore chest tube removed 2/20/2024 with persistent purulent drainage from this site. Send pleural fluid for triglycerides due to milky appearance. Please continue to change dressing frequently to keep area clean and dry. Continue non-operative management with intrapleural TPA. Has received two rounds and will plan to repeat final dose today with CT chest tomorrow. Re-check CXR in AM.     Pulmonary abscess: Surgical intervention is not recommended with increased risk of development of bronchopleural fistula in the setting of abscess.  Antibiotics per infectious disease recommendations. ID has added IV micafungin.       Nohemi Mckeon DNP, APRN  [facility]  02/22/24  12:34 EST    Greater than 32 minutes was spent reviewing the patient's chart, radiographic imaging, assessing the patient and developing a plan of care which was discussed with the patient and RN.  This excludes any other billable procedures which will be dictated separately.

## 2024-02-22 NOTE — PLAN OF CARE
Goal Outcome Evaluation:    Received orders for dysphagia evaluation. Per nsg, pt is not medically appropriate to participate in swallow evaluation at this time. ST will complete evaluation when pt is deemed medically appropriate. Thank you.

## 2024-02-22 NOTE — PROGRESS NOTES
Infectious Diseases Progress Note      LOS: 6 days   Patient Care Team:  Wm Aguirre MD as PCP - General (Sports Medicine)    Chief Complaint: Confused    Subjective       The patient had no fever during the last 24 hours.  The patient is currently on 1 L of oxygen via nasal cannula.  She is currently on vasopressin drip      Review of Systems:   Review of Systems   Unable to perform ROS: Dementia        Objective     Vital Signs  Temp:  [97.1 °F (36.2 °C)-99.8 °F (37.7 °C)] 98.8 °F (37.1 °C)  Heart Rate:  [] 128  Resp:  [16-23] 20  BP: ()/(34-84) 124/81    Physical Exam:  Physical Exam  Vitals and nursing note reviewed.   Constitutional:       General: She is not in acute distress.     Appearance: She is well-developed and normal weight. She is ill-appearing. She is not diaphoretic.   HENT:      Head: Normocephalic and atraumatic.   Eyes:      General: No scleral icterus.     Extraocular Movements: Extraocular movements intact.      Conjunctiva/sclera: Conjunctivae normal.      Pupils: Pupils are equal, round, and reactive to light.   Cardiovascular:      Rate and Rhythm: Normal rate and regular rhythm.      Heart sounds: Normal heart sounds, S1 normal and S2 normal. No murmur heard.  Pulmonary:      Effort: Pulmonary effort is normal. No respiratory distress.      Breath sounds: Normal breath sounds. No stridor. No wheezing or rales.      Comments:   Right chest tube    Very diminished in the right lobe  Chest:      Chest wall: No tenderness.   Abdominal:      General: Bowel sounds are normal. There is no distension.      Palpations: Abdomen is soft. There is no mass.      Tenderness: There is no abdominal tenderness. There is no guarding.   Genitourinary:     Comments:  Ribera catheter  Musculoskeletal:         General: No swelling, tenderness or deformity.   Skin:     General: Skin is warm and dry.      Coloration: Skin is not pale.      Findings: No bruising, erythema or rash.       Comments: Left calcaneus unstageable wound          Results Review:    I have reviewed all clinical data, test, lab, and imaging results.     Radiology  XR Chest 1 View    Result Date: 2/22/2024  XR CHEST 1 VW Date of Exam: 2/22/2024 1:32 AM EST Indication: chest tube management Comparison: Chest radiograph 2/21/2024. Findings: Extubation. Unchanged position of smallbore chest tube overlying the medial right lung base, right internal jugular approach central venous catheter overlying the inferior cavoatrial junction, and nasogastric tube coursing over the stomach. Cardiomediastinal silhouette is unchanged. Similar scattered subsegmental atelectasis. Focal consolidation in the left lateral midlung, unchanged and corresponds to the cavitary lesion seen on prior CT. Similar small right pneumothorax similar small right pleural effusion. Osseous structures are unchanged.     Impression: Extubation without significant change otherwise, including similar small right hydropneumothorax with chest tube in place. Electronically Signed: Chavez Martinez MD  2/22/2024 6:08 AM EST  Workstation ID: [id number]     Cardiology    Laboratory    Results from last 7 days   Lab Units 02/22/24  0438 02/21/24  0519 02/20/24  0523 02/19/24  0646 02/18/24  0520 02/17/24  1408 02/17/24  0510 02/16/24  2055   WBC 10*3/mm3 8.00 7.80 6.10 4.50 3.70  --  7.20 8.30   HEMOGLOBIN g/dL 9.2* 10.0* 9.7* 10.6* 8.6* 9.4* 6.9* 10.3*   HEMATOCRIT % 29.1* 31.7* 31.4* 35.5 27.9* 29.9* 23.4* 34.1   PLATELETS 10*3/mm3 88* 79* 81* 107* 109*  --  224 393     Results from last 7 days   Lab Units 02/22/24  0438 02/21/24  1541 02/21/24  0519 02/20/24  1515 02/20/24  0523 02/19/24  0646 02/18/24  0520 02/17/24  0510 02/16/24 2017 02/16/24  1228   SODIUM mmol/L 144  --  140  --  141 140 137 136 135* 138   POTASSIUM mmol/L 3.4* 3.7 4.5 4.9 3.4* 4.1 4.1 4.4 3.9 4.1   CHLORIDE mmol/L 98  --  101  --  107 108* 106 103 102 100   CO2 mmol/L 36.0*  --  31.0*  --  27.0 24.0  18.0* 25.0 23.0 28.0   BUN mg/dL 21  --  17  --  16 16 17 16 18 15   CREATININE mg/dL 0.75  --  0.77  --  0.75 0.83 0.88 0.83 0.87 0.75   GLUCOSE mg/dL 153*  --  146*  --  181* 121* 83 96 119* 104*   ALBUMIN g/dL 2.4*  --  2.6*  --  2.3*  --  3.5  --  2.1* 2.2*   BILIRUBIN mg/dL 0.4  --  0.5  --  0.4  --  0.7  --  0.4 0.4   ALK PHOS U/L 100  --  102  --  77  --  26*  --  57 61   AST (SGOT) U/L 9  --  12  --  9  --  7  --  9 8   ALT (SGPT) U/L 5  --  7  --  6  --  <5  --  6 5   CALCIUM mg/dL 8.1*  --  8.1*  --  7.3* 7.6* 8.2* 9.3 9.4 9.8         Results from last 7 days   Lab Units 02/18/24  0520   SED RATE mm/hr <1         Microbiology   Microbiology Results (last 10 days)       Procedure Component Value - Date/Time    Body Fluid Culture - Body Fluid, Pleural Cavity [[id number]]  (Abnormal)  (Susceptibility) Collected: 02/19/24 1124    Lab Status: Preliminary result Specimen: Body Fluid from Pleural Cavity Updated: 02/22/24 1020     Body Fluid Culture Light growth (2+) Escherichia coli ESBL     Comment:   Consider infectious disease consult.  Susceptibility results may not correlate to clinical outcomes.         Moderate growth (3+) Candida albicans      Rare Staphylococcus aureus, MRSA     Comment:   Methicillin resistant Staphylococcus aureus, Patient may be an isolation risk.        Gram Stain Many (4+) WBCs per low power field      Few (2+) Budding yeast    Susceptibility        Escherichia coli ESBL      TARYN      Ertapenem Susceptible      Levofloxacin Susceptible      Meropenem Susceptible      Trimethoprim + Sulfamethoxazole Susceptible                       Susceptibility        Candida albicans      TARYN      Fluconazole Susceptible      Micafungin Susceptible                       Susceptibility Comments       Escherichia coli ESBL    Cefazolin sensitivity will not be reported for Enterobacteriaceae in non-urine isolates. If cefazolin is preferred, please call the microbiology lab to request an E-test.  With  the exception of urinary-sourced infections, aminoglycosides should not be used as monotherapy.               BAL Culture, Quantitative - Lavage, Bronchus [[id number]]  (Abnormal)  (Susceptibility) Collected: 02/17/24 1709    Lab Status: Final result Specimen: Lavage from Bronchus Updated: 02/21/24 0807     BAL Culture <10,000 CFU/mL Staphylococcus aureus, MRSA     Comment:   Methicillin resistant Staphylococcus aureus, Patient may be an isolation risk.         No Normal Respiratory Maday    Susceptibility        Staphylococcus aureus, MRSA      TARYN      Clindamycin Susceptible      Linezolid Susceptible      Oxacillin Resistant      Tetracycline Susceptible      Trimethoprim + Sulfamethoxazole Resistant      Vancomycin Susceptible                           Respiratory Culture - Lavage, Lung, Left Upper Lobe [[id number]]  (Abnormal) Collected: 02/17/24 1709    Lab Status: Final result Specimen: Lavage from Lung, Left Upper Lobe Updated: 02/21/24 0807     Respiratory Culture Rare Staphylococcus aureus, MRSA     Comment:   Methicillin resistant Staphylococcus aureus, Patient may be an isolation risk.         No Normal Respiratory Maday     Gram Stain Rare (1+) WBCs per low power field      Rare (1+) Epithelial cells per low power field      No organisms seen    Narrative:      Refer to other Resp culture from same day for MICS    AFB Culture - Lavage, Lung, Left Upper Lobe [[id number]] Collected: 02/17/24 1709    Lab Status: Preliminary result Specimen: Lavage from Lung, Left Upper Lobe Updated: 02/19/24 0954     AFB Stain No acid fast bacilli seen on concentrated smear    Anaerobic Culture - Body Fluid, Pleural Cavity [[id number]]  (Normal) Collected: 02/17/24 1709    Lab Status: Preliminary result Specimen: Body Fluid from Pleural Cavity Updated: 02/22/24 0710     Anaerobic Culture No anaerobes isolated at 3 days    MRSA Screen, PCR (Inpatient) - Swab, Nares [[id number]]  (Abnormal) Collected: 02/17/24 0854    Lab  Status: Final result Specimen: Swab from Nares Updated: 02/17/24 1015     MRSA PCR MRSA Detected    Narrative:      The negative predictive value of this diagnostic test is high and should only be used to consider de-escalating anti-MRSA therapy. A positive result may indicate colonization with MRSA and must be correlated clinically.    Respiratory Panel PCR w/COVID-19(SARS-CoV-2) NAYELI/CAMILLE/HUSAM/PAD/COR/SUMAN In-House, NP Swab in UTM/VTM, 2 HR TAT - Swab, Nasopharynx [[id number]]  (Normal) Collected: 02/17/24 0854    Lab Status: Final result Specimen: Swab from Nasopharynx Updated: 02/17/24 0953     ADENOVIRUS, PCR Not Detected     Coronavirus 229E Not Detected     Coronavirus HKU1 Not Detected     Coronavirus NL63 Not Detected     Coronavirus OC43 Not Detected     COVID19 Not Detected     Human Metapneumovirus Not Detected     Human Rhinovirus/Enterovirus Not Detected     Influenza A PCR Not Detected     Influenza B PCR Not Detected     Parainfluenza Virus 1 Not Detected     Parainfluenza Virus 2 Not Detected     Parainfluenza Virus 3 Not Detected     Parainfluenza Virus 4 Not Detected     RSV, PCR Not Detected     Bordetella pertussis pcr Not Detected     Bordetella parapertussis PCR Not Detected     Chlamydophila pneumoniae PCR Not Detected     Mycoplasma pneumo by PCR Not Detected    Narrative:      In the setting of a positive respiratory panel with a viral infection PLUS a negative procalcitonin without other underlying concern for bacterial infection, consider observing off antibiotics or discontinuation of antibiotics and continue supportive care. If the respiratory panel is positive for atypical bacterial infection (Bordetella pertussis, Chlamydophila pneumoniae, or Mycoplasma pneumoniae), consider antibiotic de-escalation to target atypical bacterial infection.    Legionella Antigen, Urine - Urine, Urine, Clean Catch [[id number]]  (Normal) Collected: 02/17/24 0852    Lab Status: Final result Specimen: Urine,  Clean Catch Updated: 02/17/24 0927     LEGIONELLA ANTIGEN, URINE Negative    S. Pneumo Ag Urine or CSF - Urine, Urine, Clean Catch [[id number]]  (Normal) Collected: 02/17/24 0852    Lab Status: Final result Specimen: Urine, Clean Catch Updated: 02/17/24 0927     Strep Pneumo Ag Negative    Blood Culture - Blood, Hand, Right [[id number]]  (Normal) Collected: 02/16/24 1737    Lab Status: Final result Specimen: Blood from Hand, Right Updated: 02/21/24 1800     Blood Culture No growth at 5 days    Blood Culture - Blood, Hand, Left [[id number]]  (Normal) Collected: 02/16/24 1736    Lab Status: Final result Specimen: Blood from Hand, Left Updated: 02/21/24 1800     Blood Culture No growth at 5 days    Narrative:      Less than seven (7) mL's of blood was collected.  Insufficient quantity may yield false negative results.    AFB Culture - Body Fluid, Pleural Cavity [[id number]] Collected: 02/16/24 1556    Lab Status: Preliminary result Specimen: Body Fluid from Pleural Cavity Updated: 02/21/24 1617     AFB Culture No AFB isolated at less than 1 week     AFB Stain No acid fast bacilli seen on concentrated smear    Body Fluid Culture - Body Fluid, Pleural Cavity [[id number]]  (Abnormal)  (Susceptibility) Collected: 02/16/24 1556    Lab Status: Final result Specimen: Body Fluid from Pleural Cavity Updated: 02/18/24 1143     Body Fluid Culture Scant growth (1+) Escherichia coli ESBL     Comment:   Consider infectious disease consult.  Susceptibility results may not correlate to clinical outcomes.         Rare Normal Skin Maday     Gram Stain Moderate (3+) WBCs per low power field      No organisms seen    Susceptibility        Escherichia coli ESBL      TARYN      Ertapenem Susceptible      Levofloxacin Susceptible      Meropenem Susceptible      Trimethoprim + Sulfamethoxazole Susceptible                       Susceptibility Comments       Escherichia coli ESBL    Cefazolin sensitivity will not be reported for Enterobacteriaceae in  non-urine isolates. If cefazolin is preferred, please call the microbiology lab to request an E-test.  With the exception of urinary-sourced infections, aminoglycosides should not be used as monotherapy.               Anaerobic Culture - Pleural Fluid, Pleural Cavity [[id number]]  (Normal) Collected: 02/16/24 1556    Lab Status: Final result Specimen: Pleural Fluid from Pleural Cavity Updated: 02/21/24 0646     Anaerobic Culture No anaerobes isolated at 5 days    Fungus Culture - Body Fluid, Pleural Cavity [[id number]]  (Abnormal) Collected: 02/16/24 1556    Lab Status: Preliminary result Specimen: Body Fluid from Pleural Cavity Updated: 02/22/24 1039     Fungus Culture Candida species    Fungus Smear - Body Fluid, Pleural Cavity [[id number]] Collected: 02/16/24 1556    Lab Status: Final result Specimen: Body Fluid from Pleural Cavity Updated: 02/17/24 1205     Fungal Stain No fungal elements seen            Medication Review:       Schedule Meds  alteplase (ACTIVASE) 10 mg in sterile water (preservative free) 30 mL Syringe, , Intrapleural, Once  citalopram, 10 mg, Nasogastric, Daily  Divalproex Sodium, 125 mg, Nasogastric, Q12H  [Held by provider] enoxaparin, 40 mg, Subcutaneous, Daily  folic acid, 1 mg, Nasogastric, Daily  hydrocortisone sodium succinate, 50 mg, Intravenous, Q6H  lansoprazole, 30 mg, Nasogastric, Q AM  meropenem, 1,000 mg, Intravenous, Q8H  micafungin (MYCAMINE) IV, 100 mg, Intravenous, Q24H  midodrine, 10 mg, Nasogastric, Q8H  potassium chloride, 20 mEq, Nasogastric, BID  povidone-iodine, , Topical, Daily  risperiDONE, 0.25 mg, Nasogastric, Nightly  senna-docusate sodium, 2 tablet, Nasogastric, BID  sodium chloride, 250 mL, Intravenous, Once  sodium chloride, 10 mL, Intravenous, Q12H  vancomycin, 750 mg, Intravenous, Q24H        Infusion Meds  Pharmacy to dose vancomycin,   vasopressin, 0.03-0.1 Units/min, Last Rate: 0.03 Units/min (02/22/24 0830)        PRN Meds    acetaminophen **OR**  acetaminophen    dextrose    dextrose    glucagon (human recombinant)    lactulose    lidocaine    Magnesium Standard Dose Replacement - Follow Nurse / BPA Driven Protocol    ondansetron    ondansetron ODT    Pharmacy to dose vancomycin    Phosphorus Replacement - Follow Nurse / BPA Driven Protocol    polyethylene glycol    Potassium Replacement - Follow Nurse / BPA Driven Protocol    [COMPLETED] Insert Peripheral IV **AND** sodium chloride    sodium chloride    sodium chloride        Assessment & Plan       Antimicrobial Therapy   1.  IV vancomycin        2.  IV meropenem   3.  IV micafungin           Assessment     Right thoracic empyema.  Fluid analysis showed findings consistent with empyema with cultures growing ESBL  E. coli.  The presentation is concerning for micro fistula between the abdomen and thoracic cavity.  Patient s/p chest tube placement by IR which was replaced on February 19, 2024.  Right-sided pleural fluid culture from February 19 is growing ESBL  E. coli, MRSA and Candida albicans     Left upper lobe cavitary lesion most likely consistent with left lung abscess.  Suspecting the same pathogen involving the right lung causing this infection.   BAL culture from February 17 is  growing MRSA    Acute hypoxic respiratory failure-likely related to the above.  Extubated on February 28 and currently on 1 L of oxygen    Septic shock-patient is currently on  vasopressin drip     Left calcaneus wound, unstageable with black eschar.  I doubt this is a peripheral vascular disease patient has strong pulse.  Most likely this is a pressure ulcer    Dementia.  Patient is severely confused          Plan     Continue IV vancomycin for now, pharmacy currently following and dosing and keep trough between 15-20.  Recommend 4 weeks of IV vancomycin  Continue IV meropenem 1 g every 8 hours for 4 weeks  Continue IV micafungin 100 mg daily  Repeat CT scan of the chest tomorrow  Consider MRI of the left  foot when patient is more stable, can be done later during this admission  Continue supportive care  A.m. labs   The case was discussed with ICU service        Himanshu Spaulding MD  02/22/24  12:40 EST    Note is dictated utilizing voice recognition software/Dragon

## 2024-02-22 NOTE — SIGNIFICANT NOTE
02/22/24 1538   OTHER   Discipline occupational therapist   Rehab Time/Intention   Session Not Performed unable to evaluate, medical status change  (on pressors and not follow commands appropriately, OT will follow up)   Recommendation   OT - Next Appointment 02/23/24

## 2024-02-22 NOTE — PLAN OF CARE
Goal Outcome Evaluation:  Plan of Care Reviewed With: patient         Patient alert to self only with a baseline of dementia. Patient extubated at 0930 to NC today. Heart rate between 110-160s and MD aware of patient flipping from ST to AFIB. Phos, K, and mag replaced. Levo gtt currently infusing. TPA in chest tube and clamped for 2 hours with turning q15 minutes. Unclamped at 1745. Midaxillary wound (old chest tube site) leaking a large amount and needing changed frequently. Arterial line pulled this shift. UOP 2.2L. PRN Miralax and lactulose given this shift. No BM. Patient still needs bedside post extubation swallow evaluation.

## 2024-02-22 NOTE — PROCEDURES
Pre-op Diagnosis: Loculated Pleural Effusion, right     Post-Op Diagnosis: Loculated Pleural Effusion, right     Procedure performed: Irrigation pleural cavity with TPA and dornase    Anesthesia: None    Summary of procedure: The  pleural tube was accessed. 10 mg of TPA (reconstituted in 30cc of sterile water) was instilled into the pleural cavity. The pleural tube then was clamped.      The patient's position is to be changed to every 15 minutes for 2 hours.  The tube will then be unclamped and placed back to suction.  Patient tolerated the procedure well. We will re-evaluate with a CXR in the morning.      Appreciate assistance from Jessa RN.    Nohemi Mckeon, DNP, APRN

## 2024-02-22 NOTE — CASE MANAGEMENT/SOCIAL WORK
Continued Stay Note   [place]     Patient Name: Sandra Treadwell  MRN: [id number]  Today's Date: 2/22/2024    Admit Date: 2/16/2024    Plan: Return to [facility]. No precert or PASRR required.   Discharge Plan       Row Name 02/22/24 1052       Plan    Plan Return to [facility]. No precert or PASRR required.    Plan Comments DC Barriers: 1L NC, Vaso/Levo gtt, NG TF, C-line, FC, R chest tube, IV Abxs, MRI L foot pending, Throacic Sx/ID/WC following.               Expected Discharge Date and Time       Expected Discharge Date Expected Discharge Time    Feb 26, 2024               SANJUANA Gonzales RN  SIPS/ICU   O: [phone]  C: [phone]  [email or web address]

## 2024-02-23 ENCOUNTER — APPOINTMENT (OUTPATIENT)
Dept: CT IMAGING | Facility: HOSPITAL | Age: 74
DRG: 208 | End: 2024-02-23
Payer: MEDICARE

## 2024-02-23 ENCOUNTER — APPOINTMENT (OUTPATIENT)
Dept: GENERAL RADIOLOGY | Facility: HOSPITAL | Age: 74
DRG: 208 | End: 2024-02-23
Payer: MEDICARE

## 2024-02-23 PROBLEM — J90 PLEURAL EFFUSION, RIGHT: Status: ACTIVE | Noted: 2024-02-16

## 2024-02-23 LAB
ALBUMIN SERPL-MCNC: 2.5 G/DL (ref 3.5–5.2)
ALBUMIN/GLOB SERPL: 1 G/DL
ALP SERPL-CCNC: 81 U/L (ref 39–117)
ALT SERPL W P-5'-P-CCNC: 7 U/L (ref 1–33)
ANION GAP SERPL CALCULATED.3IONS-SCNC: 5 MMOL/L (ref 5–15)
ANISOCYTOSIS BLD QL: ABNORMAL
AST SERPL-CCNC: 8 U/L (ref 1–32)
BACTERIA FLD CULT: ABNORMAL
BACTERIA SPEC ANAEROBE CULT: NORMAL
BILIRUB SERPL-MCNC: 0.2 MG/DL (ref 0–1.2)
BUN SERPL-MCNC: 23 MG/DL (ref 8–23)
BUN/CREAT SERPL: 48.9 (ref 7–25)
CALCIUM SPEC-SCNC: 7.7 MG/DL (ref 8.6–10.5)
CHLORIDE SERPL-SCNC: 102 MMOL/L (ref 98–107)
CO2 SERPL-SCNC: 35 MMOL/L (ref 22–29)
CREAT SERPL-MCNC: 0.47 MG/DL (ref 0.57–1)
DEPRECATED RDW RBC AUTO: 58.2 FL (ref 37–54)
EGFRCR SERPLBLD CKD-EPI 2021: 101.9 ML/MIN/1.73
ERYTHROCYTE [DISTWIDTH] IN BLOOD BY AUTOMATED COUNT: 18.9 % (ref 12.3–15.4)
GLOBULIN UR ELPH-MCNC: 2.4 GM/DL
GLUCOSE BLDC GLUCOMTR-MCNC: 163 MG/DL (ref 70–105)
GLUCOSE BLDC GLUCOMTR-MCNC: 180 MG/DL (ref 70–105)
GLUCOSE SERPL-MCNC: 196 MG/DL (ref 65–99)
GRAM STN SPEC: ABNORMAL
GRAM STN SPEC: ABNORMAL
HCT VFR BLD AUTO: 23.7 % (ref 34–46.6)
HCT VFR BLD AUTO: 24.1 % (ref 34–46.6)
HGB BLD-MCNC: 7.3 G/DL (ref 12–15.9)
HGB BLD-MCNC: 7.6 G/DL (ref 12–15.9)
LDH FLD-CCNC: >2500 U/L
LDH SERPL-CCNC: 213 U/L (ref 135–214)
LYMPHOCYTES # BLD MANUAL: 0.92 10*3/MM3 (ref 0.7–3.1)
LYMPHOCYTES NFR BLD MANUAL: 4 % (ref 5–12)
MCH RBC QN AUTO: 26.9 PG (ref 26.6–33)
MCHC RBC AUTO-ENTMCNC: 31 G/DL (ref 31.5–35.7)
MCV RBC AUTO: 86.9 FL (ref 79–97)
MONOCYTES # BLD: 0.37 10*3/MM3 (ref 0.1–0.9)
NEUTROPHILS # BLD AUTO: 7.91 10*3/MM3 (ref 1.7–7)
NEUTROPHILS NFR BLD MANUAL: 74 % (ref 42.7–76)
NEUTS BAND NFR BLD MANUAL: 12 % (ref 0–5)
PHOSPHATE SERPL-MCNC: 1.4 MG/DL (ref 2.5–4.5)
PHOSPHATE SERPL-MCNC: 3.7 MG/DL (ref 2.5–4.5)
PLATELET # BLD AUTO: 64 10*3/MM3 (ref 140–450)
PMV BLD AUTO: 10.2 FL (ref 6–12)
POTASSIUM SERPL-SCNC: 4.7 MMOL/L (ref 3.5–5.2)
POTASSIUM SERPL-SCNC: 4.9 MMOL/L (ref 3.5–5.2)
PROT SERPL-MCNC: 4.9 G/DL (ref 6–8.5)
RBC # BLD AUTO: 2.72 10*6/MM3 (ref 3.77–5.28)
SCAN SLIDE: NORMAL
SMALL PLATELETS BLD QL SMEAR: ABNORMAL
SODIUM SERPL-SCNC: 142 MMOL/L (ref 136–145)
STOMATOCYTES BLD QL SMEAR: ABNORMAL
TOXIC GRANULATION: ABNORMAL
TRIGL SERPL-MCNC: 155 MG/DL (ref 0–150)
VARIANT LYMPHS NFR BLD MANUAL: 10 % (ref 19.6–45.3)
WBC NRBC COR # BLD AUTO: 9.2 10*3/MM3 (ref 3.4–10.8)

## 2024-02-23 PROCEDURE — 25810000003 SODIUM CHLORIDE 0.9 % SOLUTION: Performed by: INTERNAL MEDICINE

## 2024-02-23 PROCEDURE — 84100 ASSAY OF PHOSPHORUS: CPT | Performed by: INTERNAL MEDICINE

## 2024-02-23 PROCEDURE — 85014 HEMATOCRIT: CPT | Performed by: NURSE PRACTITIONER

## 2024-02-23 PROCEDURE — 97166 OT EVAL MOD COMPLEX 45 MIN: CPT

## 2024-02-23 PROCEDURE — 25010000002 HYDROCORTISONE SOD SUC (PF) 100 MG RECONSTITUTED SOLUTION: Performed by: NURSE PRACTITIONER

## 2024-02-23 PROCEDURE — 85007 BL SMEAR W/DIFF WBC COUNT: CPT | Performed by: INTERNAL MEDICINE

## 2024-02-23 PROCEDURE — 25010000002 VANCOMYCIN 750 MG RECONSTITUTED SOLUTION 1 EACH VIAL: Performed by: INTERNAL MEDICINE

## 2024-02-23 PROCEDURE — 85025 COMPLETE CBC W/AUTO DIFF WBC: CPT | Performed by: INTERNAL MEDICINE

## 2024-02-23 PROCEDURE — 97530 THERAPEUTIC ACTIVITIES: CPT

## 2024-02-23 PROCEDURE — 97535 SELF CARE MNGMENT TRAINING: CPT

## 2024-02-23 PROCEDURE — 85018 HEMOGLOBIN: CPT | Performed by: NURSE PRACTITIONER

## 2024-02-23 PROCEDURE — 97162 PT EVAL MOD COMPLEX 30 MIN: CPT

## 2024-02-23 PROCEDURE — 92610 EVALUATE SWALLOWING FUNCTION: CPT

## 2024-02-23 PROCEDURE — 25010000002 VASOPRESSIN 0.2 UNIT/ML SOLUTION: Performed by: INTERNAL MEDICINE

## 2024-02-23 PROCEDURE — 83615 LACTATE (LD) (LDH) ENZYME: CPT | Performed by: NURSE PRACTITIONER

## 2024-02-23 PROCEDURE — 84478 ASSAY OF TRIGLYCERIDES: CPT | Performed by: NURSE PRACTITIONER

## 2024-02-23 PROCEDURE — 25810000003 SODIUM CHLORIDE 0.9 % SOLUTION 250 ML FLEX CONT: Performed by: INTERNAL MEDICINE

## 2024-02-23 PROCEDURE — 25010000002 FUROSEMIDE PER 20 MG: Performed by: INTERNAL MEDICINE

## 2024-02-23 PROCEDURE — 25010000002 MEROPENEM PER 100 MG: Performed by: INTERNAL MEDICINE

## 2024-02-23 PROCEDURE — 71045 X-RAY EXAM CHEST 1 VIEW: CPT

## 2024-02-23 PROCEDURE — 97110 THERAPEUTIC EXERCISES: CPT

## 2024-02-23 PROCEDURE — 71250 CT THORAX DX C-: CPT

## 2024-02-23 PROCEDURE — 80053 COMPREHEN METABOLIC PANEL: CPT | Performed by: INTERNAL MEDICINE

## 2024-02-23 PROCEDURE — 82948 REAGENT STRIP/BLOOD GLUCOSE: CPT

## 2024-02-23 PROCEDURE — 84132 ASSAY OF SERUM POTASSIUM: CPT | Performed by: INTERNAL MEDICINE

## 2024-02-23 PROCEDURE — 25010000002 MICAFUNGIN SODIUM 100 MG RECONSTITUTED SOLUTION 1 EACH VIAL: Performed by: INTERNAL MEDICINE

## 2024-02-23 PROCEDURE — 99232 SBSQ HOSP IP/OBS MODERATE 35: CPT

## 2024-02-23 RX ORDER — ACETAZOLAMIDE 250 MG/1
500 TABLET ORAL ONCE
Status: COMPLETED | OUTPATIENT
Start: 2024-02-23 | End: 2024-02-23

## 2024-02-23 RX ORDER — FUROSEMIDE 10 MG/ML
40 INJECTION INTRAMUSCULAR; INTRAVENOUS EVERY 12 HOURS
Qty: 8 ML | Refills: 0 | Status: COMPLETED | OUTPATIENT
Start: 2024-02-23 | End: 2024-02-23

## 2024-02-23 RX ORDER — MIDODRINE HYDROCHLORIDE 5 MG/1
20 TABLET ORAL EVERY 8 HOURS
Status: DISCONTINUED | OUTPATIENT
Start: 2024-02-23 | End: 2024-02-25

## 2024-02-23 RX ADMIN — POTASSIUM CHLORIDE 40 MEQ: 1.5 POWDER, FOR SOLUTION ORAL at 03:17

## 2024-02-23 RX ADMIN — MEROPENEM 1000 MG: 1 INJECTION, POWDER, FOR SOLUTION INTRAVENOUS at 14:58

## 2024-02-23 RX ADMIN — FUROSEMIDE 40 MG: 10 INJECTION, SOLUTION INTRAMUSCULAR; INTRAVENOUS at 21:19

## 2024-02-23 RX ADMIN — POTASSIUM CHLORIDE 40 MEQ: 1.5 POWDER, FOR SOLUTION ORAL at 06:01

## 2024-02-23 RX ADMIN — VANCOMYCIN HYDROCHLORIDE 750 MG: 750 INJECTION, POWDER, LYOPHILIZED, FOR SOLUTION INTRAVENOUS at 10:20

## 2024-02-23 RX ADMIN — SODIUM PHOSPHATE, MONOBASIC, MONOHYDRATE AND SODIUM PHOSPHATE, DIBASIC, ANHYDROUS 15 MMOL: 142; 276 INJECTION, SOLUTION INTRAVENOUS at 14:00

## 2024-02-23 RX ADMIN — MIDODRINE HYDROCHLORIDE 10 MG: 5 TABLET ORAL at 06:01

## 2024-02-23 RX ADMIN — CITALOPRAM HYDROBROMIDE 10 MG: 20 TABLET ORAL at 09:55

## 2024-02-23 RX ADMIN — VASOPRESSIN 0.05 UNITS/MIN: 0.2 INJECTION INTRAVENOUS at 16:44

## 2024-02-23 RX ADMIN — MIDODRINE HYDROCHLORIDE 20 MG: 5 TABLET ORAL at 14:57

## 2024-02-23 RX ADMIN — MEROPENEM 1000 MG: 1 INJECTION, POWDER, FOR SOLUTION INTRAVENOUS at 06:01

## 2024-02-23 RX ADMIN — POTASSIUM CHLORIDE 20 MEQ: 1.5 POWDER, FOR SOLUTION ORAL at 21:19

## 2024-02-23 RX ADMIN — SODIUM PHOSPHATE, MONOBASIC, MONOHYDRATE AND SODIUM PHOSPHATE, DIBASIC, ANHYDROUS 15 MMOL: 142; 276 INJECTION, SOLUTION INTRAVENOUS at 10:00

## 2024-02-23 RX ADMIN — VASOPRESSIN 0.03 UNITS/MIN: 0.2 INJECTION INTRAVENOUS at 22:31

## 2024-02-23 RX ADMIN — FOLIC ACID 1 MG: 1 TABLET ORAL at 09:55

## 2024-02-23 RX ADMIN — ACETAZOLAMIDE 500 MG: 250 TABLET ORAL at 14:57

## 2024-02-23 RX ADMIN — DIVALPROEX SODIUM 125 MG: 125 CAPSULE, COATED PELLETS ORAL at 09:55

## 2024-02-23 RX ADMIN — MICAFUNGIN 100 MG: 20 INJECTION, POWDER, LYOPHILIZED, FOR SOLUTION INTRAVENOUS at 12:45

## 2024-02-23 RX ADMIN — MIDODRINE HYDROCHLORIDE 20 MG: 5 TABLET ORAL at 21:19

## 2024-02-23 RX ADMIN — HYDROCORTISONE SODIUM SUCCINATE 50 MG: 100 INJECTION, POWDER, FOR SOLUTION INTRAMUSCULAR; INTRAVENOUS at 00:55

## 2024-02-23 RX ADMIN — POVIDONE-IODINE: 10 SOLUTION TOPICAL at 09:56

## 2024-02-23 RX ADMIN — HYDROCORTISONE SODIUM SUCCINATE 50 MG: 100 INJECTION, POWDER, FOR SOLUTION INTRAMUSCULAR; INTRAVENOUS at 06:01

## 2024-02-23 RX ADMIN — VASOPRESSIN 0.05 UNITS/MIN: 0.2 INJECTION INTRAVENOUS at 10:20

## 2024-02-23 RX ADMIN — MEROPENEM 1000 MG: 1 INJECTION, POWDER, FOR SOLUTION INTRAVENOUS at 22:31

## 2024-02-23 RX ADMIN — RISPERIDONE 0.25 MG: 0.25 TABLET, FILM COATED ORAL at 21:19

## 2024-02-23 RX ADMIN — Medication 10 ML: at 09:55

## 2024-02-23 RX ADMIN — FUROSEMIDE 40 MG: 10 INJECTION, SOLUTION INTRAMUSCULAR; INTRAVENOUS at 09:54

## 2024-02-23 RX ADMIN — DIVALPROEX SODIUM 125 MG: 125 CAPSULE, COATED PELLETS ORAL at 21:19

## 2024-02-23 RX ADMIN — POTASSIUM CHLORIDE 20 MEQ: 1.5 POWDER, FOR SOLUTION ORAL at 09:55

## 2024-02-23 RX ADMIN — LANSOPRAZOLE 30 MG: 30 TABLET, ORALLY DISINTEGRATING, DELAYED RELEASE ORAL at 06:01

## 2024-02-23 RX ADMIN — VASOPRESSIN 0.03 UNITS/MIN: 0.2 INJECTION INTRAVENOUS at 03:15

## 2024-02-23 NOTE — THERAPY EVALUATION
Patient Name: Sandra Treadwell  : 1950    MRN: [id number]                              Today's Date: 2024       Admit Date: 2024    Visit Dx:     ICD-10-CM ICD-9-CM   1. Pleural effusion, right  J90 511.9   2. Pneumonia due to infectious organism, unspecified laterality, unspecified part of lung  J18.9 486   3. Hypotension, unspecified hypotension type  I95.9 458.9   4. Acute respiratory failure with hypoxemia  J96.01 518.81   5. Empyema of pleura  J86.9 510.9   6. Abscess of upper lobe of left lung with pneumonia  J85.1 513.0     Patient Active Problem List   Diagnosis    Status post knee replacement    Osteoarthritis of right knee    Abnormal EKG    Deafness in right ear    Depression    Displacement of breast implant    Dyslipidemia    Dyspnea    GERD (gastroesophageal reflux disease)    Hiatal hernia    Knee pain, right    Osteoporosis    Perennial allergic rhinitis with seasonal variation    Primary insomnia    Renal atrophy    Systolic murmur    Empyema of lung    History of malignant neoplasm of breast    Overactive bladder     Past Medical History:   Diagnosis Date    Bladder leak     Breast cancer     chemo and radiation    Depression     GERD (gastroesophageal reflux disease)     Hyperlipidemia     Osteoporosis      Past Surgical History:   Procedure Laterality Date    BREAST SURGERY      lumpectomy    CHOLECYSTECTOMY      HYSTERECTOMY      TOTAL KNEE ARTHROPLASTY Right 2023    Procedure: TOTAL KNEE ARTHROPLASTY WITH CORI ROBOT;  Surgeon: Ulises Chen II, MD;  Location: [facility];  Service: Robotics - Ortho;  Laterality: Right;      General Information       Row Name 24 1006          Physical Therapy Time and Intention    Document Type evaluation  -CL     Mode of Treatment individual therapy;physical therapy  -CL       Row Name 24 1006          General Information    Prior Level of Function dependent:  non-ambulatory per medical record  -CL     Existing  Precautions/Restrictions fall;oxygen therapy device and L/min  -CL     Barriers to Rehab medically complex;cognitive status  -CL       Row Name 02/23/24 1006          Living Environment    People in Home facility resident  -CL     Name(s) of People in Home [facility]  -CL       Row Name 02/23/24 1006          Home Main Entrance    Number of Stairs, Main Entrance none  -CL       Row Name 02/23/24 1006          Stairs Within Home, Primary    Number of Stairs, Within Home, Primary none  -CL       Row Name 02/23/24 1006          Cognition    Orientation Status (Cognition) oriented to;person  -CL       Row Name 02/23/24 1006          Safety Issues, Functional Mobility    Impairments Affecting Function (Mobility) endurance/activity tolerance  -CL               User Key  (r) = Recorded By, (t) = Taken By, (c) = Cosigned By      Initials Name Provider Type    Gladis Carl PT Physical Therapist                   Mobility       Row Name 02/23/24 1012          Bed Mobility    Bed Mobility rolling right;supine-sit-supine  -CL     Rolling Right [place] (Bed Mobility) maximum assist (25% patient effort)  -CL     Supine-Sit-Supine [place] (Bed Mobility) maximum assist (25% patient effort);dependent (less than 25% patient effort);1 person assist  -CL     Assistive Device (Bed Mobility) draw sheet  -CL       Row Name 02/23/24 1012          Bed-Chair Transfer    Bed-Chair [place] (Transfers) not tested  -CL       Row Name 02/23/24 1012          Sit-Stand Transfer    Sit-Stand [place] (Transfers) not tested  -CL       Row Name 02/23/24 1012          Gait/Stairs (Locomotion)    [place] Level (Gait) not tested  -CL     Comment, (Gait/Stairs) Pt has been non-ambulatory at facility  -CL               User Key  (r) = Recorded By, (t) = Taken By, (c) = Cosigned By      Initials Name Provider Type    Gladis Carl PT Physical Therapist                   Obj/Interventions       Row  Name 02/23/24 1013          Range of Motion Comprehensive    Comment, General Range of Motion Pt with at least 50% of ROM BLEs; pt is resistant to UE PROM and resists PROM in LEs as well.  No contractures noted and sufficient ROM to acheive sitting with knees flexed to 90 bilaterally  -CL       Row Name 02/23/24 1013          Strength Comprehensive (MMT)    Comment, General Manual Muscle Testing (MMT) Assessment Unable to assess due to cognition  -CL       Row Name 02/23/24 1013          Balance    Balance Assessment sitting static balance;sitting dynamic balance  -CL     Static Sitting Balance minimal assist  -CL     Dynamic Sitting Balance maximum assist  -CL     Position, Sitting Balance sitting edge of bed  -CL     Comment, Balance Pt tolerated sitting EOB x 20 minutes - initially to allow SLP to assess swallow then to perform LE ex.  -CL       Row Name 02/23/24 1013          Sensory Assessment (Somatosensory)    Sensory Assessment (Somatosensory) unable/difficult to assess  -CL               User Key  (r) = Recorded By, (t) = Taken By, (c) = Cosigned By      Initials Name Provider Type    CL Gladis Owusu, PT Physical Therapist                   Goals/Plan       Row Name 02/23/24 1020          Bed Mobility Goal 1 (PT)    Activity/Assistive Device (Bed Mobility Goal 1, PT) bed mobility activities, all  -CL     [place] Level/Cues Needed (Bed Mobility Goal 1, PT) moderate assist (50-74% patient effort)  -CL     Time Frame (Bed Mobility Goal 1, PT) long term goal (LTG);2 weeks  -CL       Row Name 02/23/24 1020          Transfer Goal 1 (PT)    Activity/Assistive Device (Transfer Goal 1, PT) bed-to-chair/chair-to-bed  -CL     [place] Level/Cues Needed (Transfer Goal 1, PT) maximum assist (25-49% patient effort)  -CL     Time Frame (Transfer Goal 1, PT) long term goal (LTG);2 weeks  -CL       Row Name 02/23/24 1020          Problem Specific Goal 1 (PT)    Problem Specific Goal 1 (PT) Pt will tolerate  sitting EOB x 10 minutes unsupported to build strength, postural control and endurance for transfers.  -CL     Time Frame (Problem Specific Goal 1, PT) long-term goal (LTG);2 weeks  -CL       Row Name 02/23/24 1020          Therapy Assessment/Plan (PT)    Planned Therapy Interventions (PT) balance training;bed mobility training;neuromuscular re-education;patient/family education;postural re-education;transfer training;strengthening;ROM (range of motion)  -CL               User Key  (r) = Recorded By, (t) = Taken By, (c) = Cosigned By      Initials Name Provider Type    CL Gladis Owusu, PT Physical Therapist                   Clinical Impression       Row Name 02/23/24 1015          Pain    Pain Location - back;neck  -CL     Pain Intervention(s) Repositioned;Massage  -CL     Additional Documentation Pain Scale: FACES Pre/Post-Treatment (Group)  -CL       Row Name 02/23/24 1015          Pain Scale: FACES Pre/Post-Treatment    Pain: FACES Scale, Pretreatment 2-->hurts little bit  -CL     Posttreatment Pain Rating 2-->hurts little bit  -CL       Row Name 02/23/24 1015          Plan of Care Review    Plan of Care Reviewed With patient;friend  -     Outcome Evaluation Sandra Treadwell is a 73 y.o. female who presented to ED from facility with chest pain. She was Hypoxic and hypotensive. She was found to have an empyema of the lung.  She underwent chest tube placement and was intubated 2/17 and extubated 2/21.   PMH: Hiatal hernia, Anemia, probable dementia.  Per medical record, pt is from a LTC facility and was non-ambulatory and dependent for care.  At time of PT evaluation, she is A&O x1.  She follows commands intermittently but resists BUE movement.  PT dependently positioned in sitting EOB. Pt tolerated sitting with min A for 20 minutes for SLP to perfrom swallow assessment and to perform LE ex.  Pt appears close to baseline.  We will see as pt participating today.  She may benefit from PT at her facility upon  her return.  -CL       Row Name 02/23/24 1015          Therapy Assessment/Plan (PT)    Rehab Potential (PT) fair, will monitor progress closely  -CL     Criteria for Skilled Interventions Met (PT) yes;skilled treatment is necessary  -CL     Therapy Frequency (PT) 3 times/wk  -CL     Predicted Duration of Therapy Intervention (PT) Until discharge  -CL       Row Name 02/23/24 1015          Vital Signs    Pre Systolic BP Rehab 92  -CL     Pre Treatment Diastolic BP 55  -CL     Post Systolic BP Rehab 89  -CL     Post Treatment Diastolic BP 56  -CL     Pretreatment Heart Rate (beats/min) 91  -CL     Posttreatment Heart Rate (beats/min) 90  -CL     Pretreatment Resp Rate (breaths/min) 25  -CL     Posttreatment Resp Rate (breaths/min) 24  -CL     Pre SpO2 (%) 95  -CL     O2 Delivery Pre Treatment room air  -CL     O2 Delivery Post Treatment room air  -CL     Pre Patient Position Supine  -CL     Intra Patient Position Sitting  -CL     Post Patient Position Supine  -CL       Row Name 02/23/24 1015          Positioning and Restraints    Pre-Treatment Position in bed  -CL     Post Treatment Position bed  -CL     In Bed notified nsg;supine;exit alarm on;call light within reach  -CL               User Key  (r) = Recorded By, (t) = Taken By, (c) = Cosigned By      Initials Name Provider Type    CL Gladis Owusu, PT Physical Therapist                   Outcome Measures       Row Name 02/23/24 1021          How much help from another person do you currently need...    Turning from your back to your side while in flat bed without using bedrails? 1  -CL     Moving from lying on back to sitting on the side of a flat bed without bedrails? 1  -CL     Moving to and from a bed to a chair (including a wheelchair)? 1  -CL     Standing up from a chair using your arms (e.g., wheelchair, bedside chair)? 1  -CL     Climbing 3-5 steps with a railing? 1  -CL     To walk in hospital room? 1  -CL     AM-PAC 6 Clicks Score (PT) 6  -CL      Highest Level of Mobility Goal 2 --> Bed activities/dependent transfer  -CL               User Key  (r) = Recorded By, (t) = Taken By, (c) = Cosigned By      Initials Name Provider Type    CL Gladis Owusu PT Physical Therapist                                 Physical Therapy Education       Title: PT OT SLP Therapies (In Progress)       Topic: Physical Therapy (In Progress)       Point: Mobility training (In Progress)       Learning Progress Summary             Patient Acceptance, E,TB, NR by CL at 2/23/2024 1022                                         User Key       Initials Effective Dates Name Provider Type Discipline    CL 03/02/22 -  Gladis Owusu PT Physical Therapist PT                  PT Recommendation and Plan  Planned Therapy Interventions (PT): balance training, bed mobility training, neuromuscular re-education, patient/family education, postural re-education, transfer training, strengthening, ROM (range of motion)  Plan of Care Reviewed With: patient, friend  Outcome Evaluation: Sandra Treadwell is a 73 y.o. female who presented to ED from facility with chest pain. She was Hypoxic and hypotensive. She was found to have an empyema of the lung.  She underwent chest tube placement and was intubated 2/17 and extubated 2/21.   PMH: Hiatal hernia, Anemia, probable dementia.  Per medical record, pt is from a LTC facility and was non-ambulatory and dependent for care.  At time of PT evaluation, she is A&O x1.  She follows commands intermittently but resists BUE movement.  PT dependently positioned in sitting EOB. Pt tolerated sitting with min A for 20 minutes for SLP to perfrom swallow assessment and to perform LE ex.  Pt appears close to baseline.  We will see as pt participating today.  She may benefit from PT at her facility upon her return.     Time Calculation:   PT Evaluation Complexity  History, PT Evaluation Complexity: 3 or more personal factors and/or comorbidities  Examination of Body  Systems (PT Eval Complexity): total of 3 or more elements  Clinical Presentation (PT Evaluation Complexity): evolving  Clinical Decision Making (PT Evaluation Complexity): moderate complexity  Overall Complexity (PT Evaluation Complexity): moderate complexity     PT Charges       Row Name 02/23/24 1022             Time Calculation    Start Time 0846  -CL      Stop Time 0922  -CL      Time Calculation (min) 36 min  -CL      PT Received On 02/23/24  -CL      PT - Next Appointment 02/26/24  -CL      PT Goal Re-Cert Due Date 03/08/24  -CL         Time Calculation- PT    Total Timed Code Minutes- PT 16 minute(s)  -CL                User Key  (r) = Recorded By, (t) = Taken By, (c) = Cosigned By      Initials Name Provider Type    CL Gladis Owusu, PT Physical Therapist                  Therapy Charges for Today       Code Description Service Date Service Provider Modifiers Qty    07781503258 HC PT EVAL MOD COMPLEXITY 4 2/23/2024 Gladis Owusu, PT GP 1    54822987840 HC PT THERAPEUTIC ACT EA 15 MIN 2/23/2024 Gladis Owusu, PT GP 1    16681045501 HC PT THER PROC EA 15 MIN 2/23/2024 Gladis Owusu, PT GP 1            PT G-Codes  AM-PAC 6 Clicks Score (PT): 6  PT Discharge Summary  Anticipated Discharge Disposition (PT): skilled nursing facility    Gladis Owusu PT  2/23/2024

## 2024-02-23 NOTE — THERAPY EVALUATION
Acute Care - Speech Language Pathology   Swallow Initial Evaluation  [place]     Patient Name: Sandra Treadwell  : 1950  MRN: [id number]  Today's Date: 2024               Admit Date: 2024    Visit Dx:     ICD-10-CM ICD-9-CM   1. Pleural effusion, right  J90 511.9   2. Pneumonia due to infectious organism, unspecified laterality, unspecified part of lung  J18.9 486   3. Hypotension, unspecified hypotension type  I95.9 458.9   4. Acute respiratory failure with hypoxemia  J96.01 518.81   5. Empyema of pleura  J86.9 510.9   6. Abscess of upper lobe of left lung with pneumonia  J85.1 513.0     Patient Active Problem List   Diagnosis    Status post knee replacement    Osteoarthritis of right knee    Abnormal EKG    Deafness in right ear    Depression    Displacement of breast implant    Dyslipidemia    Dyspnea    GERD (gastroesophageal reflux disease)    Hiatal hernia    Knee pain, right    Osteoporosis    Perennial allergic rhinitis with seasonal variation    Primary insomnia    Renal atrophy    Systolic murmur    Empyema of lung    History of malignant neoplasm of breast    Overactive bladder     Past Medical History:   Diagnosis Date    Bladder leak     Breast cancer     chemo and radiation    Depression     GERD (gastroesophageal reflux disease)     Hyperlipidemia     Osteoporosis      Past Surgical History:   Procedure Laterality Date    BREAST SURGERY      lumpectomy    CHOLECYSTECTOMY      HYSTERECTOMY      TOTAL KNEE ARTHROPLASTY Right 2023    Procedure: TOTAL KNEE ARTHROPLASTY WITH CORI ROBOT;  Surgeon: Ulises Chen II, MD;  Location: [facility];  Service: Robotics - Ortho;  Laterality: Right;       SLP Recommendation and Plan  SLP Swallowing Diagnosis: moderate, oral dysphagia, suspected pharyngeal dysphagia (24 1015)  SLP Diet Recommendation: NPO (24 1015)     SLP Rec. for Method of Medication Administration: meds via alternate route (24 1015)     Monitor  for Signs of Aspiration: yes, notify SLP if any concerns (02/23/24 1015)  Recommended Diagnostics: reassess via clinical swallow evaluation (02/23/24 1015)  Swallow Criteria for Skilled Therapeutic Interventions Met: demonstrates skilled criteria (02/23/24 1015)     Rehab Potential/Prognosis, Swallowing: good, to achieve stated therapy goals (02/23/24 1015)  Therapy Frequency (Swallow): PRN (02/23/24 1015)  Predicted Duration Therapy Intervention (Days): until discharge (02/23/24 1015)  Oral Care Recommendations: Oral Care BID/PRN, Swab (02/23/24 1015)          SWALLOW EVALUATION (last 72 hours)       SLP Adult Swallow Evaluation       Row Name 02/23/24 1015       Rehab Evaluation    Document Type evaluation  -CB    Subjective Information no complaints  -CB    Patient Observations alert;cooperative  -CB    Patient/Family/Caregiver Comments/Observations Patient able to follow simple directives given visual and verbal cuing.  -CB    Patient Effort good  -CB       General Information    Patient Profile Reviewed yes  -CB    Pertinent History Of Current Problem A 73 y.o. female with PMH of hiatal hernia, anemia presented to the hospital for shortness of breath and was admitted with a principal diagnosis of Empyema of lung.  Found to be hypoxic and hypotensive, CTA showed large right pleural effusion with a small right pneumothorax and left upper lobe abscess. S/p right-sided chest tube placement and cultures grew ESBL E. coli along with Candida and MRSA.  Subsequently, developed profound hypotension, treated with broad-spectrum antibiotics and vasopressors.  ID and thoracic surgery was consulted.  Subsequently, patient was intubated on 2/17 and had bronchoscopy with BAL done and left upper lobe.  Cultures grew MRSA and treated with vancomycin.  Extubated on 2/21  -CB    Current Method of Nutrition NPO  -CB       Oral Motor Structure and Function    Dentition Assessment edentulous  -CB    Secretion Management WNL/WFL  -CB     Mucosal Quality moist, healthy  -CB    Gag Response absent or diminished  -CB       Oral Musculature and Cranial Nerve Assessment    Oral Motor General Assessment generalized oral motor weakness  -CB    Oral Motor, Comment Oral mechanism examination revealed patient demonstrated generalized weakness of lingual and labial structures. Lingual strength was reduced. Palatal movement was present. No gag reflex was noted.  -CB       General Eating/Swallowing Observations    Respiratory Support Currently in Use room air  -CB    Eating/Swallowing Skills fed by SLP  -CB    Positioning During Eating --  Sitting upright on side of bed with assistance of PT.  -CB       Clinical Swallow Eval    Clinical Swallow Evaluation Summary Patient was seen for dysphagia evaluation per MD order as patient failed swallow screen. Patient was intubated from 2/17-2/21. Most recent chest x-ray revealed no sigfnificant changes compared to the recent CT given difference in modality. Loculated moderate size hydropneumothorax present on the right similar as compared to previous CT. Bibasilar airspace disease and effusion appear similar. Patient currently is receiving primary nourishment from NG tube at this time. Patient is edentulous. Oral mechanism examination revealed generalized weakness with lingual and labial structures. Lingual strength was reduced. Palatal movement was present. Noted no gag reflex. Patient was properly positioned upright on the side of bed with assistance of PT. Provided trial of ice chip x 1. No overt s/s of aspiration. Provided trials of thins via spoon x 2. Noted anterior loss with spoon as patient did not demonstrate completed labial closure.  Patient noted to bolus hold for 20-30 seconds in duration. Eventually patient ended up spitting out liquid out  of oral cavity. Patient noted to have very soft spoken vocal quality which was difficult to understand as a result. Patient is not ready for PO at this time as  patient not exhibiting functional swallowing d/t bolus hold and anterior spillage of bolus as she is apprehensive with swallowing. ST will continue to follow with ongoing reevaluation of swallow in anticipation of initiating PO diet.  -CB       SLP Evaluation Clinical Impression    SLP Swallowing Diagnosis moderate;oral dysphagia;suspected pharyngeal dysphagia  -CB    Functional Impact risk of aspiration/pneumonia  -CB    Rehab Potential/Prognosis, Swallowing good, to achieve stated therapy goals  -CB    Swallow Criteria for Skilled Therapeutic Interventions Met demonstrates skilled criteria  -CB       Recommendations    Therapy Frequency (Swallow) PRN  -CB    Predicted Duration Therapy Intervention (Days) until discharge  -CB    SLP Diet Recommendation NPO  -CB    Recommended Diagnostics reassess via clinical swallow evaluation  -CB    Oral Care Recommendations Oral Care BID/PRN;Swab  -CB    SLP Rec. for Method of Medication Administration meds via alternate route  -CB    Monitor for Signs of Aspiration yes;notify SLP if any concerns  -CB       Swallow Goals (SLP)    Swallow LTGs Swallow Long Term Goal (free text)  -CB    Swallow STGs diet tolerance goal selection (SLP)  -CB    Diet Tolerance Goal Selection (SLP) Swallow Short Term Goal 1  -CB       (LTG) Swallow    (LTG) Swallow Patient will tolerate safest and least restrictive diet without complications from aspiration.  -CB    Time Frame (Swallow Long Term Goal) by discharge  -CB       (STG) Swallow 1    (STG) Swallow 1 Patient will participate in ongoing assessment of swallow, including reevaluation clinically and/or including instrumental assessment of swallow if indicated, to further assess swallow function in anticipation of initiation of PO diet.  -CB    Time Frame (Swallow Short Term Goal 1) 1 week  -CB              User Key  (r) = Recorded By, (t) = Taken By, (c) = Cosigned By      Initials Name Effective Dates    Ana Zuñiga, SLP 09/21/21 -                      EDUCATION  The patient has been educated in the following areas:   Dysphagia (Swallowing Impairment) Oral Care/Hydration NPO rationale.        SLP GOALS       Row Name 02/23/24 1015       (LTG) Swallow    (LTG) Swallow Patient will tolerate safest and least restrictive diet without complications from aspiration.  -CB    Time Frame (Swallow Long Term Goal) by discharge  -CB       (STG) Swallow 1    (STG) Swallow 1 Patient will participate in ongoing assessment of swallow, including reevaluation clinically and/or including instrumental assessment of swallow if indicated, to further assess swallow function in anticipation of initiation of PO diet.  -CB    Time Frame (Swallow Short Term Goal 1) 1 week  -CB              User Key  (r) = Recorded By, (t) = Taken By, (c) = Cosigned By      Initials Name Provider Type    Ana Zuñiga, SLP Speech and Language Pathologist                       Time Calculation:                IDALIA Rasmussen  2/23/2024

## 2024-02-23 NOTE — PLAN OF CARE
Goal Outcome Evaluation:      Patient was seen for dysphagia evaluation per MD order as patient failed swallow screen. Patient was intubated from 2/17-2/21. Most recent chest x-ray revealed no sigfnificant changes compared to the recent CT given difference in modality. Loculated moderate size hydropneumothorax present on the right similar as compared to previous CT. Bibasilar airspace disease and effusion appear similar. Patient currently is receiving primary nourishment from NG tube at this time. Patient is edentulous. Oral mechanism examination revealed generalized weakness with lingual and labial structures. Lingual strength was reduced. Palatal movement was present. Noted no gag reflex. Patient was properly positioned upright on the side of bed with assistance of PT. Provided trial of ice chip x 1. No overt s/s of aspiration. Provided trials of thins via spoon x 2. Noted anterior loss with spoon as patient did not demonstrate completed labial closure.  Patient noted to bolus hold for 20-30 seconds in duration. Eventually patient ended up spitting out liquid out  of oral cavity. Patient noted to have very soft spoken vocal quality which was difficult to understand as a result. Patient is not ready for PO at this time as patient not exhibiting functional swallowing d/t bolus hold and anterior spillage of bolus as she is apprehensive with swallowing.It is recommended that patient remain NPO and continue primary nourishment via NG tube.  ST will continue to follow with ongoing reevaluation of swallow in anticipation of initiating PO diet.                          SLP Swallowing Diagnosis: moderate, oral dysphagia, suspected pharyngeal dysphagia (02/23/24 1015)

## 2024-02-23 NOTE — PLAN OF CARE
Goal Outcome Evaluation:  Plan of Care Reviewed With: patient        Progress: no change  Outcome Evaluation: Pt is a 72 y/o F admitted to [facility] 2/16/24 with c/o empyema of R lung, R pleural effusion, abscess on L lung, intubated 2/17/24, extubated 2/21/24. PMHx significant for recent L TKA Nov 2023 by Dr. 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 Per chart review, pt resides with spouse in [facility] with 1 NATALIA. Pt typically (I) with ADLs, no AD for mobility. Pt admitted from SNF. This date pt A&Ox1,does have low projection of speech therefore difficult to formally assess cognition, however shouts illogical statements throughout. Difficulty following commands, attempted to assess BUE PROM however resistive to movement. Pt bowel and urine incontinent upon arrival, requires max A rolling L<>R and dependent for maura hygiene and linen change. Pt noted to have previous chest tube site open with significant copious drainage, nsg present to re-apply bandage, additional time for clean up, additional linen change d/t saturated linens. Pt appears to be functioning near previous baseline post TKA, significantly lower to true baseline prior to TKA in Nov 2023. OT recommend return to SNF when medically appropriate for dc. Will follow      Anticipated Discharge Disposition (OT): skilled nursing facility                         Notified by RN patient with temperature of 100.5 F. Patient seen and examined  by me at bedside.   Patient is alert, NAD.  Patient denied headache, dizziness, chest pain, shortness of breath, rhinorrhea, N/V/D, urinary symptoms, or abdominal pain.  Pt admits to chronic dry cough.       ROS:  CONSTITUTIONAL:  + fever, NO chills, rigors  CARDIOVASCULAR:  No chest pain or palpitations  RESPIRATORY:   No SOB, dyspnea on exertion.  No wheezing, + cough  GASTROINTESTINAL:  No abd pain, N/V, diarrhea/constipation  EXTREMITIES:  No swelling or joint pain  GENITOURINARY:  No burning on urination, increased frequency or urgency.  No flank pain  NEUROLOGIC:  No HA, visual disturbances  SKIN: No rashes          VITAL SIGNS:  T(C): 38.2 (08-15-19 @ 01:00), Max: 39 (19 @ 09:05)  HR: 96 (08-15-19 @ 01:00) (77 - 102)  BP: 145/77 (08-15-19 @ 01:00) (119/68 - 145/77)  RR: 18 (08-15-19 @ 01:00) (18 - 18)  SpO2: 95% (08-15-19 @ 01:00) (93% - 98%)  Wt(kg): --      Physical Exam:  General: WN/WD NAD, AOx3, nontoxic appearing  Head:  NC/AT  CV: RRR, S1S2   Respiratory: CTA B/L, nonlabored. No rales/rhonchi/wheezes.  Abdominal: (+) bowel sounds x4. Soft, NT, ND, no palpable mass, no guarding, or rebound tenderness  MSK: No BLLE edema, + peripheral pulses, FROM all 4 extremity  Skin: (+) warm, dry   Psych: Appropriate affect       LABORATORY:                        8.2    0.4   )-----------( 36       ( 14 Aug 2019 06:53 )             24.2       08-14    133<L>  |  100  |  13  ----------------------------<  243<H>  3.5   |  21<L>  |  0.83    Ca    7.7<L>      14 Aug 2019 06:53  Phos  2.8     08-  Mg     1.8     08-14    TPro  6.5  /  Alb  2.8<L>  /  TBili  0.4  /  DBili  x   /  AST  56<H>  /  ALT  64<H>  /  AlkPhos  168<H>        Urinalysis Basic - ( 13 Aug 2019 22:43 )    Color: Light Yellow / Appearance: Clear / S.015 / pH: x  Gluc: x / Ketone: Negative  / Bili: Negative / Urobili: <2 mg/dL   Blood: x / Protein: 30 mg/dL / Nitrite: Negative   Leuk Esterase: Negative / RBC: 2 /HPF / WBC 1 /HPF   Sq Epi: x / Non Sq Epi: 1 /HPF / Bacteria: Negative              ASSESSMENT/PLAN:   HPI:  66 yo F with history of DM,  RA , diagnosed in 2016, chronic depression now with newly diagnosed AML  admitted for induction chemotherapy     Patient now with temperature of 100.5    1) Fever  -Tylenol and cooling measures prn for pyrexia  -BC x2, urine cx from  still pending   -CXR placed   -c/w current anti microbial   -Will continue to closely monitor patient/vitals   -Will endorse to primary team in AM Notified by RN patient with temperature of 100.5 F. Patient seen and examined  by me at bedside.   Patient is alert, NAD.  Patient denied headache, dizziness, chest pain, shortness of breath, rhinorrhea, N/V/D, urinary symptoms, or abdominal pain.  Pt admits to chronic dry cough.       ROS:  CONSTITUTIONAL:  + fever, NO chills, rigors  CARDIOVASCULAR:  No chest pain or palpitations  RESPIRATORY:   No SOB, dyspnea on exertion.  No wheezing, + cough  GASTROINTESTINAL:  No abd pain, N/V, diarrhea/constipation  EXTREMITIES:  No swelling or joint pain  GENITOURINARY:  No burning on urination, increased frequency or urgency.  No flank pain  NEUROLOGIC:  No HA, visual disturbances  SKIN: No rashes          VITAL SIGNS:  T(C): 38.2 (08-15-19 @ 01:00), Max: 39 (19 @ 09:05)  HR: 96 (08-15-19 @ 01:00) (77 - 102)  BP: 145/77 (08-15-19 @ 01:00) (119/68 - 145/77)  RR: 18 (08-15-19 @ 01:00) (18 - 18)  SpO2: 95% (08-15-19 @ 01:00) (93% - 98%)  Wt(kg): --      Physical Exam:  General: WN/WD NAD, AOx3, nontoxic appearing  Head:  NC/AT  CV: RRR, S1S2   Respiratory: CTA B/L, nonlabored. No rales/rhonchi/wheezes.  Abdominal: (+) bowel sounds x4. Soft, NT, ND, no palpable mass, no guarding, or rebound tenderness  MSK: No BLLE edema, + peripheral pulses, FROM all 4 extremity  Skin: (+) warm, dry   Psych: Appropriate affect       LABORATORY:                        8.2    0.4   )-----------( 36       ( 14 Aug 2019 06:53 )             24.2       08-14    133<L>  |  100  |  13  ----------------------------<  243<H>  3.5   |  21<L>  |  0.83    Ca    7.7<L>      14 Aug 2019 06:53  Phos  2.8     08-  Mg     1.8     08-14    TPro  6.5  /  Alb  2.8<L>  /  TBili  0.4  /  DBili  x   /  AST  56<H>  /  ALT  64<H>  /  AlkPhos  168<H>        Urinalysis Basic - ( 13 Aug 2019 22:43 )    Color: Light Yellow / Appearance: Clear / S.015 / pH: x  Gluc: x / Ketone: Negative  / Bili: Negative / Urobili: <2 mg/dL   Blood: x / Protein: 30 mg/dL / Nitrite: Negative   Leuk Esterase: Negative / RBC: 2 /HPF / WBC 1 /HPF   Sq Epi: x / Non Sq Epi: 1 /HPF / Bacteria: Negative              ASSESSMENT/PLAN:   HPI:  64 yo F with history of DM,  RA , diagnosed in 2016, chronic depression now with newly diagnosed AML  admitted for induction chemotherapy     Patient now with temperature of 100.5    1) Fever  -Tylenol and cooling measures prn for pyrexia  -BC x2, urine cx from  still pending   -CXR placed   -c/w current anti microbial   -Will continue to closely monitor patient/vitals   -Will endorse to primary team in AM      Addendum:  CXR d/w Radiology overnight: no change from previous study Notified by RN patient with temperature of 100.5 F. Patient seen and examined  by me at bedside.   Patient is alert, NAD.  Patient denied headache, dizziness, chest pain, shortness of breath, rhinorrhea, N/V/D, urinary symptoms, or abdominal pain.  Pt admits to chronic dry cough.       ROS:  CONSTITUTIONAL:  + fever, NO chills, rigors  CARDIOVASCULAR:  No chest pain or palpitations  RESPIRATORY:   No SOB, dyspnea on exertion.  No wheezing, + cough  GASTROINTESTINAL:  No abd pain, N/V, diarrhea/constipation  EXTREMITIES:  No swelling or joint pain  GENITOURINARY:  No burning on urination, increased frequency or urgency.  No flank pain  NEUROLOGIC:  No HA, visual disturbances  SKIN: No rashes          VITAL SIGNS:  T(C): 38.2 (08-15-19 @ 01:00), Max: 39 (19 @ 09:05)  HR: 96 (08-15-19 @ 01:00) (77 - 102)  BP: 145/77 (08-15-19 @ 01:00) (119/68 - 145/77)  RR: 18 (08-15-19 @ 01:00) (18 - 18)  SpO2: 95% (08-15-19 @ 01:00) (93% - 98%)  Wt(kg): --      Physical Exam:  General: WN/WD NAD, AOx3, nontoxic appearing  Head:  NC/AT  CV: RRR, S1S2   Respiratory: CTA B/L, nonlabored. No rales/rhonchi/wheezes.  Abdominal: (+) bowel sounds x4. Soft, NT, ND, no palpable mass, no guarding, or rebound tenderness  MSK: No BLLE edema, + peripheral pulses, FROM all 4 extremity  Skin: (+) warm, dry   Psych: Appropriate affect       LABORATORY:                        8.2    0.4   )-----------( 36       ( 14 Aug 2019 06:53 )             24.2       08-14    133<L>  |  100  |  13  ----------------------------<  243<H>  3.5   |  21<L>  |  0.83    Ca    7.7<L>      14 Aug 2019 06:53  Phos  2.8     08-  Mg     1.8     08-14    TPro  6.5  /  Alb  2.8<L>  /  TBili  0.4  /  DBili  x   /  AST  56<H>  /  ALT  64<H>  /  AlkPhos  168<H>        Urinalysis Basic - ( 13 Aug 2019 22:43 )    Color: Light Yellow / Appearance: Clear / S.015 / pH: x  Gluc: x / Ketone: Negative  / Bili: Negative / Urobili: <2 mg/dL   Blood: x / Protein: 30 mg/dL / Nitrite: Negative   Leuk Esterase: Negative / RBC: 2 /HPF / WBC 1 /HPF   Sq Epi: x / Non Sq Epi: 1 /HPF / Bacteria: Negative              ASSESSMENT/PLAN:   HPI:  66 yo F with history of DM,  RA , diagnosed in 2016, chronic depression now with newly diagnosed AML  admitted for induction chemotherapy     Patient now with temperature of 100.5    1) Fever  -Tylenol and cooling measures prn for pyrexia  -BC x2, urine cx from  still pending   -CXR placed   -c/w current anti microbial   -Will continue to closely monitor patient/vitals   -Will endorse to primary team in AM      Addendum:  CXR d/w Radiology overnight: no change from previous study  - primary team to follow official report

## 2024-02-23 NOTE — PROGRESS NOTES
Nutrition Services    Patient Name: Sandra Treadwell  YOB: 1950  MRN: [id number]  Admission date: 2/16/2024    PROGRESS NOTE      Encounter Information: Check on for tube feeding.  Remains extubated.  On vaso.  Patient discussed in AM rounds.  SLP seeing patient during rounds, patient holding food in mouth and not swallowing.  MD to consult palliative care.         PO Diet: NPO Diet NPO Type: Strict NPO   PO Supplements: None ordered   PO Intake:  NPO       Current nutrition support: Peptamen AF at 45mL/hr + 30mL/hr water flush    Nutrition support review: Documented as above per EMR       Labs (reviewed below): Hypokalemia - being replaced   Phosphorus low - to replace today        GI Function:  BM today 2/23 (today)  Residuals WNL       Nutrition Intervention Updates: Continue current EN prescription, at goal        Results from last 7 days   Lab Units 02/23/24  0611 02/22/24  1556 02/22/24  0438 02/21/24  1541 02/21/24  0519   SODIUM mmol/L 142  --  144  --  140   POTASSIUM mmol/L 4.7 3.0* 3.4*   < > 4.5   CHLORIDE mmol/L 102  --  98  --  101   CO2 mmol/L 35.0*  --  36.0*  --  31.0*   BUN mg/dL 23  --  21  --  17   CREATININE mg/dL 0.47*  --  0.75  --  0.77   CALCIUM mg/dL 7.7*  --  8.1*  --  8.1*   BILIRUBIN mg/dL 0.2  --  0.4  --  0.5   ALK PHOS U/L 81  --  100  --  102   ALT (SGPT) U/L 7  --  5  --  7   AST (SGOT) U/L 8  --  9  --  12   GLUCOSE mg/dL 196*  --  153*  --  146*    < > = values in this interval not displayed.     Results from last 7 days   Lab Units 02/23/24  0611 02/22/24  0438 02/21/24  1541 02/21/24  0519   MAGNESIUM mg/dL  --  2.0 1.8 1.9   PHOSPHORUS mg/dL 1.4* 2.9 2.2* 2.2*   HEMOGLOBIN g/dL 7.3* 9.2*  --  10.0*   HEMATOCRIT % 23.7* 29.1*  --  31.7*     COVID19   Date Value Ref Range Status   02/17/2024 Not Detected Not Detected - Ref. Range Final     Lab Results   Component Value Date    HGBA1C 5.60 11/15/2023       RD to follow up per protocol.    Electronically signed  by:  Lyly Flores RD  02/23/24 07:18 EST

## 2024-02-23 NOTE — PROGRESS NOTES
Infectious Diseases Progress Note      LOS: 7 days   Patient Care Team:  Wm Aguirre MD as PCP - General (Sports Medicine)    Chief Complaint: Confused    Subjective       The patient had no fever during the last 24 hours.  The patient is currently on room air.  She remains on low-dose of vasopressin drip.  She remained severely confused secondary to her underlying dementia.  She was noted to have purulent drainage from the previous right-sided chest tube site      Review of Systems:   Review of Systems   Unable to perform ROS: Dementia        Objective     Vital Signs  Temp:  [97.6 °F (36.4 °C)-98.8 °F (37.1 °C)] 97.6 °F (36.4 °C)  Heart Rate:  [] 88  Resp:  [14-22] 20  BP: ()/(49-76) 108/64    Physical Exam:  Physical Exam  Vitals and nursing note reviewed.   Constitutional:       General: She is not in acute distress.     Appearance: She is well-developed and normal weight. She is ill-appearing. She is not diaphoretic.   HENT:      Head: Normocephalic and atraumatic.   Eyes:      General: No scleral icterus.     Extraocular Movements: Extraocular movements intact.      Conjunctiva/sclera: Conjunctivae normal.      Pupils: Pupils are equal, round, and reactive to light.   Cardiovascular:      Rate and Rhythm: Normal rate and regular rhythm.      Heart sounds: Normal heart sounds, S1 normal and S2 normal. No murmur heard.  Pulmonary:      Effort: Pulmonary effort is normal. No respiratory distress.      Breath sounds: Normal breath sounds. No stridor. No wheezing or rales.      Comments:   Right chest tube    Very diminished in the right lobe  Chest:      Chest wall: No tenderness.   Abdominal:      General: Bowel sounds are normal. There is no distension.      Palpations: Abdomen is soft. There is no mass.      Tenderness: There is no abdominal tenderness. There is no guarding.   Genitourinary:     Comments:  Ribera catheter  Musculoskeletal:         General: No swelling, tenderness or  deformity.   Skin:     General: Skin is warm and dry.      Coloration: Skin is not pale.      Findings: No bruising, erythema or rash.      Comments: Left calcaneus unstageable wound          Results Review:    I have reviewed all clinical data, test, lab, and imaging results.     Radiology  XR Chest 1 View    Result Date: 2/23/2024  XR CHEST 1 VW Date of Exam: 2/23/2024 5:30 AM EST Indication: chest tube management Comparison: 2/23/2024 Findings: Right-sided chest tube present. Loculated moderate-sized hydropneumothorax present on the right, similar as compared to the previous CT from 2/23/2024 given difference in modality. Bibasilar small effusions appear similar as compared to the recent CT. Air component not well-visualized. Enteric tube present within the stomach. Right internal jugular CVC catheter present with the tip at the cavoatrial junction. Bibasilar atelectatic changes present. Small amount of fluid is seen along the left major fissure, stable.     Impression: No significant changes compared to the recent CT given difference in modality. Loculated moderate-sized hydropneumothorax present on the right, similar as compared to the previous CT. Bibasilar airspace disease and effusions appear similar. Electronically Signed: Tracy Knowles MD  2/23/2024 6:47 AM EST  Workstation ID: [id number]    CT Chest Without Contrast Diagnostic    Result Date: 2/23/2024  CT CHEST WO CONTRAST DIAGNOSTIC Date of Exam: 2/23/2024 1:44 AM EST Indication: right pleural effusion. Comparison: 2/23/2024, 2/18/2024 . Technique: Axial CT images were obtained of the chest without contrast administration.  Sagittal and coronal reconstructions were performed.  Automated exposure control and iterative reconstruction methods were used. Findings: Hilum and Mediastinum: No pathologically enlarged lymph nodes.  Normal heart size.   No pericardial effusion.  Unremarkable thoracic aorta and pulmonary arteries. Right internal jugular CVC catheter  present with the tip at the cavoatrial junction. Atherosclerotic calcifications are present including within the coronary arteries. Lung Parenchyma and Pleura: There is a moderate-sized loculated hydropneumothorax present. Right-sided chest tube seen posteriorly which is just at the margin of the posterior and medial margin of the hydropneumothorax. It is unclear whether this extends  within the loculated component. Atelectatic changes are present within the left lung base. An additional hydropneumothorax is seen extending up along the medial margin of the right lower lobe and at the margin of the chest tube there is a small amount of loculated fluid seen tracking along the left major fissure measuring approximately 3.2 x 3.1 cm (series 3 image 52). A small amount of loculated fluid is seen within the left lung base containing a small amount of air consistent with a small hydropneumothorax. Remaining portions of the lungs are clear.. Mild atelectatic changes are present. Upper Abdomen: No acute process. Enteric tube present within the stomach. Soft tissues: Diffuse anasarca of the soft tissues present.. Osseous structures: No aggressive focal lytic or sclerotic osseous lesions.     Impression: 1.Moderate-sized loculated right-sided hydropneumothorax with a right-sided chest tube seen posteriorly which is just at the margin of the posterior and medial margin of the hydropneumothorax. It is unclear whether this extends within the loculated component. An additional hydropneumothorax is seen extending up along the medial margin of the right lower lobe and at the margin of the chest tube. The loculated fluid component appears to have increased as compared to the previous study. The layering air component along the anterior right upper lobe appears to have resolved. Increased air and fluid seen along the posterior medial component. It is unclear whether these components are contiguous. 2.A small amount of loculated fluid  is seen tracking along the left major fissure which appears similar. A small amount of loculated fluid is seen within the left lung base which was present on the previous study though now contains a small pocket of air. 3.Ancillary findings as described above. Electronically Signed: Tracy Knowles MD  2/23/2024 6:07 AM EST  Workstation ID: [id number]     Cardiology    Laboratory    Results from last 7 days   Lab Units 02/23/24  1016 02/23/24  0611 02/22/24  0438 02/21/24  0519 02/20/24  0523 02/19/24  0646 02/18/24  0520 02/17/24  1408 02/17/24  0510   WBC 10*3/mm3  --  9.20 8.00 7.80 6.10 4.50 3.70  --  7.20   HEMOGLOBIN g/dL 7.6* 7.3* 9.2* 10.0* 9.7* 10.6* 8.6*   < > 6.9*   HEMATOCRIT % 24.1* 23.7* 29.1* 31.7* 31.4* 35.5 27.9*   < > 23.4*   PLATELETS 10*3/mm3  --  64* 88* 79* 81* 107* 109*  --  224    < > = values in this interval not displayed.     Results from last 7 days   Lab Units 02/23/24  1016 02/23/24  0611 02/22/24  1556 02/22/24  0438 02/21/24  1541 02/21/24  0519 02/20/24  1515 02/20/24  0523 02/19/24  0646 02/18/24  0520 02/17/24  0510 02/16/24  2017 02/16/24  1228   SODIUM mmol/L  --  142  --  144  --  140  --  141 140 137 136 135* 138   POTASSIUM mmol/L 4.9 4.7 3.0* 3.4* 3.7 4.5 4.9 3.4* 4.1 4.1 4.4 3.9 4.1   CHLORIDE mmol/L  --  102  --  98  --  101  --  107 108* 106 103 102 100   CO2 mmol/L  --  35.0*  --  36.0*  --  31.0*  --  27.0 24.0 18.0* 25.0 23.0 28.0   BUN mg/dL  --  23  --  21  --  17  --  16 16 17 16 18 15   CREATININE mg/dL  --  0.47*  --  0.75  --  0.77  --  0.75 0.83 0.88 0.83 0.87 0.75   GLUCOSE mg/dL  --  196*  --  153*  --  146*  --  181* 121* 83 96 119* 104*   ALBUMIN g/dL  --  2.5*  --  2.4*  --  2.6*  --  2.3*  --  3.5  --  2.1* 2.2*   BILIRUBIN mg/dL  --  0.2  --  0.4  --  0.5  --  0.4  --  0.7  --  0.4 0.4   ALK PHOS U/L  --  81  --  100  --  102  --  77  --  26*  --  57 61   AST (SGOT) U/L  --  8  --  9  --  12  --  9  --  7  --  9 8   ALT (SGPT) U/L  --  7  --  5  --  7  --  6   --  <5  --  6 5   CALCIUM mg/dL  --  7.7*  --  8.1*  --  8.1*  --  7.3* 7.6* 8.2* 9.3 9.4 9.8         Results from last 7 days   Lab Units 02/18/24  0520   SED RATE mm/hr <1         Microbiology   Microbiology Results (last 10 days)       Procedure Component Value - Date/Time    Body Fluid Culture - Body Fluid, Pleural Cavity [[id number]]  (Abnormal)  (Susceptibility) Collected: 02/19/24 1124    Lab Status: Final result Specimen: Body Fluid from Pleural Cavity Updated: 02/23/24 0713     Body Fluid Culture Light growth (2+) Escherichia coli ESBL     Comment:   Consider infectious disease consult.  Susceptibility results may not correlate to clinical outcomes.         Moderate growth (3+) Candida albicans      Rare Staphylococcus aureus, MRSA     Comment:   Methicillin resistant Staphylococcus aureus, Patient may be an isolation risk.        Gram Stain Many (4+) WBCs per low power field      Few (2+) Budding yeast    Susceptibility        Escherichia coli ESBL      TARYN      Ertapenem Susceptible      Levofloxacin Susceptible      Meropenem Susceptible      Trimethoprim + Sulfamethoxazole Susceptible                       Susceptibility        Candida albicans      TARYN      Fluconazole Susceptible      Micafungin Susceptible                       Susceptibility        Staphylococcus aureus, MRSA      TARYN      Clindamycin Susceptible      Erythromycin Resistant      Oxacillin Resistant      Rifampin Susceptible      Tetracycline Susceptible      Trimethoprim + Sulfamethoxazole Resistant      Vancomycin Susceptible                       Susceptibility Comments       Escherichia coli ESBL    Cefazolin sensitivity will not be reported for Enterobacteriaceae in non-urine isolates. If cefazolin is preferred, please call the microbiology lab to request an E-test.  With the exception of urinary-sourced infections, aminoglycosides should not be used as monotherapy.               BAL Culture, Quantitative - Lavage, Bronchus  [[id number]]  (Abnormal)  (Susceptibility) Collected: 02/17/24 1709    Lab Status: Final result Specimen: Lavage from Bronchus Updated: 02/21/24 0807     BAL Culture <10,000 CFU/mL Staphylococcus aureus, MRSA     Comment:   Methicillin resistant Staphylococcus aureus, Patient may be an isolation risk.         No Normal Respiratory Maday    Susceptibility        Staphylococcus aureus, MRSA      TARYN      Clindamycin Susceptible      Linezolid Susceptible      Oxacillin Resistant      Tetracycline Susceptible      Trimethoprim + Sulfamethoxazole Resistant      Vancomycin Susceptible                           Respiratory Culture - Lavage, Lung, Left Upper Lobe [[id number]]  (Abnormal) Collected: 02/17/24 1709    Lab Status: Final result Specimen: Lavage from Lung, Left Upper Lobe Updated: 02/21/24 0807     Respiratory Culture Rare Staphylococcus aureus, MRSA     Comment:   Methicillin resistant Staphylococcus aureus, Patient may be an isolation risk.         No Normal Respiratory Maday     Gram Stain Rare (1+) WBCs per low power field      Rare (1+) Epithelial cells per low power field      No organisms seen    Narrative:      Refer to other Resp culture from same day for MICS    AFB Culture - Lavage, Lung, Left Upper Lobe [[id number]] Collected: 02/17/24 1709    Lab Status: Preliminary result Specimen: Lavage from Lung, Left Upper Lobe Updated: 02/19/24 0954     AFB Stain No acid fast bacilli seen on concentrated smear    Anaerobic Culture - Body Fluid, Pleural Cavity [[id number]]  (Normal) Collected: 02/17/24 1709    Lab Status: Preliminary result Specimen: Body Fluid from Pleural Cavity Updated: 02/22/24 0710     Anaerobic Culture No anaerobes isolated at 3 days    MRSA Screen, PCR (Inpatient) - Swab, Nares [[id number]]  (Abnormal) Collected: 02/17/24 0854    Lab Status: Final result Specimen: Swab from Nares Updated: 02/17/24 1015     MRSA PCR MRSA Detected    Narrative:      The negative predictive value of this  diagnostic test is high and should only be used to consider de-escalating anti-MRSA therapy. A positive result may indicate colonization with MRSA and must be correlated clinically.    Respiratory Panel PCR w/COVID-19(SARS-CoV-2) NAYELI/CAMILLE/HUSAM/PAD/COR/SUMAN In-House, NP Swab in UTM/VTM, 2 HR TAT - Swab, Nasopharynx [[id number]]  (Normal) Collected: 02/17/24 0854    Lab Status: Final result Specimen: Swab from Nasopharynx Updated: 02/17/24 0953     ADENOVIRUS, PCR Not Detected     Coronavirus 229E Not Detected     Coronavirus HKU1 Not Detected     Coronavirus NL63 Not Detected     Coronavirus OC43 Not Detected     COVID19 Not Detected     Human Metapneumovirus Not Detected     Human Rhinovirus/Enterovirus Not Detected     Influenza A PCR Not Detected     Influenza B PCR Not Detected     Parainfluenza Virus 1 Not Detected     Parainfluenza Virus 2 Not Detected     Parainfluenza Virus 3 Not Detected     Parainfluenza Virus 4 Not Detected     RSV, PCR Not Detected     Bordetella pertussis pcr Not Detected     Bordetella parapertussis PCR Not Detected     Chlamydophila pneumoniae PCR Not Detected     Mycoplasma pneumo by PCR Not Detected    Narrative:      In the setting of a positive respiratory panel with a viral infection PLUS a negative procalcitonin without other underlying concern for bacterial infection, consider observing off antibiotics or discontinuation of antibiotics and continue supportive care. If the respiratory panel is positive for atypical bacterial infection (Bordetella pertussis, Chlamydophila pneumoniae, or Mycoplasma pneumoniae), consider antibiotic de-escalation to target atypical bacterial infection.    Legionella Antigen, Urine - Urine, Urine, Clean Catch [[id number]]  (Normal) Collected: 02/17/24 0852    Lab Status: Final result Specimen: Urine, Clean Catch Updated: 02/17/24 0927     LEGIONELLA ANTIGEN, URINE Negative    S. Pneumo Ag Urine or CSF - Urine, Urine, Clean Catch [[id number]]  (Normal)  Collected: 02/17/24 0852    Lab Status: Final result Specimen: Urine, Clean Catch Updated: 02/17/24 0927     Strep Pneumo Ag Negative    Blood Culture - Blood, Hand, Right [[id number]]  (Normal) Collected: 02/16/24 1737    Lab Status: Final result Specimen: Blood from Hand, Right Updated: 02/21/24 1800     Blood Culture No growth at 5 days    Blood Culture - Blood, Hand, Left [[id number]]  (Normal) Collected: 02/16/24 1736    Lab Status: Final result Specimen: Blood from Hand, Left Updated: 02/21/24 1800     Blood Culture No growth at 5 days    Narrative:      Less than seven (7) mL's of blood was collected.  Insufficient quantity may yield false negative results.    AFB Culture - Body Fluid, Pleural Cavity [[id number]] Collected: 02/16/24 1556    Lab Status: Preliminary result Specimen: Body Fluid from Pleural Cavity Updated: 02/21/24 1617     AFB Culture No AFB isolated at less than 1 week     AFB Stain No acid fast bacilli seen on concentrated smear    Body Fluid Culture - Body Fluid, Pleural Cavity [[id number]]  (Abnormal)  (Susceptibility) Collected: 02/16/24 1556    Lab Status: Final result Specimen: Body Fluid from Pleural Cavity Updated: 02/18/24 1143     Body Fluid Culture Scant growth (1+) Escherichia coli ESBL     Comment:   Consider infectious disease consult.  Susceptibility results may not correlate to clinical outcomes.         Rare Normal Skin Maday     Gram Stain Moderate (3+) WBCs per low power field      No organisms seen    Susceptibility        Escherichia coli ESBL      TARYN      Ertapenem Susceptible      Levofloxacin Susceptible      Meropenem Susceptible      Trimethoprim + Sulfamethoxazole Susceptible                       Susceptibility Comments       Escherichia coli ESBL    Cefazolin sensitivity will not be reported for Enterobacteriaceae in non-urine isolates. If cefazolin is preferred, please call the microbiology lab to request an E-test.  With the exception of urinary-sourced infections,  aminoglycosides should not be used as monotherapy.               Anaerobic Culture - Pleural Fluid, Pleural Cavity [[id number]]  (Normal) Collected: 02/16/24 1556    Lab Status: Final result Specimen: Pleural Fluid from Pleural Cavity Updated: 02/21/24 0646     Anaerobic Culture No anaerobes isolated at 5 days    Fungus Culture - Body Fluid, Pleural Cavity [[id number]]  (Abnormal) Collected: 02/16/24 1556    Lab Status: Preliminary result Specimen: Body Fluid from Pleural Cavity Updated: 02/22/24 1039     Fungus Culture Candida species    Fungus Smear - Body Fluid, Pleural Cavity [[id number]] Collected: 02/16/24 1556    Lab Status: Final result Specimen: Body Fluid from Pleural Cavity Updated: 02/17/24 1205     Fungal Stain No fungal elements seen            Medication Review:       Schedule Meds  acetaZOLAMIDE, 500 mg, Nasogastric, Once  citalopram, 10 mg, Nasogastric, Daily  Divalproex Sodium, 125 mg, Nasogastric, Q12H  [Held by provider] enoxaparin, 40 mg, Subcutaneous, Daily  folic acid, 1 mg, Nasogastric, Daily  furosemide, 40 mg, Intravenous, Q12H  lansoprazole, 30 mg, Nasogastric, Q AM  meropenem, 1,000 mg, Intravenous, Q8H  micafungin (MYCAMINE) IV, 100 mg, Intravenous, Q24H  midodrine, 20 mg, Nasogastric, Q8H  potassium chloride, 20 mEq, Nasogastric, BID  povidone-iodine, , Topical, Daily  risperiDONE, 0.25 mg, Nasogastric, Nightly  sodium chloride, 250 mL, Intravenous, Once  sodium chloride, 10 mL, Intravenous, Q12H  sodium phosphate, 15 mmol, Intravenous, Q3H  vancomycin, 750 mg, Intravenous, Q24H        Infusion Meds  Pharmacy to dose vancomycin,   vasopressin, 0.03-0.1 Units/min, Last Rate: 0.05 Units/min (02/23/24 1020)        PRN Meds    acetaminophen **OR** acetaminophen    dextrose    dextrose    glucagon (human recombinant)    lactulose    lidocaine    Magnesium Standard Dose Replacement - Follow Nurse / BPA Driven Protocol    ondansetron    ondansetron ODT    Pharmacy to dose vancomycin    Phosphorus  Replacement - Follow Nurse / BPA Driven Protocol    polyethylene glycol    Potassium Replacement - Follow Nurse / BPA Driven Protocol    [COMPLETED] Insert Peripheral IV **AND** sodium chloride    sodium chloride    sodium chloride        Assessment & Plan       Antimicrobial Therapy   1.  IV vancomycin        2.  IV meropenem   3.  IV micafungin           Assessment     Right thoracic empyema.  Fluid analysis showed findings consistent with empyema with cultures growing ESBL  E. coli.  The presentation is concerning for micro fistula between the abdomen and thoracic cavity.  Patient s/p chest tube placement by IR which was replaced on February 19, 2024.  Right-sided pleural fluid culture from February 19 is growing ESBL  E. coli, MRSA and Candida albicans  Repeat chest CT scan on February 23, 2024 showed persistent loculated right effusion consistent with hydropneumothorax.     Left upper lobe cavitary lesion most likely consistent with left lung abscess.  Suspecting the same pathogen involving the right lung causing this infection.   BAL culture from February 17 is  growing MRSA    Acute hypoxic respiratory failure-likely related to the above.  Extubated on February 28 and currently on 1 L of oxygen    Septic shock-patient is currently on  vasopressin drip     Left calcaneus wound, unstageable with black eschar.  I doubt this is a peripheral vascular disease patient has strong pulse.  Most likely this is a pressure ulcer    Dementia.  Patient is severely confused          Plan     Continue IV vancomycin for now, pharmacy currently following and dosing and keep trough between 15-20.    Continue IV meropenem 1 g every 8 hours   Continue IV micafungin 100 mg daily  Recommend at least 4 weeks of the above antimicrobial therapy  Thoracic surgery service will be consulted to eval patient for possible VATS and decortication    Consider MRI of the left foot when patient is more stable, can be done later  during this admission  Continue supportive care  A.m. labs   The case was discussed with ICU service        Himanshu Spaulding MD  02/23/24  11:57 EST    Note is dictated utilizing voice recognition software/Dragon

## 2024-02-23 NOTE — CASE MANAGEMENT/SOCIAL WORK
Continued Stay Note   [place]     Patient Name: Sandra Treadwell  MRN: [id number]  Today's Date: 2/23/2024    Admit Date: 2/16/2024    Plan: Return to [facility]. No precert or PASRR required.   Discharge Plan       Row Name 02/23/24 0812       Plan    Plan Return to [facility]. No precert or PASRR required.    Plan Comments DC Barriers: 1L NC, Vaso gtt, NG TF, C-line, R chest tube, IV Abxs, MRI L foot pending, Throacic Sx/ID/WC following.               Expected Discharge Date and Time       Expected Discharge Date Expected Discharge Time    Feb 26, 2024               SANJUANA Gonzales RN  SIPS/ICU   O: [phone]  C: [phone]  [email or web address]

## 2024-02-23 NOTE — PROGRESS NOTES
"    Chief Complaint: Right Empyema  S/P: CT-guided chest tube, intrapleural lytic therapy      Subjective:  Symptoms:  Stable.  She reports weakness.  No shortness of breath.    Diet:  NPO (enteral nutrition via tube feeds).    Activity level: Impaired due to weakness.    Pain:  She reports no pain.    Resting in bed.  Denies pain.    Vital Signs:  Temp:  [97.5 °F (36.4 °C)-98.7 °F (37.1 °C)] 97.5 °F (36.4 °C)  Heart Rate:  [] 90  Resp:  [14-22] 20  BP: ()/(45-76) 88/45    Intake & Output (last day)         02/22 0701  02/23 0700 02/23 0701  02/24 0700    P.O. 0     I.V. (mL/kg) 1973 (32.1)     Other 303     NG/     Total Intake(mL/kg) 3220 (52.4)     Urine (mL/kg/hr) 1100 (0.7)     Stool 0     Chest Tube 630     Wound 185     Total Output 1915     Net +1305           Urine Unmeasured Occurrence 0 x     Stool Unmeasured Occurrence 6 x 1 x            Objective:  General Appearance:  Ill-appearing, in no acute distress, comfortable and not in pain.    Vital signs: (most recent): Blood pressure (!) 88/45, pulse 90, temperature 97.5 °F (36.4 °C), temperature source Axillary, resp. rate 20, height 152.4 cm (60\"), weight 61.5 kg (135 lb 9.3 oz), SpO2 90%.    HEENT: (Weaned to room air.)    Lungs:  Normal effort and normal respiratory rate.  She is not in respiratory distress.  There are decreased breath sounds.    Heart: Normal rate.  Regular rhythm.    Chest: Symmetric chest wall expansion. Chest wall tenderness present.  (Incision from prior large bore chest tube dressed with occlusive dressing.  Draining milky fluid.)  Abdomen: Abdomen is soft and non-distended.    Extremities: Decreased range of motion.    Neurological: Patient is alert.    Skin:  Warm and dry.                CT-guided chest tube:  Site: Right, Clean, Dry, and Intact  Suction: -20 cm  Air Leak: negative  24 Hour Total: 630ml, milky    Results Review:     I reviewed the patient's new clinical results.  I reviewed the patient's new " imaging results and agree with the interpretation.  Discussed with nurse, and Dr. Meraz    Imaging reviewed independently and agree with interpretation.     Imaging Results (Last 24 Hours)       Procedure Component Value Units Date/Time    XR Chest 1 View [[id number]] Collected: 02/23/24 0645     Updated: 02/23/24 0649    Narrative:      XR CHEST 1 VW    Date of Exam: 2/23/2024 5:30 AM EST    Indication: chest tube management    Comparison: 2/23/2024    Findings:  Right-sided chest tube present. Loculated moderate-sized hydropneumothorax present on the right, similar as compared to the previous CT from 2/23/2024 given difference in modality. Bibasilar small effusions appear similar as compared to the recent CT.   Air component not well-visualized. Enteric tube present within the stomach. Right internal jugular CVC catheter present with the tip at the cavoatrial junction. Bibasilar atelectatic changes present. Small amount of fluid is seen along the left major   fissure, stable.      Impression:      Impression:  No significant changes compared to the recent CT given difference in modality. Loculated moderate-sized hydropneumothorax present on the right, similar as compared to the previous CT. Bibasilar airspace disease and effusions appear similar.      Electronically Signed: Tracy Knowles MD    2/23/2024 6:47 AM EST    Workstation ID: [id number]    CT Chest Without Contrast Diagnostic [[id number]] Collected: 02/23/24 0602     Updated: 02/23/24 0609    Narrative:      CT CHEST WO CONTRAST DIAGNOSTIC    Date of Exam: 2/23/2024 1:44 AM EST    Indication: right pleural effusion.    Comparison: 2/23/2024, 2/18/2024  .    Technique: Axial CT images were obtained of the chest without contrast administration.  Sagittal and coronal reconstructions were performed.  Automated exposure control and iterative reconstruction methods were used.      Findings:  Hilum and Mediastinum: No pathologically enlarged lymph nodes.  Normal heart  size.   No pericardial effusion.  Unremarkable thoracic aorta and pulmonary arteries. Right internal jugular CVC catheter present with the tip at the cavoatrial junction.   Atherosclerotic calcifications are present including within the coronary arteries.    Lung Parenchyma and Pleura: There is a moderate-sized loculated hydropneumothorax present. Right-sided chest tube seen posteriorly which is just at the margin of the posterior and medial margin of the hydropneumothorax. It is unclear whether this extends   within the loculated component. Atelectatic changes are present within the left lung base. An additional hydropneumothorax is seen extending up along the medial margin of the right lower lobe and at the margin of the chest tube there is a small amount   of loculated fluid seen tracking along the left major fissure measuring approximately 3.2 x 3.1 cm (series 3 image 52). A small amount of loculated fluid is seen within the left lung base containing a small amount of air consistent with a small   hydropneumothorax. Remaining portions of the lungs are clear.. Mild atelectatic changes are present.    Upper Abdomen: No acute process. Enteric tube present within the stomach.    Soft tissues: Diffuse anasarca of the soft tissues present..    Osseous structures: No aggressive focal lytic or sclerotic osseous lesions.      Impression:      Impression:  1.Moderate-sized loculated right-sided hydropneumothorax with a right-sided chest tube seen posteriorly which is just at the margin of the posterior and medial margin of the hydropneumothorax. It is unclear whether this extends within the loculated   component. An additional hydropneumothorax is seen extending up along the medial margin of the right lower lobe and at the margin of the chest tube. The loculated fluid component appears to have increased as compared to the previous study. The layering   air component along the anterior right upper lobe appears to have  resolved. Increased air and fluid seen along the posterior medial component. It is unclear whether these components are contiguous.  2.A small amount of loculated fluid is seen tracking along the left major fissure which appears similar. A small amount of loculated fluid is seen within the left lung base which was present on the previous study though now contains a small pocket of   air.  3.Ancillary findings as described above.        Electronically Signed: Tracy Knowles MD    2/23/2024 6:07 AM EST    Workstation ID: [id number]            Lab Results:     Lab Results (last 24 hours)       Procedure Component Value Units Date/Time    POC Glucose Once [[id number]]  (Abnormal) Collected: 02/23/24 1241    Specimen: Blood Updated: 02/23/24 1243     Glucose 180 mg/dL      Comment: Serial Number: [id number]:  [id number]       Potassium [[id number]]  (Normal) Collected: 02/23/24 1016    Specimen: Blood Updated: 02/23/24 1038     Potassium 4.9 mmol/L     Hemoglobin & Hematocrit, Blood [[id number]]  (Abnormal) Collected: 02/23/24 1016    Specimen: Blood Updated: 02/23/24 1027     Hemoglobin 7.6 g/dL      Hematocrit 24.1 %     CBC & Differential [[id number]]  (Abnormal) Collected: 02/23/24 0611    Specimen: Blood, Central Line Updated: 02/23/24 0718    Narrative:      The following orders were created for panel order CBC & Differential.  Procedure                               Abnormality         Status                     ---------                               -----------         ------                     CBC Auto Differential[[id number]]        Abnormal            Final result               Scan Slide[[id number]]                                       Final result                 Please view results for these tests on the individual orders.    CBC Auto Differential [[id number]]  (Abnormal) Collected: 02/23/24 0611    Specimen: Blood, Central Line Updated: 02/23/24 0718     WBC 9.20 10*3/mm3      RBC 2.72 10*6/mm3       Hemoglobin 7.3 g/dL      Hematocrit 23.7 %      MCV 86.9 fL      MCH 26.9 pg      MCHC 31.0 g/dL      RDW 18.9 %      RDW-SD 58.2 fl      MPV 10.2 fL      Platelets 64 10*3/mm3     Narrative:      The previously reported component NRBC is no longer being reported. Previous result was 0.2 /100 WBC (Reference Range: 0.0-0.2 /100 WBC) on 2/23/2024 at 0633 EST.    Scan Slide [[id number]] Collected: 02/23/24 0611    Specimen: Blood, Central Line Updated: 02/23/24 0718     Scan Slide --     Comment: See Manual Differential Results       Manual Differential [[id number]]  (Abnormal) Collected: 02/23/24 0611    Specimen: Blood, Central Line Updated: 02/23/24 0718     Neutrophil % 74.0 %      Lymphocyte % 10.0 %      Monocyte % 4.0 %      Bands %  12.0 %      Neutrophils Absolute 7.91 10*3/mm3      Lymphocytes Absolute 0.92 10*3/mm3      Monocytes Absolute 0.37 10*3/mm3      Anisocytosis Slight/1+     Stomatocytes Slight/1+     Toxic Granulation Slight/1+     Platelet Estimate Decreased    Body Fluid Culture - Body Fluid, Pleural Cavity [[id number]]  (Abnormal)  (Susceptibility) Collected: 02/19/24 1124    Specimen: Body Fluid from Pleural Cavity Updated: 02/23/24 0713     Body Fluid Culture Light growth (2+) Escherichia coli ESBL     Comment:   Consider infectious disease consult.  Susceptibility results may not correlate to clinical outcomes.         Moderate growth (3+) Candida albicans      Rare Staphylococcus aureus, MRSA     Comment:   Methicillin resistant Staphylococcus aureus, Patient may be an isolation risk.        Gram Stain Many (4+) WBCs per low power field      Few (2+) Budding yeast    Susceptibility        Escherichia coli ESBL      TARYN      Ertapenem Susceptible      Levofloxacin Susceptible      Meropenem Susceptible      Trimethoprim + Sulfamethoxazole Susceptible                       Susceptibility        Candida albicans      TARYN      Fluconazole Susceptible      Micafungin Susceptible                        Susceptibility        Staphylococcus aureus, MRSA      TARYN      Clindamycin Susceptible      Erythromycin Resistant      Oxacillin Resistant      Rifampin Susceptible      Tetracycline Susceptible      Trimethoprim + Sulfamethoxazole Resistant      Vancomycin Susceptible                       Susceptibility Comments       Escherichia coli ESBL    Cefazolin sensitivity will not be reported for Enterobacteriaceae in non-urine isolates. If cefazolin is preferred, please call the microbiology lab to request an E-test.  With the exception of urinary-sourced infections, aminoglycosides should not be used as monotherapy.               Comprehensive Metabolic Panel [[id number]]  (Abnormal) Collected: 02/23/24 0611    Specimen: Blood, Central Line Updated: 02/23/24 0657     Glucose 196 mg/dL      BUN 23 mg/dL      Creatinine 0.47 mg/dL      Sodium 142 mmol/L      Potassium 4.7 mmol/L      Chloride 102 mmol/L      CO2 35.0 mmol/L      Calcium 7.7 mg/dL      Total Protein 4.9 g/dL      Albumin 2.5 g/dL      ALT (SGPT) 7 U/L      AST (SGOT) 8 U/L      Alkaline Phosphatase 81 U/L      Total Bilirubin 0.2 mg/dL      Globulin 2.4 gm/dL      A/G Ratio 1.0 g/dL      BUN/Creatinine Ratio 48.9     Anion Gap 5.0 mmol/L      eGFR 101.9 mL/min/1.73     Narrative:      GFR Normal >60  Chronic Kidney Disease <60  Kidney Failure <15    The GFR formula is only valid for adults with stable renal function between ages 18 and 70.    Phosphorus [[id number]]  (Abnormal) Collected: 02/23/24 0611    Specimen: Blood, Central Line Updated: 02/23/24 0656     Phosphorus 1.4 mg/dL     Triglycerides, Body Fluid - Body Fluid, Pleural Cavity [[id number]] Collected: 02/22/24 1553    Specimen: Body Fluid from Pleural Cavity Updated: 02/22/24 2015     Triglycerides, Fluid 1,057 mg/dL     POC Glucose Q6H [[id number]]  (Abnormal) Collected: 02/22/24 1827    Specimen: Blood Updated: 02/22/24 1828     Glucose 205 mg/dL      Comment: Serial Number:  [id number]:  [id number]       Potassium [[id number]]  (Abnormal) Collected: 02/22/24 1556    Specimen: Blood Updated: 02/22/24 1614     Potassium 3.0 mmol/L              Assessment & Plan       Empyema of lung    Pleural effusion, right       Assessment & Plan  Patient has been weaned to room air.  She denies any pain or shortness of breath.  Resting in bed without complaints.    Right Pleural effusion: Concerning for empyema as pleural fluid cultures grew ESBL/E. Col, MRSA and candida albicans.  S/p CT-guided chest tube placement on 2/19/2024.  Large bore chest tube removed 2/20/2024 with persistent purulent drainage from this site.  S/p intrapleural lytic therapy x 3. Persistent milky appearance of chest tube output. Purulent drainage versus chylothorax.  Pleural fluid culture growing multiple organisms, continue antimicrobials per infectious disease recommendations. Pleural fluid triglyceride returned as, 1,057.  Will check pleural fluid chylomicron today to aid in excluding possible chylothorax.     Follow-up CT of the chest performed today independently reviewed and demonstrates persistent moderate size loculated right hydropneumothorax.  Right-sided chest tube seen in position posteriorly & uncertain if in communication  with loculated effusion.  Patient will ultimately need VATS with decortication for definitive treatment.  Will discuss with surgeon as far as best time for this, will tentatively plan for Monday.  Noted plans for palliative care discussion to establish goals of care.  Await decision of patient and family.     Continue chest tube to -20 cm of suction for the time being.  Continue to dress prior large bore chest tube site with occlusive dressing, may cleanse surrounding skin with hydrogen peroxide as needed.  trend chest tube output and repeat a.m. chest x-ray. Encourage pulmonary hygiene, Increase activity as able.     Pulmonary abscess: recommend medical management given increased risk of  development of bronchopleural fistula in the setting of abscess.  Antibiotics per infectious disease recommendations.     BERNA Noriega  [facility]  02/23/24  12:55 EST    Greater than 30 minutes was spent reviewing the patient's chart, radiographic imaging, assessing the patient and developing a plan of care which was discussed with the patient and RN.  This excludes any other billable procedures which will be dictated separately.

## 2024-02-23 NOTE — PLAN OF CARE
Goal Outcome Evaluation:  Plan of Care Reviewed With: patient, friend           Outcome Evaluation: Sandra Treadwell is a 73 y.o. female who presented to ED from facility with chest pain. She was Hypoxic and hypotensive. She was found to have an empyema of the lung.  She underwent chest tube placement and was intubated 2/17 and extubated 2/21.   PMH: Hiatal hernia, Anemia, probable dementia.  Per medical record, pt is from a LTC facility and was non-ambulatory and dependent for care.  At time of PT evaluation, she is A&O x1.  She follows commands intermittently but resists BUE movement.  PT dependently positioned in sitting EOB. Pt tolerated sitting with min A for 20 minutes for SLP to perfrom swallow assessment and to perform LE ex.  Pt appears close to baseline.  We will see as pt participating today.  She may benefit from PT at her facility upon her return.      Anticipated Discharge Disposition (PT): skilled nursing facility

## 2024-02-23 NOTE — PROGRESS NOTES
Critical Care Progress Note   Sandra Treadwell : 1950 MRN:[id number] LOS:7     Principal Problem: Empyema of lung     Reason for follow up: All the medical problems listed below    Summary     A 73 y.o. female with PMH of hiatal hernia, anemia presented to the hospital for shortness of breath and was admitted with a principal diagnosis of Empyema of lung.  Found to be hypoxic and hypotensive, CTA showed large right pleural effusion with a small right pneumothorax and left upper lobe abscess. S/p right-sided chest tube placement and cultures grew ESBL E. coli along with Candida and MRSA.  Subsequently, developed profound hypotension, treated with broad-spectrum antibiotics and vasopressors.  ID and thoracic surgery was consulted.  Subsequently, patient was intubated on  and had bronchoscopy with BAL done and left upper lobe.  Cultures grew MRSA and treated with vancomycin.  Extubated on .    Due to inadequate drainage from her right chest tube, patient also received intrapleural tPA per thoracic surgery.  Repeat CT chest on  showed persistent loculated moderate-sized right hydropneumothorax.  Also had some purulence oozing out of previous chest tube site on the right lung.  Scheduled tentatively for OR on 2024.    Significant events     24 : Working with speech today. Patient is still on Vaso. Increasing Midodrine. Continue Lasix today.  Palliative care consult.    Assessment / Plan     Acute respiratory failure with hypoxia:   Likely due to right-sided empyema and left lung abscess  Wean off oxygen as tolerated.    Right-sided empyema / Left upper lobe lung abscess  S/p chest tube placement.    Currently getting intrapleural tPA per thoracic surgery.    Repeat CT chest on  with persistent right hydropneumothorax.  Thoracic surgery and ID following.  Scheduled to go to the OR tentatively on .  Pleural fluid grew ESBL E. coli with MRSA and BAL cultures positive for MRSA.  Currently  on meropenem and vancomycin.    Vasodilatory shock  Due to empyema  Previous right upper chest tube site no oozing purulence.  Discussed with thoracic surgery.  Recommended continuation of antibiotics for now and tentatively scheduled to go to the OR on 2/26.  Does not meet SIRS criteria.  Currently on vasopressin for MAP target of 65.  Continue with midodrine and stress dose steroids.    Paroxysmal atrial fibrillation :   EKG with sinus rhythm and some PACs.  Telemetry with episodes of paroxysmal A-fib.  Not on any rate control agents at this time due to shock state.  Heart rate improved after switching norepinephrine to vasopressin.  Likely episodes of A-fib are precipitated by infection.  [id number] Score of atleast 3.  Not on any anticoagulation due to thrombocytopenia.    Acute thrombocytopenia  Likely from infection, stable platelet count.  Normal INR and PTT, normal fibrinogen.  DIC ruled out.    Acute Combined Systolic and Diastolic heart failure /pulmonary hypertension:   Currently decompensated.  Last ECHO showed an EF of 40 to 45%, LV diastolic dysfunction, RVSP of 40.   Will give 2 more dose of Lasix today and reassess in a.m.  Will eventually add goal-directed medical therapy when blood pressure permits.  Might not be a candidate for any ischemic evaluation, given advanced dementia and poor quality of life.  Monitor Input/Output very closely. Follow daily weights.   Net IO Since Admission: 871 mL [02/23/24 1232]    Left calcaneal wound: Will get an eventual MRI.    Anxiety/depression: On Celexa and Risperdal.    Cachexia: Body mass index is 26.48 kg/m².  Nutrition following   History of breast cancer  History of right knee replacement    Code status:   Medical Intervention Limits: NO intubation (DNI)  Code Status (Patient has no pulse and is not breathing): No CPR (Do Not Attempt to Resuscitate)  Medical Interventions (Patient has pulse or is breathing): Limited Support       Nutrition: NPO Diet NPO  Type: Strict NPO   Diet, Tube Feeding Tube Feeding Formula: Peptamen AF (Vital AF 1.2); Tube Feeding Type: Continuous; Continuous Tube Feeding Start Rate (mL/hr): 45; Then Advance Rate By (mL/hr): Do Not Advance; Every __ Hours: Patient at Goal Rate; To Goal Rate of...    DVT prophylaxis:  Medical and mechanical DVT prophylaxis orders are present.       Subjective / Review of systems     Review of Systems   Patient oriented to self. Patient complains of pain at chest tube site. No other complaints at this time.   Objective / Physical Exam     Vital signs:  Temp: 97.5 °F (36.4 °C)  BP: 108/64  Heart Rate: 88  Resp: 20  SpO2: 94 %  Weight: 61.5 kg (135 lb 9.3 oz)    Admission Weight: Weight: 67.1 kg (148 lb)  Current Weight: Weight: 61.5 kg (135 lb 9.3 oz)    Input/Output in last 24 hours:    Intake/Output Summary (Last 24 hours) at 2/23/2024 1232  Last data filed at 2/23/2024 0550  Gross per 24 hour   Intake 3220 ml   Output 1915 ml   Net 1305 ml      Physical Exam  Vitals and nursing note reviewed.   Constitutional:       General: She is awake. She is not in acute distress.     Appearance: She is underweight. She is ill-appearing.   HENT:      Head: Normocephalic and atraumatic.      Nose: Nose normal. No congestion.      Comments: NGT in place.     Mouth/Throat:      Mouth: Mucous membranes are moist.      Comments: ET tube in place  Eyes:      General: No scleral icterus.     Conjunctiva/sclera: Conjunctivae normal.   Cardiovascular:      Rate and Rhythm: Regular rhythm. Tachycardia present.      Pulses: Normal pulses.      Heart sounds: No murmur heard.     No gallop.   Pulmonary:      Effort: Pulmonary effort is normal.      Breath sounds: Normal breath sounds. No wheezing or rales.      Comments: Right-sided chest tube in place   Abdominal:      General: Bowel sounds are normal. There is no distension.      Palpations: Abdomen is soft.      Tenderness: There is no abdominal tenderness.   Musculoskeletal:       Cervical back: Neck supple.      Right lower leg: Edema (++) present.      Left lower leg: Edema (++) present.      Comments: Left heel wound with dressing in place   Skin:     General: Skin is warm and dry.   Neurological:      Mental Status: She is alert.      Comments: Alert, awake, oriented only to her name.  Motor and sensory are grossly intact.   Psychiatric:         Behavior: Behavior is cooperative.        Radiology and Labs     Results from last 7 days   Lab Units 02/23/24  1016 02/23/24  0611 02/22/24  0438 02/21/24  0519 02/20/24  0523 02/19/24  0646   WBC 10*3/mm3  --  9.20 8.00 7.80 6.10 4.50   HEMATOCRIT % 24.1* 23.7* 29.1* 31.7* 31.4* 35.5   PLATELETS 10*3/mm3  --  64* 88* 79* 81* 107*      Results from last 7 days   Lab Units 02/23/24  1016 02/23/24  0611 02/22/24  1556 02/22/24  0438 02/21/24  1541 02/21/24  0519 02/20/24  1515 02/20/24  0523 02/19/24  0646   SODIUM mmol/L  --  142  --  144  --  140  --  141 140   POTASSIUM mmol/L 4.9 4.7 3.0* 3.4* 3.7 4.5   < > 3.4* 4.1   CHLORIDE mmol/L  --  102  --  98  --  101  --  107 108*   CO2 mmol/L  --  35.0*  --  36.0*  --  31.0*  --  27.0 24.0   BUN mg/dL  --  23  --  21  --  17  --  16 16   CREATININE mg/dL  --  0.47*  --  0.75  --  0.77  --  0.75 0.83    < > = values in this interval not displayed.      Current medications     Scheduled Meds:   acetaZOLAMIDE, 500 mg, Nasogastric, Once  citalopram, 10 mg, Nasogastric, Daily  Divalproex Sodium, 125 mg, Nasogastric, Q12H  [Held by provider] enoxaparin, 40 mg, Subcutaneous, Daily  folic acid, 1 mg, Nasogastric, Daily  furosemide, 40 mg, Intravenous, Q12H  lansoprazole, 30 mg, Nasogastric, Q AM  meropenem, 1,000 mg, Intravenous, Q8H  micafungin (MYCAMINE) IV, 100 mg, Intravenous, Q24H  midodrine, 20 mg, Nasogastric, Q8H  potassium chloride, 20 mEq, Nasogastric, BID  povidone-iodine, , Topical, Daily  risperiDONE, 0.25 mg, Nasogastric, Nightly  sodium chloride, 250 mL, Intravenous, Once  sodium chloride, 10  mL, Intravenous, Q12H  sodium phosphate, 15 mmol, Intravenous, Q3H  vancomycin, 750 mg, Intravenous, Q24H      Continuous Infusions:   Pharmacy to dose vancomycin,   vasopressin, 0.03-0.1 Units/min, Last Rate: 0.05 Units/min (02/23/24 1020)      Plan discussed with RN. Reviewed all other data in the last 24 hours, including but not limited to vitals, labs, microbiology, imaging and pertinent notes from other providers. High complexity decision making and high risk of deterioration.    BERNA Garcia   Critical Care  02/23/24   12:32 EST     Attending Addendum     Subjective: Confused, not a reliable historian.  Occasional moaning and groaning.    Exam: Right-sided chest tube in place.  Additional drainage bag in place on the right axillary area which is oozing purulence.    Assessment / Plan:   Right-sided empyema: Still in shock requiring antibiotics and vasopressors.  Discussed with thoracic surgery about needing source control.  Recommended to continue with antibiotics for now and tentatively schedule to go to the OR on Monday.    I have personally reviewed all labs and other data, reviewed all other pertinent notes, interviewed and examined this patient today. I have also reviewed the note by APRN, discussed the plan and made relevant changes in the note.     Dr. Nabil Queen MD MPH  Staff Intensivist  02/23/24   13:17 EST

## 2024-02-23 NOTE — PLAN OF CARE
Goal Outcome Evaluation:    Patient's old CT site has had a lot of drainage today. PA with thoracic surgery is aware. Dr. Queen called the  to update him on pt's current status and palliative has been consulted.  is leaning towards holding off on surgery Monday. He's going to see how she does with supportive treatment over the weekend and talk to MD again on Sunday. Vaso gtt continues. Otherwise, vitals WNL.

## 2024-02-23 NOTE — THERAPY EVALUATION
Patient Name: Sandra Treadwell  : 1950    MRN: [id number]                              Today's Date: 2024       Admit Date: 2024    Visit Dx:     ICD-10-CM ICD-9-CM   1. Pleural effusion, right  J90 511.9   2. Pneumonia due to infectious organism, unspecified laterality, unspecified part of lung  J18.9 486   3. Hypotension, unspecified hypotension type  I95.9 458.9   4. Acute respiratory failure with hypoxemia  J96.01 518.81   5. Empyema of pleura  J86.9 510.9   6. Abscess of upper lobe of left lung with pneumonia  J85.1 513.0     Patient Active Problem List   Diagnosis    Status post knee replacement    Osteoarthritis of right knee    Abnormal EKG    Deafness in right ear    Depression    Displacement of breast implant    Dyslipidemia    Dyspnea    GERD (gastroesophageal reflux disease)    Hiatal hernia    Knee pain, right    Osteoporosis    Perennial allergic rhinitis with seasonal variation    Primary insomnia    Renal atrophy    Systolic murmur    Empyema of lung    History of malignant neoplasm of breast    Overactive bladder    Pleural effusion, right     Past Medical History:   Diagnosis Date    Bladder leak     Breast cancer     chemo and radiation    Depression     GERD (gastroesophageal reflux disease)     Hyperlipidemia     Osteoporosis      Past Surgical History:   Procedure Laterality Date    BREAST SURGERY      lumpectomy    CHOLECYSTECTOMY      HYSTERECTOMY      TOTAL KNEE ARTHROPLASTY Right 2023    Procedure: TOTAL KNEE ARTHROPLASTY WITH CORI ROBOT;  Surgeon: lUises Chen II, MD;  Location: [facility];  Service: Robotics - Ortho;  Laterality: Right;      General Information       Row Name 24 1458          OT Time and Intention    Document Type evaluation  -MS     Mode of Treatment occupational therapy  -MS       Row Name 24 1458          General Information    Patient Profile Reviewed yes  -MS     Prior Level of Function dependent:;ADL's;transfer  pr  chart review, pt has been dependent for all care and transfers since TKA  -MS     Existing Precautions/Restrictions fall;oxygen therapy device and L/min   previous chest tube site with significant copious drainage  -MS     Barriers to Rehab medically complex;cognitive status;previous functional deficit;hearing deficit  -MS       Row Name 02/23/24 1458          Occupational Profile    Reason for Services/Referral (Occupational Profile) Pt is a 74 y/o F admitted to [facility]/16/24 with c/o empyema of R lung, R pleural effusion, abscess on L lung, intubated 2/17/24, extubated 2/21/24. PMHx significant for recent L TKA Nov 2023 by Dr. 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 Per chart review, pt resides with spouse in [facility] with 1 NATALIA. Pt typically (I) with ADLs, no AD for mobility. Pt admitted from SNF.  -MS       Row Name 02/23/24 1458          Living Environment    People in Home facility resident  from SNF  -MS       Row Name 02/23/24 1458          Cognition    Orientation Status (Cognition) oriented to;person  pt with low vocal projection, difficult to understand, shouts illogical statements throughout  -MS       Row Name 02/23/24 1458          Safety Issues, Functional Mobility    Safety Issues Affecting Function (Mobility) ability to follow commands;friction/shear risk;insight into deficits/self-awareness;judgment  -MS     Impairments Affecting Function (Mobility) endurance/activity tolerance;cognition;muscle tone abnormal;pain;postural/trunk control;range of motion (ROM);strength  -MS               User Key  (r) = Recorded By, (t) = Taken By, (c) = Cosigned By      Initials Name Provider Type    Jayne Zavala OT Occupational Therapist                     Mobility/ADL's       Row Name 02/23/24 0461          Bed Mobility    Bed Mobility rolling right;rolling left  -MS     Rolling Left Gregg (Bed Mobility) maximum assist (25% patient effort)  -MS     Rolling Right Gregg (Bed  Mobility) maximum assist (25% patient effort)  -MS     Bed Mobility, Safety Issues decreased use of arms for pushing/pulling;decreased use of legs for bridging/pushing;cognitive deficits limit understanding  -MS     Assistive Device (Bed Mobility) draw sheet  -MS       Row Name 02/23/24 1503          Transfers    Transfers sit-stand transfer;bed-chair transfer  -MS       Row Name 02/23/24 1503          Bed-Chair Transfer    Bed-Chair Hoffman (Transfers) not tested  -MS       Row Name 02/23/24 1503          Sit-Stand Transfer    Sit-Stand Hoffman (Transfers) not tested  -MS       Row Name 02/23/24 1503          Activities of Daily Living    BADL Assessment/Intervention upper body dressing;toileting  -MS       Row Name 02/23/24 1503          Upper Body Dressing Assessment/Training    Hoffman Level (Upper Body Dressing) don;doff;maximum assist (25% patient effort)  -MS     Position (Upper Body Dressing) supine  -MS     Comment, (Upper Body Dressing) change gown, unable to participate to assist with threading BUE through sleeves  -MS       Row Name 02/23/24 1503          Toileting Assessment/Training    Hoffman Level (Toileting) perform perineal hygiene;toileting skills;dependent (less than 25% patient effort)  -MS     Position (Toileting) supine  -MS     Comment, (Toileting) bowel and urine incontinent upon arrival  -MS               User Key  (r) = Recorded By, (t) = Taken By, (c) = Cosigned By      Initials Name Provider Type    MS Jayne Fernandez OT Occupational Therapist                   Obj/Interventions       Row Name 02/23/24 1504          Sensory Assessment (Somatosensory)    Sensory Assessment (Somatosensory) unable/difficult to assess  -MS       Row Name 02/23/24 1504          Vision Assessment/Intervention    Visual Impairment/Limitations unable/difficult to assess  -MS       Row Name 02/23/24 1504          Range of Motion Comprehensive    Comment, General Range of Motion resistive to  BUE movement L>R, difficulty assessing PROM  -MS       Row Name 02/23/24 1504          Strength Comprehensive (MMT)    Comment, General Manual Muscle Testing (MMT) Assessment unable to assess d/t cognition  -MS               User Key  (r) = Recorded By, (t) = Taken By, (c) = Cosigned By      Initials Name Provider Type    Jayne Zavala, OT Occupational Therapist                   Goals/Plan       Row Name 02/23/24 1513          Bed Mobility Goal 1 (OT)    Activity/Assistive Device (Bed Mobility Goal 1, OT) bed mobility activities, all  -MS     Fergus Level/Cues Needed (Bed Mobility Goal 1, OT) moderate assist (50-74% patient effort)  -MS     Time Frame (Bed Mobility Goal 1, OT) long term goal (LTG);2 weeks  -MS     Progress/Outcomes (Bed Mobility Goal 1, OT) new goal  -MS       Row Name 02/23/24 1513          Grooming Goal 1 (OT)    Activity/Device (Grooming Goal 1, OT) grooming skills, all  -MS     Fergus (Grooming Goal 1, OT) minimum assist (75% or more patient effort)  -MS     Time Frame (Grooming Goal 1, OT) long term goal (LTG);2 weeks  -MS     Progress/Outcome (Grooming Goal 1, OT) new goal  -MS       Row Name 02/23/24 1513          Problem Specific Goal 1 (OT)    Problem Specific Goal 1 (OT) increase seated activity tolerance needed for ADL routine >5 minutes with SBA  -MS     Time Frame (Problem Specific Goal 1, OT) long term goal (LTG);2 weeks  -MS     Progress/Outcome (Problem Specific Goal 1, OT) new goal  -MS       Row Name 02/23/24 1513          Therapy Assessment/Plan (OT)    Planned Therapy Interventions (OT) activity tolerance training;adaptive equipment training;BADL retraining;cognitive/visual perception retraining;functional balance retraining;IADL retraining;neuromuscular control/coordination retraining;occupation/activity based interventions;passive ROM/stretching;patient/caregiver education/training;transfer/mobility retraining;strengthening exercise;ROM/therapeutic exercise   -MS               User Key  (r) = Recorded By, (t) = Taken By, (c) = Cosigned By      Initials Name Provider Type    MS Jayne Fernandez, OT Occupational Therapist                   Clinical Impression       Row Name 02/23/24 1506          Pain Assessment    Pain Intervention(s) Repositioned;Emotional support  nsg present  -MS     Additional Documentation Pain Scale: FACES Pre/Post-Treatment (Group)  -MS       Row Name 02/23/24 1506          Pain Scale: FACES Pre/Post-Treatment    Pain: FACES Scale, Pretreatment 2-->hurts little bit  -MS     Posttreatment Pain Rating 2-->hurts little bit  -MS     Pain Location - Side/Orientation Right  -MS     Pain Location - pleuritic  -MS     Pre/Posttreatment Pain Comment with rolling L<>R, appears to be in pain in current chest tube site, previous chest tube site  -MS       Row Name 02/23/24 1506          Plan of Care Review    Plan of Care Reviewed With patient  -MS     Progress no change  -MS     Outcome Evaluation Pt is a 74 y/o F admitted to [facility] 2/16/24 with c/o empyema of R lung, R pleural effusion, abscess on L lung, intubated 2/17/24, extubated 2/21/24. PMHx significant for recent L TKA Nov 2023 by Dr. 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 Per chart review, pt resides with spouse in [facility] with 1 NATALIA. Pt typically (I) with ADLs, no AD for mobility. Pt admitted from SNF. This date pt A&Ox1,does have low projection of speech therefore difficult to formally assess cognition, however shouts illogical statements throughout. Difficulty following commands, attempted to assess BUE PROM however resistive to movement. Pt bowel and urine incontinent upon arrival, requires max A rolling L<>R and dependent for maura hygiene and linen change. Pt noted to have previous chest tube site open with significant copious drainage, nsg present to re-apply bandage, additional time for clean up, additional linen change d/t saturated linens. Pt appears to be functioning near  previous baseline post TKA, significantly lower to true baseline prior to TKA in Nov 2023. OT recommend return to SNF when medically appropriate for dc. Will follow  -MS       Row Name 02/23/24 1506          Therapy Assessment/Plan (OT)    Rehab Potential (OT) fair, will monitor progress closely  -MS     Criteria for Skilled Therapeutic Interventions Met (OT) yes;meets criteria;skilled treatment is necessary  -MS     Therapy Frequency (OT) 3 times/wk  -MS     Predicted Duration of Therapy Intervention (OT) until d/c  -MS       Row Name 02/23/24 1506          Therapy Plan Review/Discharge Plan (OT)    Anticipated Discharge Disposition (OT) skilled nursing facility  -MS       Row Name 02/23/24 1506          Vital Signs    Pre Systolic BP Rehab 100  -MS     Pre Treatment Diastolic BP 63  -MS     Pretreatment Heart Rate (beats/min) 95  -MS     O2 Delivery Pre Treatment room air  -MS     O2 Delivery Intra Treatment room air  -MS     O2 Delivery Post Treatment room air  -MS     Pre Patient Position Supine  -MS     Intra Patient Position Supine  -MS     Post Patient Position Supine  -MS       Row Name 02/23/24 1506          Positioning and Restraints    Pre-Treatment Position in bed  -MS     Post Treatment Position bed  -MS     In Bed notified nsg;side lying left;call light within reach;encouraged to call for assist;exit alarm on  -MS               User Key  (r) = Recorded By, (t) = Taken By, (c) = Cosigned By      Initials Name Provider Type    Jayne Zavala, OT Occupational Therapist                   Outcome Measures       Row Name 02/23/24 1514          How much help from another is currently needed...    Putting on and taking off regular lower body clothing? 1  -MS     Bathing (including washing, rinsing, and drying) 1  -MS     Toileting (which includes using toilet bed pan or urinal) 1  -MS     Putting on and taking off regular upper body clothing 1  -MS     Taking care of personal grooming (such as brushing  teeth) 1  -MS     Eating meals 1  -MS     AM-PAC 6 Clicks Score (OT) 6  -MS       Row Name 02/23/24 1021          How much help from another person do you currently need...    Turning from your back to your side while in flat bed without using bedrails? 1  -CL     Moving from lying on back to sitting on the side of a flat bed without bedrails? 1  -CL     Moving to and from a bed to a chair (including a wheelchair)? 1  -CL     Standing up from a chair using your arms (e.g., wheelchair, bedside chair)? 1  -CL     Climbing 3-5 steps with a railing? 1  -CL     To walk in hospital room? 1  -CL     AM-PAC 6 Clicks Score (PT) 6  -CL     Highest Level of Mobility Goal 2 --> Bed activities/dependent transfer  -CL       Row Name 02/23/24 1514          Functional Assessment    Outcome Measure Options AM-PAC 6 Clicks Daily Activity (OT)  -MS               User Key  (r) = Recorded By, (t) = Taken By, (c) = Cosigned By      Initials Name Provider Type    MS Jayne Fernandez, OT Occupational Therapist    CL Gladis Owusu, PT Physical Therapist                    Occupational Therapy Education       Title: PT OT SLP Therapies (In Progress)       Topic: Occupational Therapy (Done)       Point: ADL training (Done)       Description:   Instruct learner(s) on proper safety adaptation and remediation techniques during self care or transfers.   Instruct in proper use of assistive devices.                  Learning Progress Summary             Patient Acceptance, E,TB, VU,NR by MS at 2/23/2024 1514                         Point: Precautions (Done)       Description:   Instruct learner(s) on prescribed precautions during self-care and functional transfers.                  Learning Progress Summary             Patient Acceptance, E,TB, VU,NR by MS at 2/23/2024 1514                         Point: Body mechanics (Done)       Description:   Instruct learner(s) on proper positioning and spine alignment during self-care, functional mobility  activities and/or exercises.                  Learning Progress Summary             Patient Acceptance, E,TB, VU,NR by MS at 2/23/2024 3064                                         User Key       Initials Effective Dates Name Provider Type Discipline    MS 07/13/22 -  Jayne Fernandez, IRAIS Occupational Therapist OT                  OT Recommendation and Plan  Planned Therapy Interventions (OT): activity tolerance training, adaptive equipment training, BADL retraining, cognitive/visual perception retraining, functional balance retraining, IADL retraining, neuromuscular control/coordination retraining, occupation/activity based interventions, passive ROM/stretching, patient/caregiver education/training, transfer/mobility retraining, strengthening exercise, ROM/therapeutic exercise  Therapy Frequency (OT): 3 times/wk  Plan of Care Review  Plan of Care Reviewed With: patient  Progress: no change  Outcome Evaluation: Pt is a 72 y/o F admitted to [facility] 2/16/24 with c/o empyema of R lung, R pleural effusion, abscess on L lung, intubated 2/17/24, extubated 2/21/24. PMHx significant for recent L TKA Nov 2023 by Dr. 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 Per chart review, pt resides with spouse in [facility] with 1 NAATLIA. Pt typically (I) with ADLs, no AD for mobility. Pt admitted from SNF. This date pt A&Ox1,does have low projection of speech therefore difficult to formally assess cognition, however shouts illogical statements throughout. Difficulty following commands, attempted to assess BUE PROM however resistive to movement. Pt bowel and urine incontinent upon arrival, requires max A rolling L<>R and dependent for maura hygiene and linen change. Pt noted to have previous chest tube site open with significant copious drainage, nsg present to re-apply bandage, additional time for clean up, additional linen change d/t saturated linens. Pt appears to be functioning near previous baseline post TKA, significantly lower to  true baseline prior to TKA in Nov 2023. OT recommend return to SNF when medically appropriate for dc. Will follow     Time Calculation:                   Jayne Fernandez, IRAIS  2/23/2024

## 2024-02-23 NOTE — CONSULTS
Palliative Care Social Work Progress Note    Code Status:do not resuscitate    Goals of Care: Limited Additonal Intervention     Narrative: Palliative care  contacted patients spouse to discuss goals of care. Patient [facility]  [facility] and spouse would like patient to return when stable. He affirmed patient is a DNR/DNI.  provided updated from nursing. Education provided on hospice care. Spouse is agreeable to explanation of benefits. Referral placed to [facility] hospice. RN and CM updated. Emotional support provided.     Plan: [facility] hospice referral          Radha Alexander

## 2024-02-24 ENCOUNTER — APPOINTMENT (OUTPATIENT)
Dept: GENERAL RADIOLOGY | Facility: HOSPITAL | Age: 74
DRG: 208 | End: 2024-02-24
Payer: MEDICARE

## 2024-02-24 LAB
ALBUMIN SERPL-MCNC: 2.5 G/DL (ref 3.5–5.2)
ALBUMIN/GLOB SERPL: 1 G/DL
ALP SERPL-CCNC: 77 U/L (ref 39–117)
ALT SERPL W P-5'-P-CCNC: 8 U/L (ref 1–33)
ANION GAP SERPL CALCULATED.3IONS-SCNC: 10 MMOL/L (ref 5–15)
ANISOCYTOSIS BLD QL: ABNORMAL
AST SERPL-CCNC: 8 U/L (ref 1–32)
BILIRUB SERPL-MCNC: 0.2 MG/DL (ref 0–1.2)
BUN SERPL-MCNC: 22 MG/DL (ref 8–23)
BUN/CREAT SERPL: 44.9 (ref 7–25)
CALCIUM SPEC-SCNC: 7.8 MG/DL (ref 8.6–10.5)
CHLORIDE SERPL-SCNC: 100 MMOL/L (ref 98–107)
CO2 SERPL-SCNC: 33 MMOL/L (ref 22–29)
CREAT SERPL-MCNC: 0.49 MG/DL (ref 0.57–1)
DEPRECATED RDW RBC AUTO: 60.4 FL (ref 37–54)
EGFRCR SERPLBLD CKD-EPI 2021: 100.9 ML/MIN/1.73
EOSINOPHIL # BLD MANUAL: 0.11 10*3/MM3 (ref 0–0.4)
EOSINOPHIL NFR BLD MANUAL: 1 % (ref 0.3–6.2)
ERYTHROCYTE [DISTWIDTH] IN BLOOD BY AUTOMATED COUNT: 19.4 % (ref 12.3–15.4)
GLOBULIN UR ELPH-MCNC: 2.6 GM/DL
GLUCOSE BLDC GLUCOMTR-MCNC: 102 MG/DL (ref 70–105)
GLUCOSE BLDC GLUCOMTR-MCNC: 118 MG/DL (ref 70–105)
GLUCOSE SERPL-MCNC: 148 MG/DL (ref 65–99)
HCT VFR BLD AUTO: 23.2 % (ref 34–46.6)
HGB BLD-MCNC: 7.1 G/DL (ref 12–15.9)
LYMPHOCYTES # BLD MANUAL: 1.19 10*3/MM3 (ref 0.7–3.1)
LYMPHOCYTES NFR BLD MANUAL: 2 % (ref 5–12)
MCH RBC QN AUTO: 27.2 PG (ref 26.6–33)
MCHC RBC AUTO-ENTMCNC: 30.8 G/DL (ref 31.5–35.7)
MCV RBC AUTO: 88.3 FL (ref 79–97)
METAMYELOCYTES NFR BLD MANUAL: 1 % (ref 0–0)
MONOCYTES # BLD: 0.22 10*3/MM3 (ref 0.1–0.9)
MYELOCYTES NFR BLD MANUAL: 1 % (ref 0–0)
NEUTROPHILS # BLD AUTO: 9.07 10*3/MM3 (ref 1.7–7)
NEUTROPHILS NFR BLD MANUAL: 77 % (ref 42.7–76)
NEUTS BAND NFR BLD MANUAL: 7 % (ref 0–5)
NRBC SPEC MANUAL: 1 /100 WBC (ref 0–0.2)
PLATELET # BLD AUTO: 87 10*3/MM3 (ref 140–450)
PMV BLD AUTO: 10.1 FL (ref 6–12)
POIKILOCYTOSIS BLD QL SMEAR: ABNORMAL
POTASSIUM SERPL-SCNC: 3.1 MMOL/L (ref 3.5–5.2)
PROT SERPL-MCNC: 5.1 G/DL (ref 6–8.5)
RBC # BLD AUTO: 2.63 10*6/MM3 (ref 3.77–5.28)
SCAN SLIDE: NORMAL
SMALL PLATELETS BLD QL SMEAR: ABNORMAL
SODIUM SERPL-SCNC: 143 MMOL/L (ref 136–145)
STOMATOCYTES BLD QL SMEAR: ABNORMAL
TOXIC GRANULATION: ABNORMAL
VANCOMYCIN TROUGH SERPL-MCNC: 20.7 MCG/ML (ref 5–20)
VARIANT LYMPHS NFR BLD MANUAL: 11 % (ref 19.6–45.3)
WBC NRBC COR # BLD AUTO: 10.8 10*3/MM3 (ref 3.4–10.8)

## 2024-02-24 PROCEDURE — 80053 COMPREHEN METABOLIC PANEL: CPT | Performed by: INTERNAL MEDICINE

## 2024-02-24 PROCEDURE — 92526 ORAL FUNCTION THERAPY: CPT

## 2024-02-24 PROCEDURE — 25010000002 MEROPENEM PER 100 MG: Performed by: INTERNAL MEDICINE

## 2024-02-24 PROCEDURE — 82948 REAGENT STRIP/BLOOD GLUCOSE: CPT | Performed by: NURSE PRACTITIONER

## 2024-02-24 PROCEDURE — 25010000002 VANCOMYCIN 1 G RECONSTITUTED SOLUTION 1 EACH VIAL: Performed by: INTERNAL MEDICINE

## 2024-02-24 PROCEDURE — 71045 X-RAY EXAM CHEST 1 VIEW: CPT

## 2024-02-24 PROCEDURE — 85007 BL SMEAR W/DIFF WBC COUNT: CPT | Performed by: INTERNAL MEDICINE

## 2024-02-24 PROCEDURE — 85025 COMPLETE CBC W/AUTO DIFF WBC: CPT | Performed by: INTERNAL MEDICINE

## 2024-02-24 PROCEDURE — 82948 REAGENT STRIP/BLOOD GLUCOSE: CPT

## 2024-02-24 PROCEDURE — 25810000003 SODIUM CHLORIDE 0.9 % SOLUTION 250 ML FLEX CONT: Performed by: INTERNAL MEDICINE

## 2024-02-24 PROCEDURE — 80202 ASSAY OF VANCOMYCIN: CPT | Performed by: INTERNAL MEDICINE

## 2024-02-24 PROCEDURE — 25010000002 VASOPRESSIN 0.2 UNIT/ML SOLUTION: Performed by: INTERNAL MEDICINE

## 2024-02-24 PROCEDURE — 25010000002 MICAFUNGIN SODIUM 100 MG RECONSTITUTED SOLUTION 1 EACH VIAL: Performed by: INTERNAL MEDICINE

## 2024-02-24 RX ORDER — POTASSIUM CHLORIDE 1.5 G/1.58G
40 POWDER, FOR SOLUTION ORAL EVERY 4 HOURS
Status: COMPLETED | OUTPATIENT
Start: 2024-02-24 | End: 2024-02-24

## 2024-02-24 RX ORDER — ACETAMINOPHEN 500 MG
1000 TABLET ORAL ONCE
OUTPATIENT
Start: 2024-02-26

## 2024-02-24 RX ORDER — OXYCODONE HCL 10 MG/1
10 TABLET, FILM COATED, EXTENDED RELEASE ORAL ONCE
OUTPATIENT
Start: 2024-02-26

## 2024-02-24 RX ORDER — SODIUM CHLORIDE 0.9 % (FLUSH) 0.9 %
10 SYRINGE (ML) INJECTION AS NEEDED
OUTPATIENT
Start: 2024-02-24

## 2024-02-24 RX ORDER — SODIUM CHLORIDE 0.9 % (FLUSH) 0.9 %
10 SYRINGE (ML) INJECTION EVERY 12 HOURS SCHEDULED
OUTPATIENT
Start: 2024-02-24

## 2024-02-24 RX ORDER — SODIUM CHLORIDE, SODIUM LACTATE, POTASSIUM CHLORIDE, CALCIUM CHLORIDE 600; 310; 30; 20 MG/100ML; MG/100ML; MG/100ML; MG/100ML
9 INJECTION, SOLUTION INTRAVENOUS CONTINUOUS PRN
OUTPATIENT
Start: 2024-02-24

## 2024-02-24 RX ADMIN — DIVALPROEX SODIUM 125 MG: 125 CAPSULE, COATED PELLETS ORAL at 08:48

## 2024-02-24 RX ADMIN — RISPERIDONE 0.25 MG: 0.25 TABLET, FILM COATED ORAL at 22:13

## 2024-02-24 RX ADMIN — POTASSIUM CHLORIDE 20 MEQ: 1.5 POWDER, FOR SOLUTION ORAL at 08:48

## 2024-02-24 RX ADMIN — MEROPENEM 1000 MG: 1 INJECTION, POWDER, FOR SOLUTION INTRAVENOUS at 08:48

## 2024-02-24 RX ADMIN — MIDODRINE HYDROCHLORIDE 20 MG: 5 TABLET ORAL at 22:13

## 2024-02-24 RX ADMIN — CITALOPRAM HYDROBROMIDE 10 MG: 20 TABLET ORAL at 08:48

## 2024-02-24 RX ADMIN — MEROPENEM 1000 MG: 1 INJECTION, POWDER, FOR SOLUTION INTRAVENOUS at 15:55

## 2024-02-24 RX ADMIN — VANCOMYCIN HYDROCHLORIDE 1000 MG: 1 INJECTION, POWDER, LYOPHILIZED, FOR SOLUTION INTRAVENOUS at 11:15

## 2024-02-24 RX ADMIN — POTASSIUM CHLORIDE 40 MEQ: 1.5 POWDER, FOR SOLUTION ORAL at 17:36

## 2024-02-24 RX ADMIN — POVIDONE-IODINE: 10 SOLUTION TOPICAL at 08:48

## 2024-02-24 RX ADMIN — MEROPENEM 1000 MG: 1 INJECTION, POWDER, FOR SOLUTION INTRAVENOUS at 22:12

## 2024-02-24 RX ADMIN — MIDODRINE HYDROCHLORIDE 20 MG: 5 TABLET ORAL at 06:01

## 2024-02-24 RX ADMIN — POTASSIUM CHLORIDE 40 MEQ: 1.5 POWDER, FOR SOLUTION ORAL at 13:33

## 2024-02-24 RX ADMIN — Medication 10 ML: at 08:49

## 2024-02-24 RX ADMIN — MICAFUNGIN 100 MG: 20 INJECTION, POWDER, LYOPHILIZED, FOR SOLUTION INTRAVENOUS at 13:33

## 2024-02-24 RX ADMIN — LANSOPRAZOLE 30 MG: 30 TABLET, ORALLY DISINTEGRATING, DELAYED RELEASE ORAL at 06:01

## 2024-02-24 RX ADMIN — Medication 10 ML: at 22:13

## 2024-02-24 RX ADMIN — FOLIC ACID 1 MG: 1 TABLET ORAL at 08:48

## 2024-02-24 RX ADMIN — VASOPRESSIN 0.03 UNITS/MIN: 0.2 INJECTION INTRAVENOUS at 19:51

## 2024-02-24 RX ADMIN — DIVALPROEX SODIUM 125 MG: 125 CAPSULE, COATED PELLETS ORAL at 22:13

## 2024-02-24 RX ADMIN — POTASSIUM CHLORIDE 40 MEQ: 1.5 POWDER, FOR SOLUTION ORAL at 22:12

## 2024-02-24 RX ADMIN — MIDODRINE HYDROCHLORIDE 20 MG: 5 TABLET ORAL at 13:33

## 2024-02-24 NOTE — PROGRESS NOTES
Infectious Diseases Progress Note      LOS: 8 days   Patient Care Team:  Wm Aguirre MD as PCP - General (Sports Medicine)    Chief Complaint: Confused    Subjective       The patient had no fever during the last 24 hours.  The patient is currently on room air.  She remains on low-dose of vasopressin drip.  She remained severely confused secondary to her underlying dementia.  She was noted to have purulent drainage from the previous right-sided chest tube site      Review of Systems:   Review of Systems   Unable to perform ROS: Dementia        Objective     Vital Signs  Temp:  [96.8 °F (36 °C)-98.2 °F (36.8 °C)] 96.8 °F (36 °C)  Heart Rate:  [] 90  BP: ()/(45-81) 102/55    Physical Exam:  Physical Exam  Vitals and nursing note reviewed.   Constitutional:       General: She is not in acute distress.     Appearance: She is well-developed and normal weight. She is ill-appearing. She is not diaphoretic.   HENT:      Head: Normocephalic and atraumatic.   Eyes:      General: No scleral icterus.     Extraocular Movements: Extraocular movements intact.      Conjunctiva/sclera: Conjunctivae normal.      Pupils: Pupils are equal, round, and reactive to light.   Cardiovascular:      Rate and Rhythm: Normal rate and regular rhythm.      Heart sounds: Normal heart sounds, S1 normal and S2 normal. No murmur heard.  Pulmonary:      Effort: Pulmonary effort is normal. No respiratory distress.      Breath sounds: Normal breath sounds. No stridor. No wheezing or rales.      Comments:   Right chest tube    Very diminished in the right lobe  Chest:      Chest wall: No tenderness.   Abdominal:      General: Bowel sounds are normal. There is no distension.      Palpations: Abdomen is soft. There is no mass.      Tenderness: There is no abdominal tenderness. There is no guarding.   Genitourinary:     Comments:  Ribera catheter  Musculoskeletal:         General: No swelling, tenderness or deformity.   Skin:      General: Skin is warm and dry.      Coloration: Skin is not pale.      Findings: No bruising, erythema or rash.      Comments: Left calcaneus unstageable wound          Results Review:    I have reviewed all clinical data, test, lab, and imaging results.     Radiology  XR Chest 1 View    Result Date: 2/24/2024  XR CHEST 1 VW Date of Exam: 2/24/2024 1:37 AM EST Indication: chest tube management Comparison: 2/23/2024 Findings: Right pleural catheter remains in place there is an increase in size of a right sided hydropneumothorax, with a larger fluid component. There is subpleural atelectasis in the right lung there is grossly stable airspace disease in the left lung when accounting for technique differences. Right IJ line remains in place with the tip at the inferior cavoatrial junction. Gastric tube remains in place     Impression: 1. Increase in size of right hydropneumothorax with overall larger fluid component 2. Stable airspace disease Electronically Signed: Girish Barraza  2/24/2024 8:08 AM EST  Workstation ID: [id number]     Cardiology    Laboratory    Results from last 7 days   Lab Units 02/24/24  0601 02/23/24  1016 02/23/24  0611 02/22/24  0438 02/21/24  0519 02/20/24  0523 02/19/24  0646 02/18/24  0520   WBC 10*3/mm3 10.80  --  9.20 8.00 7.80 6.10 4.50 3.70   HEMOGLOBIN g/dL 7.1* 7.6* 7.3* 9.2* 10.0* 9.7* 10.6* 8.6*   HEMATOCRIT % 23.2* 24.1* 23.7* 29.1* 31.7* 31.4* 35.5 27.9*   PLATELETS 10*3/mm3 87*  --  64* 88* 79* 81* 107* 109*     Results from last 7 days   Lab Units 02/24/24  0601 02/23/24  1016 02/23/24  0611 02/22/24  1556 02/22/24  0438 02/21/24  1541 02/21/24  0519 02/20/24  1515 02/20/24  0523 02/19/24  0646 02/18/24  0520   SODIUM mmol/L 143  --  142  --  144  --  140  --  141 140 137   POTASSIUM mmol/L 3.1* 4.9 4.7 3.0* 3.4* 3.7 4.5   < > 3.4* 4.1 4.1   CHLORIDE mmol/L 100  --  102  --  98  --  101  --  107 108* 106   CO2 mmol/L 33.0*  --  35.0*  --  36.0*  --  31.0*  --  27.0 24.0 18.0*   BUN  mg/dL 22  --  23  --  21  --  17  --  16 16 17   CREATININE mg/dL 0.49*  --  0.47*  --  0.75  --  0.77  --  0.75 0.83 0.88   GLUCOSE mg/dL 148*  --  196*  --  153*  --  146*  --  181* 121* 83   ALBUMIN g/dL 2.5*  --  2.5*  --  2.4*  --  2.6*  --  2.3*  --  3.5   BILIRUBIN mg/dL 0.2  --  0.2  --  0.4  --  0.5  --  0.4  --  0.7   ALK PHOS U/L 77  --  81  --  100  --  102  --  77  --  26*   AST (SGOT) U/L 8  --  8  --  9  --  12  --  9  --  7   ALT (SGPT) U/L 8  --  7  --  5  --  7  --  6  --  <5   CALCIUM mg/dL 7.8*  --  7.7*  --  8.1*  --  8.1*  --  7.3* 7.6* 8.2*    < > = values in this interval not displayed.         Results from last 7 days   Lab Units 02/18/24  0520   SED RATE mm/hr <1         Microbiology   Microbiology Results (last 10 days)       Procedure Component Value - Date/Time    Body Fluid Culture - Body Fluid, Pleural Cavity [[id number]]  (Abnormal)  (Susceptibility) Collected: 02/19/24 1124    Lab Status: Final result Specimen: Body Fluid from Pleural Cavity Updated: 02/23/24 0713     Body Fluid Culture Light growth (2+) Escherichia coli ESBL     Comment:   Consider infectious disease consult.  Susceptibility results may not correlate to clinical outcomes.         Moderate growth (3+) Candida albicans      Rare Staphylococcus aureus, MRSA     Comment:   Methicillin resistant Staphylococcus aureus, Patient may be an isolation risk.        Gram Stain Many (4+) WBCs per low power field      Few (2+) Budding yeast    Susceptibility        Escherichia coli ESBL      TARYN      Ertapenem Susceptible      Levofloxacin Susceptible      Meropenem Susceptible      Trimethoprim + Sulfamethoxazole Susceptible                       Susceptibility        Candida albicans      TARYN      Fluconazole Susceptible      Micafungin Susceptible                       Susceptibility        Staphylococcus aureus, MRSA      TARYN      Clindamycin Susceptible      Erythromycin Resistant      Oxacillin Resistant      Rifampin  Susceptible      Tetracycline Susceptible      Trimethoprim + Sulfamethoxazole Resistant      Vancomycin Susceptible                       Susceptibility Comments       Escherichia coli ESBL    Cefazolin sensitivity will not be reported for Enterobacteriaceae in non-urine isolates. If cefazolin is preferred, please call the microbiology lab to request an E-test.  With the exception of urinary-sourced infections, aminoglycosides should not be used as monotherapy.               BAL Culture, Quantitative - Lavage, Bronchus [[id number]]  (Abnormal)  (Susceptibility) Collected: 02/17/24 1709    Lab Status: Final result Specimen: Lavage from Bronchus Updated: 02/21/24 0807     BAL Culture <10,000 CFU/mL Staphylococcus aureus, MRSA     Comment:   Methicillin resistant Staphylococcus aureus, Patient may be an isolation risk.         No Normal Respiratory Maday    Susceptibility        Staphylococcus aureus, MRSA      TARYN      Clindamycin Susceptible      Linezolid Susceptible      Oxacillin Resistant      Tetracycline Susceptible      Trimethoprim + Sulfamethoxazole Resistant      Vancomycin Susceptible                           Respiratory Culture - Lavage, Lung, Left Upper Lobe [[id number]]  (Abnormal) Collected: 02/17/24 1709    Lab Status: Final result Specimen: Lavage from Lung, Left Upper Lobe Updated: 02/21/24 0807     Respiratory Culture Rare Staphylococcus aureus, MRSA     Comment:   Methicillin resistant Staphylococcus aureus, Patient may be an isolation risk.         No Normal Respiratory Maday     Gram Stain Rare (1+) WBCs per low power field      Rare (1+) Epithelial cells per low power field      No organisms seen    Narrative:      Refer to other Resp culture from same day for MICS    Fungus Culture - Lavage, Lung, Left Upper Lobe [[id number]] Collected: 02/17/24 1709    Lab Status: Preliminary result Specimen: Lavage from Lung, Left Upper Lobe Updated: 02/23/24 1415     Fungus Culture No fungus isolated at less  than 1 week    AFB Culture - Lavage, Lung, Left Upper Lobe [[id number]] Collected: 02/17/24 1709    Lab Status: Preliminary result Specimen: Lavage from Lung, Left Upper Lobe Updated: 02/23/24 1415     AFB Culture No AFB isolated at less than 1 week     AFB Stain No acid fast bacilli seen on concentrated smear    Anaerobic Culture - Body Fluid, Pleural Cavity [[id number]]  (Normal) Collected: 02/17/24 1709    Lab Status: Final result Specimen: Body Fluid from Pleural Cavity Updated: 02/23/24 1410     Anaerobic Culture No anaerobes isolated at 5 days    MRSA Screen, PCR (Inpatient) - Swab, Nares [[id number]]  (Abnormal) Collected: 02/17/24 0854    Lab Status: Final result Specimen: Swab from Nares Updated: 02/17/24 1015     MRSA PCR MRSA Detected    Narrative:      The negative predictive value of this diagnostic test is high and should only be used to consider de-escalating anti-MRSA therapy. A positive result may indicate colonization with MRSA and must be correlated clinically.    Respiratory Panel PCR w/COVID-19(SARS-CoV-2) [facility]AM/[facility] In-House, NP Swab in UTM/VTM, 2 HR TAT - Swab, Nasopharynx [[id number]]  (Normal) Collected: 02/17/24 0854    Lab Status: Final result Specimen: Swab from Nasopharynx Updated: 02/17/24 0953     ADENOVIRUS, PCR Not Detected     Coronavirus 229E Not Detected     Coronavirus HKU1 Not Detected     Coronavirus NL63 Not Detected     Coronavirus OC43 Not Detected     COVID19 Not Detected     Human Metapneumovirus Not Detected     Human Rhinovirus/Enterovirus Not Detected     Influenza A PCR Not Detected     Influenza B PCR Not Detected     Parainfluenza Virus 1 Not Detected     Parainfluenza Virus 2 Not Detected     Parainfluenza Virus 3 Not Detected     Parainfluenza Virus 4 Not Detected     RSV, PCR Not Detected     Bordetella pertussis pcr Not Detected     Bordetella parapertussis PCR Not Detected     Chlamydophila pneumoniae PCR Not Detected     Mycoplasma pneumo by PCR Not  Detected    Narrative:      In the setting of a positive respiratory panel with a viral infection PLUS a negative procalcitonin without other underlying concern for bacterial infection, consider observing off antibiotics or discontinuation of antibiotics and continue supportive care. If the respiratory panel is positive for atypical bacterial infection (Bordetella pertussis, Chlamydophila pneumoniae, or Mycoplasma pneumoniae), consider antibiotic de-escalation to target atypical bacterial infection.    Legionella Antigen, Urine - Urine, Urine, Clean Catch [[id number]]  (Normal) Collected: 02/17/24 0852    Lab Status: Final result Specimen: Urine, Clean Catch Updated: 02/17/24 0927     LEGIONELLA ANTIGEN, URINE Negative    S. Pneumo Ag Urine or CSF - Urine, Urine, Clean Catch [[id number]]  (Normal) Collected: 02/17/24 0852    Lab Status: Final result Specimen: Urine, Clean Catch Updated: 02/17/24 0927     Strep Pneumo Ag Negative    Blood Culture - Blood, Hand, Right [[id number]]  (Normal) Collected: 02/16/24 1737    Lab Status: Final result Specimen: Blood from Hand, Right Updated: 02/21/24 1800     Blood Culture No growth at 5 days    Blood Culture - Blood, Hand, Left [[id number]]  (Normal) Collected: 02/16/24 1736    Lab Status: Final result Specimen: Blood from Hand, Left Updated: 02/21/24 1800     Blood Culture No growth at 5 days    Narrative:      Less than seven (7) mL's of blood was collected.  Insufficient quantity may yield false negative results.    AFB Culture - Body Fluid, Pleural Cavity [[id number]] Collected: 02/16/24 1556    Lab Status: Preliminary result Specimen: Body Fluid from Pleural Cavity Updated: 02/23/24 1616     AFB Culture No AFB isolated at 1 week     AFB Stain No acid fast bacilli seen on concentrated smear    Body Fluid Culture - Body Fluid, Pleural Cavity [[id number]]  (Abnormal)  (Susceptibility) Collected: 02/16/24 1556    Lab Status: Final result Specimen: Body Fluid from Pleural Cavity  Updated: 02/18/24 1143     Body Fluid Culture Scant growth (1+) Escherichia coli ESBL     Comment:   Consider infectious disease consult.  Susceptibility results may not correlate to clinical outcomes.         Rare Normal Skin Maday     Gram Stain Moderate (3+) WBCs per low power field      No organisms seen    Susceptibility        Escherichia coli ESBL      TARYN      Ertapenem Susceptible      Levofloxacin Susceptible      Meropenem Susceptible      Trimethoprim + Sulfamethoxazole Susceptible                       Susceptibility Comments       Escherichia coli ESBL    Cefazolin sensitivity will not be reported for Enterobacteriaceae in non-urine isolates. If cefazolin is preferred, please call the microbiology lab to request an E-test.  With the exception of urinary-sourced infections, aminoglycosides should not be used as monotherapy.               Anaerobic Culture - Pleural Fluid, Pleural Cavity [[id number]]  (Normal) Collected: 02/16/24 1556    Lab Status: Final result Specimen: Pleural Fluid from Pleural Cavity Updated: 02/21/24 0646     Anaerobic Culture No anaerobes isolated at 5 days    Fungus Culture - Body Fluid, Pleural Cavity [[id number]]  (Abnormal) Collected: 02/16/24 1556    Lab Status: Preliminary result Specimen: Body Fluid from Pleural Cavity Updated: 02/22/24 1039     Fungus Culture Candida species    Fungus Smear - Body Fluid, Pleural Cavity [[id number]] Collected: 02/16/24 1556    Lab Status: Final result Specimen: Body Fluid from Pleural Cavity Updated: 02/17/24 1205     Fungal Stain No fungal elements seen            Medication Review:       Schedule Meds  citalopram, 10 mg, Nasogastric, Daily  Divalproex Sodium, 125 mg, Nasogastric, Q12H  [Held by provider] enoxaparin, 40 mg, Subcutaneous, Daily  folic acid, 1 mg, Nasogastric, Daily  lansoprazole, 30 mg, Nasogastric, Q AM  meropenem, 1,000 mg, Intravenous, Q8H  micafungin (MYCAMINE) IV, 100 mg, Intravenous, Q24H  midodrine, 20 mg, Nasogastric,  Q8H  potassium chloride, 20 mEq, Nasogastric, BID  povidone-iodine, , Topical, Daily  risperiDONE, 0.25 mg, Nasogastric, Nightly  sodium chloride, 250 mL, Intravenous, Once  sodium chloride, 10 mL, Intravenous, Q12H  vancomycin, 1,000 mg, Intravenous, Q24H        Infusion Meds  vasopressin, 0.03-0.1 Units/min, Last Rate: 0.01 Units/min (02/24/24 1003)        PRN Meds    acetaminophen **OR** acetaminophen    dextrose    dextrose    glucagon (human recombinant)    lactulose    lidocaine    Magnesium Standard Dose Replacement - Follow Nurse / BPA Driven Protocol    ondansetron    ondansetron ODT    Phosphorus Replacement - Follow Nurse / BPA Driven Protocol    polyethylene glycol    Potassium Replacement - Follow Nurse / BPA Driven Protocol    [COMPLETED] Insert Peripheral IV **AND** sodium chloride    sodium chloride    sodium chloride        Assessment & Plan       Antimicrobial Therapy   1.  IV vancomycin        2.  IV meropenem   3.  IV micafungin           Assessment     Right thoracic empyema.  Fluid analysis showed findings consistent with empyema with cultures growing ESBL  E. coli.  The presentation is concerning for micro fistula between the abdomen and thoracic cavity.  Patient s/p chest tube placement by IR which was replaced on February 19, 2024.  Right-sided pleural fluid culture from February 19 is growing ESBL  E. coli, MRSA and Candida albicans  Repeat chest CT scan on February 23, 2024 showed persistent loculated right effusion consistent with hydropneumothorax.  Chest tube output is highly concerning for chylothorax     Left upper lobe cavitary lesion most likely consistent with left lung abscess.  Suspecting the same pathogen involving the right lung causing this infection.   BAL culture from February 17 is  growing MRSA    Acute hypoxic respiratory failure-likely related to the above.  Extubated on February 28 and currently on 1 L of oxygen    Septic shock-patient is currently on   vasopressin drip     Left calcaneus wound, unstageable with black eschar.  I doubt this is a peripheral vascular disease patient has strong pulse.  Most likely this is a pressure ulcer    Dementia.  Patient is severely confused          Plan     Continue IV vancomycin for now, pharmacy currently following and dosing and keep trough between 15-20.    Continue IV meropenem 1 g every 8 hours   Continue IV micafungin 100 mg daily  Recommend at least 4 weeks of the above antimicrobial therapy  Thoracic surgery service is following the patient now and planning for VATS on Monday    Consider MRI of the left foot when patient is more stable, can be done later during this admission  Continue supportive care  A.m. labs   Send pleural fluid for triglyceride level        Himanshu Spaulding MD  02/24/24  11:59 EST    Note is dictated utilizing voice recognition software/Dragon

## 2024-02-24 NOTE — PLAN OF CARE
Goal Outcome Evaluation:   Patient was seen for re-evaluation of swallow function. Patient's  was present during this assessment. Most recent chest x-ray revealed increased in size of right hydropneumothorax with overall larger fluid component. Stable airspace disease. Patient has history of hiatal hernia and emphysema of the lungs. Properly positioned patient upright in bed prior to trials. Patient was more alert today and verbalized more as well. Voice was soft and difficult to understand as she does not project voice well. Provided 1/2 tsp of water via spoon x 2. Patient exhibited a delay with initiating swallow. Patient reacted with a immediate cough response. NOted oxygen saturation dropped to 83 during event but eventually returned to over 90. No further trials were attempted d/t high risk of aspiration.  It is recommended that patient remain NPO at this time. Patient currently receives nourishment via NG tube. ST will continue to follow for ongoing re-evaluation of swallow functions.

## 2024-02-24 NOTE — PROGRESS NOTES
"Pharmacy Antimicrobial Dosing Service    Subjective:  Sandra Treadwell is a 73 y.o.female admitted with large pleural effusion. Pharmacy has been consulted to dose Vancomycin and Cefepime for PNA with cavitary lesion.    HPI: s/p IR placed chest tube/pleural drain with 2.3 L out.   PMH: Hx bresat cancer      Assessment/Plan     1. Day #8 Vancomycin: Goal -600 mcg*h/mL.   Scr has improved, but unclear if CrCl is reliable measure of function given poor UOP when on push diuretics.    Current regimen: Vancomycin 750 mg IV q24 hours.      Insight calculated AUC to subtherapeutic on current regimen. Will adjust to 1 g IV q24h for predicted  mcg*h/mL. Repeat level on 2/26 prior to 3rd dose.       2. Day #7 Meropenem: 1g IV q8h for estCrCl >50 mL/min.    3. Day #4 Micafungin: 100 mg IV q24h.     Will continue to monitor drug levels, renal function, culture and sensitivities, and patient clinical status.       Objective:  Relevant clinical data and objective history reviewed:  152.4 cm (60\")   61.5 kg (135 lb 9.3 oz)   Ideal body weight: 45.5 kg (100 lb 4.9 oz)  Adjusted ideal body weight: 51.9 kg (114 lb 6.7 oz)  Body mass index is 26.48 kg/m².    Results from last 7 days   Lab Units 02/24/24  0601 02/21/24  0901 02/20/24  1446 02/19/24  0646   VANCOMYCIN PK mcg/mL  --   --  24.50  --    VANCOMYCIN TR mcg/mL 20.70* 18.80  --  20.00     Results from last 7 days   Lab Units 02/24/24  0601 02/23/24  0611 02/22/24  0438   CREATININE mg/dL 0.49* 0.47* 0.75     Estimated Creatinine Clearance: 86.3 mL/min (A) (by C-G formula based on SCr of 0.49 mg/dL (L)).  I/O last 3 completed shifts:  In: 4153.2 [I.V.:1989.2; Other:818; NG/GT:1346]  Out: 2345 [Urine:1050; Chest Tube:1110]    Results from last 7 days   Lab Units 02/24/24  0601 02/23/24  0611 02/22/24  0438   WBC 10*3/mm3 10.80 9.20 8.00     Temperature    02/23/24 2300 02/24/24 0804 02/24/24 1200   Temp: 98.2 °F (36.8 °C) 96.8 °F (36 °C) 97.5 °F (36.4 °C)     Baseline " culture/source/susceptibility:  Microbiology Results (last 10 days)       Procedure Component Value - Date/Time    Body Fluid Culture - Body Fluid, Pleural Cavity [[id number]]  (Abnormal)  (Susceptibility) Collected: 02/19/24 1124    Lab Status: Final result Specimen: Body Fluid from Pleural Cavity Updated: 02/23/24 0713     Body Fluid Culture Light growth (2+) Escherichia coli ESBL     Comment:   Consider infectious disease consult.  Susceptibility results may not correlate to clinical outcomes.         Moderate growth (3+) Candida albicans      Rare Staphylococcus aureus, MRSA     Comment:   Methicillin resistant Staphylococcus aureus, Patient may be an isolation risk.        Gram Stain Many (4+) WBCs per low power field      Few (2+) Budding yeast    Susceptibility        Escherichia coli ESBL      TARYN      Ertapenem <=0.5 ug/ml Susceptible      Levofloxacin <=0.12 ug/ml Susceptible      Meropenem <=0.25 ug/ml Susceptible      Trimethoprim + Sulfamethoxazole <=20 ug/ml Susceptible                       Susceptibility        Candida albicans      TARYN      Fluconazole <=0.5 ug/ml Susceptible      Micafungin <=0.06 ug/ml Susceptible                       Susceptibility        Staphylococcus aureus, MRSA      TARYN      Clindamycin 0.25 ug/ml Susceptible      Erythromycin >=8 ug/ml Resistant      Oxacillin >=4 ug/ml Resistant      Rifampin <=0.5 ug/ml Susceptible      Tetracycline 2 ug/ml Susceptible      Trimethoprim + Sulfamethoxazole >=320 ug/ml Resistant      Vancomycin 1 ug/ml Susceptible                       Susceptibility Comments       Escherichia coli ESBL    Cefazolin sensitivity will not be reported for Enterobacteriaceae in non-urine isolates. If cefazolin is preferred, please call the microbiology lab to request an E-test.  With the exception of urinary-sourced infections, aminoglycosides should not be used as monotherapy.               BAL Culture, Quantitative - Lavage, Bronchus [[id number]]  (Abnormal)   (Susceptibility) Collected: 02/17/24 1709    Lab Status: Final result Specimen: Lavage from Bronchus Updated: 02/21/24 0807     BAL Culture <10,000 CFU/mL Staphylococcus aureus, MRSA     Comment:   Methicillin resistant Staphylococcus aureus, Patient may be an isolation risk.         No Normal Respiratory Maday    Susceptibility        Staphylococcus aureus, MRSA      TARYN      Clindamycin 0.25 ug/ml Susceptible      Linezolid 2 ug/ml Susceptible      Oxacillin >=4 ug/ml Resistant      Tetracycline 2 ug/ml Susceptible      Trimethoprim + Sulfamethoxazole >=320 ug/ml Resistant      Vancomycin 1 ug/ml Susceptible                           Respiratory Culture - Lavage, Lung, Left Upper Lobe [[id number]]  (Abnormal) Collected: 02/17/24 1709    Lab Status: Final result Specimen: Lavage from Lung, Left Upper Lobe Updated: 02/21/24 0807     Respiratory Culture Rare Staphylococcus aureus, MRSA     Comment:   Methicillin resistant Staphylococcus aureus, Patient may be an isolation risk.         No Normal Respiratory Maday     Gram Stain Rare (1+) WBCs per low power field      Rare (1+) Epithelial cells per low power field      No organisms seen    Narrative:      Refer to other Resp culture from same day for MICS    Fungus Culture - Lavage, Lung, Left Upper Lobe [[id number]] Collected: 02/17/24 1709    Lab Status: Preliminary result Specimen: Lavage from Lung, Left Upper Lobe Updated: 02/23/24 1415     Fungus Culture No fungus isolated at less than 1 week    AFB Culture - Lavage, Lung, Left Upper Lobe [[id number]] Collected: 02/17/24 1709    Lab Status: Preliminary result Specimen: Lavage from Lung, Left Upper Lobe Updated: 02/23/24 1415     AFB Culture No AFB isolated at less than 1 week     AFB Stain No acid fast bacilli seen on concentrated smear    Anaerobic Culture - Body Fluid, Pleural Cavity [[id number]]  (Normal) Collected: 02/17/24 1709    Lab Status: Final result Specimen: Body Fluid from Pleural Cavity Updated:  02/23/24 1410     Anaerobic Culture No anaerobes isolated at 5 days    MRSA Screen, PCR (Inpatient) - Swab, Nares [[id number]]  (Abnormal) Collected: 02/17/24 0854    Lab Status: Final result Specimen: Swab from Nares Updated: 02/17/24 1015     MRSA PCR MRSA Detected    Narrative:      The negative predictive value of this diagnostic test is high and should only be used to consider de-escalating anti-MRSA therapy. A positive result may indicate colonization with MRSA and must be correlated clinically.    Respiratory Panel PCR w/COVID-19(SARS-CoV-2) NAYELI/CAMILLE/HUSAM/PAD/COR/SUMAN In-House, NP Swab in UTM/VTM, 2 HR TAT - Swab, Nasopharynx [[id number]]  (Normal) Collected: 02/17/24 0854    Lab Status: Final result Specimen: Swab from Nasopharynx Updated: 02/17/24 0953     ADENOVIRUS, PCR Not Detected     Coronavirus 229E Not Detected     Coronavirus HKU1 Not Detected     Coronavirus NL63 Not Detected     Coronavirus OC43 Not Detected     COVID19 Not Detected     Human Metapneumovirus Not Detected     Human Rhinovirus/Enterovirus Not Detected     Influenza A PCR Not Detected     Influenza B PCR Not Detected     Parainfluenza Virus 1 Not Detected     Parainfluenza Virus 2 Not Detected     Parainfluenza Virus 3 Not Detected     Parainfluenza Virus 4 Not Detected     RSV, PCR Not Detected     Bordetella pertussis pcr Not Detected     Bordetella parapertussis PCR Not Detected     Chlamydophila pneumoniae PCR Not Detected     Mycoplasma pneumo by PCR Not Detected    Narrative:      In the setting of a positive respiratory panel with a viral infection PLUS a negative procalcitonin without other underlying concern for bacterial infection, consider observing off antibiotics or discontinuation of antibiotics and continue supportive care. If the respiratory panel is positive for atypical bacterial infection (Bordetella pertussis, Chlamydophila pneumoniae, or Mycoplasma pneumoniae), consider antibiotic de-escalation to target atypical  bacterial infection.    Legionella Antigen, Urine - Urine, Urine, Clean Catch [[id number]]  (Normal) Collected: 02/17/24 0852    Lab Status: Final result Specimen: Urine, Clean Catch Updated: 02/17/24 0927     LEGIONELLA ANTIGEN, URINE Negative    S. Pneumo Ag Urine or CSF - Urine, Urine, Clean Catch [[id number]]  (Normal) Collected: 02/17/24 0852    Lab Status: Final result Specimen: Urine, Clean Catch Updated: 02/17/24 0927     Strep Pneumo Ag Negative    Blood Culture - Blood, Hand, Right [[id number]]  (Normal) Collected: 02/16/24 1737    Lab Status: Final result Specimen: Blood from Hand, Right Updated: 02/21/24 1800     Blood Culture No growth at 5 days    Blood Culture - Blood, Hand, Left [[id number]]  (Normal) Collected: 02/16/24 1736    Lab Status: Final result Specimen: Blood from Hand, Left Updated: 02/21/24 1800     Blood Culture No growth at 5 days    Narrative:      Less than seven (7) mL's of blood was collected.  Insufficient quantity may yield false negative results.    AFB Culture - Body Fluid, Pleural Cavity [[id number]] Collected: 02/16/24 1556    Lab Status: Preliminary result Specimen: Body Fluid from Pleural Cavity Updated: 02/23/24 1616     AFB Culture No AFB isolated at 1 week     AFB Stain No acid fast bacilli seen on concentrated smear    Body Fluid Culture - Body Fluid, Pleural Cavity [[id number]]  (Abnormal)  (Susceptibility) Collected: 02/16/24 1556    Lab Status: Final result Specimen: Body Fluid from Pleural Cavity Updated: 02/18/24 1143     Body Fluid Culture Scant growth (1+) Escherichia coli ESBL     Comment:   Consider infectious disease consult.  Susceptibility results may not correlate to clinical outcomes.         Rare Normal Skin Maday     Gram Stain Moderate (3+) WBCs per low power field      No organisms seen    Susceptibility        Escherichia coli ESBL      TARYN      Ertapenem <=0.5 ug/ml Susceptible      Levofloxacin <=0.12 ug/ml Susceptible      Meropenem <=0.25 ug/ml  Susceptible      Trimethoprim + Sulfamethoxazole <=20 ug/ml Susceptible                       Susceptibility Comments       Escherichia coli ESBL    Cefazolin sensitivity will not be reported for Enterobacteriaceae in non-urine isolates. If cefazolin is preferred, please call the microbiology lab to request an E-test.  With the exception of urinary-sourced infections, aminoglycosides should not be used as monotherapy.               Anaerobic Culture - Pleural Fluid, Pleural Cavity [[id number]]  (Normal) Collected: 02/16/24 1556    Lab Status: Final result Specimen: Pleural Fluid from Pleural Cavity Updated: 02/21/24 0646     Anaerobic Culture No anaerobes isolated at 5 days    Fungus Culture - Body Fluid, Pleural Cavity [[id number]]  (Abnormal) Collected: 02/16/24 1556    Lab Status: Preliminary result Specimen: Body Fluid from Pleural Cavity Updated: 02/22/24 1039     Fungus Culture Candida species    Fungus Smear - Body Fluid, Pleural Cavity [[id number]] Collected: 02/16/24 1556    Lab Status: Final result Specimen: Body Fluid from Pleural Cavity Updated: 02/17/24 1205     Fungal Stain No fungal elements seen            Mayra Pena RPH  02/24/24 14:23 EST

## 2024-02-24 NOTE — PROGRESS NOTES
Critical Care Progress Note   Sandra Treadwell : 1950 MRN:[id number] LOS:8     Principal Problem: Empyema of lung     Reason for follow up: All the medical problems listed below    Summary     A 73 y.o. female with PMH of hiatal hernia, anemia presented to the hospital for shortness of breath and was admitted with a principal diagnosis of Empyema of lung.  Found to be hypoxic and hypotensive, CTA showed large right pleural effusion with a small right pneumothorax and left upper lobe abscess. S/p right-sided chest tube placement and cultures grew ESBL E. coli along with Candida and MRSA.  Subsequently, developed profound hypotension, treated with broad-spectrum antibiotics and vasopressors.  ID and thoracic surgery was consulted.  Subsequently, patient was intubated on  and had bronchoscopy with BAL done and left upper lobe.  Cultures grew MRSA and treated with vancomycin.  Extubated on .    Due to inadequate drainage from her right chest tube, patient also received intrapleural tPA per thoracic surgery.  Repeat CT chest on  showed persistent loculated moderate-sized right hydropneumothorax.  Also had some purulence oozing out of previous chest tube site on the right lung.  Scheduled tentatively for OR on 2024.    Significant events     24 : Able to wean vaso to 0.02. No further diuresis planned at this time.  spoke with palliative team yesterday. He is considering holding off on surgery Monday.     Assessment / Plan     Acute respiratory failure with hypoxia:   Likely due to right-sided empyema and left lung abscess  Wean off oxygen as tolerated.    Right-sided empyema / Left upper lobe lung abscess  S/p chest tube placement.    Currently getting intrapleural tPA per thoracic surgery.    Repeat CT chest on  with persistent right hydropneumothorax.  Thoracic surgery and ID following.  Scheduled to go to the OR tentatively on .  Pleural fluid grew ESBL E. coli with MRSA and  BAL cultures positive for MRSA.  Currently on meropenem and vancomycin.    Vasodilatory shock  Due to empyema  Previous right upper chest tube site now oozing purulence.  Discussed with thoracic surgery.  Recommended continuation of antibiotics for now and tentatively scheduled to go to the OR on 2/26.  Does not meet SIRS criteria.  Currently on vasopressin for MAP target of 65.  Continue with midodrine. S/p stress dose steroids for 3 days.    Paroxysmal atrial fibrillation :   EKG with sinus rhythm and some PACs.  Telemetry with episodes of paroxysmal A-fib.  Not on any rate control agents at this time due to shock state.  Heart rate improved after switching norepinephrine to vasopressin.  Likely episodes of A-fib are precipitated by infection.  [id number] Score of atleast 3.  Not on any anticoagulation due to thrombocytopenia.    Acute thrombocytopenia  Likely from infection, platelet counts are improving.  Normal INR and PTT, normal fibrinogen.  DIC ruled out.    Acute Combined Systolic and Diastolic heart failure /pulmonary hypertension:   Currently well compensated.  Last ECHO showed an EF of 40 to 45%, LV diastolic dysfunction, RVSP of 40.   Appears to be 2 L positive but weight down by 6 kg since admission.  Currently euvolemic.  Hold further diuretics.    Will eventually add goal-directed medical therapy when blood pressure permits.  Might not be a candidate for any ischemic evaluation, given advanced dementia and poor quality of life.  Monitor Input/Output very closely. Follow daily weights.   Net IO Since Admission: 2,355.16 mL [02/24/24 1017]    Left calcaneal wound: Will get an eventual MRI.    Anxiety/depression: On Celexa and Risperdal.    Cachexia: Body mass index is 26.48 kg/m².  Nutrition following   History of breast cancer  History of right knee replacement    Code status:   Medical Intervention Limits: NO intubation (DNI)  Code Status (Patient has no pulse and is not breathing): No CPR (Do Not  Attempt to Resuscitate)  Medical Interventions (Patient has pulse or is breathing): Limited Support       Nutrition: NPO Diet NPO Type: Strict NPO   Diet, Tube Feeding Tube Feeding Formula: Peptamen AF (Vital AF 1.2); Tube Feeding Type: Continuous; Continuous Tube Feeding Start Rate (mL/hr): 45; Then Advance Rate By (mL/hr): Do Not Advance; Every __ Hours: Patient at Goal Rate; To Goal Rate of...    DVT prophylaxis:  Medical and mechanical DVT prophylaxis orders are present.       Subjective / Review of systems     Review of Systems   Unable to perform ROS: Dementia   Respiratory:  Negative for shortness of breath.    Cardiovascular:  Negative for chest pain.   Gastrointestinal:  Negative for abdominal pain.   Musculoskeletal:  Negative for back pain.        Objective / Physical Exam     Vital signs:  Temp: 96.8 °F (36 °C)  BP: 102/55  Heart Rate: 90  Resp: 20  SpO2: 90 %  Weight: 61.5 kg (135 lb 9.3 oz)    Admission Weight: Weight: 67.1 kg (148 lb)  Current Weight: Weight: 61.5 kg (135 lb 9.3 oz)    Input/Output in last 24 hours:    Intake/Output Summary (Last 24 hours) at 2/24/2024 1017  Last data filed at 2/24/2024 0550  Gross per 24 hour   Intake 3254.16 ml   Output 1770 ml   Net 1484.16 ml      Physical Exam  Vitals and nursing note reviewed.   Constitutional:       General: She is awake. She is not in acute distress.     Appearance: She is underweight. She is ill-appearing.   HENT:      Head: Normocephalic and atraumatic.      Nose: Nose normal. No congestion.      Comments: NGT in place.     Mouth/Throat:      Mouth: Mucous membranes are moist.      Comments: ET tube in place  Eyes:      General: No scleral icterus.     Conjunctiva/sclera: Conjunctivae normal.   Cardiovascular:      Rate and Rhythm: Regular rhythm. Tachycardia present.      Pulses: Normal pulses.      Heart sounds: No murmur heard.     No gallop.   Pulmonary:      Effort: Pulmonary effort is normal.      Breath sounds: Normal breath sounds.  No wheezing or rales.      Comments: Right-sided chest tube in place   Abdominal:      General: Bowel sounds are normal. There is no distension.      Palpations: Abdomen is soft.      Tenderness: There is no abdominal tenderness.   Musculoskeletal:      Cervical back: Neck supple.      Right lower leg: No edema.      Left lower leg: No edema.   Skin:     General: Skin is warm and dry.   Neurological:      Mental Status: She is alert.      Comments: Alert, awake, oriented only to her name.  Motor and sensory are grossly intact.   Psychiatric:         Behavior: Behavior is cooperative.        Radiology and Labs     Results from last 7 days   Lab Units 02/24/24  0601 02/23/24  1016 02/23/24  0611 02/22/24  0438 02/21/24  0519 02/20/24  0523   WBC 10*3/mm3 10.80  --  9.20 8.00 7.80 6.10   HEMATOCRIT % 23.2* 24.1* 23.7* 29.1* 31.7* 31.4*   PLATELETS 10*3/mm3 87*  --  64* 88* 79* 81*      Results from last 7 days   Lab Units 02/24/24  0601 02/23/24  1016 02/23/24  0611 02/22/24  1556 02/22/24  0438 02/21/24  1541 02/21/24  0519 02/20/24  1515 02/20/24  0523   SODIUM mmol/L 143  --  142  --  144  --  140  --  141   POTASSIUM mmol/L 3.1* 4.9 4.7 3.0* 3.4*   < > 4.5   < > 3.4*   CHLORIDE mmol/L 100  --  102  --  98  --  101  --  107   CO2 mmol/L 33.0*  --  35.0*  --  36.0*  --  31.0*  --  27.0   BUN mg/dL 22  --  23  --  21  --  17  --  16   CREATININE mg/dL 0.49*  --  0.47*  --  0.75  --  0.77  --  0.75    < > = values in this interval not displayed.      Current medications     Scheduled Meds:   citalopram, 10 mg, Nasogastric, Daily  Divalproex Sodium, 125 mg, Nasogastric, Q12H  [Held by provider] enoxaparin, 40 mg, Subcutaneous, Daily  folic acid, 1 mg, Nasogastric, Daily  lansoprazole, 30 mg, Nasogastric, Q AM  meropenem, 1,000 mg, Intravenous, Q8H  micafungin (MYCAMINE) IV, 100 mg, Intravenous, Q24H  midodrine, 20 mg, Nasogastric, Q8H  potassium chloride, 20 mEq, Nasogastric, BID  povidone-iodine, , Topical,  Daily  risperiDONE, 0.25 mg, Nasogastric, Nightly  sodium chloride, 250 mL, Intravenous, Once  sodium chloride, 10 mL, Intravenous, Q12H  vancomycin, 1,000 mg, Intravenous, Q24H      Continuous Infusions:   vasopressin, 0.03-0.1 Units/min, Last Rate: 0.01 Units/min (02/24/24 1003)      Plan discussed with RN. Reviewed all other data in the last 24 hours, including but not limited to vitals, labs, microbiology, imaging and pertinent notes from other providers. High complexity decision making and high risk of deterioration.    BERNA Garcia   Critical Care  02/24/24   10:17 EST     Attending Addendum     Subjective: Patient still confused, unable to give any history.  Had a lengthy discussion with  at the bedside.  Discussed about current condition, discussed about all the options including surgical management and potential rehab/SNF placement.  Also discussed about conservative management.  Also introduced the concept of palliative care.  He will talk with patient's sister today and will get back to me tomorrow about further goals of care.    Exam: Awake, confused, follows simple commands.  Still with oozing of pus on the right upper chest area.    Assessment / Plan:   Empyema: Still with purulence coming out of previous chest tube site, normal triglycerides, continue with antibiotics.  ID thoracic surgery following.    I have personally reviewed all labs and other data, reviewed all other pertinent notes, interviewed and examined this patient today. I have also reviewed the note by APRN, discussed the plan and made relevant changes in the note.     Dr. Nabil Queen MD MPH  Staff Intensivist  02/24/24   12:50 EST

## 2024-02-24 NOTE — PROGRESS NOTES
Nutrition Services    Patient Name: Sandra Treadwell  YOB: 1950  MRN: [id number]  Admission date: 2/16/2024    PROGRESS NOTE      Encounter Information: Check on for tube feeding.  Remains extubated.  On vaso.  SLP recommended NPO 2/23.  Palliative care met with patient and spouse.         PO Diet: NPO Diet NPO Type: Strict NPO   PO Supplements: None ordered   PO Intake:  NPO       Current nutrition support: Peptamen AF at 45mL/hr + 30mL/hr water flush    Nutrition support review: Documented as above per EMR       Labs (reviewed below): Hypokalemia - being replaced again today   Phosphorus low - resolved        GI Function:  BM today 2/23 (yesterday)  Residuals WNL       Nutrition Intervention Updates: Continue current EN prescription, at goal        Results from last 7 days   Lab Units 02/24/24  0601 02/23/24  1016 02/23/24  0611 02/22/24  1556 02/22/24  0438   SODIUM mmol/L 143  --  142  --  144   POTASSIUM mmol/L 3.1* 4.9 4.7   < > 3.4*   CHLORIDE mmol/L 100  --  102  --  98   CO2 mmol/L 33.0*  --  35.0*  --  36.0*   BUN mg/dL 22  --  23  --  21   CREATININE mg/dL 0.49*  --  0.47*  --  0.75   CALCIUM mg/dL 7.8*  --  7.7*  --  8.1*   BILIRUBIN mg/dL 0.2  --  0.2  --  0.4   ALK PHOS U/L 77  --  81  --  100   ALT (SGPT) U/L 8  --  7  --  5   AST (SGOT) U/L 8  --  8  --  9   GLUCOSE mg/dL 148*  --  196*  --  153*    < > = values in this interval not displayed.     Results from last 7 days   Lab Units 02/24/24  0601 02/23/24  2119 02/23/24  1016 02/23/24  0611 02/22/24  0438 02/21/24  1541 02/21/24  0519   MAGNESIUM mg/dL  --   --   --   --  2.0 1.8 1.9   PHOSPHORUS mg/dL  --  3.7  --    < > 2.9 2.2* 2.2*   HEMOGLOBIN g/dL 7.1*  --  7.6*   < > 9.2*  --  10.0*   HEMATOCRIT % 23.2*  --  24.1*   < > 29.1*  --  31.7*   TRIGLYCERIDES mg/dL  --   --  155*  --   --   --   --     < > = values in this interval not displayed.     COVID19   Date Value Ref Range Status   02/17/2024 Not Detected Not Detected - Ref.  Range Final     Lab Results   Component Value Date    HGBA1C 5.60 11/15/2023       RD to follow up per protocol.    Electronically signed by:  Lyly Flores RD  02/24/24 10:07 EST

## 2024-02-24 NOTE — THERAPY TREATMENT NOTE
Acute Care - Speech Language Pathology   Swallow Re-Evaluation  [facility]     Patient Name: Sandra Treadwell  : 1950  MRN: [id number]  Today's Date: 2024               Admit Date: 2024    Visit Dx:     ICD-10-CM ICD-9-CM   1. Pleural effusion, right  J90 511.9   2. Pneumonia due to infectious organism, unspecified laterality, unspecified part of lung  J18.9 486   3. Hypotension, unspecified hypotension type  I95.9 458.9   4. Acute respiratory failure with hypoxemia  J96.01 518.81   5. Empyema of pleura  J86.9 510.9   6. Abscess of upper lobe of left lung with pneumonia  J85.1 513.0     Patient Active Problem List   Diagnosis    Status post knee replacement    Osteoarthritis of right knee    Abnormal EKG    Deafness in right ear    Depression    Displacement of breast implant    Dyslipidemia    Dyspnea    GERD (gastroesophageal reflux disease)    Hiatal hernia    Knee pain, right    Osteoporosis    Perennial allergic rhinitis with seasonal variation    Primary insomnia    Renal atrophy    Systolic murmur    Empyema of lung    History of malignant neoplasm of breast    Overactive bladder    Pleural effusion, right     Past Medical History:   Diagnosis Date    Bladder leak     Breast cancer     chemo and radiation    Depression     GERD (gastroesophageal reflux disease)     Hyperlipidemia     Osteoporosis      Past Surgical History:   Procedure Laterality Date    BREAST SURGERY      lumpectomy    CHOLECYSTECTOMY      HYSTERECTOMY      TOTAL KNEE ARTHROPLASTY Right 2023    Procedure: TOTAL KNEE ARTHROPLASTY WITH CORI ROBOT;  Surgeon: Ulises Chen II, MD;  Location: [facility];  Service: Robotics - Ortho;  Laterality: Right;       SLP Recommendation and Plan                                                                                      SWALLOW EVALUATION (last 72 hours)       SLP Adult Swallow Evaluation       Row Name 24 1015                   Rehab Evaluation    Document  Type evaluation  -CB        Subjective Information no complaints  -CB        Patient Observations alert;cooperative  -CB        Patient/Family/Caregiver Comments/Observations Patient able to follow simple directives given visual and verbal cuing.  -CB        Patient Effort good  -CB           General Information    Patient Profile Reviewed yes  -CB        Pertinent History Of Current Problem A 73 y.o. female with PMH of hiatal hernia, anemia presented to the hospital for shortness of breath and was admitted with a principal diagnosis of Empyema of lung.  Found to be hypoxic and hypotensive, CTA showed large right pleural effusion with a small right pneumothorax and left upper lobe abscess. S/p right-sided chest tube placement and cultures grew ESBL E. coli along with Candida and MRSA.  Subsequently, developed profound hypotension, treated with broad-spectrum antibiotics and vasopressors.  ID and thoracic surgery was consulted.  Subsequently, patient was intubated on 2/17 and had bronchoscopy with BAL done and left upper lobe.  Cultures grew MRSA and treated with vancomycin.  Extubated on 2/21  -CB        Current Method of Nutrition NPO  -CB           Oral Motor Structure and Function    Dentition Assessment edentulous  -CB        Secretion Management WNL/WFL  -CB        Mucosal Quality moist, healthy  -CB        Gag Response absent or diminished  -CB           Oral Musculature and Cranial Nerve Assessment    Oral Motor General Assessment generalized oral motor weakness  -CB        Oral Motor, Comment Oral mechanism examination revealed patient demonstrated generalized weakness of lingual and labial structures. Lingual strength was reduced. Palatal movement was present. No gag reflex was noted.  -CB           General Eating/Swallowing Observations    Respiratory Support Currently in Use room air  -CB        Eating/Swallowing Skills fed by SLP  -CB        Positioning During Eating --  Sitting upright on side of bed  with assistance of PT.  -CB           Clinical Swallow Eval    Clinical Swallow Evaluation Summary Patient was seen for dysphagia evaluation per MD order as patient failed swallow screen. Patient was intubated from 2/17-2/21. Most recent chest x-ray revealed no sigfnificant changes compared to the recent CT given difference in modality. Loculated moderate size hydropneumothorax present on the right similar as compared to previous CT. Bibasilar airspace disease and effusion appear similar. Patient currently is receiving primary nourishment from NG tube at this time. Patient is edentulous. Oral mechanism examination revealed generalized weakness with lingual and labial structures. Lingual strength was reduced. Palatal movement was present. Noted no gag reflex. Patient was properly positioned upright on the side of bed with assistance of PT. Provided trial of ice chip x 1. No overt s/s of aspiration. Provided trials of thins via spoon x 2. Noted anterior loss with spoon as patient did not demonstrate completed labial closure.  Patient noted to bolus hold for 20-30 seconds in duration. Eventually patient ended up spitting out liquid out  of oral cavity. Patient noted to have very soft spoken vocal quality which was difficult to understand as a result. Patient is not ready for PO at this time as patient not exhibiting functional swallowing d/t bolus hold and anterior spillage of bolus as she is apprehensive with swallowing. ST will continue to follow with ongoing reevaluation of swallow in anticipation of initiating PO diet.  -CB           SLP Evaluation Clinical Impression    SLP Swallowing Diagnosis moderate;oral dysphagia;suspected pharyngeal dysphagia  -CB        Functional Impact risk of aspiration/pneumonia  -CB        Rehab Potential/Prognosis, Swallowing good, to achieve stated therapy goals  -CB        Swallow Criteria for Skilled Therapeutic Interventions Met demonstrates skilled criteria  -CB            Recommendations    Therapy Frequency (Swallow) PRN  -CB        Predicted Duration Therapy Intervention (Days) until discharge  -CB        SLP Diet Recommendation NPO  -CB        Recommended Diagnostics reassess via clinical swallow evaluation  -CB        Oral Care Recommendations Oral Care BID/PRN;Swab  -CB        SLP Rec. for Method of Medication Administration meds via alternate route  -CB        Monitor for Signs of Aspiration yes;notify SLP if any concerns  -CB           Swallow Goals (SLP)    Swallow LTGs Swallow Long Term Goal (free text)  -CB        Swallow STGs diet tolerance goal selection (SLP)  -CB        Diet Tolerance Goal Selection (SLP) Swallow Short Term Goal 1  -CB           (LTG) Swallow    (LTG) Swallow Patient will tolerate safest and least restrictive diet without complications from aspiration.  -CB        Time Frame (Swallow Long Term Goal) by discharge  -CB           (STG) Swallow 1    (STG) Swallow 1 Patient will participate in ongoing assessment of swallow, including reevaluation clinically and/or including instrumental assessment of swallow if indicated, to further assess swallow function in anticipation of initiation of PO diet.  -CB        Time Frame (Swallow Short Term Goal 1) 1 week  -CB                  User Key  (r) = Recorded By, (t) = Taken By, (c) = Cosigned By      Initials Name Effective Dates    Ana Zuñiga, SLP 09/21/21 -                     EDUCATION  The patient has been educated in the following areas:   Dysphagia (Swallowing Impairment) Oral Care/Hydration NPO rationale.        SLP GOALS       Row Name 02/24/24 1100       (LTG) Swallow    (LTG) Swallow Patient will tolerate safest and least restrictive diet without complications from aspiration.  -CB    Time Frame (Swallow Long Term Goal) by discharge  -CB    Progress/Outcomes (Swallow Long Term Goal) goal ongoing  -CB       (STG) Swallow 1    (STG) Swallow 1 Patient will participate in ongoing assessment of  swallow, including reevaluation clinically and/or including instrumental assessment of swallow if indicated, to further assess swallow function in anticipation of initiation of PO diet.  -CB    Time Frame (Swallow Short Term Goal 1) 1 week  -CB    Progress/Outcomes (Swallow Short Term Goal 1) goal ongoing  -CB    Comment (Swallow Short Term Goal 1) Patient was seen for re-evaluation of swallow function. Patient's  was present during this assessment. Most recent chest x-ray revealed increased in size of right hydropneumothorax with overall larger fluid component. Stable airspace disease. Patient has history of hiatal hernia and emphysema of the lungs. Properly positioned patient upright in bed prior to trials. Patient was more alert today and verbalized more as well. Voice was soft and difficult to understand as she does not project voice well. Provided 1/2 tsp of water via spoon x 2. Patient exhibited a delay with initiating swallow. Patient reacted with a immediate cough response. NOted oxygen saturation dropped to 83 during event but eventually returned to over 90. No further trials were attempted d/t high risk of aspiration.  It is recommended that patient remain NPO at this time. Patient currently receives nourishment via NG tube. ST will continue to follow for ongoing re-evaluation of swallow functions.  -CB              User Key  (r) = Recorded By, (t) = Taken By, (c) = Cosigned By      Initials Name Provider Type    CB Ana Lombardo SLP Speech and Language Pathologist                       Time Calculation:       Therapy Charges for Today       Code Description Service Date Service Provider Modifiers Qty    [id number] [facility] EVAL ORAL PHARYNG SWALLOW 6 2/23/2024 Ana Lombardo SLP GN 1                 IDALIA Rasmussen  2/24/2024

## 2024-02-25 ENCOUNTER — APPOINTMENT (OUTPATIENT)
Dept: GENERAL RADIOLOGY | Facility: HOSPITAL | Age: 74
DRG: 208 | End: 2024-02-25
Payer: MEDICARE

## 2024-02-25 VITALS
TEMPERATURE: 97.8 F | OXYGEN SATURATION: 100 % | HEART RATE: 92 BPM | BODY MASS INDEX: 26.62 KG/M2 | SYSTOLIC BLOOD PRESSURE: 93 MMHG | WEIGHT: 135.58 LBS | RESPIRATION RATE: 24 BRPM | HEIGHT: 60 IN | DIASTOLIC BLOOD PRESSURE: 55 MMHG

## 2024-02-25 LAB
ALBUMIN SERPL-MCNC: 2.4 G/DL (ref 3.5–5.2)
ALBUMIN/GLOB SERPL: 0.8 G/DL
ALP SERPL-CCNC: 92 U/L (ref 39–117)
ALT SERPL W P-5'-P-CCNC: 10 U/L (ref 1–33)
ANION GAP SERPL CALCULATED.3IONS-SCNC: 6 MMOL/L (ref 5–15)
ANISOCYTOSIS BLD QL: ABNORMAL
AST SERPL-CCNC: 12 U/L (ref 1–32)
BILIRUB SERPL-MCNC: 0.3 MG/DL (ref 0–1.2)
BUN SERPL-MCNC: 20 MG/DL (ref 8–23)
BUN/CREAT SERPL: 38.5 (ref 7–25)
CALCIUM SPEC-SCNC: 8 MG/DL (ref 8.6–10.5)
CHLORIDE SERPL-SCNC: 107 MMOL/L (ref 98–107)
CO2 SERPL-SCNC: 29 MMOL/L (ref 22–29)
CREAT SERPL-MCNC: 0.52 MG/DL (ref 0.57–1)
DEPRECATED RDW RBC AUTO: 60.8 FL (ref 37–54)
EGFRCR SERPLBLD CKD-EPI 2021: 99.5 ML/MIN/1.73
ERYTHROCYTE [DISTWIDTH] IN BLOOD BY AUTOMATED COUNT: 19.5 % (ref 12.3–15.4)
GLOBULIN UR ELPH-MCNC: 2.9 GM/DL
GLUCOSE BLDC GLUCOMTR-MCNC: 164 MG/DL (ref 70–105)
GLUCOSE SERPL-MCNC: 170 MG/DL (ref 65–99)
HCT VFR BLD AUTO: 24 % (ref 34–46.6)
HGB BLD-MCNC: 7.4 G/DL (ref 12–15.9)
LYMPHOCYTES # BLD MANUAL: 1.51 10*3/MM3 (ref 0.7–3.1)
LYMPHOCYTES NFR BLD MANUAL: 1 % (ref 5–12)
MCH RBC QN AUTO: 26.9 PG (ref 26.6–33)
MCHC RBC AUTO-ENTMCNC: 30.6 G/DL (ref 31.5–35.7)
MCV RBC AUTO: 88.1 FL (ref 79–97)
MONOCYTES # BLD: 0.13 10*3/MM3 (ref 0.1–0.9)
NEUTROPHILS # BLD AUTO: 10.96 10*3/MM3 (ref 1.7–7)
NEUTROPHILS NFR BLD MANUAL: 77 % (ref 42.7–76)
NEUTS BAND NFR BLD MANUAL: 10 % (ref 0–5)
PLAT MORPH BLD: NORMAL
PLATELET # BLD AUTO: 130 10*3/MM3 (ref 140–450)
PMV BLD AUTO: 9.9 FL (ref 6–12)
POLYCHROMASIA BLD QL SMEAR: ABNORMAL
POTASSIUM SERPL-SCNC: 4.4 MMOL/L (ref 3.5–5.2)
POTASSIUM SERPL-SCNC: 4.5 MMOL/L (ref 3.5–5.2)
PROT SERPL-MCNC: 5.3 G/DL (ref 6–8.5)
RBC # BLD AUTO: 2.73 10*6/MM3 (ref 3.77–5.28)
SCAN SLIDE: NORMAL
SODIUM SERPL-SCNC: 142 MMOL/L (ref 136–145)
STOMATOCYTES BLD QL SMEAR: ABNORMAL
TOXIC GRANULATION: ABNORMAL
VARIANT LYMPHS NFR BLD MANUAL: 12 % (ref 19.6–45.3)
WBC NRBC COR # BLD AUTO: 12.6 10*3/MM3 (ref 3.4–10.8)

## 2024-02-25 PROCEDURE — 25010000002 VASOPRESSIN 0.2 UNIT/ML SOLUTION: Performed by: INTERNAL MEDICINE

## 2024-02-25 PROCEDURE — 85025 COMPLETE CBC W/AUTO DIFF WBC: CPT | Performed by: INTERNAL MEDICINE

## 2024-02-25 PROCEDURE — 85007 BL SMEAR W/DIFF WBC COUNT: CPT | Performed by: INTERNAL MEDICINE

## 2024-02-25 PROCEDURE — 82948 REAGENT STRIP/BLOOD GLUCOSE: CPT

## 2024-02-25 PROCEDURE — 80053 COMPREHEN METABOLIC PANEL: CPT | Performed by: INTERNAL MEDICINE

## 2024-02-25 PROCEDURE — 84132 ASSAY OF SERUM POTASSIUM: CPT | Performed by: INTERNAL MEDICINE

## 2024-02-25 PROCEDURE — 71045 X-RAY EXAM CHEST 1 VIEW: CPT

## 2024-02-25 PROCEDURE — 25010000002 MEROPENEM PER 100 MG: Performed by: INTERNAL MEDICINE

## 2024-02-25 RX ORDER — ACETAMINOPHEN 325 MG/1
650 TABLET ORAL EVERY 4 HOURS PRN
Status: DISCONTINUED | OUTPATIENT
Start: 2024-02-25 | End: 2024-02-25 | Stop reason: HOSPADM

## 2024-02-25 RX ORDER — ACETAMINOPHEN 160 MG/5ML
650 SOLUTION ORAL EVERY 4 HOURS PRN
Status: DISCONTINUED | OUTPATIENT
Start: 2024-02-25 | End: 2024-02-25 | Stop reason: HOSPADM

## 2024-02-25 RX ORDER — LORAZEPAM 1 MG/1
2 TABLET ORAL
Status: DISCONTINUED | OUTPATIENT
Start: 2024-02-25 | End: 2024-02-25 | Stop reason: HOSPADM

## 2024-02-25 RX ORDER — MIDAZOLAM HYDROCHLORIDE 1 MG/ML
1 INJECTION INTRAMUSCULAR; INTRAVENOUS
Status: DISCONTINUED | OUTPATIENT
Start: 2024-02-25 | End: 2024-02-25 | Stop reason: HOSPADM

## 2024-02-25 RX ORDER — CARBOXYMETHYLCELLULOSE SODIUM 10 MG/ML
1 GEL OPHTHALMIC
Status: DISCONTINUED | OUTPATIENT
Start: 2024-02-25 | End: 2024-02-25 | Stop reason: HOSPADM

## 2024-02-25 RX ORDER — SCOLOPAMINE TRANSDERMAL SYSTEM 1 MG/1
1 PATCH, EXTENDED RELEASE TRANSDERMAL
Status: DISCONTINUED | OUTPATIENT
Start: 2024-02-25 | End: 2024-02-25 | Stop reason: HOSPADM

## 2024-02-25 RX ORDER — MIDAZOLAM HYDROCHLORIDE 1 MG/ML
4 INJECTION INTRAMUSCULAR; INTRAVENOUS
Status: DISCONTINUED | OUTPATIENT
Start: 2024-02-25 | End: 2024-02-25 | Stop reason: HOSPADM

## 2024-02-25 RX ORDER — GLYCOPYRROLATE 0.2 MG/ML
0.4 INJECTION INTRAMUSCULAR; INTRAVENOUS
Status: DISCONTINUED | OUTPATIENT
Start: 2024-02-25 | End: 2024-02-25 | Stop reason: HOSPADM

## 2024-02-25 RX ORDER — GLYCOPYRROLATE 0.2 MG/ML
0.2 INJECTION INTRAMUSCULAR; INTRAVENOUS
Status: DISCONTINUED | OUTPATIENT
Start: 2024-02-25 | End: 2024-02-25 | Stop reason: HOSPADM

## 2024-02-25 RX ORDER — ACETAMINOPHEN 650 MG/1
650 SUPPOSITORY RECTAL EVERY 4 HOURS PRN
Status: DISCONTINUED | OUTPATIENT
Start: 2024-02-25 | End: 2024-02-25 | Stop reason: HOSPADM

## 2024-02-25 RX ORDER — ONDANSETRON 4 MG/1
4 TABLET, ORALLY DISINTEGRATING ORAL EVERY 6 HOURS PRN
Status: DISCONTINUED | OUTPATIENT
Start: 2024-02-25 | End: 2024-02-25 | Stop reason: HOSPADM

## 2024-02-25 RX ORDER — MIDAZOLAM HYDROCHLORIDE 1 MG/ML
2 INJECTION INTRAMUSCULAR; INTRAVENOUS
Status: DISCONTINUED | OUTPATIENT
Start: 2024-02-25 | End: 2024-02-25 | Stop reason: HOSPADM

## 2024-02-25 RX ORDER — LORAZEPAM 2 MG/ML
0.5 CONCENTRATE ORAL
Status: DISCONTINUED | OUTPATIENT
Start: 2024-02-25 | End: 2024-02-25 | Stop reason: HOSPADM

## 2024-02-25 RX ORDER — IPRATROPIUM BROMIDE AND ALBUTEROL SULFATE 2.5; .5 MG/3ML; MG/3ML
3 SOLUTION RESPIRATORY (INHALATION) EVERY 4 HOURS PRN
Status: DISCONTINUED | OUTPATIENT
Start: 2024-02-25 | End: 2024-02-25 | Stop reason: HOSPADM

## 2024-02-25 RX ORDER — LORAZEPAM 0.5 MG/1
0.5 TABLET ORAL
Status: DISCONTINUED | OUTPATIENT
Start: 2024-02-25 | End: 2024-02-25 | Stop reason: HOSPADM

## 2024-02-25 RX ORDER — LORAZEPAM 2 MG/ML
1 CONCENTRATE ORAL
Status: DISCONTINUED | OUTPATIENT
Start: 2024-02-25 | End: 2024-02-25 | Stop reason: HOSPADM

## 2024-02-25 RX ORDER — ONDANSETRON 2 MG/ML
4 INJECTION INTRAMUSCULAR; INTRAVENOUS EVERY 6 HOURS PRN
Status: DISCONTINUED | OUTPATIENT
Start: 2024-02-25 | End: 2024-02-25 | Stop reason: HOSPADM

## 2024-02-25 RX ORDER — LORAZEPAM 2 MG/ML
2 CONCENTRATE ORAL
Status: DISCONTINUED | OUTPATIENT
Start: 2024-02-25 | End: 2024-02-25 | Stop reason: HOSPADM

## 2024-02-25 RX ORDER — DIPHENOXYLATE HYDROCHLORIDE AND ATROPINE SULFATE 2.5; .025 MG/1; MG/1
1 TABLET ORAL
Status: DISCONTINUED | OUTPATIENT
Start: 2024-02-25 | End: 2024-02-25 | Stop reason: HOSPADM

## 2024-02-25 RX ORDER — LORAZEPAM 1 MG/1
1 TABLET ORAL
Status: DISCONTINUED | OUTPATIENT
Start: 2024-02-25 | End: 2024-02-25 | Stop reason: HOSPADM

## 2024-02-25 RX ADMIN — VASOPRESSIN 0.03 UNITS/MIN: 0.2 INJECTION INTRAVENOUS at 06:34

## 2024-02-25 RX ADMIN — Medication 10 ML: at 08:19

## 2024-02-25 RX ADMIN — MEROPENEM 1000 MG: 1 INJECTION, POWDER, FOR SOLUTION INTRAVENOUS at 08:18

## 2024-02-25 RX ADMIN — POTASSIUM CHLORIDE 20 MEQ: 1.5 POWDER, FOR SOLUTION ORAL at 08:18

## 2024-02-25 RX ADMIN — CITALOPRAM HYDROBROMIDE 10 MG: 20 TABLET ORAL at 08:18

## 2024-02-25 RX ADMIN — DIVALPROEX SODIUM 125 MG: 125 CAPSULE, COATED PELLETS ORAL at 08:18

## 2024-02-25 RX ADMIN — MIDODRINE HYDROCHLORIDE 20 MG: 5 TABLET ORAL at 05:26

## 2024-02-25 RX ADMIN — LANSOPRAZOLE 30 MG: 30 TABLET, ORALLY DISINTEGRATING, DELAYED RELEASE ORAL at 05:26

## 2024-02-25 RX ADMIN — FOLIC ACID 1 MG: 1 TABLET ORAL at 08:18

## 2024-02-25 RX ADMIN — POVIDONE-IODINE: 10 SOLUTION TOPICAL at 08:20

## 2024-02-25 NOTE — PLAN OF CARE
Goal Outcome Evaluation:         Pt bp became soft towards the beginning of night so RN restarted vaso. Pt has had copious amount of drainage out of wound on pts back. Lots of purulent drainage in atrium.

## 2024-02-25 NOTE — CASE MANAGEMENT/SOCIAL WORK
"Physicians Statement of Medical Necessity for  Ambulance Transportation    GENERAL INFORMATION     Name: Sandra Treadwell  YOB: 1950  Medicare #: [id number]   Transport Date: 2/25/24 (Valid for round trips this date, or for scheduled repetitive trips for 60 days from the date signed below.)  Origin: [facility]  Destination: [facility]  Is the Patient's stay covered under Medicare Part A (PPS/DRG?)Yes  Closest appropriate facility? Yes  If this a hosp-hosp transfer? No  Is this a hospice patient? Yes, Is this transport related to patient's terminal illness? No     MEDICAL NECESSITY QUESTIONAIRE    Ambulance Transportation is medically necessary only if other means of transportation are contraindicated or would be potentially harmful to the patient.  To meet this requirement, the patient must be either \"bed confined\" or suffer from a condition such that transport by means other than an ambulance is contraindicated by the patient's condition.  The following questions must be answered by the healthcare professional signing below for this form to be valid:     1) Describe the MEDICAL CONDITION (physical and/or mental) of this patient AT THE TIME OF AMBULANCE TRANSPORT that requires the patient to be transported in an ambulance, and why transport by other means is contraindicated by the patient's condition: Unable to tolerate seated position for transfer. O2 4L NC, Acute respiratory failure with hypoxia , Right lung empyema   Past Medical History:   Diagnosis Date    Bladder leak     Breast cancer     chemo and radiation    Depression     GERD (gastroesophageal reflux disease)     Hyperlipidemia     Osteoporosis       Past Surgical History:   Procedure Laterality Date    BREAST SURGERY      lumpectomy    CHOLECYSTECTOMY      HYSTERECTOMY      TOTAL KNEE ARTHROPLASTY Right 11/24/2023    Procedure: TOTAL KNEE ARTHROPLASTY WITH CORI ROBOT;  Surgeon: Ulises hCen II, MD;  Location: [facility] OR;  " "Service: Robotics - Ortho;  Laterality: Right;      2) Is this patient \"bed confined\" as defined below?Yes   To be \"bed confined\" the patient must satisfy all three of the following criteria:  (1) unable to get up from bed without assistance; AND (2) unable to ambulate;  AND (3) unable to sit in a chair or wheelchair.  3) Can this patient safely be transported by car or wheelchair van (I.e., may safely sit during transport, without an attendant or monitoring?)No   4. In addition to completing questions 1-3 above, please check any of the following conditions that apply*:          *Note: supporting documentation for any boxes checked must be maintained in the patient's medical records Medical attendant required, Requires oxygen - unable to self administer, and Unable to tolerate seated position for time needed to transport      SIGNATURE OF PHYSICIAN OR OTHER AUTHORIZED HEALTHCARE PROFESSIONAL    I certify that the above information is true and correct based on my evaluation of this patient, and represent that the patient requires transport by ambulance and that other forms of transport are contraindicated.  I understand that this information will be used by the Centers for Medicare and Medicaid Services (CMS) to support the determiniation of medical necessity for ambulance services, and I represent that I have personal knowledge of the patient's condition at the time of transport.    SM    If this box is checked, I also certify that the patient is physically or mentally incapable of signing the ambulance service's claim form and that the institution with which I am affiliated has furnished care, services or assistance to the patient.  My signature below is made on behalf of the patient pursuant to 42 .36(b)(4). In accordance with 42 .37, the specific reason(s) that the patient is physically or mentally incapable of signing the claim for is as follows:     Signature of Physician or Healthcare " Professional   Maddy Rangel RN Date/Time:   2/25/25, 6167     (For Scheduled repetitive transport, this form is not valid for transports performed more than 60 days after this date).                                                                                                                                            --------------------------------------------------------------------------------------------  Printed Name and Credentials of Physician or Authorized Healthcare Professional     *Form must be signed by patient's attending physician for scheduled, repetitive transports,.  For non-repetitive ambulance transports, if unable to obtain the signature of the attending physician, any of the following may sign (please select below):     Physician  Clinical Nurse Specialist  Registered Nurse     Physician Assistant  Discharge Planner  Licensed Practical Nurse     Nurse Practitioner

## 2024-02-25 NOTE — CASE MANAGEMENT/SOCIAL WORK
Continued Stay Note   [facility]     Patient Name: Sandra Treadwell  MRN: [id number]  Today's Date: 2/25/2024    Admit Date: 2/16/2024    Plan: Return to [facility], skilled. No precert or PASRR required. [facility] following.   Discharge Plan       Row Name 02/25/24 1404       Plan    Plan Return to [facility], skilled. No precert or PASRR required. [facility] following.    Plan Comments Verified with liamessi Sheehan, patient can return to [facility]. Attempted to reach liaison with [facility] answering service. Spoke with mariangel Dewey with [facility], they are following patient at hospital. They will meet with patient at facility this evening or tomorrow.                      Expected Discharge Date and Time       Expected Discharge Date Expected Discharge Time    Feb 25, 2024               Maddy Rangel RN

## 2024-02-25 NOTE — PROGRESS NOTES
Infectious Diseases Progress Note      LOS: 9 days   Patient Care Team:  Wm Aguirre MD as PCP - General (Sports Medicine)    Chief Complaint: Confused    Subjective       The patient had no fever during the last 24 hours.  The patient is currently on room air.  She remains on low-dose of vasopressin drip.  She remained severely confused secondary to her underlying dementia.  She was noted to have purulent drainage from the previous right-sided chest tube site      Review of Systems:   Review of Systems   Unable to perform ROS: Dementia        Objective     Vital Signs  Temp:  [97.7 °F (36.5 °C)-97.9 °F (36.6 °C)] 97.8 °F (36.6 °C)  Heart Rate:  [] 94  BP: ()/() 95/52    Physical Exam:  Physical Exam  Vitals and nursing note reviewed.   Constitutional:       General: She is not in acute distress.     Appearance: She is well-developed and normal weight. She is ill-appearing. She is not diaphoretic.   HENT:      Head: Normocephalic and atraumatic.   Eyes:      General: No scleral icterus.     Extraocular Movements: Extraocular movements intact.      Conjunctiva/sclera: Conjunctivae normal.      Pupils: Pupils are equal, round, and reactive to light.   Cardiovascular:      Rate and Rhythm: Normal rate and regular rhythm.      Heart sounds: Normal heart sounds, S1 normal and S2 normal. No murmur heard.  Pulmonary:      Effort: Pulmonary effort is normal. No respiratory distress.      Breath sounds: Normal breath sounds. No stridor. No wheezing or rales.      Comments:   Right chest tube    Very diminished in the right lobe  Chest:      Chest wall: No tenderness.   Abdominal:      General: Bowel sounds are normal. There is no distension.      Palpations: Abdomen is soft. There is no mass.      Tenderness: There is no abdominal tenderness. There is no guarding.   Genitourinary:     Comments:  Ribera catheter  Musculoskeletal:         General: No swelling, tenderness or deformity.   Skin:      General: Skin is warm and dry.      Coloration: Skin is not pale.      Findings: No bruising, erythema or rash.      Comments: Left calcaneus unstageable wound          Results Review:    I have reviewed all clinical data, test, lab, and imaging results.     Radiology  XR Chest 1 View    Result Date: 2/25/2024  XR CHEST 1 VW Date of Exam: 2/25/2024 4:11 AM EST Indication: Right chest tube, right hydropneumothorax, follow-up. Comparison: 2/24/2024. Findings: There is a stable right basilar pigtail chest tube in place. There is interval improvement of the right hydropneumothorax. The pleural fluid component is decreased. The pneumothorax component measures approximately 6 mm in thickness in the right lower chest. There is atelectasis in the right lung which is improved. On the left side, the patient probably has a small pleural effusion. There is mixed interstitial/airspace disease throughout both lungs and most prominent in the left mid to lower chest similar to yesterday. Multifocal pneumonia, pulmonary edema, and/or both are in the differential diagnosis. There is a stable right internal jugular line in place with the tip located deep in the right atrium. The nasogastric tube tip projects out of field-of-view.     Impression: 1. Stable right basilar pigtail chest tube with an improving hydropneumothorax. 3. Probable small left pleural effusion. 4. Mixed interstitial/airspace disease throughout both lungs most prominent in the left mid to lower chest. Multifocal pneumonia, pulmonary edema, and/or both are in the differential diagnosis. Electronically Signed: Wm Guerrero MD  2/25/2024 8:26 AM EST  Workstation ID: [id number]     Cardiology    Laboratory    Results from last 7 days   Lab Units 02/25/24  0529 02/24/24  0601 02/23/24  1016 02/23/24  0611 02/22/24  0438 02/21/24  0519 02/20/24  0523 02/19/24  0646   WBC 10*3/mm3 12.60* 10.80  --  9.20 8.00 7.80 6.10 4.50   HEMOGLOBIN g/dL 7.4* 7.1* 7.6* 7.3* 9.2* 10.0* 9.7*  10.6*   HEMATOCRIT % 24.0* 23.2* 24.1* 23.7* 29.1* 31.7* 31.4* 35.5   PLATELETS 10*3/mm3 130* 87*  --  64* 88* 79* 81* 107*     Results from last 7 days   Lab Units 02/25/24  0529 02/25/24  0225 02/24/24  0601 02/23/24  1016 02/23/24  0611 02/22/24  1556 02/22/24  0438 02/21/24  1541 02/21/24  0519 02/20/24  1515 02/20/24  0523 02/19/24  0646   SODIUM mmol/L 142  --  143  --  142  --  144  --  140  --  141 140   POTASSIUM mmol/L 4.5 4.4 3.1* 4.9 4.7 3.0* 3.4*   < > 4.5   < > 3.4* 4.1   CHLORIDE mmol/L 107  --  100  --  102  --  98  --  101  --  107 108*   CO2 mmol/L 29.0  --  33.0*  --  35.0*  --  36.0*  --  31.0*  --  27.0 24.0   BUN mg/dL 20  --  22  --  23  --  21  --  17  --  16 16   CREATININE mg/dL 0.52*  --  0.49*  --  0.47*  --  0.75  --  0.77  --  0.75 0.83   GLUCOSE mg/dL 170*  --  148*  --  196*  --  153*  --  146*  --  181* 121*   ALBUMIN g/dL 2.4*  --  2.5*  --  2.5*  --  2.4*  --  2.6*  --  2.3*  --    BILIRUBIN mg/dL 0.3  --  0.2  --  0.2  --  0.4  --  0.5  --  0.4  --    ALK PHOS U/L 92  --  77  --  81  --  100  --  102  --  77  --    AST (SGOT) U/L 12  --  8  --  8  --  9  --  12  --  9  --    ALT (SGPT) U/L 10  --  8  --  7  --  5  --  7  --  6  --    CALCIUM mg/dL 8.0*  --  7.8*  --  7.7*  --  8.1*  --  8.1*  --  7.3* 7.6*    < > = values in this interval not displayed.                   Microbiology   Microbiology Results (last 10 days)       Procedure Component Value - Date/Time    Body Fluid Culture - Body Fluid, Pleural Cavity [[id number]]  (Abnormal)  (Susceptibility) Collected: 02/19/24 1124    Lab Status: Final result Specimen: Body Fluid from Pleural Cavity Updated: 02/23/24 0713     Body Fluid Culture Light growth (2+) Escherichia coli ESBL     Comment:   Consider infectious disease consult.  Susceptibility results may not correlate to clinical outcomes.         Moderate growth (3+) Candida albicans      Rare Staphylococcus aureus, MRSA     Comment:   Methicillin resistant Staphylococcus  aureus, Patient may be an isolation risk.        Gram Stain Many (4+) WBCs per low power field      Few (2+) Budding yeast    Susceptibility        Escherichia coli ESBL      TARYN      Ertapenem Susceptible      Levofloxacin Susceptible      Meropenem Susceptible      Trimethoprim + Sulfamethoxazole Susceptible                       Susceptibility        Candida albicans      TARYN      Fluconazole Susceptible      Micafungin Susceptible                       Susceptibility        Staphylococcus aureus, MRSA      TARYN      Clindamycin Susceptible      Erythromycin Resistant      Oxacillin Resistant      Rifampin Susceptible      Tetracycline Susceptible      Trimethoprim + Sulfamethoxazole Resistant      Vancomycin Susceptible                       Susceptibility Comments       Escherichia coli ESBL    Cefazolin sensitivity will not be reported for Enterobacteriaceae in non-urine isolates. If cefazolin is preferred, please call the microbiology lab to request an E-test.  With the exception of urinary-sourced infections, aminoglycosides should not be used as monotherapy.               BAL Culture, Quantitative - Lavage, Bronchus [[id number]]  (Abnormal)  (Susceptibility) Collected: 02/17/24 1709    Lab Status: Final result Specimen: Lavage from Bronchus Updated: 02/21/24 0807     BAL Culture <10,000 CFU/mL Staphylococcus aureus, MRSA     Comment:   Methicillin resistant Staphylococcus aureus, Patient may be an isolation risk.         No Normal Respiratory Maday    Susceptibility        Staphylococcus aureus, MRSA      TARYN      Clindamycin Susceptible      Linezolid Susceptible      Oxacillin Resistant      Tetracycline Susceptible      Trimethoprim + Sulfamethoxazole Resistant      Vancomycin Susceptible                           Respiratory Culture - Lavage, Lung, Left Upper Lobe [[id number]]  (Abnormal) Collected: 02/17/24 1709    Lab Status: Final result Specimen: Lavage from Lung, Left Upper Lobe Updated: 02/21/24 0807      Respiratory Culture Rare Staphylococcus aureus, MRSA     Comment:   Methicillin resistant Staphylococcus aureus, Patient may be an isolation risk.         No Normal Respiratory Maday     Gram Stain Rare (1+) WBCs per low power field      Rare (1+) Epithelial cells per low power field      No organisms seen    Narrative:      Refer to other Resp culture from same day for MICS    Fungus Culture - Lavage, Lung, Left Upper Lobe [[id number]] Collected: 02/17/24 1709    Lab Status: Preliminary result Specimen: Lavage from Lung, Left Upper Lobe Updated: 02/25/24 1415     Fungus Culture No fungus isolated at 1 week    AFB Culture - Lavage, Lung, Left Upper Lobe [[id number]] Collected: 02/17/24 1709    Lab Status: Preliminary result Specimen: Lavage from Lung, Left Upper Lobe Updated: 02/25/24 1415     AFB Culture No AFB isolated at 1 week     AFB Stain No acid fast bacilli seen on concentrated smear    Anaerobic Culture - Body Fluid, Pleural Cavity [[id number]]  (Normal) Collected: 02/17/24 1709    Lab Status: Final result Specimen: Body Fluid from Pleural Cavity Updated: 02/23/24 1410     Anaerobic Culture No anaerobes isolated at 5 days    MRSA Screen, PCR (Inpatient) - Swab, Nares [[id number]]  (Abnormal) Collected: 02/17/24 0854    Lab Status: Final result Specimen: Swab from Nares Updated: 02/17/24 1015     MRSA PCR MRSA Detected    Narrative:      The negative predictive value of this diagnostic test is high and should only be used to consider de-escalating anti-MRSA therapy. A positive result may indicate colonization with MRSA and must be correlated clinically.    Respiratory Panel PCR w/COVID-19(SARS-CoV-2) NAYELI/CAMILLE/HUSAM/PAD/COR/SUMAN In-House, NP Swab in UTM/VTM, 2 HR TAT - Swab, Nasopharynx [[id number]]  (Normal) Collected: 02/17/24 0854    Lab Status: Final result Specimen: Swab from Nasopharynx Updated: 02/17/24 0953     ADENOVIRUS, PCR Not Detected     Coronavirus 229E Not Detected     Coronavirus HKU1 Not Detected      Coronavirus NL63 Not Detected     Coronavirus OC43 Not Detected     COVID19 Not Detected     Human Metapneumovirus Not Detected     Human Rhinovirus/Enterovirus Not Detected     Influenza A PCR Not Detected     Influenza B PCR Not Detected     Parainfluenza Virus 1 Not Detected     Parainfluenza Virus 2 Not Detected     Parainfluenza Virus 3 Not Detected     Parainfluenza Virus 4 Not Detected     RSV, PCR Not Detected     Bordetella pertussis pcr Not Detected     Bordetella parapertussis PCR Not Detected     Chlamydophila pneumoniae PCR Not Detected     Mycoplasma pneumo by PCR Not Detected    Narrative:      In the setting of a positive respiratory panel with a viral infection PLUS a negative procalcitonin without other underlying concern for bacterial infection, consider observing off antibiotics or discontinuation of antibiotics and continue supportive care. If the respiratory panel is positive for atypical bacterial infection (Bordetella pertussis, Chlamydophila pneumoniae, or Mycoplasma pneumoniae), consider antibiotic de-escalation to target atypical bacterial infection.    Legionella Antigen, Urine - Urine, Urine, Clean Catch [[id number]]  (Normal) Collected: 02/17/24 0852    Lab Status: Final result Specimen: Urine, Clean Catch Updated: 02/17/24 0927     LEGIONELLA ANTIGEN, URINE Negative    S. Pneumo Ag Urine or CSF - Urine, Urine, Clean Catch [[id number]]  (Normal) Collected: 02/17/24 0852    Lab Status: Final result Specimen: Urine, Clean Catch Updated: 02/17/24 0927     Strep Pneumo Ag Negative    Blood Culture - Blood, Hand, Right [[id number]]  (Normal) Collected: 02/16/24 1737    Lab Status: Final result Specimen: Blood from Hand, Right Updated: 02/21/24 1800     Blood Culture No growth at 5 days    Blood Culture - Blood, Hand, Left [[id number]]  (Normal) Collected: 02/16/24 1736    Lab Status: Final result Specimen: Blood from Hand, Left Updated: 02/21/24 1800     Blood Culture No growth at 5 days     Narrative:      Less than seven (7) mL's of blood was collected.  Insufficient quantity may yield false negative results.    AFB Culture - Body Fluid, Pleural Cavity [[id number]] Collected: 02/16/24 1556    Lab Status: Preliminary result Specimen: Body Fluid from Pleural Cavity Updated: 02/23/24 1616     AFB Culture No AFB isolated at 1 week     AFB Stain No acid fast bacilli seen on concentrated smear    Body Fluid Culture - Body Fluid, Pleural Cavity [[id number]]  (Abnormal)  (Susceptibility) Collected: 02/16/24 1556    Lab Status: Final result Specimen: Body Fluid from Pleural Cavity Updated: 02/18/24 1143     Body Fluid Culture Scant growth (1+) Escherichia coli ESBL     Comment:   Consider infectious disease consult.  Susceptibility results may not correlate to clinical outcomes.         Rare Normal Skin Maday     Gram Stain Moderate (3+) WBCs per low power field      No organisms seen    Susceptibility        Escherichia coli ESBL      TARYN      Ertapenem Susceptible      Levofloxacin Susceptible      Meropenem Susceptible      Trimethoprim + Sulfamethoxazole Susceptible                       Susceptibility Comments       Escherichia coli ESBL    Cefazolin sensitivity will not be reported for Enterobacteriaceae in non-urine isolates. If cefazolin is preferred, please call the microbiology lab to request an E-test.  With the exception of urinary-sourced infections, aminoglycosides should not be used as monotherapy.               Anaerobic Culture - Pleural Fluid, Pleural Cavity [[id number]]  (Normal) Collected: 02/16/24 1556    Lab Status: Final result Specimen: Pleural Fluid from Pleural Cavity Updated: 02/21/24 0646     Anaerobic Culture No anaerobes isolated at 5 days    Fungus Culture - Body Fluid, Pleural Cavity [[id number]]  (Abnormal) Collected: 02/16/24 1556    Lab Status: Preliminary result Specimen: Body Fluid from Pleural Cavity Updated: 02/22/24 1039     Fungus Culture Candida species    Fungus Smear -  Body Fluid, Pleural Cavity [[id number]] Collected: 02/16/24 1556    Lab Status: Final result Specimen: Body Fluid from Pleural Cavity Updated: 02/17/24 1205     Fungal Stain No fungal elements seen            Medication Review:       Schedule Meds         Infusion Meds         PRN Meds    acetaminophen **OR** acetaminophen **OR** acetaminophen    carboxymethylcellulose sod    diphenoxylate-atropine    glycopyrrolate **OR** glycopyrrolate **OR** glycopyrrolate **OR** glycopyrrolate    HYDROmorphone **OR** HYDROmorphone    HYDROmorphone **OR** HYDROmorphone    ipratropium-albuterol    lidocaine    LORazepam **OR** midazolam **OR** midazolam **OR** midazolam **OR** LORazepam **OR** LORazepam    LORazepam **OR** midazolam **OR** midazolam **OR** midazolam **OR** LORazepam **OR** LORazepam    LORazepam **OR** midazolam **OR** midazolam **OR** midazolam **OR** LORazepam **OR** LORazepam    ondansetron ODT **OR** ondansetron    Scopolamine        Assessment & Plan       Antimicrobial Therapy   1.  IV vancomycin        2.  IV meropenem   3.  IV micafungin           Assessment     Right thoracic empyema.  Fluid analysis showed findings consistent with empyema with cultures growing ESBL  E. coli.  The presentation is concerning for micro fistula between the abdomen and thoracic cavity.  Patient s/p chest tube placement by IR which was replaced on February 19, 2024.  Right-sided pleural fluid culture from February 19 is growing ESBL  E. coli, MRSA and Candida albicans  Repeat chest CT scan on February 23, 2024 showed persistent loculated right effusion consistent with hydropneumothorax.  Chest tube output is highly concerning for chylothorax     Left upper lobe cavitary lesion most likely consistent with left lung abscess.  Suspecting the same pathogen involving the right lung causing this infection.   BAL culture from February 17 is  growing MRSA    Acute hypoxic respiratory failure-likely related to the above.   Extubated on February 28 and currently on 1 L of oxygen    Septic shock-patient is currently on  vasopressin drip     Left calcaneus wound, unstageable with black eschar.  I doubt this is a peripheral vascular disease patient has strong pulse.  Most likely this is a pressure ulcer    Dementia.  Patient is severely confused          Plan     Continue IV vancomycin for now, pharmacy currently following and dosing and keep trough between 15-20.    Continue IV meropenem 1 g every 8 hours   Continue IV micafungin 100 mg daily  Continue supportive care    Patient was previously scheduled for right VATS decortication tomorrow but family has apparently decided to make her hospice/comfort care.  Appears that she is being sent back to [facility] rehab later today        Bridget Sanchez, APRN  02/25/24  16:05 EST    Note is dictated utilizing voice recognition software/Dragon

## 2024-02-25 NOTE — DISCHARGE SUMMARY
Discharge Note   Sandra Treadwell 1950 [id number]     Date of Admission:2/16/2024     Date of Discharge: 02/25/24     Discharge Diagnosis:   Acute respiratory failure with hypoxia  Right lung empyema  Left upper lobe lung abscess  Vasodilatory shock  Paroxysmal atrial fibrillation  Acute thrombocytopenia  Acute combined systolic and diastolic heart failure  Pulmonary hypertension  Anxiety/depression  Cachexia  History of breast cancer    Consults:  Infectious disease, thoracic surgery, cardiology    Hospital Course: A 73 y.o. female with PMH of hiatal hernia, anemia presented to the hospital for shortness of breath and was admitted with a principal diagnosis of Empyema of lung.  Found to be hypoxic and hypotensive, CTA showed large right pleural effusion with a small right pneumothorax and left upper lobe abscess. S/p right-sided chest tube placement and cultures grew ESBL E. coli along with Candida and MRSA.  Subsequently, developed profound hypotension, treated with broad-spectrum antibiotics and vasopressors.  ID and thoracic surgery was consulted.  Subsequently, patient was intubated on 2/17 and had bronchoscopy with BAL done and left upper lobe.  Cultures grew MRSA and treated with vancomycin.  Extubated on 2/21.     Due to inadequate drainage from her right chest tube, patient also received intrapleural tPA per thoracic surgery.  Repeat CT chest on 2/23 showed persistent loculated moderate-sized right hydropneumothorax.  Also had some purulence oozing out of previous chest tube site on the right lung.  Scheduled tentatively for OR on 2/26/2024 for VATS and decortication.  Subsequently, family decided on comfort measures/hospice.  Patient is currently being sent back to SNF for hospice care.    Vitals:    02/25/24 1219   BP:    Pulse:    Resp:    Temp: 97.8 °F (36.6 °C)   SpO2:         Physical Exam     Disposition: [facility] rehab    Discharged Condition: poor    Activity: activity as  tolerated    Diet:  No active diet order       Labs:    Results from last 7 days   Lab Units 02/25/24  0529 02/24/24  0601 02/23/24  1016 02/23/24  0611 02/22/24  0438 02/21/24  0519   WBC 10*3/mm3 12.60* 10.80  --  9.20 8.00 7.80   HEMOGLOBIN g/dL 7.4* 7.1* 7.6* 7.3* 9.2* 10.0*   HEMATOCRIT % 24.0* 23.2* 24.1* 23.7* 29.1* 31.7*   PLATELETS 10*3/mm3 130* 87*  --  64* 88* 79*       Results from last 7 days   Lab Units 02/25/24  0529 02/25/24  0225 02/24/24  0601 02/23/24  1016 02/23/24  0611 02/22/24  1556 02/22/24  0438 02/21/24  1541 02/21/24  0519   SODIUM mmol/L 142  --  143  --  142  --  144  --  140   POTASSIUM mmol/L 4.5 4.4 3.1* 4.9 4.7   < > 3.4*   < > 4.5   CHLORIDE mmol/L 107  --  100  --  102  --  98  --  101   CO2 mmol/L 29.0  --  33.0*  --  35.0*  --  36.0*  --  31.0*   BUN mg/dL 20  --  22  --  23  --  21  --  17   CREATININE mg/dL 0.52*  --  0.49*  --  0.47*  --  0.75  --  0.77    < > = values in this interval not displayed.                  Discharge Medications        Stop These Medications      acetaminophen 325 MG tablet  Commonly known as: TYLENOL     calcium carbonate (oyster shell) 500 MG tablet tablet     citalopram 10 MG tablet  Commonly known as: CeleXA     Diclofenac Sodium 1 % gel gel  Commonly known as: VOLTAREN     divalproex 125 MG DR tablet  Commonly known as: DEPAKOTE     ferrous sulfate 325 (65 FE) MG tablet     Fluticasone-Salmeterol 100-50 MCG/ACT DISKUS  Commonly known as: ADVAIR/WIXELA     folic acid 400 MCG tablet  Commonly known as: FOLVITE     furosemide 40 MG tablet  Commonly known as: LASIX     midodrine 5 MG tablet  Commonly known as: PROAMATINE     nitroglycerin 0.4 MG SL tablet  Commonly known as: NITROSTAT     potassium chloride 20 MEQ CR tablet  Commonly known as: K-DUR,KLOR-CON     risperiDONE 0.25 MG tablet  Commonly known as: risperDAL     sennosides-docusate 8.6-50 MG per tablet  Commonly known as: PERICOLACE     vitamin B-12 1000 MCG tablet  Commonly known as:  CYANOCOBALAMIN               Spent at least 15 minutes in the management of patient's care including but not limited to physical exam, review of vital signs, labs, cultures and imaging studies, discussing the hospital stay along with plan of care at home, preparation and coordinating of discharge, arranging follow up care and referrals as indicated. Plan also discussed with RN.    Nabil Queen MD  Critical Care  02/25/24   13:27 EST

## 2024-02-25 NOTE — PROGRESS NOTES
Critical Care Progress Note   Sandra Treadwell : 1950 MRN:[id number] LOS:9     Principal Problem: Empyema of lung     Reason for follow up: All the medical problems listed below    Summary     A 73 y.o. female with PMH of hiatal hernia, anemia presented to the hospital for shortness of breath and was admitted with a principal diagnosis of Empyema of lung.  Found to be hypoxic and hypotensive, CTA showed large right pleural effusion with a small right pneumothorax and left upper lobe abscess. S/p right-sided chest tube placement and cultures grew ESBL E. coli along with Candida and MRSA.  Subsequently, developed profound hypotension, treated with broad-spectrum antibiotics and vasopressors.  ID and thoracic surgery was consulted.  Subsequently, patient was intubated on  and had bronchoscopy with BAL done and left upper lobe.  Cultures grew MRSA and treated with vancomycin.  Extubated on .    Due to inadequate drainage from her right chest tube, patient also received intrapleural tPA per thoracic surgery.  Repeat CT chest on  showed persistent loculated moderate-sized right hydropneumothorax.  Also had some purulence oozing out of previous chest tube site on the right lung.  Scheduled tentatively for OR on 2024.    Significant events     24 : Transition to comfort care measures. Thoracic surgery has cancelled VATS which had been scheduled for tomorrow.  Right thoracic post-chest tube exit site continues to drain purulent fluid, will place ostomy collection bag.  Transfer out of the ICU    Assessment / Plan     Acute respiratory failure with hypoxia:   Likely due to right-sided empyema and left lung abscess  Wean off oxygen as tolerated.    Right-sided empyema / Left upper lobe lung abscess  S/p chest tube placement.    Received intrapleural tPA per thoracic surgery.    Repeat CT chest on  with persistent right hydropneumothorax.  Thoracic surgery and ID following.  Scheduled to go to the  OR, 2/26 cancelled due to transition to comfort care.  Pleural fluid grew ESBL E. coli with MRSA and BAL/sputum cultures positive for MRSA.  Currently on meropenem, Micafungin and vancomycin per ID.    Vasodilatory shock  Due to empyema  Previous right upper chest tube site now oozing purulence.  Discussed with thoracic surgery.  Recommended continuation of antibiotics  Does not meet SIRS criteria.  Discontinue vasopressin, transition to comfort care  Continue with midodrine. S/p stress dose steroids for 3 days.    Paroxysmal atrial fibrillation :   EKG with sinus rhythm and some PACs.  Telemetry with episodes of paroxysmal A-fib.  Not on any rate control agents at this time due to shock state.  Heart rate improved after switching norepinephrine to vasopressin.  Likely episodes of A-fib are precipitated by infection.  [id number] Score of atleast 3.  Not on any anticoagulation due to thrombocytopenia.    Acute thrombocytopenia  Likely from infection, platelet counts are improving.  Normal INR and PTT, normal fibrinogen.  DIC ruled out.    Acute Combined Systolic and Diastolic heart failure /pulmonary hypertension:   Currently well compensated.  Last ECHO showed an EF of 40 to 45%, LV diastolic dysfunction, RVSP of 40.   I/O not accurate due to unmeasured voids.  Hold further diuretics.    Not on goal-directed medical therapy due to low blood pressure  Might not be a candidate for any ischemic evaluation, given advanced dementia and poor quality of life.  Monitor Input/Output very closely. Follow daily weights.   Net IO Since Admission: 3,654.16 mL [02/25/24 1205]    Left calcaneal wound: unable to obtain MRI    Anxiety/depression: On Celexa and Risperdal.    Cachexia: Body mass index is 26.48 kg/m².  Nutrition following   History of breast cancer  History of right knee replacement    Code status:   Medical Intervention Limits: NO intubation (DNI)  Code Status (Patient has no pulse and is not breathing): No CPR (Do  Not Attempt to Resuscitate)  Medical Interventions (Patient has pulse or is breathing): Limited Support       Nutrition: NPO Diet NPO Type: Strict NPO   Diet, Tube Feeding Tube Feeding Formula: Peptamen AF (Vital AF 1.2); Tube Feeding Type: Continuous; Continuous Tube Feeding Start Rate (mL/hr): 45; Then Advance Rate By (mL/hr): Do Not Advance; Every __ Hours: Patient at Goal Rate; To Goal Rate of...    DVT prophylaxis:  Medical and mechanical DVT prophylaxis orders are present.       Subjective / Review of systems     Unable to perform due to history of dementia, current confusion  Objective / Physical Exam     Vital signs:  Temp: 97.9 °F (36.6 °C)  BP: 105/68  Heart Rate: 97  Resp: 24  SpO2: 90 %  Weight: 61.5 kg (135 lb 9.3 oz)    Admission Weight: Weight: 67.1 kg (148 lb)  Current Weight: Weight: 61.5 kg (135 lb 9.3 oz)    Input/Output in last 24 hours:    Intake/Output Summary (Last 24 hours) at 2/25/2024 1205  Last data filed at 2/25/2024 0638  Gross per 24 hour   Intake 2521 ml   Output 1222 ml   Net 1299 ml      Physical Exam  Vitals and nursing note reviewed.   Constitutional:       General: She is awake. She is not in acute distress.     Appearance: She is underweight. She is ill-appearing.      Comments: lethargic   HENT:      Head: Normocephalic and atraumatic.      Nose: Nose normal.      Mouth/Throat:      Mouth: Mucous membranes are dry.   Eyes:      General: No scleral icterus.     Conjunctiva/sclera: Conjunctivae normal.   Cardiovascular:      Heart sounds: No murmur heard.     No gallop.      Comments: SR  Pulmonary:      Effort: Pulmonary effort is normal.      Comments: Right-sided chest tube in place   Right-sided thoracic post-chest tube site with purulent drainage  Right base very diminished  Left base fine crackles  Abdominal:      General: Bowel sounds are normal. There is no distension.      Palpations: Abdomen is soft.   Musculoskeletal:      Cervical back: Neck supple.      Right lower  leg: No edema.      Left lower leg: No edema.   Skin:     General: Skin is warm and dry.   Neurological:      Comments: Lethargic, awakens easily but briefly.  Oriented only to her name.  Generalized weakness   Psychiatric:         Behavior: Behavior is cooperative.        Radiology and Labs     Results from last 7 days   Lab Units 02/25/24  0529 02/24/24  0601 02/23/24  1016 02/23/24  0611 02/22/24  0438 02/21/24  0519   WBC 10*3/mm3 12.60* 10.80  --  9.20 8.00 7.80   HEMATOCRIT % 24.0* 23.2* 24.1* 23.7* 29.1* 31.7*   PLATELETS 10*3/mm3 130* 87*  --  64* 88* 79*      Results from last 7 days   Lab Units 02/25/24  0529 02/25/24  0225 02/24/24  0601 02/23/24  1016 02/23/24  0611 02/22/24  1556 02/22/24  0438 02/21/24  1541 02/21/24  0519   SODIUM mmol/L 142  --  143  --  142  --  144  --  140   POTASSIUM mmol/L 4.5 4.4 3.1* 4.9 4.7   < > 3.4*   < > 4.5   CHLORIDE mmol/L 107  --  100  --  102  --  98  --  101   CO2 mmol/L 29.0  --  33.0*  --  35.0*  --  36.0*  --  31.0*   BUN mg/dL 20  --  22  --  23  --  21  --  17   CREATININE mg/dL 0.52*  --  0.49*  --  0.47*  --  0.75  --  0.77    < > = values in this interval not displayed.      Current medications     Scheduled Meds:   citalopram, 10 mg, Nasogastric, Daily  Divalproex Sodium, 125 mg, Nasogastric, Q12H  [Held by provider] enoxaparin, 40 mg, Subcutaneous, Daily  folic acid, 1 mg, Nasogastric, Daily  lansoprazole, 30 mg, Nasogastric, Q AM  meropenem, 1,000 mg, Intravenous, Q8H  micafungin (MYCAMINE) IV, 100 mg, Intravenous, Q24H  midodrine, 20 mg, Nasogastric, Q8H  potassium chloride, 20 mEq, Nasogastric, BID  povidone-iodine, , Topical, Daily  risperiDONE, 0.25 mg, Nasogastric, Nightly  sodium chloride, 250 mL, Intravenous, Once  sodium chloride, 10 mL, Intravenous, Q12H  vancomycin, 1,000 mg, Intravenous, Q24H      Continuous Infusions:        Plan discussed with RN. Reviewed all other data in the last 24 hours, including but not limited to vitals, labs,  microbiology, imaging and pertinent notes from other providers. High complexity decision making and high risk of deterioration.    Tatyana Basilio, APRN   Critical Care  02/25/24   12:05 EST

## 2024-02-25 NOTE — PROGRESS NOTES
Nutrition Services    Patient Name: Sandra Treadwell  YOB: 1950  MRN: [id number]  Admission date: 2/16/2024    PROGRESS NOTE      Encounter Information: Check on for tube feeding.  Remains extubated.  On vaso.  SLP recommended NPO 2/24.         PO Diet: NPO Diet NPO Type: Strict NPO   PO Supplements: None ordered   PO Intake:  NPO       Current nutrition support: Peptamen AF at 45mL/hr + 30mL/hr water flush    Nutrition support review: Documented as above per EMR       Labs (reviewed below): Hypokalemia - resolved        GI Function:  BM today 2/24 (yesterday)  Residuals WNL       Nutrition Intervention Updates: Continue current EN prescription, at goal        Results from last 7 days   Lab Units 02/25/24  0529 02/25/24  0225 02/24/24  0601 02/23/24  1016 02/23/24  0611   SODIUM mmol/L 142  --  143  --  142   POTASSIUM mmol/L 4.5 4.4 3.1*   < > 4.7   CHLORIDE mmol/L 107  --  100  --  102   CO2 mmol/L 29.0  --  33.0*  --  35.0*   BUN mg/dL 20  --  22  --  23   CREATININE mg/dL 0.52*  --  0.49*  --  0.47*   CALCIUM mg/dL 8.0*  --  7.8*  --  7.7*   BILIRUBIN mg/dL 0.3  --  0.2  --  0.2   ALK PHOS U/L 92  --  77  --  81   ALT (SGPT) U/L 10  --  8  --  7   AST (SGOT) U/L 12  --  8  --  8   GLUCOSE mg/dL 170*  --  148*  --  196*    < > = values in this interval not displayed.     Results from last 7 days   Lab Units 02/25/24  0529 02/24/24  0601 02/23/24  2119 02/23/24  1016 02/23/24  0611 02/22/24  0438 02/21/24  1541 02/21/24  0519   MAGNESIUM mg/dL  --   --   --   --   --  2.0 1.8 1.9   PHOSPHORUS mg/dL  --   --  3.7  --    < > 2.9 2.2* 2.2*   HEMOGLOBIN g/dL 7.4*   < >  --  7.6*   < > 9.2*  --  10.0*   HEMATOCRIT % 24.0*   < >  --  24.1*   < > 29.1*  --  31.7*   TRIGLYCERIDES mg/dL  --   --   --  155*  --   --   --   --     < > = values in this interval not displayed.     COVID19   Date Value Ref Range Status   02/17/2024 Not Detected Not Detected - Ref. Range Final     Lab Results   Component Value Date     HGBA1C 5.60 11/15/2023       RD to follow up per protocol.    Electronically signed by:  Lyly Flores RD  02/25/24 09:57 EST

## 2024-02-25 NOTE — ANESTHESIA PREPROCEDURE EVALUATION
Anesthesia Evaluation     Patient summary reviewed and Nursing notes reviewed   no history of anesthetic complications:   NPO Solid Status: > 8 hours  NPO Liquid Status: > 2 hours           Airway   Dental      Pulmonary    (+) pleural effusion,shortness of breath  Cardiovascular     ECG reviewed    (+) valvular problems/murmurs murmur, MR and TI, dysrhythmias PAC, Tachycardia, Paroxysmal Atrial Fib, CHF Diastolic >=55% and Systolic <55%, hyperlipidemia      Neuro/Psych  (+) psychiatric history Anxiety and Depression, dementia  GI/Hepatic/Renal/Endo    (+) hiatal hernia, GERD, renal disease-    Musculoskeletal     Abdominal    Substance History      OB/GYN          Other   arthritis, blood dyscrasia anemia thrombocytopenia,   history of cancer    ROS/Med Hx Other: Additional History:  LBBB, hyperglycemia, hypokalemia, hypocalcemia, low protein/albumin, pulmonary hypertension, empyema/hydropneumothorax, allergies, breast cancer, renal atrophy, confusion, cachexia, calcaneal wound    Acute respiratory failure with hypoxemia - now extubated    Septic shock/hypotension - vasopressin drip    Patient is DNR/DNI, hospice care with comfort measures    Echo:    Echocardiogram Findings    Left Ventricle Left ventricular systolic function is mildly decreased. Calculated left ventricular EF = 43% Left ventricular ejection fraction appears to be 41 - 45%.     Normal left ventricular cavity size and wall thickness noted. Left ventricular diastolic dysfunction is noted.  Right Ventricle Normal right ventricular cavity size and systolic function noted.  Left Atrium The left atrial cavity is moderately dilated.  Right Atrium Normal right atrial cavity size noted. The inferior vena cava is normally sized. Normal IVC inspiratory collapse of greater than 50% noted.  Aortic Valve The aortic valve is not well visualized. No significant aortic valve regurgitation is present. No hemodynamically significant aortic valve stenosis is  present.  Mitral Valve Mitral valve is not well visualized. Mild mitral valve regurgitation is present. No significant mitral valve stenosis is present.  Tricuspid Valve The tricuspid valve is grossly normal in structure. Mild tricuspid valve regurgitation is present. Estimated right ventricular systolic pressure from tricuspid regurgitation is mildly elevated (35-45 mmHg). Mild to moderate pulmonary hypertension is present. No evidence of significant tricuspid valve stenosis is present.  Pulmonic Valve The pulmonic valve is not well visualized. There is trace pulmonic valve regurgitation present. There is no pulmonic valve stenosis present.  Greater Vessels No dilation of the aortic root is present.  Pericardium The pericardium is normal. There is no evidence of pericardial effusion. .        PSH:  HYSTERECTOMY BREAST SURGERY  CHOLECYSTECTOMY TOTAL KNEE ARTHROPLASTY              Anesthesia Plan    ASA 4     general with block and Mell     (Patient identified; pre-operative vital signs, all relevant labs/studies, complete medical/surgical/anesthetic history, full medication list, full allergy list, and NPO status obtained/reviewed; physical assessment performed; anesthetic options, side effects, potential complications, risks, and benefits discussed; questions answered; written anesthesia consent obtained; patient cleared for procedure; anesthesia machine and equipment checked and functioning    Possible post-op ventilation)  intravenous induction   Postoperative Plan: Expected vent after surgery  Anesthetic plan, risks, benefits, and alternatives have been provided, discussed and informed consent has been obtained with: patient.    Plan discussed with CRNA and CAA.    CODE STATUS:    While under anesthesia and immediate perioperative period:  Code Status: CPR - Full Support

## 2024-02-25 NOTE — SIGNIFICANT NOTE
Case Management Discharge Note      Final Note: (P) d/c to [facility] Post Acute Provider List?: N/A    Selected Continued Care - Discharged on 2/25/2024 Admission date: 2/16/2024 - Discharge disposition: Rehab Facility or Unit (DC - External)                  Selected Continued Care - Prior Encounters Includes continued care and service providers with selected services from prior encounters from 11/18/2023 to 2/25/2024      Discharged on 12/4/2023 Admission date: 11/24/2023 - Discharge disposition: Skilled Nursing Facility (DC - External)      Destination       Service Provider Selected Services Address Phone Fax Patient Preferred    [facility] [address] [facility] [place] [address] [phone] --              Home Medical Care       Service Provider Selected Services Address Phone Fax Patient Preferred    [facility] [address] [place] [address], [address] [address] [phone] --                               Final Discharge Disposition Code: (P) 03 - skilled nursing facility (SNF)

## 2024-02-25 NOTE — PLAN OF CARE
Goal Outcome Evaluation:   ST has been following for swallow evaluation. Pt has been made comfort care and will go back to nursing facility with Hospice. Pt s no longer appropriate for ST services and will be Dced from ST caseload. Please re-consult if any further needs arise.

## 2024-02-25 NOTE — SIGNIFICANT NOTE
Right pigtail chest tube removed at end-expiration, catheter viewed to be intact.  Site dressed with Vaseline impregnated guaze, 4x4s, and firmly taped. Patient tolerated the procedure with minimal discomfort.

## 2024-02-25 NOTE — PROGRESS NOTES
Dr. Meraz discussed with Dr. Queen. Plans noted to transition to hospice/comfort care.  We will cancel surgery scheduled for tomorrow.  Trend chest tube output and possibly remove tomorrow.    Nohemi Mckeon, JASPREET, APRN

## 2024-02-25 NOTE — NURSING NOTE
Report called to [facility] rehab. EMS at bedside.  Vel (spouse) notified of patient being transported. Patient being transferred for pallative/hospice care.

## 2024-02-29 LAB
CHYLO FLD QL: ABNORMAL
SPECIMEN SOURCE: ABNORMAL
TRIGL FLD-MCNC: 2308 MG/DL

## 2024-03-01 LAB
MYCOBACTERIUM SPEC CULT: NORMAL
NIGHT BLUE STAIN TISS: NORMAL

## 2024-03-02 LAB
QT INTERVAL: 283 MS
QTC INTERVAL: 396 MS

## 2024-03-03 LAB
FUNGUS WND CULT: NORMAL
MYCOBACTERIUM SPEC CULT: NORMAL
NIGHT BLUE STAIN TISS: NORMAL

## 2024-03-08 LAB
MYCOBACTERIUM SPEC CULT: NORMAL
NIGHT BLUE STAIN TISS: NORMAL

## 2024-03-10 LAB
FUNGUS WND CULT: NORMAL
MYCOBACTERIUM SPEC CULT: NORMAL
NIGHT BLUE STAIN TISS: NORMAL

## 2024-03-14 LAB — FUNGUS WND CULT: ABNORMAL

## 2024-03-15 LAB
MYCOBACTERIUM SPEC CULT: NORMAL
NIGHT BLUE STAIN TISS: NORMAL

## 2024-03-17 LAB
FUNGUS WND CULT: NORMAL
MYCOBACTERIUM SPEC CULT: NORMAL
NIGHT BLUE STAIN TISS: NORMAL

## 2024-03-22 LAB
MYCOBACTERIUM SPEC CULT: NORMAL
NIGHT BLUE STAIN TISS: NORMAL

## 2024-03-24 LAB
MYCOBACTERIUM SPEC CULT: NORMAL
NIGHT BLUE STAIN TISS: NORMAL

## 2024-03-29 LAB
MYCOBACTERIUM SPEC CULT: NORMAL
NIGHT BLUE STAIN TISS: NORMAL

## 2024-03-31 LAB
MYCOBACTERIUM SPEC CULT: NORMAL
NIGHT BLUE STAIN TISS: NORMAL

## (undated) DEVICE — PK TOTL KN 50

## (undated) DEVICE — UNDERGLV SURG BIOGEL INDICAT PI SZ8.5 BLU

## (undated) DEVICE — SYR LUERLOK 50ML

## (undated) DEVICE — NEEDLE, QUINCKE, 20GX3.5": Brand: MEDLINE

## (undated) DEVICE — ANTIBACTERIAL UNDYED BRAIDED (POLYGLACTIN 910), SYNTHETIC ABSORBABLE SUTURE: Brand: COATED VICRYL

## (undated) DEVICE — SUT ETHLN 2/0 PS 18IN 585H

## (undated) DEVICE — DUAL CUT SAGITTAL BLADE

## (undated) DEVICE — GLV SURG SENSICARE PI ORTHO SZ8.5 LF STRL

## (undated) DEVICE — CORI KNEE TENSIONER CARTRIDGE: Brand: CORI

## (undated) DEVICE — 450 ML BOTTLE OF 0.05% CHLORHEXIDINE GLUCONATE IN 99.95% STERILE WATER FOR IRRIGATION, USP AND APPLICATOR.: Brand: IRRISEPT ANTIMICROBIAL WOUND LAVAGE

## (undated) DEVICE — ZIP 24 SURGICAL SKIN CLOSURE DEVICE, PSA: Brand: ZIP 24 SURGICAL SKIN CLOSURE DEVICE

## (undated) DEVICE — STERILE PATIENT PROTECTIVE PAD FOR IMP® KNEE POSITIONERS & COHESIVE WRAP (10 / CASE): Brand: DE MAYO KNEE POSITIONER®

## (undated) DEVICE — MIS 3.2MM X 45MM RIMMED PIN STERILE: Brand: HARMONY

## (undated) DEVICE — SOL IRRIG NACL 1000ML

## (undated) DEVICE — APPL CHLORAPREP HI/LITE 26ML ORNG

## (undated) DEVICE — PICO 7 10CM X 30CM: Brand: PICO™ 7

## (undated) DEVICE — DISPOSABLE TOURNIQUET CUFF 30"X4", 1-LINE, WHITE, STERILE, 1EA/PK, 10PK/CS: Brand: ASP MEDICAL

## (undated) DEVICE — COVER,MAYO STAND,STERILE: Brand: MEDLINE

## (undated) DEVICE — SUT ETHIB 2 CV V37 MS/4 30IN MX69G

## (undated) DEVICE — GLV SURG SENSICARE PI ORTHO PF SZ7 LF STRL

## (undated) DEVICE — ZIPPERED TOGA, 2X LARGE: Brand: FLYTE

## (undated) DEVICE — Device

## (undated) DEVICE — SOL ISO/ALC RUB 70PCT 4OZ

## (undated) DEVICE — TBG PENCL TELESCP MEGADYNE SMOKE EVAC 15FT

## (undated) DEVICE — UNDRGLV SURG BIOGEL PUNCTUREINDICATION SZ7 PF STRL

## (undated) DEVICE — KT SURG TURNOVER 050

## (undated) DEVICE — WRAP KNEE COLD THERAPY

## (undated) DEVICE — CEMENT MIXING SYSTEM WITH FEMORAL BREAKWAY NOZZLE: Brand: REVOLUTION

## (undated) DEVICE — CVR HNDL LT SURG ACCSSRY BLU STRL